# Patient Record
Sex: FEMALE | Race: WHITE | Employment: OTHER | URBAN - METROPOLITAN AREA
[De-identification: names, ages, dates, MRNs, and addresses within clinical notes are randomized per-mention and may not be internally consistent; named-entity substitution may affect disease eponyms.]

---

## 2017-04-05 ENCOUNTER — HOSPITAL ENCOUNTER (INPATIENT)
Age: 82
LOS: 1 days | Discharge: HOME HEALTH | DRG: 293 | End: 2017-04-06
Attending: EMERGENCY MEDICINE | Admitting: HOSPITALIST
Payer: MEDICARE

## 2017-04-05 ENCOUNTER — APPOINTMENT (OUTPATIENT)
Dept: CT IMAGING | Age: 82
DRG: 293 | End: 2017-04-05
Attending: EMERGENCY MEDICINE
Payer: MEDICARE

## 2017-04-05 ENCOUNTER — APPOINTMENT (OUTPATIENT)
Dept: GENERAL RADIOLOGY | Age: 82
DRG: 293 | End: 2017-04-05
Attending: EMERGENCY MEDICINE
Payer: MEDICARE

## 2017-04-05 DIAGNOSIS — R77.8 ELEVATED TROPONIN: ICD-10-CM

## 2017-04-05 DIAGNOSIS — I50.41 ACUTE COMBINED SYSTOLIC AND DIASTOLIC CONGESTIVE HEART FAILURE (HCC): Primary | ICD-10-CM

## 2017-04-05 DIAGNOSIS — I48.20 CHRONIC ATRIAL FIBRILLATION (HCC): ICD-10-CM

## 2017-04-05 PROBLEM — I50.9 CHF, ACUTE ON CHRONIC (HCC): Status: ACTIVE | Noted: 2017-04-05

## 2017-04-05 LAB
ALBUMIN SERPL BCP-MCNC: 3.7 G/DL (ref 3.4–5)
ALBUMIN/GLOB SERPL: 1.1 {RATIO} (ref 0.8–1.7)
ALP SERPL-CCNC: 128 U/L (ref 45–117)
ALT SERPL-CCNC: 14 U/L (ref 13–56)
ANION GAP BLD CALC-SCNC: 11 MMOL/L (ref 3–18)
APPEARANCE UR: CLEAR
AST SERPL W P-5'-P-CCNC: 24 U/L (ref 15–37)
BASOPHILS # BLD AUTO: 0 K/UL (ref 0–0.06)
BASOPHILS # BLD: 0 % (ref 0–2)
BILIRUB SERPL-MCNC: 0.9 MG/DL (ref 0.2–1)
BILIRUB UR QL: NEGATIVE
BNP SERPL-MCNC: 5645 PG/ML (ref 0–1800)
BUN SERPL-MCNC: 17 MG/DL (ref 7–18)
BUN/CREAT SERPL: 19 (ref 12–20)
CALCIUM SERPL-MCNC: 9.3 MG/DL (ref 8.5–10.1)
CHLORIDE SERPL-SCNC: 101 MMOL/L (ref 100–108)
CK MB CFR SERPL CALC: 1.9 % (ref 0–4)
CK MB SERPL-MCNC: 1.3 NG/ML (ref 5–25)
CK SERPL-CCNC: 70 U/L (ref 26–192)
CO2 SERPL-SCNC: 27 MMOL/L (ref 21–32)
COLOR UR: YELLOW
CREAT SERPL-MCNC: 0.91 MG/DL (ref 0.6–1.3)
DIFFERENTIAL METHOD BLD: ABNORMAL
EOSINOPHIL # BLD: 0 K/UL (ref 0–0.4)
EOSINOPHIL NFR BLD: 0 % (ref 0–5)
ERYTHROCYTE [DISTWIDTH] IN BLOOD BY AUTOMATED COUNT: 16.4 % (ref 11.6–14.5)
GLOBULIN SER CALC-MCNC: 3.5 G/DL (ref 2–4)
GLUCOSE SERPL-MCNC: 109 MG/DL (ref 74–99)
GLUCOSE UR STRIP.AUTO-MCNC: NEGATIVE MG/DL
HCT VFR BLD AUTO: 46.1 % (ref 35–45)
HGB BLD-MCNC: 15 G/DL (ref 12–16)
HGB UR QL STRIP: NEGATIVE
KETONES UR QL STRIP.AUTO: NEGATIVE MG/DL
LEUKOCYTE ESTERASE UR QL STRIP.AUTO: NEGATIVE
LYMPHOCYTES # BLD AUTO: 5 % (ref 21–52)
LYMPHOCYTES # BLD: 0.6 K/UL (ref 0.9–3.6)
MCH RBC QN AUTO: 26.5 PG (ref 24–34)
MCHC RBC AUTO-ENTMCNC: 32.5 G/DL (ref 31–37)
MCV RBC AUTO: 81.6 FL (ref 74–97)
MONOCYTES # BLD: 0.5 K/UL (ref 0.05–1.2)
MONOCYTES NFR BLD AUTO: 5 % (ref 3–10)
NEUTS SEG # BLD: 10 K/UL (ref 1.8–8)
NEUTS SEG NFR BLD AUTO: 90 % (ref 40–73)
NITRITE UR QL STRIP.AUTO: NEGATIVE
PH UR STRIP: 6.5 [PH] (ref 5–8)
PLATELET # BLD AUTO: 198 K/UL (ref 135–420)
PMV BLD AUTO: 9.5 FL (ref 9.2–11.8)
POTASSIUM SERPL-SCNC: 4.6 MMOL/L (ref 3.5–5.5)
PROT SERPL-MCNC: 7.2 G/DL (ref 6.4–8.2)
PROT UR STRIP-MCNC: NEGATIVE MG/DL
RBC # BLD AUTO: 5.65 M/UL (ref 4.2–5.3)
SODIUM SERPL-SCNC: 139 MMOL/L (ref 136–145)
SP GR UR REFRACTOMETRY: 1.01 (ref 1–1.03)
TROPONIN I SERPL-MCNC: 0.05 NG/ML (ref 0–0.04)
TSH SERPL DL<=0.05 MIU/L-ACNC: 1.69 UIU/ML (ref 0.36–3.74)
UROBILINOGEN UR QL STRIP.AUTO: 0.2 EU/DL (ref 0.2–1)
WBC # BLD AUTO: 11.1 K/UL (ref 4.6–13.2)

## 2017-04-05 PROCEDURE — 99285 EMERGENCY DEPT VISIT HI MDM: CPT

## 2017-04-05 PROCEDURE — 82550 ASSAY OF CK (CPK): CPT | Performed by: EMERGENCY MEDICINE

## 2017-04-05 PROCEDURE — 71010 XR CHEST SNGL V: CPT

## 2017-04-05 PROCEDURE — 87086 URINE CULTURE/COLONY COUNT: CPT | Performed by: EMERGENCY MEDICINE

## 2017-04-05 PROCEDURE — 51702 INSERT TEMP BLADDER CATH: CPT

## 2017-04-05 PROCEDURE — 85025 COMPLETE CBC W/AUTO DIFF WBC: CPT | Performed by: EMERGENCY MEDICINE

## 2017-04-05 PROCEDURE — 83880 ASSAY OF NATRIURETIC PEPTIDE: CPT | Performed by: EMERGENCY MEDICINE

## 2017-04-05 PROCEDURE — 84443 ASSAY THYROID STIM HORMONE: CPT | Performed by: EMERGENCY MEDICINE

## 2017-04-05 PROCEDURE — 70450 CT HEAD/BRAIN W/O DYE: CPT

## 2017-04-05 PROCEDURE — 77030005514 HC CATH URETH FOL14 BARD -A

## 2017-04-05 PROCEDURE — 65660000000 HC RM CCU STEPDOWN

## 2017-04-05 PROCEDURE — 93005 ELECTROCARDIOGRAM TRACING: CPT

## 2017-04-05 PROCEDURE — 96374 THER/PROPH/DIAG INJ IV PUSH: CPT

## 2017-04-05 PROCEDURE — 74011250636 HC RX REV CODE- 250/636: Performed by: EMERGENCY MEDICINE

## 2017-04-05 PROCEDURE — 80053 COMPREHEN METABOLIC PANEL: CPT | Performed by: EMERGENCY MEDICINE

## 2017-04-05 PROCEDURE — 74011250637 HC RX REV CODE- 250/637: Performed by: EMERGENCY MEDICINE

## 2017-04-05 PROCEDURE — 81003 URINALYSIS AUTO W/O SCOPE: CPT | Performed by: EMERGENCY MEDICINE

## 2017-04-05 RX ORDER — CHOLECALCIFEROL (VITAMIN D3) 125 MCG
2000 CAPSULE ORAL DAILY
COMMUNITY
End: 2017-12-24

## 2017-04-05 RX ORDER — LISINOPRIL 5 MG/1
2.5 TABLET ORAL DAILY
Status: DISCONTINUED | OUTPATIENT
Start: 2017-04-06 | End: 2017-04-06

## 2017-04-05 RX ORDER — METOPROLOL SUCCINATE 25 MG/1
25 TABLET, EXTENDED RELEASE ORAL DAILY
Status: DISCONTINUED | OUTPATIENT
Start: 2017-04-06 | End: 2017-04-06 | Stop reason: HOSPADM

## 2017-04-05 RX ORDER — ACETAMINOPHEN 325 MG/1
500 TABLET ORAL
COMMUNITY
End: 2017-12-24

## 2017-04-05 RX ORDER — CLOPIDOGREL BISULFATE 75 MG/1
75 TABLET ORAL DAILY
COMMUNITY
End: 2017-12-24

## 2017-04-05 RX ORDER — FUROSEMIDE 10 MG/ML
40 INJECTION INTRAMUSCULAR; INTRAVENOUS
Status: COMPLETED | OUTPATIENT
Start: 2017-04-05 | End: 2017-04-05

## 2017-04-05 RX ORDER — FUROSEMIDE 20 MG/1
40 TABLET ORAL DAILY
COMMUNITY

## 2017-04-05 RX ORDER — FUROSEMIDE 10 MG/ML
20 INJECTION INTRAMUSCULAR; INTRAVENOUS DAILY
Status: DISCONTINUED | OUTPATIENT
Start: 2017-04-06 | End: 2017-04-06

## 2017-04-05 RX ORDER — SIMVASTATIN 40 MG/1
40 TABLET, FILM COATED ORAL
COMMUNITY
End: 2017-12-24

## 2017-04-05 RX ORDER — FAMOTIDINE 40 MG/1
40 TABLET, FILM COATED ORAL DAILY
COMMUNITY
End: 2017-12-24

## 2017-04-05 RX ORDER — POTASSIUM CHLORIDE 750 MG/1
TABLET, FILM COATED, EXTENDED RELEASE ORAL EVERY OTHER DAY
COMMUNITY

## 2017-04-05 RX ORDER — GUAIFENESIN 100 MG/5ML
162 LIQUID (ML) ORAL
Status: COMPLETED | OUTPATIENT
Start: 2017-04-05 | End: 2017-04-05

## 2017-04-05 RX ORDER — ALENDRONATE SODIUM 40 MG/1
40 TABLET ORAL
COMMUNITY

## 2017-04-05 RX ORDER — LISINOPRIL 20 MG/1
20 TABLET ORAL DAILY
COMMUNITY
End: 2017-12-24

## 2017-04-05 RX ADMIN — NITROGLYCERIN 1 INCH: 20 OINTMENT TOPICAL at 20:59

## 2017-04-05 RX ADMIN — FUROSEMIDE 40 MG: 10 INJECTION, SOLUTION INTRAMUSCULAR; INTRAVENOUS at 20:59

## 2017-04-05 RX ADMIN — ASPIRIN 81 MG CHEWABLE TABLET 162 MG: 81 TABLET CHEWABLE at 20:59

## 2017-04-05 NOTE — ED PROVIDER NOTES
HPI Comments: Maik Palma is a 80 y.o. Female with h/o chf, normally active, ambulatory with c/o increased sob, diff walking, generalized weakness for last 2-3 days, sig worse today where pt could not walk. Denies cp, cough, nvd abd pain. Increased swelling in le. No known fever. Dec appetite. Sx exacerbated with walking, standing. Is supposed to be on diuretic but does not take all the time    The history is provided by the patient and a relative (daughter). Past Medical History:   Diagnosis Date    Breast cancer ([facility] [address]     Chronic obstructive pulmonary disease ([facility] [address]     Hypertension        Past Surgical History:   Procedure Laterality Date    HX MASTECTOMY           History reviewed. No pertinent family history. Social History     Social History    Marital status:      Spouse name: N/A    Number of children: N/A    Years of education: N/A     Occupational History    Not on file. Social History Main Topics    Smoking status: Never Smoker    Smokeless tobacco: Not on file    Alcohol use No    Drug use: No    Sexual activity: Not on file     Other Topics Concern    Not on file     Social History Narrative    No narrative on file         ALLERGIES: Review of patient's allergies indicates no known allergies. Review of Systems   Constitutional: Positive for activity change and appetite change. Negative for diaphoresis and fever. HENT: Negative for sore throat and trouble swallowing. Eyes: Negative for visual disturbance. Respiratory: Positive for shortness of breath. Negative for cough and wheezing. Cardiovascular: Positive for leg swelling. Negative for chest pain. Gastrointestinal: Negative for abdominal pain, diarrhea and nausea. Endocrine: Negative for polyuria. Genitourinary: Negative for difficulty urinating. Musculoskeletal: Positive for gait problem. Skin: Negative for rash. Allergic/Immunologic: Negative for food allergies.    Neurological: Positive for weakness. Negative for syncope. Hematological: Bruises/bleeds easily. Psychiatric/Behavioral: Positive for confusion and sleep disturbance. Vitals:    04/05/17 1913   BP: (!) 173/117   Pulse: 71   Resp: 13   Temp: 99.8 °F (37.7 °C)   SpO2: 97%   Weight: 49.9 kg (110 lb)   Height: 4' 11\" (1.499 m)            Physical Exam   Constitutional: She is oriented to person, place, and time. She appears well-developed and well-nourished. No distress. Elderly appearing younger than stated age, pleasant     HENT:   Head: Normocephalic and atraumatic. Right Ear: External ear normal.   Left Ear: External ear normal.   Nose: Nose normal.   Mouth/Throat: Uvula is midline, oropharynx is clear and moist and mucous membranes are normal.   Eyes: Conjunctivae are normal. No scleral icterus. Neck: Neck supple. JVD present. Cardiovascular: Normal rate, regular rhythm and intact distal pulses. Murmur heard. Pulmonary/Chest: Effort normal. She has rales (bases). Abdominal: Soft. There is no tenderness. Musculoskeletal: She exhibits edema (ankles,feet). Neurological: She is alert and oriented to person, place, and time. Gait normal.   Skin: Skin is warm and dry. She is not diaphoretic. Psychiatric: Her behavior is normal.   Nursing note and vitals reviewed.        [facility]  ED Course       Procedures      Vitals:  Patient Vitals for the past 12 hrs:   Temp Pulse Resp BP SpO2   04/05/17 1930 - 78 26 192/84 97 %   04/05/17 1915 - 80 24 (!) 196/108 98 %   04/05/17 1913 99.8 °F (37.7 °C) 71 13 (!) 173/117 97 %         Medications ordered:   Medications   furosemide (LASIX) injection 40 mg (40 mg IntraVENous Given 4/5/17 2059)   nitroglycerin (NITROBID) 2 % ointment 1 Inch (1 Inch Topical Given 4/5/17 2059)   aspirin chewable tablet 162 mg (162 mg Oral Given 4/5/17 2059)         Lab findings:  Recent Results (from the past 12 hour(s))   EKG, 12 LEAD, INITIAL    Collection Time: 04/05/17  7:18 PM   Result Value Ref Range    Ventricular Rate 83 BPM    Atrial Rate 375 BPM    QRS Duration 136 ms    Q-T Interval 398 ms    QTC Calculation (Bezet) 467 ms    Calculated R Axis -130 degrees    Calculated T Axis 27 degrees    Diagnosis       Atrial fibrillation  Right bundle branch block , plus right ventricular hypertrophy  Lateral infarct , age undetermined  Abnormal ECG  No previous ECGs available     CBC WITH AUTOMATED DIFF    Collection Time: 04/05/17  7:30 PM   Result Value Ref Range    WBC 11.1 4.6 - 13.2 K/uL    RBC 5.65 (H) 4.20 - 5.30 M/uL    HGB 15.0 12.0 - 16.0 g/dL    HCT 46.1 (H) 35.0 - 45.0 %    MCV 81.6 74.0 - 97.0 FL    MCH 26.5 24.0 - 34.0 PG    MCHC 32.5 31.0 - 37.0 g/dL    RDW 16.4 (H) 11.6 - 14.5 %    PLATELET 073 713 - 205 K/uL    MPV 9.5 9.2 - 11.8 FL    NEUTROPHILS 90 (H) 40 - 73 %    LYMPHOCYTES 5 (L) 21 - 52 %    MONOCYTES 5 3 - 10 %    EOSINOPHILS 0 0 - 5 %    BASOPHILS 0 0 - 2 %    ABS. NEUTROPHILS 10.0 (H) 1.8 - 8.0 K/UL    ABS. LYMPHOCYTES 0.6 (L) 0.9 - 3.6 K/UL    ABS. MONOCYTES 0.5 0.05 - 1.2 K/UL    ABS. EOSINOPHILS 0.0 0.0 - 0.4 K/UL    ABS. BASOPHILS 0.0 0.0 - 0.06 K/UL    DF AUTOMATED     METABOLIC PANEL, COMPREHENSIVE    Collection Time: 04/05/17  7:30 PM   Result Value Ref Range    Sodium 139 136 - 145 mmol/L    Potassium 4.6 3.5 - 5.5 mmol/L    Chloride 101 100 - 108 mmol/L    CO2 27 21 - 32 mmol/L    Anion gap 11 3.0 - 18 mmol/L    Glucose 109 (H) 74 - 99 mg/dL    BUN 17 7.0 - 18 MG/DL    Creatinine 0.91 0.6 - 1.3 MG/DL    BUN/Creatinine ratio 19 12 - 20      GFR est AA >60 >60 ml/min/1.73m2    GFR est non-AA 57 (L) >60 ml/min/1.73m2    Calcium 9.3 8.5 - 10.1 MG/DL    Bilirubin, total 0.9 0.2 - 1.0 MG/DL    ALT (SGPT) 14 13 - 56 U/L    AST (SGOT) 24 15 - 37 U/L    Alk.  phosphatase 128 (H) 45 - 117 U/L    Protein, total 7.2 6.4 - 8.2 g/dL    Albumin 3.7 3.4 - 5.0 g/dL    Globulin 3.5 2.0 - 4.0 g/dL    A-G Ratio 1.1 0.8 - 1.7     CARDIAC PANEL,(CK, CKMB & TROPONIN)    Collection Time: 04/05/17  7:30 PM   Result Value Ref Range    CK 70 26 - 192 U/L    CK - MB 1.3 <3.6 ng/ml    CK-MB Index 1.9 0.0 - 4.0 %    Troponin-I, Qt. 0.05 (H) 0.0 - 0.045 NG/ML   PRO-BNP    Collection Time: 04/05/17  7:30 PM   Result Value Ref Range    NT pro-BNP 5645 (H) 0 - 1800 PG/ML   TSH 3RD GENERATION    Collection Time: 04/05/17  7:30 PM   Result Value Ref Range    TSH 1.69 0.36 - 3.74 uIU/mL   URINALYSIS W/ RFLX MICROSCOPIC    Collection Time: 04/05/17  7:55 PM   Result Value Ref Range    Color YELLOW      Appearance CLEAR      Specific gravity 1.010 1.005 - 1.030      pH (UA) 6.5 5.0 - 8.0      Protein NEGATIVE  NEG mg/dL    Glucose NEGATIVE  NEG mg/dL    Ketone NEGATIVE  NEG mg/dL    Bilirubin NEGATIVE  NEG      Blood NEGATIVE  NEG      Urobilinogen 0.2 0.2 - 1.0 EU/dL    Nitrites NEGATIVE  NEG      Leukocyte Esterase NEGATIVE  NEG         EKG interpretation by ED Physician:  afib with rbbb, no acute st tw changes  Rate 83, qtc 467  No previous  But there is mention in [facility] of afib in 2014    X-Ray, CT or other radiology findings or impressions:  CT HEAD WO CONT    (Results Pending)   XR CHEST SNGL V    (Results Pending)   ct head with nap per prelim read  cxr cardiomegaly, with edema    Progress notes, Consult notes or additional Procedure notes:   Suspect sx related to worsening chf vs infection. Will give lasix, nitro paste  Dw/ pt, daughter, grandson results, need for admission  D/w dr Gari Dance on call for hospitalist who will admit    ED Critical Care Note    System at risk for life threatening failure: cardiac, pulm, renal  Associated problems: elevated trop, htn, afib, elevated bnp    Critical Care services provided: bedside management, consult, documentation  Excluded procedures (time not included in critical care): ecg interp    Total Critical Care Time (in minutes) 34          Reevaluation of patient:   Stable for admission    Disposition:  Diagnosis:   1.  Acute combined systolic and diastolic congestive heart failure (HCC)    2. Elevated troponin        Disposition: admit      Follow-up Information     None            Patient's Medications    No medications on file

## 2017-04-05 NOTE — ED TRIAGE NOTES
PAtient arrived by EMS with chief c/o generalized weakness. Per EMS patient can normally wlak around the house at home and today she couldn't get up. Patient states shes been weak for 3 days. Patient alert and oriented X4.

## 2017-04-05 NOTE — IP AVS SNAPSHOT
Current Discharge Medication List  
  
CONTINUE these medications which have NOT CHANGED Dose & Instructions Dispensing Information Comments Morning Noon Evening Bedtime  
 alendronate 40 mg tablet Commonly known as:  FOSAMAX Dose:  40 mg Take 40 mg by mouth every seven (7) days. Refills:  0  
     
   
   
   
  
 LASIX 20 mg tablet Generic drug:  furosemide Dose:  20 mg Take 20 mg by mouth every other day. Refills:  0  
     
   
   
   
  
 lisinopril 20 mg tablet Commonly known as:  Scar Erin Your last dose was:  4/6/2017 Your next dose is:  4/7/2017 Dose:  20 mg Take 20 mg by mouth daily. Refills:  0 PEPCID 40 mg tablet Generic drug:  famotidine Dose:  40 mg Take 40 mg by mouth daily. Refills:  0 PLAVIX 75 mg Tab Generic drug:  clopidogrel Your last dose was:  4/6/2017 Your next dose is:  4/7/2017 Dose:  75 mg Take 75 mg by mouth daily. Refills:  0  
     
   
   
   
  
 potassium chloride SR 10 mEq tablet Commonly known as:  KLOR-CON 10 Take  by mouth every other day. Refills:  0  
     
   
   
   
  
 TYLENOL 325 mg tablet Generic drug:  acetaminophen Dose:  325 mg Take 325 mg by mouth every six (6) hours as needed for Pain. Indications: Pain Refills:  0  
     
   
   
   
  
 VITAMIN D3 2,000 unit Tab Generic drug:  cholecalciferol (vitamin D3) Your last dose was:  4/6/2017 Your next dose is:  4/7/2017 Dose:  2000 Units Take 2,000 Units by mouth daily. Refills:  0 ZOCOR 40 mg tablet Generic drug:  simvastatin Your last dose was:  4/6/2017 Your next dose is:  4/7/2017 Dose:  40 mg Take 40 mg by mouth nightly. Refills:  0

## 2017-04-05 NOTE — IP AVS SNAPSHOT
Summary of Care Report The Summary of Care report has been created to help improve care coordination. Users with access to [employer] or [address] (Web-based application) may access additional patient information including the Discharge Summary. If you are not currently a [address] user and need more information, please call the number listed below in the [place] [address] section and ask to be connected with Medical Records. Facility Information Name Address Phone [address] [facility] [address] [facility] [address] [id number] [phone] Patient Information Patient Name Sex KRYSTEN Landaverde ([id number]) Female 10/21/1920 Discharge Information Admitting Provider Service Area Unit [facility], DO / [address] Cardiac [facility] / [phone] Discharge Provider Discharge Date/Time Discharge Disposition Destination (none) 2017 (Pending) [facility] (none) Patient Language Language ENGLISH [13] Hospital Problems as of 2017  Never Reviewed Class Noted - Resolved Last Modified POA Active Problems CHF, acute on chronic ([facility] [address]  2017 - Present 2017 by Ector Beckett MD Unknown Entered by Ector Beckett MD  
  
You are allergic to the following No active allergies Current Discharge Medication List  
  
CONTINUE these medications which have NOT CHANGED Dose & Instructions Dispensing Information Comments  
 alendronate 40 mg tablet Commonly known as:  FOSAMAX Dose:  40 mg Take 40 mg by mouth every seven (7) days. Refills:  0  
   
 LASIX 20 mg tablet Generic drug:  furosemide Dose:  20 mg Take 20 mg by mouth every other day. Refills:  0  
   
 lisinopril 20 mg tablet Commonly known as:  Salvadore Dine Dose:  20 mg Take 20 mg by mouth daily. Refills:  0 PEPCID 40 mg tablet Generic drug:  famotidine Dose:  40 mg Take 40 mg by mouth daily. Refills:  0 PLAVIX 75 mg Tab Generic drug:  clopidogrel Dose:  75 mg Take 75 mg by mouth daily. Refills:  0  
   
 potassium chloride SR 10 mEq tablet Commonly known as:  KLOR-CON 10 Take  by mouth every other day. Refills:  0  
   
 TYLENOL 325 mg tablet Generic drug:  acetaminophen Dose:  325 mg Take 325 mg by mouth every six (6) hours as needed for Pain. Indications: Pain Refills:  0  
   
 VITAMIN D3 2,000 unit Tab Generic drug:  cholecalciferol (vitamin D3) Dose:  2000 Units Take 2,000 Units by mouth daily. Refills:  0 ZOCOR 40 mg tablet Generic drug:  simvastatin Dose:  40 mg Take 40 mg by mouth nightly. Refills:  0 Follow-up Information Follow up With Details Comments Contact Info Not On File [facility] Schedule an appointment as soon as possible for a visit in 1 week  Not On File (62) Patient has a PCP but that physician is not listed in [facility]. Discharge Instructions Patient armband removed and shredded. DISCHARGE SUMMARY from Nurse The following personal items are in your possession at time of discharge: 
 
Dental Appliances: At home Visual Aid: Glasses, At bedside, With patient Home Medications: None Jewelry: Ring (sent home) Clothing: None (sent home) Other Valuables: None Personal Items Sent to Safe: none PATIENT INSTRUCTIONS: 
 
After general anesthesia or intravenous sedation, for 24 hours or while taking prescription Narcotics: · Limit your activities · Do not drive and operate hazardous machinery · Do not make important personal or business decisions · Do  not drink alcoholic beverages · If you have not urinated within 8 hours after discharge, please contact your surgeon on call. Report the following to your surgeon: · Excessive pain, swelling, redness or odor of or around the surgical area · Temperature over 100.5 · Nausea and vomiting lasting longer than 4 hours or if unable to take medications · Any signs of decreased circulation or nerve impairment to extremity: change in color, persistent  numbness, tingling, coldness or increase pain · Any questions What to do at Home: 
Recommended activity: Activity as tolerated. If you experience any of the following symptoms shortness of breath, difficulty breathing, fever greater than 100.5, nausea, vomiting, diarrhea, bleeding, weight gain of 5 pounds or more in one week, change in mental status or increasing leg pain please follow up with your primary care provider or call 911. *  Please give a list of your current medications to your Primary Care Provider. *  Please update this list whenever your medications are discontinued, doses are 
    changed, or new medications (including over-the-counter products) are added. *  Please carry medication information at all times in case of emergency situations. These are general instructions for a healthy lifestyle: No smoking/ No tobacco products/ Avoid exposure to second hand smoke Surgeon General's Warning:  Quitting smoking now greatly reduces serious risk to your health. Obesity, smoking, and sedentary lifestyle greatly increases your risk for illness A healthy diet, regular physical exercise & weight monitoring are important for maintaining a healthy lifestyle You may be retaining fluid if you have a history of heart failure or if you experience any of the following symptoms:  Weight gain of 3 pounds or more overnight or 5 pounds in a week, increased swelling in our hands or feet or shortness of breath while lying flat in bed. Please call your doctor as soon as you notice any of these symptoms; do not wait until your next office visit.  
 
Recognize signs and symptoms of STROKE: 
 
 F-face looks uneven A-arms unable to move or move unevenly S-speech slurred or non-existent T-time-call 911 as soon as signs and symptoms begin-DO NOT go Back to bed or wait to see if you get better-TIME IS BRAIN. Warning Signs of HEART ATTACK Call 911 if you have these symptoms: 
? Chest discomfort. Most heart attacks involve discomfort in the center of the chest that lasts more than a few minutes, or that goes away and comes back. It can feel like uncomfortable pressure, squeezing, fullness, or pain. ? Discomfort in other areas of the upper body. Symptoms can include pain or discomfort in one or both arms, the back, neck, jaw, or stomach. ? Shortness of breath with or without chest discomfort. ? Other signs may include breaking out in a cold sweat, nausea, or lightheadedness. Don't wait more than five minutes to call [address]! Fast action can save your life. Calling 911 is almost always the fastest way to get lifesaving treatment. Emergency Medical Services staff can begin treatment when they arrive  up to an hour sooner than if someone gets to the hospital by car. The discharge information has been reviewed with the patient and caregiver. The patient and caregiver verbalized understanding. Discharge medications reviewed with the patient and guardian and appropriate educational materials and side effects teaching were provided. Chart Review Routing History No Routing History on File

## 2017-04-05 NOTE — IP AVS SNAPSHOT
Adriel Flow 
 
 
 [address] [place] [address] 
[phone] Patient: Lita Sprague MRN: [id number] You are allergic to the following No active allergies Recent Documentation Height Weight Breastfeeding? BMI OB Status Smoking Status 1.372 m 49 kg No 26.04 kg/m2 Postmenopausal Never Smoker Unresulted Labs Order Current Status CULTURE, URINE Preliminary result Emergency Contacts Name Discharge Info Relation Home Work Mobile Sommer Travis DISCHARGE CAREGIVER [3] Daughter [21] [phone] About your hospitalization You were admitted on:  April 5, 2017 You last received care in the:  [address] You were discharged on:  April 6, 2017 Unit phone number:  [phone] Why you were hospitalized Your primary diagnosis was:  Not on File Your diagnoses also included:  Chf, Acute On Chronic (Hcc) Providers Seen During Your Hospitalizations Provider Role Specialty Primary office phone Xiomara Potter MD Attending Provider Emergency Medicine [phone] Cleve Cotton MD Attending Provider [facility] [phone] Aleksey Gentile DO Attending Provider Internal Medicine [phone] Your Primary Care Physician (PCP) Primary Care Physician Office Phone Office Fax NOT ON FILE ** None ** ** None ** Follow-up Information Follow up With Details Comments Contact Info Not On File Bsi Schedule an appointment as soon as possible for a visit in 1 week  Not On File (62) Patient has a PCP but that physician is not listed in [facility]. Current Discharge Medication List  
  
CONTINUE these medications which have NOT CHANGED Dose & Instructions Dispensing Information Comments Morning Noon Evening Bedtime  
 alendronate 40 mg tablet Commonly known as:  FOSAMAX Your last dose was: Your next dose is:    
   
   
 Dose:  40 mg Take 40 mg by mouth every seven (7) days. Refills:  0  
     
   
   
   
  
 LASIX 20 mg tablet Generic drug:  furosemide Your last dose was: Your next dose is:    
   
   
 Dose:  20 mg Take 20 mg by mouth every other day. Refills:  0  
     
   
   
   
  
 lisinopril 20 mg tablet Commonly known as:  Fred Hannahh Your last dose was: Your next dose is:    
   
   
 Dose:  20 mg Take 20 mg by mouth daily. Refills:  0 PEPCID 40 mg tablet Generic drug:  famotidine Your last dose was: Your next dose is:    
   
   
 Dose:  40 mg Take 40 mg by mouth daily. Refills:  0 PLAVIX 75 mg Tab Generic drug:  clopidogrel Your last dose was: Your next dose is:    
   
   
 Dose:  75 mg Take 75 mg by mouth daily. Refills:  0  
     
   
   
   
  
 potassium chloride SR 10 mEq tablet Commonly known as:  KLOR-CON 10 Your last dose was: Your next dose is: Take  by mouth every other day. Refills:  0  
     
   
   
   
  
 TYLENOL 325 mg tablet Generic drug:  acetaminophen Your last dose was: Your next dose is:    
   
   
 Dose:  325 mg Take 325 mg by mouth every six (6) hours as needed for Pain. Indications: Pain Refills:  0  
     
   
   
   
  
 VITAMIN D3 2,000 unit Tab Generic drug:  cholecalciferol (vitamin D3) Your last dose was: Your next dose is:    
   
   
 Dose:  2000 Units Take 2,000 Units by mouth daily. Refills:  0 ZOCOR 40 mg tablet Generic drug:  simvastatin Your last dose was: Your next dose is:    
   
   
 Dose:  40 mg Take 40 mg by mouth nightly. Refills:  0 Discharge Instructions Patient armband removed and shredded. DISCHARGE SUMMARY from Nurse The following personal items are in your possession at time of discharge: 
 
Dental Appliances: At home Visual Aid: Glasses, At bedside, With patient Home Medications: None Jewelry: Ring (sent home) Clothing: None (sent home) Other Valuables: None Personal Items Sent to Safe: none PATIENT INSTRUCTIONS: 
 
After general anesthesia or intravenous sedation, for 24 hours or while taking prescription Narcotics: · Limit your activities · Do not drive and operate hazardous machinery · Do not make important personal or business decisions · Do  not drink alcoholic beverages · If you have not urinated within 8 hours after discharge, please contact your surgeon on call. Report the following to your surgeon: 
· Excessive pain, swelling, redness or odor of or around the surgical area · Temperature over 100.5 · Nausea and vomiting lasting longer than 4 hours or if unable to take medications · Any signs of decreased circulation or nerve impairment to extremity: change in color, persistent  numbness, tingling, coldness or increase pain · Any questions What to do at Home: 
Recommended activity: Activity as tolerated. If you experience any of the following symptoms shortness of breath, difficulty breathing, fever greater than 100.5, nausea, vomiting, diarrhea, bleeding, weight gain of 5 pounds or more in one week, change in mental status or increasing leg pain please follow up with your primary care provider or call 911. *  Please give a list of your current medications to your Primary Care Provider. *  Please update this list whenever your medications are discontinued, doses are 
    changed, or new medications (including over-the-counter products) are added. *  Please carry medication information at all times in case of emergency situations. These are general instructions for a healthy lifestyle: No smoking/ No tobacco products/ Avoid exposure to second hand smoke Surgeon General's Warning:  Quitting smoking now greatly reduces serious risk to your health. Obesity, smoking, and sedentary lifestyle greatly increases your risk for illness A healthy diet, regular physical exercise & weight monitoring are important for maintaining a healthy lifestyle You may be retaining fluid if you have a history of heart failure or if you experience any of the following symptoms:  Weight gain of 3 pounds or more overnight or 5 pounds in a week, increased swelling in our hands or feet or shortness of breath while lying flat in bed. Please call your doctor as soon as you notice any of these symptoms; do not wait until your next office visit. Recognize signs and symptoms of STROKE: 
 
F-face looks uneven A-arms unable to move or move unevenly S-speech slurred or non-existent T-time-call 911 as soon as signs and symptoms begin-DO NOT go Back to bed or wait to see if you get better-TIME IS BRAIN. Warning Signs of HEART ATTACK Call 911 if you have these symptoms: 
? Chest discomfort. Most heart attacks involve discomfort in the center of the chest that lasts more than a few minutes, or that goes away and comes back. It can feel like uncomfortable pressure, squeezing, fullness, or pain. ? Discomfort in other areas of the upper body. Symptoms can include pain or discomfort in one or both arms, the back, neck, jaw, or stomach. ? Shortness of breath with or without chest discomfort. ? Other signs may include breaking out in a cold sweat, nausea, or lightheadedness. Don't wait more than five minutes to call [address]! Fast action can save your life. Calling 911 is almost always the fastest way to get lifesaving treatment. Emergency Medical Services staff can begin treatment when they arrive  up to an hour sooner than if someone gets to the hospital by car. The discharge information has been reviewed with the patient and caregiver. The patient and caregiver verbalized understanding. Discharge medications reviewed with the patient and guardian and appropriate educational materials and side effects teaching were provided. Discharge Instructions Attachments/References HEART FAILURE: AVOIDING TRIGGERS (ENGLISH) HEART FAILURE: GENERAL INFO (ENGLISH) Discharge Orders None [employer] Announcement We are excited to announce that we are making your provider's discharge notes available to you in [email or web address]. You will see these notes when they are completed and signed by the physician that discharged you from your recent hospital stay. If you have any questions or concerns about any information you see in [email or web address], please call the Health Information Department where you were seen or reach out to your Primary Care Provider for more information about your plan of care. Introducing [facility] SERVICES! Jersey Miramontes introduces [email or web address] patient portal. Now you can access parts of your medical record, email your doctor's office, and request medication refills online. 1. In your internet browser, go to [email or web address] 2. Click on the First Time User? Click Here link in the Sign In box. You will see the New Member Sign Up page. 3. Enter your [email or web address] Access Code exactly as it appears below. You will not need to use this code after youve completed the sign-up process. If you do not sign up before the expiration date, you must request a new code. · [email or web address] Access Code: [email or web address] Expires: 7/4/2017  7:19 PM 
 
4. Enter the last four digits of your Social Security Number (xxxx) and Date of Birth (mm/dd/yyyy) as indicated and click Submit. You will be taken to the next sign-up page. 5. Create a [employer]t ID. This will be your [email or web address] login ID and cannot be changed, so think of one that is secure and easy to remember. 6. Create a [employer] password. You can change your password at any time. 7. Enter your Password Reset Question and Answer. This can be used at a later time if you forget your password. 8. Enter your e-mail address. You will receive e-mail notification when new information is available in [address] 9. Click Sign Up. You can now view and download portions of your medical record. 10. Click the Download Summary menu link to download a portable copy of your medical information. If you have questions, please visit the Frequently Asked Questions section of the [employer] website. Remember, [employer] is NOT to be used for urgent needs. For medical emergencies, dial 911. Now available from your iPhone and Android! General Information Please provide this summary of care documentation to your next provider. Patient Signature:  ____________________________________________________________ Date:  ____________________________________________________________  
  
Guillermo Kaushik Provider Signature:  ____________________________________________________________ Date:  ____________________________________________________________ More Information Avoiding Triggers With Heart Failure: Care Instructions Your Care Instructions Triggers are anything that make your heart failure flare up. A flare-up is also called \"sudden heart failure\" or \"acute heart failure. \" When you have a flare-up, fluid builds up in your lungs, and you have problems breathing. You might need to go to the hospital. By watching for changes in your condition and avoiding triggers, you can prevent heart failure flare-ups. Follow-up care is a key part of your treatment and safety. Be sure to make and go to all appointments, and call your doctor if you are having problems. It's also a good idea to know your test results and keep a list of the medicines you take. How can you care for yourself at home? Watch for changes in your weight and condition · Weigh yourself without clothing at the same time each day. Record your weight. Call your doctor if you gain 3 pounds or more in 2 to 3 days. A sudden weight gain may mean that your heart failure is getting worse. · Keep a daily record of your symptoms. Write down any changes in how you feel, such as new shortness of breath, cough, or problems eating. Also record if your ankles are more swollen than usual and if you have to urinate in the night more often. Note anything that you ate or did that could have triggered these changes. Limit sodium Sodium causes your body to hold on to extra water. This may cause your heart failure symptoms to get worse. People get most of their sodium from processed foods. Fast food and restaurant meals also tend to be very high in sodium. · Your doctor may suggest that you limit sodium to 2,000 milligrams (mg) a day or less. That is less than 1 teaspoon of salt a day, including all the salt you eat in cooking or in packaged foods. · Read food labels on cans and food packages. They tell you how much sodium you get in one serving. Check the serving size. If you eat more than one serving, you are getting more sodium. · Be aware that sodium can come in forms other than salt, including monosodium glutamate (MSG), sodium citrate, and sodium bicarbonate (baking soda). MSG is often added to Asian food. You can sometimes ask for food without MSG or salt. · Slowly reducing salt will help you adjust to the taste. Take the salt shaker off the table. · Flavor your food with garlic, lemon juice, onion, vinegar, herbs, and spices instead of salt. Do not use soy sauce, steak sauce, onion salt, garlic salt, mustard, or ketchup on your food, unless it is labeled \"low-sodium\" or \"low-salt. \" 
· Make your own salad dressings, sauces, and ketchup without adding salt.  
· Use fresh or frozen ingredients, instead of canned ones, whenever you can. Choose low-sodium canned goods. · Eat less processed food and food from restaurants, including fast food. Exercise as directed Moderate, regular exercise is very good for your heart. It improves your blood flow and helps control your weight. But too much exercise can stress your heart and cause a heart failure flare-up. · Check with your doctor before you start an exercise program. 
· Walking is an easy way to get exercise. Start out slowly. Gradually increase the length and pace of your walk. Swimming, riding a bike, and using a treadmill are also good forms of exercise. · When you exercise, watch for signs that your heart is working too hard. You are pushing yourself too hard if you cannot talk while you are exercising. If you become short of breath or dizzy or have chest pain, stop, sit down, and rest. 
· Do not exercise when you do not feel well. Take medicines correctly · Take your medicines exactly as prescribed. Call your doctor if you think you are having a problem with your medicine. · Make a list of all the medicines you take. Include those prescribed to you by other doctors and any over-the-counter medicines, vitamins, or supplements you take. Take this list with you when you go to any doctor. · Take your medicines at the same time every day. It may help you to post a list of all the medicines you take every day and what time of day you take them. · Make taking your medicine as simple as you can. Plan times to take your medicines when you are doing other things, such as eating a meal or getting ready for bed. This will make it easier to remember to take your medicines. · Get organized. Use helpful tools, such as daily or weekly pill containers. When should you call for help? Call 911 if you have symptoms of sudden heart failure such as: 
· You have severe trouble breathing. · You cough up pink, foamy mucus. · You have a new irregular or rapid heartbeat. Call your doctor now or seek immediate medical care if: 
· You have new or increased shortness of breath. · You are dizzy or lightheaded, or you feel like you may faint. · You have sudden weight gain, such as 3 pounds or more in 2 to 3 days. · You have increased swelling in your legs, ankles, or feet. · You are suddenly so tired or weak that you cannot do your usual activities. Watch closely for changes in your health, and be sure to contact your doctor if you develop new symptoms. Where can you learn more? Go to http://denysEMBIflorencio.info/. Enter V956 in the search box to learn more about \"Avoiding Triggers With Heart Failure: Care Instructions. \" Current as of: November 15, 2016 Content Version: 11.2 © 7119-3030 [employer]. Care instructions adapted under license by [employer] (which disclaims liability or warranty for this information). If you have questions about a medical condition or this instruction, always ask your healthcare professional. [place] [address] any warranty or liability for your use of this information. Learning About Heart Failure What is heart failure? Heart failure means that your heart muscle does not pump as much blood as your body needs. Failure does not mean that your heart has stopped. It means that your heart is not pumping as well as it should. Your body has an amazing ability to make up for heart failure. It may do such a good job that you don't know you have a disease. But at some point, your heart and body will no longer be able to keep up. Then fluid starts to build up in your lungs and other parts of your body. What can you expect when you have heart failure? Heart failure is a lifelong (chronic) disease. Treatment may be able to slow the disease and help you feel better. But heart failure tends to get worse over time.  Despite this, there are many steps you can take to feel better and stay healthy longer. Early on, your symptoms may not be too bad. As heart failure gets worse, symptoms typically get worse, and you may need to limit your activities. Heart failure can also get worse suddenly. If this happens, you need emergency care. Then, after treatment, your symptoms may go back to being stable (which means they stay the same) for a long time. Heart failure can lead to other health problems, such as heart rhythm problems. Over time, your treatment options may change, especially as your symptoms get worse. As heart failure gets worse, palliative care can help improve the quality of your life. You can do advance care planning to decide what kind of care you want at the end of your life. What are the symptoms? Symptoms of heart failure start to happen when your heart can't pump enough blood to the rest of your body. In the early stages of heart failure, you may: · Feel tired easily. · Be short of breath when you exert yourself. · Feel like your heart is pounding or racing (palpitations). · Feel weak or dizzy. As heart failure gets worse, fluid starts to build up in your lungs and other parts of your body. This may cause you to: · Feel short of breath even at rest. 
· Have swelling (edema), especially in your legs, ankles, and feet. · Gain weight. This may happen over just a day or two, or more slowly. · Cough or wheeze, especially when you lie down. How is heart failure treated? · You'll probably take several medicines. · You might attend cardiac rehabilitation (rehab) to get education and support that help you make lifestyle changes and stay as healthy as possible. · You may get a heart device. A pacemaker helps your heart pump blood. An ICD can stop abnormal heart rhythms. How can you care for yourself? There are many steps you can take to feel better and stay healthy longer. These steps are an important part of treatment. They can help you stay active and enjoy life. · Take your medicine the right way. Avoid medicines that can make your symptoms worse. · Check your weight and symptoms every day. Know what to do if your symptoms get worse. · Limit sodium. This helps keep fluid from building up. It may help you feel better. · Be active. Exercise regularly, but don't exercise too hard. · Be heart-healthy. Eat healthy foods, stay at a healthy weight, limit alcohol, and don't smoke. · Stay as healthy as possible. Avoid colds and flu, get help for depression and anxiety, and manage stress. Follow-up care is a key part of your treatment and safety. Be sure to make and go to all appointments, and call your doctor if you are having problems. It's also a good idea to know your test results and keep a list of the medicines you take. Where can you learn more? Go to http://denys-florencio.info/. Enter H303 in the search box to learn more about \"Learning About Heart Failure. \" Current as of: November 15, 2016 Content Version: 11.2 © 4031-3403 [employer]. Care instructions adapted under license by [employer] (which disclaims liability or warranty for this information). If you have questions about a medical condition or this instruction, always ask your healthcare professional. [address] any warranty or liability for your use of this information.

## 2017-04-06 VITALS
WEIGHT: 108 LBS | TEMPERATURE: 97.5 F | BODY MASS INDEX: 24.99 KG/M2 | RESPIRATION RATE: 20 BRPM | HEART RATE: 61 BPM | DIASTOLIC BLOOD PRESSURE: 63 MMHG | SYSTOLIC BLOOD PRESSURE: 114 MMHG | OXYGEN SATURATION: 91 % | HEIGHT: 55 IN

## 2017-04-06 LAB
ATRIAL RATE: 375 BPM
CALCULATED R AXIS, ECG10: -130 DEGREES
CALCULATED T AXIS, ECG11: 27 DEGREES
DIAGNOSIS, 93000: NORMAL
Q-T INTERVAL, ECG07: 398 MS
QRS DURATION, ECG06: 136 MS
QTC CALCULATION (BEZET), ECG08: 467 MS
TROPONIN I SERPL-MCNC: 0.08 NG/ML (ref 0–0.04)
VENTRICULAR RATE, ECG03: 83 BPM

## 2017-04-06 PROCEDURE — 97162 PT EVAL MOD COMPLEX 30 MIN: CPT

## 2017-04-06 PROCEDURE — G8979 MOBILITY GOAL STATUS: HCPCS

## 2017-04-06 PROCEDURE — 99218 HC RM OBSERVATION: CPT

## 2017-04-06 PROCEDURE — G8978 MOBILITY CURRENT STATUS: HCPCS

## 2017-04-06 PROCEDURE — 97530 THERAPEUTIC ACTIVITIES: CPT

## 2017-04-06 PROCEDURE — 93306 TTE W/DOPPLER COMPLETE: CPT

## 2017-04-06 PROCEDURE — 74011250637 HC RX REV CODE- 250/637: Performed by: HOSPITALIST

## 2017-04-06 PROCEDURE — 84484 ASSAY OF TROPONIN QUANT: CPT | Performed by: HOSPITALIST

## 2017-04-06 PROCEDURE — 36415 COLL VENOUS BLD VENIPUNCTURE: CPT | Performed by: HOSPITALIST

## 2017-04-06 RX ORDER — LISINOPRIL 10 MG/1
20 TABLET ORAL DAILY
Status: DISCONTINUED | OUTPATIENT
Start: 2017-04-06 | End: 2017-04-06 | Stop reason: HOSPADM

## 2017-04-06 RX ORDER — FAMOTIDINE 20 MG/1
40 TABLET, FILM COATED ORAL DAILY
Status: DISCONTINUED | OUTPATIENT
Start: 2017-04-06 | End: 2017-04-06

## 2017-04-06 RX ORDER — ENOXAPARIN SODIUM 100 MG/ML
30 INJECTION SUBCUTANEOUS EVERY 24 HOURS
Status: DISCONTINUED | OUTPATIENT
Start: 2017-04-07 | End: 2017-04-06 | Stop reason: HOSPADM

## 2017-04-06 RX ORDER — POTASSIUM CHLORIDE 750 MG/1
10 TABLET, FILM COATED, EXTENDED RELEASE ORAL EVERY OTHER DAY
Status: DISCONTINUED | OUTPATIENT
Start: 2017-04-06 | End: 2017-04-06

## 2017-04-06 RX ORDER — SIMVASTATIN 40 MG/1
40 TABLET, FILM COATED ORAL
Status: DISCONTINUED | OUTPATIENT
Start: 2017-04-06 | End: 2017-04-06 | Stop reason: HOSPADM

## 2017-04-06 RX ORDER — CLOPIDOGREL BISULFATE 75 MG/1
75 TABLET ORAL DAILY
Status: DISCONTINUED | OUTPATIENT
Start: 2017-04-06 | End: 2017-04-06 | Stop reason: HOSPADM

## 2017-04-06 RX ORDER — MELATONIN
2000 DAILY
Status: DISCONTINUED | OUTPATIENT
Start: 2017-04-06 | End: 2017-04-06 | Stop reason: HOSPADM

## 2017-04-06 RX ORDER — FAMOTIDINE 20 MG/1
20 TABLET, FILM COATED ORAL DAILY
Status: DISCONTINUED | OUTPATIENT
Start: 2017-04-07 | End: 2017-04-06 | Stop reason: HOSPADM

## 2017-04-06 RX ORDER — ENOXAPARIN SODIUM 100 MG/ML
40 INJECTION SUBCUTANEOUS EVERY 24 HOURS
Status: DISCONTINUED | OUTPATIENT
Start: 2017-04-06 | End: 2017-04-06

## 2017-04-06 RX ADMIN — FAMOTIDINE 40 MG: 20 TABLET ORAL at 11:35

## 2017-04-06 RX ADMIN — VITAMIN D, TAB 1000IU (100/BT) 2000 UNITS: 25 TAB at 11:35

## 2017-04-06 RX ADMIN — LISINOPRIL 20 MG: 10 TABLET ORAL at 11:35

## 2017-04-06 RX ADMIN — CLOPIDOGREL BISULFATE 75 MG: 75 TABLET, FILM COATED ORAL at 11:35

## 2017-04-06 RX ADMIN — METOPROLOL SUCCINATE 25 MG: 25 TABLET, EXTENDED RELEASE ORAL at 11:36

## 2017-04-06 NOTE — DISCHARGE SUMMARY
Internal Medicine Discharge Summary        Patient: Ean Valles    YOB: 1920    Age:  80 y.o. Admit Date: 4/5/2017    Discharge Date: 4/6/2017    LOS:  LOS: 1 day     Discharge To: Home    Consults: None    Admission Diagnoses: CHF, acute on chronic ([facility]  CHF, acute on chronic [facility])    Discharge Diagnoses:    #Ambulatory Dysfunction 2/2 Generalized Muscle Weakness  #HTN  #HLD  #Vit D Def    Discharge Condition:  Improved    Procedures: None         HPI: Ean Valles is a 80 y.o. female who presents to the ED complaining of body pain and inability to walk. Patient's daughter is at bedside and reports that today it took 20 minutes to walk to the bathroom. The patient Complained of pain and became short winded with ambulation. She also reports that patient had purple hands that turned white and cold. Decreased appetite. She decided to bring the patient in for medial evaluation. While n the ED, patien found ot have elevated BNP. Hospital Course (Including Significant Findings): The patient was admitted to the hospital for further management of their presenting condition. Although she received IV lasix, she did not have too much urine output. She was feeling much better the following day. Echo did not show any signs of chronic heart failure. She was seen by PT and they recommended home health PT. The rest of the patient's chronic conditions were managed appropriately during their admission. They were medically stable at the time of discharge.     Visit Vitals    /63 (BP 1 Location: Right arm)    Pulse 61    Temp 97.5 °F (36.4 °C)    Resp 20    Ht 4' 6\" (1.372 m)    Wt 49 kg (108 lb)    SpO2 91%    Breastfeeding No    BMI 26.04 kg/m2       Physical Exam at Discharge:  General Appearance: NAD, conversant  HENT: normocephalic/atraumatic, moist mucus membranes  Lungs: CTA with normal respiratory effort  CV: RRR, no m/r/g  Abdomen: soft, non-tender, normal bowel sounds  Extremities: no cyanosis, no peripheral edema  Neuro: moves all extremities, no focal deficits  Psych: appropriate affect, alert and oriented to person, place and time    Labs Prior to Discharge:  Labs: Results:       Chemistry Recent Labs      04/05/17 1930   GLU  109*   NA  139   K  4.6   CL  101   CO2  27   BUN  17   CREA  0.91   CA  9.3   AGAP  11   BUCR  19   AP  128*   TP  7.2   ALB  3.7   GLOB  3.5   AGRAT  1.1      CBC w/Diff Recent Labs      04/05/17 1930   WBC  11.1   RBC  5.65*   HGB  15.0   HCT  46.1*   PLT  198   GRANS  90*   LYMPH  5*   EOS  0      Cardiac Enzymes Recent Labs      04/05/17 1930   CPK  70   CKND1  1.9      Coagulation No results for input(s): PTP, INR, APTT in the last 72 hours. No lab exists for component: INREXT    Lipid Panel No results found for: CHOL, CHOLPOCT, CHOLX, CHLST, CHOLV, [id number], HDL, LDL, NLDLCT, DLDL, LDLC, DLDLP, [id number], VLDLC, VLDL, TGL, TGLX, TRIGL, [id number], TRIGP, TGLPOCT, [id number], CHHD, CHHDX   BNP No results for input(s): BNPP in the last 72 hours. Liver Enzymes Recent Labs      04/05/17 1930   TP  7.2   ALB  3.7   AP  128*   SGOT  24      Thyroid Studies Lab Results   Component Value Date/Time    TSH 1.69 04/05/2017 07:30 PM            Significant Imaging:  Xr Chest Sngl V    Result Date: 4/6/2017  Chest, single view Indication: Chest pain, shortness of breath Comparison: July 25, 2007 Findings:  Portable upright AP view of the chest was obtained. The patient is slightly rotated to the left with kyphotic positioning. Evidence of prior median sternotomy and CABG. The cardiomediastinal silhouette is grossly unchanged in size given difference in technique/positioning however, the aorta is tortuous with atheromatous calcifications. Coarsened interstitial opacities are seen bilaterally with mild bibasilar volume loss. No focal consolidation. No effusion or pneumothorax. No acute osseous findings.  There does appear to be slight anterior subluxation of the right humeral head, incompletely assessed. Surgical clips recommended over the bilateral chest walls are noted. Impression: 1. No acute pulmonary process. 2. Mild cardiomegaly with chronic appearing interstitial opacities may reflect underlying pulmonary venous hypertension. 3. Apparent anterior subluxation of the right shoulder, incompletely assessed but may represent sequela of prior shoulder dislocation. Consider dedicated right shoulder radiographs as clinically indicated. Ct Head Wo Cont    Result Date: 4/6/2017  EXAM: CT head INDICATION: Weakness, gait difficulties. COMPARISON: None. TECHNIQUE: Axial CT imaging of the head was obtained from skull base to vertex without intravenous contrast. One or more dose reduction techniques were used on this CT: automated exposure control, adjustment of the mAs and/or kVp according to patient's size, and iterative reconstruction techniques. The specific techniques utilized on this CT exam have been documented in the patient's electronic medical record. _______________ FINDINGS: BRAIN AND POSTERIOR FOSSA: There is diffuse prominence of the ventricular system, associated with proportional widening of the cortical cerebral sulci, compatible with generalized volume loss. There is no intracranial hemorrhage, mass effect, or shift of midline structures. Scattered periventricular and subcortical hypoattenuation which is a nonspecific finding, most commonly encountered in the setting of chronic microvascular disease. Cerebral atherosclerosis noted. EXTRA-AXIAL SPACES AND MENINGES: There are no abnormal extra-axial fluid collections CALVARIUM: No acute osseous abnormality. SINUSES: The visualized portions of the paranasal sinuses and mastoid air cells are well aerated. OTHER: None. _______________     IMPRESSION: 1. No acute intracranial abnormalities are identified.    2.  Mild cerebral atrophy/volume loss and periventricular white matter changes, most commonly seen with chronic microvascular disease. Preliminary report was given by the on-call radiology resident. Discharge Medications:     Current Discharge Medication List      CONTINUE these medications which have NOT CHANGED    Details   clopidogrel (PLAVIX) 75 mg tab Take 75 mg by mouth daily. lisinopril (PRINIVIL, ZESTRIL) 20 mg tablet Take 20 mg by mouth daily. potassium chloride SR (KLOR-CON 10) 10 mEq tablet Take  by mouth every other day. famotidine (PEPCID) 40 mg tablet Take 40 mg by mouth daily. simvastatin (ZOCOR) 40 mg tablet Take 40 mg by mouth nightly. furosemide (LASIX) 20 mg tablet Take 20 mg by mouth every other day. cholecalciferol, vitamin D3, (VITAMIN D3) 2,000 unit tab Take 2,000 Units by mouth daily. alendronate (FOSAMAX) 40 mg tablet Take 40 mg by mouth every seven (7) days. acetaminophen (TYLENOL) 325 mg tablet Take 325 mg by mouth every six (6) hours as needed for Pain. Indications: Pain             Activity: PT/OT per Home Health    Diet: Resume previous diet    Wound Care: None needed    Follow-up:   Please follow up with your PCP within 7 days to discuss your recent hospitalization. Patient to arrange.          Total time spent including time spent on final examination and discharge discussion, discharge documentation and records reviewed and medication reconciliation: > 30 minutes    Reyes Clan, DO  Internal Medicine, Hospitalist  Pager: [phone]

## 2017-04-06 NOTE — PROGRESS NOTES
[employer]   Discharge Planning/ Assessment    Reasons for Intervention: Chart reviewed and verified with pt. Her PCP is Dr Maria Del Carmen Archibald in [place]. She lives with daughter Suze Clark [phone]. Dtr assists her with ADL. She had previous HH, agter havin an episode of swollen legs, but does not remember company. She has a walker, wc and cane at home her dtr will drive her home and participate in dc process. Plan is home, with [facility] pt/ot as needed.     High Risk Criteria  [] Yes  [x]No   Physician Referral  [] Yes  [x]No        Date    Nursing Referral  [] Yes  [x]No        Date    Patient/Family Request  [] Yes  [x]No        Date       Resources:    Medicare  [x] Yes  []No   Medicaid  [] Yes  [x]No   No Resources  [] Yes  [x]No   Private Insurance  [] Yes  [x]No    Name/Phone Number    Other  [] Yes  [x]No        (i.e. Workman's Comp)         Prior Services:    Prior Services  [x] Yes  []No   Home Health  [x] Yes  []No   [address]  [] Yes  [x]No        Number of [address]  [] Yes  [x]No       Meals on Wheels  [] Yes  [x]No   Office on Aging  [] Yes  [x]No   Transportation Services  [] Yes  [x]No   Nursing Home  [] Yes  [x]No        Nursing Home Name    [address]  [] Yes  [x]No        [address] Name    Other       Information Source:      Information obtained from  [x] Patient  [] Parent   [] [facility]  [] Child  [] Spouse   [] Significant Other/Partner   [] Friend      [] EMS    [] Nursing Home Chart          [] Other:   Chart Review  [x] Yes  []No     Family/Support System:    Patient lives with  [] Alone    [] Spouse   [] Significant Other  [x] Children  [] Caretaker   [] Parent  [] Sibling     [] Other       Other Support System:    Is the patient responsible for care of others  [] Yes  [x]No   Information of person caring for patient on  discharge    Managers financial affairs independently  [] Yes  [x]No   If no, explain: Status Prior to Admission:    Mental Status  [x] Awake  [] Alert  [] Oriented  [] Quiet/Calm [] Lethargic/Sedated   [] Disoriented  [] Restless/Anxious  [] Combative   Personal Care  [x] Dependent  [] Independent Personal Care  [] Requires Assistance   Meal Preparation Ability  [] Independent   [] Standby Assistance   [] Minimal Assistance   [] Moderate Assistance  [] Maximum Assistance     [x] Total Assistance   Chores  [] Independent with Chores   [] N/A Nursing Home Resident   [x] Requires Assistance   Bowel/Bladder  [x] Continent  [] Catheter  [] Incontinent  [] Ostomy Self-Care    [] Urine Diversion Self-Care  [] Maximum Assistance     [] Total Assistance   Number of Persons needed for assistance    DME at home  [] Eugena Anjali Russo  [x] Eugena Pool, Straight   [] Commode    [] Bathroom/Grab Bars  [] Hospital Bed  [] Nebulizer  [] Oxygen           [] Raised Toilet Seat  [] Shower Chair  [] Side Rails for Bed   [] Tub Transfer Bench   [] Tasia Matamoros  [x] Walker, Standard      [] Other:   Vendor      Treatment Presently Receiving:    Current Treatments  [] Chemotherapy  [] Dialysis  [] Insulin  [] IVAB [x] IVF   [] O2  [] PCA   [] PT   [] RT   [] Tube Feedings   [] Wound Care     Psychosocial Evaluation:    Verbalized Knowledge of Disease Process  [] Patient  []Family   Coping with Disease Process  [] Patient  []Family   Requires Further Counseling Coping with Disease Process  [] Patient  []Family     Identified Projected Needs:    Home Health Aid  [] Yes  [x]No   Transportation  [] Yes  [x]No   Education  [] Yes  [x]No        Specific Education     Financial Counseling  [] Yes  [x]No   Inability to Care for Self/Will Require 24 hour care  [] Yes  [x]No   Pain Management  [] Yes  [x]No   Home Infusion Therapy  [] Yes  [x]No   Oxygen Therapy  [] Yes  [x]No   DME  [] Yes  [x]No   Long Term Care Placement  [] Yes  [x]No   Rehab  [] Yes  [x]No   Physical Therapy  [] Yes  [x]No   Needs Anticipated At This Time  [] Yes [x]No     Intra-Hospital Referral:    [address] [facility]  [] Yes  [x]No     [] Yes  [x]No   Patient Representative  [] Yes  [x]No   Staff for Teaching Needs  [] Yes  [x]No   Specialty Teaching Needs     Diabetic Educator  [] Yes  [x]No   Referral for Diabetic Educator Needed  [] Yes  [x]No  If Yes, place order for Nutritionist or Diabetic Consult     Tentative Discharge Plan:    Home with No Services  [] Yes  [x]No   Home with Home Health Follow-up  [x] Yes  []No        If Yes, specify type    Home Care Program  [] Yes  [x]No        If Yes, specify type    Meals on Wheels  [] Yes  [x]No   Office of Aging  [] Yes  [x]No   NHP  [] Yes  [x]No   Return to the Nursing Home  [] Yes  [x]No   Rehab Therapy  [] Yes  [x]No   Acute Rehab  [] Yes  [x]No   Subacute Rehab  [] Yes  [x]No   Private Care  [] Yes  [x]No   Substance Abuse Referral  [] Yes  [x]No   Transportation  [] Yes  [x]No   Chore Service  [] Yes  [x]No   Inpatient Hospice  [] Yes  [x]No   OP RT  [] Yes  [x] No   OP Hemo  [] Yes  [x] No   OP PT  [] Yes  [x]No   Support Group  [] Yes  [x]No   Reach to Recovery  [] Yes  [x]No   OP Oncology Clinic  [] Yes  [x]No   Clinic Appointment  [] Yes  [x]No   DME  [] Yes  [x]No   Comments    Name of D/C Planner or  Given to Patient or Family Sarwat Ramon. Luisa Godinez RN   Phone Number         [phone]   Date 04/06/17   Time    If you are discharged home, whom do you designate to participate in your discharge plan and receive any information needed?      Enter name of designee dtr        Phone # of designee         Address of designee         Updated         Patient refused to designate any           individual

## 2017-04-06 NOTE — PROGRESS NOTES
mel completed formal not to follow  Patient to benefit from physical therapy home health and a rolling walker for home.

## 2017-04-06 NOTE — PROGRESS NOTES
Nutrition initial assessment/Plan of care      RECOMMENDATIONS:   1. Low Na ( 2g) diet  2. Ensure Plus daily  3. Monitor weight and PO intake  4. RD to follow     GOALS:   1. PO intake meets >75% of protein/calorie needs by 4/11  2. Weight Maintenance (+/- 1-2 lb) by 4/13       ASSESSMENT:   Per BMI of 26.0, weight is in the overweight classification. However, weight is appropriate for patient. PO intake is fair. Labs noted. Nutrition recommendations listed. RD to follow. Nutrition Diagnoses:   None at this time     Nutrition Risk:  [] High  [x] Moderate []  Low    SUBJECTIVE/OBJECTIVE:   Patient admitted for CHF. She has h/o COPD. Per daughter, patient was 113 lb 1 week ago at the doctor. They were surprised that she was 108 lb here. Patient ate about 50% of her lunch. Patient doesn't eat a lot at meal time but she like to snack throughout the day. Daughter cooks her dinner and she would eat her leftover for lunch. She likes sweets and fruits. Information Obtained from:    [x] Chart Review   [x] Patient   [x] Family/Caregiver   [] Nurse/Physician   [] Interdisciplinary Meeting/Rounds    Diet: Cardiac  Medications: [x] Reviewed (Vitamin D3, KCl)   Allergies: [x] Reviewed   Encounter Diagnoses     ICD-10-CM ICD-9-CM   1. Acute combined systolic and diastolic congestive heart failure (HCC) I50.41 428.41     428.0   2. Elevated troponin R74.8 790.6   3.  Chronic atrial fibrillation (HCC) I48.2 427.31     Past Medical History:   Diagnosis Date    [facility] ([facility]     Chronic obstructive pulmonary disease ([facility]     Hypertension       Labs:  Lab Results   Component Value Date/Time    Sodium 139 04/05/2017 07:30 PM    Potassium 4.6 04/05/2017 07:30 PM    Chloride 101 04/05/2017 07:30 PM    CO2 27 04/05/2017 07:30 PM    Anion gap 11 04/05/2017 07:30 PM    Glucose 109 04/05/2017 07:30 PM    BUN 17 04/05/2017 07:30 PM    Creatinine 0.91 04/05/2017 07:30 PM    Calcium 9.3 04/05/2017 07:30 PM    Albumin 3.7 04/05/2017 07:30 PM     Anthropometrics: BMI (calculated): 26  Last 3 Recorded Weights in this Encounter    04/05/17 1913 04/05/17 2226 04/06/17 0619   Weight: 49.9 kg (110 lb) 49.2 kg (108 lb 7.5 oz) 49 kg (108 lb)    Ht Readings from Last 1 Encounters:   04/05/17 4' 6\" (1.372 m)     Patient Vitals for the past 100 hrs:   % Diet Eaten   04/06/17 1316 50 %   04/06/17 1012 100 %   04/06/17 0232 50 %       IBW: 70 lb %IBW: 154%  Estimated Nutrition Needs: [x] MSJ  [] Other:  Calories: 9897-9795 kcal Based on:   [x] Actual BW    Protein:   55-65 g Based on:   [x] Actual BW    Fluid:       8399-9903 ml Based on:   [x] Actual BW      [x] No Cultural, Muslim or ethnic dietary need identified.     [] Cultural, Muslim and ethnic food preferences identified and addressed     Wt Status:  [] Normal (18.6 - 24.9) [] Underweight (<18.5) [x] Overweight (25 - 29.9) [] Mild Obesity (30 - 34.9)  [] Moderate Obesity (35 - 39.9) [] Morbid Obesity (40+)   [] Moderate Malnutrition [] Severe Malnutrition in the context of :     Nutrition Problems Identified:   [x] Suboptimal PO intake   [] Food Allergies  [] Difficulty chewing/swallowing/poor dentition  [] Constipation/Diarrhea   [] Nausea/Vomiting   [] None  [] Other:     Plan:   [x] Therapeutic Diet  [x]  Obtained/adjusted food preferences/tolerances and/or snacks options   [x]  Supplements added   [] Occupational therapy following for feeding techniques  []  HS snack added   []  Modify diet texture   []  Modify diet for food allergies   []  Educate patient   []  Assist with menu selection   [x]  Monitor PO intake on meal rounds   [x]  Continue inpatient monitoring and intervention   []  Participated in discharge planning/Interdisciplinary rounds/Team meetings   []  Other:     Education Needs:   [] Not appropriate for teaching at this time due to:   [x] Identified and addressed    Nutrition Monitoring and Evaluation:  [x] Continue ongoing monitoring and intervention  [] Other    Ayesha Vega, [address] [place] [address]  Pager: [phone]

## 2017-04-06 NOTE — ROUTINE PROCESS
Bedside and Verbal shift change report given to [facility] (oncoming nurse) by Laura Bardales (offgoing nurse). Report included the following information SBAR, Kardex, Procedure Summary, Intake/Output, MAR and Recent Results. 0900 Spoke to pt's grandson and two daughters by phone to update them on pt's condition. 1150 Pt resting in bed with no change to condition. Will continue to monitor.  Pt [facility] to [facility] for 2D Echo. Pt transported on stretcher via transport. 1402 Pt returned to room Daughter at bedside. 1410 Removed pt's de guzman catheter without difficulty or event. Will continue to monitor. 7003 Per Archie Barker she will contact pt's daughter on 4/7/2017 concerning pt's home care. 1658 Pt voided without difficulty post de guzman catheter removal. Bedside commode in place. 4057 Per Archie Barker, , pt's daughter will be contacted in the am about home health and walker delivery. 1800 Discharge instructions reviewed with pt and daughter. Questions answered and clarifications provided. 1805 Pt discharged via wheelchair with daughter.

## 2017-04-06 NOTE — PROGRESS NOTES
conducted an initial consultation and Spiritual Assessment for Tasia Fritz, who is a 80 y.o.,female. Patients Primary Language is: [place]. According to the patients EMR Restoration Affiliation is: No preference. The reason the Patient came to the hospital is:   Patient Active Problem List    Diagnosis Date Noted    CHF, acute on chronic ([facility] 04/05/2017        The  provided the following Interventions:  Initiated a relationship of care and support with patient in room 3005  Listened empathically as she talked about being here and her pleasure over out visit. Provided information about Spiritual Care Services. Offered prayer and assurance of continued prayers on patients behalf. The following outcomes were achieved:  Patient shared limited information about her medical narrative and spiritual journey/beliefs. Patient processed feeling about current hospitalization. Patient expressed gratitude for pastoral care visit. Assessment:  Patient does not have any Confucianism/cultural needs that will affect patients preferences in health care. There are no further spiritual or Confucianism issues which require Spiritual Care Services interventions at this time. Plan:  Chaplains will continue to follow and will provide pastoral care on an as needed/requested basis    . Martin Sahni   Spiritual Care   [phone]

## 2017-04-06 NOTE — H&P
History and Physical    Patient: Bob Stern               Sex: female          DOA: 4/5/2017       YOB: 1920      Age:  80 y.o.        LOS:  LOS: 0 days        HPI:     Bob Stern is a 80 y.o. female who presents to the ED complaining of body pain and inability to walk. Patient's daughter is at bedside and reports that today it took 20 minutes to walk to the bathroom. The patient  Complained of pain and became short winded with ambulation. She also reports that patient had purple hands that turned white and cold. Decreased appetite. She decided to bring the patient in for medial evaluation. While n the ED, patien found ot have elevated BNP. Patient admitted for acute on chronic CHF. Past Medical History:   Diagnosis Date    Breast cancer ([facility]     Chronic obstructive pulmonary disease ([facility]     Hypertension        Past Surgical History:   Procedure Laterality Date    HX MASTECTOMY         Social History     Social History    Marital status:      Spouse name: N/A    Number of children: N/A    Years of education: N/A     Occupational History    Not on file. Social History Main Topics    Smoking status: Never Smoker    Smokeless tobacco: Not on file    Alcohol use No    Drug use: No    Sexual activity: Not on file     Other Topics Concern    Not on file     Social History Narrative    No narrative on file       History reviewed. No pertinent family history. No Known Allergies    Review of Systems:  Positive in bold. CONST: Fever, weight loss, fatigue or chills  GI: Nausea, vomiting, abdominal pain, change in bowel habits, hematochezia, melena, and GERD   INTEG: Dermatitis, abnormal moles  HEENT: Recent changes in vision, vertigo, epistaxis, dysphagia, hoarseness  CV: Chest pain, palpitations, HTN, edema and varicosities  RESP: Cough, shortness of breath, wheezing, hemoptysis, snoring.    : Hematuria, dysuria, frequency, urgency, retention, incontinence   MS: Weakness, joint pain and arthritis  ENDO: Diabetes, thyroid disease, polyuria, polydipsia, polyphagia, poor wound healing, heat intolerance, cold intolerance  LYMPH/HEME: Anemia, bruising and history of blood transfusions  NEURO: Dizziness, headache, fainting, seizures and stroke  PSYCH: Anxiety , depression    Physical Exam:      Visit Vitals    /84    Pulse 78    Temp 99.8 °F (37.7 °C)    Resp 26    Ht 4' 11\" (1.499 m)    Wt 49.9 kg (110 lb)    SpO2 97%    BMI 22.22 kg/m2       Physical Exam:  Gen:  No distress, alert, awake and oriented x 4  HEENT:  Normal cephalic atraumatic, extra-occular movements are intact. Neck:  Supple, No JVD  Lungs:  Clear bilaterally, no wheeze, no rales, normal effort  Cardiovascular: irregularly irregular Rhythm, normal S1 and S2, no audible murmur. Capillary refill: < 3 seconds. Abdomen:  Soft, non tender, non distended, normal bowel sounds, no guarding. Extremities:  Well perfused, no cyanosis,1+ edema  Neurological:  Awake and alert, CN's are intact, normal strength throughout extremities  Skin:  No rashes or moles. Turgor and color normal  Mental Status:  Normal thought process, does not appear anxious      Laboratory Studies: All lab results for the last 24 hours reviewed. Assessment/Plan     Active Problems:    CHF  A-Fib      PLAN:    Respiratory: shortness of breath. Patient breathing well on room air. Oxygen via nasal canula as needed    CV: A-Fib, CHF, acute on chronic   Admit to tele   Echocardiogram in am   Lasix 20mg IV daily   Fluid restriction-cardiac diet. Heme:  DVT prophylaxis   Bilateral lower extremity compression devices    Misc:  FULL CODE   Physical therapy to evaluate and treat   Fall precautions   Ambulate with assistance. Monitor intake and output   Monitor vitals as per unit   Replace electrolytes as needed. Follow up am labs.           Sen Cameron MD

## 2017-04-06 NOTE — PROGRESS NOTES
Internal Medicine Progress Note    Patient's Name: Joan Bass  Admit Date: 4/5/2017  Length of Stay: 1      Assessment/Plan     #Acute CHF, Diastolic vs Systolic  #HTN  #HLD  #Vit D Def  #Ambulatory Dysfunction 2/2 Generalized Muscle Weakness  #DVT PPx    - D/c further diuretics as pt no longer in heart failure. Echo pending. Trend BMP. Daily wts  - BP stable. Cont home regimen  - Cont statin  - Cont Vit D  - PT eval/tx  - Lovenox    I have personally reviewed all pertinent labs and films that have officially resulted over the last 24 hours. I have personally checked for all pending labs that are awaiting final results.     Subjective     Pt s/e @ bedside  No major events overnight  Pt offers no complaints this AM  Confused at baseline  Denies CP or SOB    Objective     Visit Vitals    /75 (BP 1 Location: Right arm)    Pulse 65    Temp 97.5 °F (36.4 °C)    Resp 20    Ht 4' 6\" (1.372 m)    Wt 49 kg (108 lb)    SpO2 93%    Breastfeeding No    BMI 26.04 kg/m2       Physical Exam:  General Appearance: NAD, conversant  Lungs: CTA with normal respiratory effort  CV: RRR, no m/r/g  Abdomen: soft, non-tender, normal bowel sounds  Extremities: no cyanosis, no peripheral edema  Psych: appropriate affect, AA&Ox1    Lab/Data Reviewed:  BMP:   Lab Results   Component Value Date/Time     04/05/2017 07:30 PM    K 4.6 04/05/2017 07:30 PM     04/05/2017 07:30 PM    CO2 27 04/05/2017 07:30 PM    AGAP 11 04/05/2017 07:30 PM     (H) 04/05/2017 07:30 PM    BUN 17 04/05/2017 07:30 PM    CREA 0.91 04/05/2017 07:30 PM    GFRAA >60 04/05/2017 07:30 PM    GFRNA 57 (L) 04/05/2017 07:30 PM     CBC:   Lab Results   Component Value Date/Time    WBC 11.1 04/05/2017 07:30 PM    HGB 15.0 04/05/2017 07:30 PM    HCT 46.1 (H) 04/05/2017 07:30 PM     04/05/2017 07:30 PM       Imaging Reviewed:  Xr Chest Sngl V    Result Date: 4/6/2017  Chest, single view Indication: Chest pain, shortness of breath Comparison: July 25, 2007 Findings:  Portable upright AP view of the chest was obtained. The patient is slightly rotated to the left with kyphotic positioning. Evidence of prior median sternotomy and CABG. The cardiomediastinal silhouette is grossly unchanged in size given difference in technique/positioning however, the aorta is tortuous with atheromatous calcifications. Coarsened interstitial opacities are seen bilaterally with mild bibasilar volume loss. No focal consolidation. No effusion or pneumothorax. No acute osseous findings. There does appear to be slight anterior subluxation of the right humeral head, incompletely assessed. Surgical clips recommended over the bilateral chest walls are noted. Impression: 1. No acute pulmonary process. 2. Mild cardiomegaly with chronic appearing interstitial opacities may reflect underlying pulmonary venous hypertension. 3. Apparent anterior subluxation of the right shoulder, incompletely assessed but may represent sequela of prior shoulder dislocation. Consider dedicated right shoulder radiographs as clinically indicated. Ct Head Wo Cont    Result Date: 4/6/2017  EXAM: CT head INDICATION: Weakness, gait difficulties. COMPARISON: None. TECHNIQUE: Axial CT imaging of the head was obtained from skull base to vertex without intravenous contrast. One or more dose reduction techniques were used on this CT: automated exposure control, adjustment of the mAs and/or kVp according to patient's size, and iterative reconstruction techniques. The specific techniques utilized on this CT exam have been documented in the patient's electronic medical record. _______________ FINDINGS: BRAIN AND POSTERIOR FOSSA: There is diffuse prominence of the ventricular system, associated with proportional widening of the cortical cerebral sulci, compatible with generalized volume loss. There is no intracranial hemorrhage, mass effect, or shift of midline structures.   Scattered periventricular and subcortical hypoattenuation which is a nonspecific finding, most commonly encountered in the setting of chronic microvascular disease. Cerebral atherosclerosis noted. EXTRA-AXIAL SPACES AND MENINGES: There are no abnormal extra-axial fluid collections CALVARIUM: No acute osseous abnormality. SINUSES: The visualized portions of the paranasal sinuses and mastoid air cells are well aerated. OTHER: None. _______________     IMPRESSION: 1. No acute intracranial abnormalities are identified. 2.  Mild cerebral atrophy/volume loss and periventricular white matter changes, most commonly seen with chronic microvascular disease. Preliminary report was given by the on-call radiology resident.        Medications Reviewed:  Current Facility-Administered Medications   Medication Dose Route Frequency    clopidogrel (PLAVIX) tablet 75 mg  75 mg Oral DAILY    famotidine (PEPCID) tablet 40 mg  40 mg Oral DAILY    lisinopril (PRINIVIL, ZESTRIL) tablet 20 mg  20 mg Oral DAILY    potassium chloride SR (KLOR-CON 10) tablet 10 mEq  10 mEq Oral EVERY OTHER DAY    simvastatin (ZOCOR) tablet 40 mg  40 mg Oral QHS    cholecalciferol (VITAMIN D3) tablet 2,000 Units  2,000 Units Oral DAILY    metoprolol succinate (TOPROL-XL) XL tablet 25 mg  25 mg Oral DAILY           Heide Marley DO  Internal Medicine, Hospitalist  Pager: [phone] [facility]

## 2017-04-06 NOTE — PROGRESS NOTES
Attempted to see for evaluation patient off the floor for testing will attempt later today as time permits.

## 2017-04-06 NOTE — ACP (ADVANCE CARE PLANNING)
Patient has designated ________Dtr________________ to participate in his/her discharge plan and to receive any needed information.      Name: Adelle Scheuermann  Address:  Phone number:[phone]

## 2017-04-06 NOTE — PROGRESS NOTES
Reason for Renal Dosing:  Per Renal Dosing Policy    Patient clinical status and labs ordered/reviewed. Pt Weight Weight: 49 kg (108 lb)   Serum Creatinine Lab Results   Component Value Date/Time    Creatinine 0.91 04/05/2017 07:30 PM       Creatinine Clearance Estimated Creatinine Clearance: 28 mL/min (based on Cr of 0.91). BUN Lab Results   Component Value Date/Time    BUN 17 04/05/2017 07:30 PM       WBC Lab Results   Component Value Date/Time    WBC 11.1 04/05/2017 07:30 PM      Temperature Temp: 97.5 °F (36.4 °C)   HR Pulse (Heart Rate): 61     BP BP: 114/63           Drug type: low molecular weight heparin      Drug/dose: Renally dosed enoxaparin to 30 mg subcutaneously every 24 hours. Continue to monitor.     Signed Darlin Apple PHARMD  Date 4/6/2017  Time 3:35 PM

## 2017-04-06 NOTE — PROGRESS NOTES
Problem: Mobility Impaired (Adult and Pediatric)  Goal: *Acute Goals and Plan of Care (Insert Text)  Physical Therapy Goals  Initiated 4/6/2017 and to be accomplished within 7 day(s)  1. Patient will move from supine to sit and sit to supine , scoot up and down and roll side to side in bed with modified independence. 2. Patient will transfer from bed to chair and chair to bed with modified independence using the least restrictive device. 3. Patient will perform sit to stand with modified independence. 4. Patient will ambulate with modified independence for 150 feet with the least restrictive device. 5. Patient will ascend/descend 4 stairs with handrail(s) with minimal assistance/contact guard assist.  Outcome: Progressing Towards Goal  PHYSICAL THERAPY EVALUATION     Patient: Ean Valles (10 y.o. female)  Date: 4/6/2017  Primary Diagnosis: CHF, acute on chronic (HCC)  CHF, acute on chronic [facility])        Precautions:   Fall      PROBLEM LIST:  Patient presents with the following problems:   Bed Mobility, Transfers, Gait, Strength, Balance and Stairs  ASSESSMENT :   Patient requires between supervision/set-up and minimal assistance/contact guard assist for bed mobility, transfers and ambulation. Patient reports no pain. Mobility with rolling walker and cga to control long gown and  Safety . Patient  And daughter educated on plan of care and safety and need for rolling walker for home need reinforcement. Called Dr Hellen Farmer and obtained orders for home health PT and rolling walker for home. Nursing notified. Left in bed with call light in reach. Patient will benefit from skilled intervention to address the above impairments.   Patients rehabilitation potential is considered to be Fair  Factors which may influence rehabilitation potential include:   [ ]         None noted  [ ]         Mental ability/status  [X]         Medical condition  [ ]         Home/family situation and support systems  [ ] Safety awareness  [ ]         Pain tolerance/management  [ ]         Other:        PLAN :  Recommendations and Planned Interventions:  [X]           Bed Mobility Training             [X]    Neuromuscular Re-Education  [X]           Transfer Training                   [ ]    Orthotic/Prosthetic Training  [X]           Gait Training                          [ ]    Modalities  [X]           Therapeutic Exercises          [ ]    Edema Management/Control  [X]           Therapeutic Activities            [X]    Patient and Family Training/Education  [ ]           Other (comment):     Frequency/Duration: Patient will be followed by physical therapy 3-5 times a week to address goals. Discharge Recommendations: Home Health  Further Equipment Recommendations for Discharge: rolling walker       SUBJECTIVE:   Patient stated .      OBJECTIVE DATA SUMMARY:       Past Medical History:   Diagnosis Date    [facility] cancer ([facility]      Chronic obstructive pulmonary disease ([facility]      Hypertension       Past Surgical History:   Procedure Laterality Date    HX MASTECTOMY         Barriers to Learning/Limitations: yes;  sensory deficits-vision/hearing/speech and physical  Compensate with: visual, verbal, tactile, kinesthetic cues/model     G CODES:Mobility  Current  CK= 40-59%   Goal  CJ= 20-39%. The severity rating is based on the Other [employer] Balance Scale3/5   [employer] Balance Scale3/5  0: Pt performs 25% or less of standing activity (Max assist) CN, 100% impaired. 1: Pt supports self with upper extremities but requires therapist assistance. Pt performs 25-50% of effort (Mod assist) CM, 80% to <100% impaired. 1+: Pt supports self with upper extremities but requires therapist assistance. Pt performs >50% effort. (Min assist). CL, 60% to <80% impaired. 2: Pt supports self independently with both upper extremities (walker, crutches, parallel bars). CL, 60% to <80% impaired.   2+: Pt support self independently with 1 upper extremity (cane, crutch, 1 parallel bar). CK, 40% to <60% impaired. 3: Pt stands without upper extremity support for up to 30 seconds. CK, 40% to <60% impaired. 3+: Pt stands without upper extremity support for 30 seconds or greater. CJ, 20% to <40% impaired. 4: Pt independently moves and returns center of gravity 1-2 inches in one plane. CJ, 20% to <40% impaired. 4+: Pt independently moves and returns center of gravity 1-2 inches in multiple planes. CI, 1% to <20% impaired. 5: Pt independently moves and returns center of gravity in all planes greater than 2 inches. CH, 0% impaired. Eval Complexity: History: MEDIUM  Complexity : 1-2 comorbidities / personal factors will impact the outcome/ POC Exam:MEDIUM Complexity : 3 Standardized tests and measures addressing body structure, function, activity limitation and / or participation in recreation  Presentation: MEDIUM Complexity : Evolving with changing characteristics  Clinical Decision Making:Medium Complexity [facility] Standing Balance Scale3/5 Overall Complexity:MEDIUM     Prior Level of Function/Home Situation: I with adl's and ambulation with quad cane. Home Situation  Home Environment: Private residence  # Steps to Enter: 4  Rails to Enter: Yes  Hand Rails : Bilateral  One/Two Story Residence: Two story, live on 1st floor  # of Interior Steps: 13  Height of Each Step (in): 8 inches  Interior Rails: Right  Lift Chair Available: No  Living Alone: No  Support Systems: Family member(s)  Patient Expects to be Discharged to[de-identified] Private residence  Current DME Used/Available at Home: Cane, quad, Grab bars  Tub or Shower Type: Tub/Shower combination  Critical Behavior:  Neurologic State: Alert  Orientation Level: Oriented to person;Oriented to place; Disoriented to time  Cognition: Follows commands  Safety/Judgement: Awareness of environment; Fall prevention  Psychosocial  Patient Behaviors: Calm;Cooperative  Family  Behaviors: Calm; Cooperative  Purposeful Interaction: Yes  Pt Identified Daily Priority: Clinical issues (comment)  Caritas Process: Nurture loving kindness;Enable dolores/hope;Establish trust;Teaching/learning; Attend basic human needs;Create healing environment  Caring Interventions: Reassure; Therapeutic modalities  Reassure: Therapeutic listening; Informing; Acceptance; Instilling dolores and hope;Caring rounds  Family  Behaviors: Calm; Cooperative  Strength:    Strength: Generally decreased, functional (3+/5 left <right )  Tone & Sensation:   Tone: Normal  Sensation: Intact     Range Of Motion:  AROM: Generally decreased, functional ( right shoulder decreased flex, end range of both knee)  PROM: Generally decreased, functional  Functional Mobility:  Bed Mobility:  Rolling: Stand-by asssistance;Contact guard assistance; Additional time  Supine to Sit: Stand-by asssistance;Contact guard assistance; Additional time  Sit to Supine: Stand-by asssistance;Contact guard assistance; Additional time;Assist x1  Transfers:  Sit to Stand: Stand-by asssistance;Contact guard assistance; Additional time;Assist x1  Stand to Sit: Contact guard assistance;Stand-by asssistance; Additional time  Balance:   Sitting: Impaired  Sitting - Static: Good (unsupported); Fair (occasional)  Sitting - Dynamic: Fair (occasional)  Standing: Impaired  Standing - Static: Good;Fair  Standing - Dynamic : Fair  Ambulation/Gait Training:  Distance (ft): 65 Feet (ft) (x3)  Assistive Device: Walker, rolling  Ambulation - Level of Assistance: Stand-by asssistance;Contact guard assistance  Gait Description (WDL): Exceptions to WDL  Gait Abnormalities: Step to gait; Path deviations  Base of Support: Center of gravity altered  Speed/My: Slow  Step Length: Left shortened;Right shortened  Interventions: Safety awareness training;Verbal cues; Visual/Demos  Therapeutic Exercises: ankle pumps glut sets quad  Set shoulder  Flex   Pain:  Pre treatment pain level:0  Post treatment pain level:0  Pain Scale 1: Numeric (0 - 10)  Pain Intensity 1: 0   Activity Tolerance:   Fair   Please refer to the flowsheet for vital signs taken during this treatment. After treatment:   [ ]         Patient left in no apparent distress sitting up in chair  [X]         Patient left in no apparent distress in bed  [X]         Call bell left within reach  [X]         Nursing notified  [X]         Caregiver present  [ ]         Bed alarm activated      COMMUNICATION/EDUCATION:   [X]         Fall prevention education was provided and the patient/caregiver indicated understanding. [X]         Patient/family have participated as able in goal setting and plan of care. [X]         Patient/family agree to work toward stated goals and plan of care. [ ]         Patient understands intent and goals of therapy, but is neutral about his/her participation. [ ]         Patient is unable to participate in goal setting and plan of care. Patient educated on the role of physical therapy during the acute stay  and the importance of mobility. VU.needs reinforcement.         Thank you for this referral.  Viviann Bumpers, PT   Time Calculation: 24 mins

## 2017-04-06 NOTE — PROGRESS NOTES
Reason for Renal Dosing:  Per Renal Dosing Policy    Patient clinical status and labs ordered/reviewed. Pt Weight Weight: 49 kg (108 lb)   Serum Creatinine Lab Results   Component Value Date/Time    Creatinine 0.91 04/05/2017 07:30 PM       Creatinine Clearance Estimated Creatinine Clearance: 28 mL/min (based on Cr of 0.91). BUN Lab Results   Component Value Date/Time    BUN 17 04/05/2017 07:30 PM       WBC Lab Results   Component Value Date/Time    WBC 11.1 04/05/2017 07:30 PM      Temperature Temp: 97.5 °F (36.4 °C)   HR Pulse (Heart Rate): 61     BP BP: 114/63           Drug type: H-2 Receptor Blocker      Drug/dose: Famotidine has been renally dosed to 20 mg PO daily. Continue to monitor.     Signed Salomón Vegas, PHARMD  Date 4/6/2017  Time 3:40 PM

## 2017-04-07 ENCOUNTER — HOME HEALTH ADMISSION (OUTPATIENT)
Dept: HOME HEALTH SERVICES | Facility: HOME HEALTH | Age: 82
End: 2017-04-07
Payer: MEDICARE

## 2017-04-07 LAB
BACTERIA SPEC CULT: NORMAL
SERVICE CMNT-IMP: NORMAL

## 2017-04-07 NOTE — ADT AUTH CERT NOTES
INITIAL PHYSICIAN ORDER: OBSERVATION/OUTPATIENT IN A BED Telemetry [[id number]] (Order [id number])   ADT Transfer    Date and Time: 4/6/2017  3:37 PM   Department: [facility] 3S CARDIAC TELE   Released By/Authorizing: Anabella Mejía DO (auto-released)         Order Providers      Authorizing     Anabella Mejía       Order Information      Order Date/Time Release Date/Time Start Date/Time End Date/Time     04/06/17 03:37 PM 04/06/17 03:37 PM 04/06/17 03:45 PM 04/06/17 03:45 PM         CSN:     [id number]       Order Details      Frequency Duration Priority Order Class     ONE TIME 1  occurrence Routine ADT Pend Transfer       Reprint OrderRequisition - Outpatient Only      INITIAL PHYSICIAN ORDER: OBSERVATION/OUTPATIENT IN A BED Telemetry (Order #[id number]) on 4/6/17       Original Order      Ordered On Ordered By        4/6/2017 3:37 PM Anabella Mejía DO               Transfer Level of Care      Department     [facility] ADMINISTRATION       Order Questions      Question Answer Comment     Patient Class: Observation      Type of Bed Telemetry      Reason for Observation Dyspnea, possible chf      Admitting Physician YOLANDE [address] [place] [address] Elena      Attending Physician WEI LANIER      Diagnosis CHF, acute on chronic [facility])        Collection Information        Acknowledgement Info      For At Acknowledged By Acknowledged On     Placing Order 04/06/17 1538 Lalita Castillo 04/06/17 1546       Additional Information      Associated Reports     View Encounter     Priority and Order Details       Process Instructions      Plan of Care: See Progress Notes, H&P and Physician Orders            Medication Detail         Disp Refills Start End        INITIAL PHYSICIAN ORDER: OBSERVATION/OUTPATIENT IN A BED Telemetry   4/6/2017 4/6/2017      Sig: One time.      Class: ADT Pend Transfer            NPI Information            Electronically signed by Authorizing Provider: Lindsay Rodney [id number]  Order start date/time: 4/6/2017 3:45 PM  Electronically signed by Co-signing Provider (if required):

## 2017-04-07 NOTE — ROUTINE PROCESS
2220 - Patient arrived in the unit. She is calm. Daughter and son-in-law with her. They both seem very worried about pt.    2300 - Shift assessment. Bedside and Verbal shift change report given to Fred Cabrera RN (oncoming nurse) by Norma Woody RN (offgoing nurse). Report included the following information SBAR, Recent Results and Med Rec Status.

## 2017-04-10 ENCOUNTER — HOME CARE VISIT (OUTPATIENT)
Dept: HOME HEALTH SERVICES | Facility: HOME HEALTH | Age: 82
End: 2017-04-10

## 2017-04-10 NOTE — ANCILLARY DISCHARGE INSTRUCTIONS
[facility]  Discharge Phone Call       After-Care Discharge Phone Call Questions:    Were you able to get your prescriptions filled? Comment:      [x] Yes  []No    Comment if answer is \"No\"   Are you taking your medication(s) as your doctor ordered? Do you understand the purpose of your medications? Comment:    [x] Yes  []No    Comment if answer is \"No\"   Are you taking any other medications that are not on the list?  Comment:      [] Yes  [x]No    Comment if answer is \"Yes\"   Do you have any questions about your medications? Are you aware of potential side effects? Comment:    [] Yes  [x]No    Comment if answer is \"Yes\"   Did you make your follow-up appointments (if the hospital did not do this before  discharge)? Comment:    [x] Yes  []No    Comment if answer is \"No\"   Is there any reason you might not be able to keep your follow-up appointments? Comment:     [] Yes  [x]No    Comment if answer is \"Yes\"   Do you have any questions about your care plan? Are you aware of what health problems to be alert for? Comment:    [] Yes  [x]No    Comment if answer is \"Yes\"   Do you have a good understanding of how you should manage your health? Comment:    [x] Yes  []No    Comment if answer is \"Yes\"   Do you know which symptoms to watch for that would mean you would need to call your doctor right away? Comment:      [x] Yes  []No    Comment if answer is \"No\"   Do you have any questions about the follow up process or any instructions that we have provided? Comment:    [] Yes  [x]No    Comment if answer is \"Yes\"   Did staff take your preferences into account?  Daughter has been trying to call the  for information on home health that she set up, unable to get through to the .         [] Yes  [x]No    Comment if answer is \"Yes\"

## 2017-04-10 NOTE — ANCILLARY DISCHARGE INSTRUCTIONS
Spoke with daughter during discharge follow up call, daughter states that she has been calling the hospital trying to contact the , daughter would like 's information and supervisor's. Daughter given Ben Ortega ext and Suzie Mattson's ext.

## 2017-04-11 ENCOUNTER — HOME CARE VISIT (OUTPATIENT)
Dept: HOME HEALTH SERVICES | Facility: HOME HEALTH | Age: 82
End: 2017-04-11

## 2017-04-11 ENCOUNTER — HOME CARE VISIT (OUTPATIENT)
Dept: SCHEDULING | Facility: HOME HEALTH | Age: 82
End: 2017-04-11
Payer: MEDICARE

## 2017-04-11 VITALS — DIASTOLIC BLOOD PRESSURE: 60 MMHG | SYSTOLIC BLOOD PRESSURE: 110 MMHG | HEART RATE: 70 BPM | RESPIRATION RATE: 17 BRPM

## 2017-04-11 PROCEDURE — 3331090001 HH PPS REVENUE CREDIT

## 2017-04-11 PROCEDURE — G0151 HHCP-SERV OF PT,EA 15 MIN: HCPCS

## 2017-04-11 PROCEDURE — 3331090002 HH PPS REVENUE DEBIT

## 2017-04-11 PROCEDURE — 400013 HH SOC

## 2017-04-12 ENCOUNTER — HOME CARE VISIT (OUTPATIENT)
Dept: HOME HEALTH SERVICES | Facility: HOME HEALTH | Age: 82
End: 2017-04-12
Payer: MEDICARE

## 2017-04-12 PROCEDURE — 3331090002 HH PPS REVENUE DEBIT

## 2017-04-12 PROCEDURE — 3331090001 HH PPS REVENUE CREDIT

## 2017-04-13 ENCOUNTER — HOME CARE VISIT (OUTPATIENT)
Dept: SCHEDULING | Facility: HOME HEALTH | Age: 82
End: 2017-04-13
Payer: MEDICARE

## 2017-04-13 PROCEDURE — 3331090002 HH PPS REVENUE DEBIT

## 2017-04-13 PROCEDURE — G0157 HHC PT ASSISTANT EA 15: HCPCS

## 2017-04-13 PROCEDURE — 3331090001 HH PPS REVENUE CREDIT

## 2017-04-14 ENCOUNTER — HOME CARE VISIT (OUTPATIENT)
Dept: SCHEDULING | Facility: HOME HEALTH | Age: 82
End: 2017-04-14
Payer: MEDICARE

## 2017-04-14 VITALS — OXYGEN SATURATION: 91 % | SYSTOLIC BLOOD PRESSURE: 116 MMHG | HEART RATE: 58 BPM | DIASTOLIC BLOOD PRESSURE: 64 MMHG

## 2017-04-14 VITALS — HEART RATE: 75 BPM | SYSTOLIC BLOOD PRESSURE: 118 MMHG | OXYGEN SATURATION: 90 % | DIASTOLIC BLOOD PRESSURE: 60 MMHG

## 2017-04-14 PROCEDURE — 3331090002 HH PPS REVENUE DEBIT

## 2017-04-14 PROCEDURE — G0157 HHC PT ASSISTANT EA 15: HCPCS

## 2017-04-14 PROCEDURE — 3331090001 HH PPS REVENUE CREDIT

## 2017-04-15 PROCEDURE — 3331090001 HH PPS REVENUE CREDIT

## 2017-04-15 PROCEDURE — 3331090002 HH PPS REVENUE DEBIT

## 2017-04-16 ENCOUNTER — HOME CARE VISIT (OUTPATIENT)
Dept: SCHEDULING | Facility: HOME HEALTH | Age: 82
End: 2017-04-16
Payer: MEDICARE

## 2017-04-16 PROCEDURE — 3331090002 HH PPS REVENUE DEBIT

## 2017-04-16 PROCEDURE — 3331090001 HH PPS REVENUE CREDIT

## 2017-04-16 PROCEDURE — G0299 HHS/HOSPICE OF RN EA 15 MIN: HCPCS

## 2017-04-17 ENCOUNTER — HOME CARE VISIT (OUTPATIENT)
Dept: SCHEDULING | Facility: HOME HEALTH | Age: 82
End: 2017-04-17
Payer: MEDICARE

## 2017-04-17 VITALS — DIASTOLIC BLOOD PRESSURE: 58 MMHG | OXYGEN SATURATION: 98 % | HEART RATE: 55 BPM | SYSTOLIC BLOOD PRESSURE: 116 MMHG

## 2017-04-17 VITALS
SYSTOLIC BLOOD PRESSURE: 112 MMHG | DIASTOLIC BLOOD PRESSURE: 60 MMHG | TEMPERATURE: 97 F | RESPIRATION RATE: 16 BRPM | HEART RATE: 58 BPM | OXYGEN SATURATION: 100 %

## 2017-04-17 PROCEDURE — 3331090001 HH PPS REVENUE CREDIT

## 2017-04-17 PROCEDURE — G0157 HHC PT ASSISTANT EA 15: HCPCS

## 2017-04-17 PROCEDURE — 3331090002 HH PPS REVENUE DEBIT

## 2017-04-17 PROCEDURE — G0156 HHCP-SVS OF AIDE,EA 15 MIN: HCPCS

## 2017-04-18 ENCOUNTER — HOME CARE VISIT (OUTPATIENT)
Dept: SCHEDULING | Facility: HOME HEALTH | Age: 82
End: 2017-04-18
Payer: MEDICARE

## 2017-04-18 PROCEDURE — 3331090001 HH PPS REVENUE CREDIT

## 2017-04-18 PROCEDURE — 3331090002 HH PPS REVENUE DEBIT

## 2017-04-18 PROCEDURE — G0299 HHS/HOSPICE OF RN EA 15 MIN: HCPCS

## 2017-04-19 ENCOUNTER — HOME CARE VISIT (OUTPATIENT)
Dept: SCHEDULING | Facility: HOME HEALTH | Age: 82
End: 2017-04-19
Payer: MEDICARE

## 2017-04-19 VITALS — SYSTOLIC BLOOD PRESSURE: 121 MMHG | DIASTOLIC BLOOD PRESSURE: 72 MMHG

## 2017-04-19 PROCEDURE — 3331090001 HH PPS REVENUE CREDIT

## 2017-04-19 PROCEDURE — 3331090002 HH PPS REVENUE DEBIT

## 2017-04-19 PROCEDURE — G0157 HHC PT ASSISTANT EA 15: HCPCS

## 2017-04-20 PROCEDURE — 3331090001 HH PPS REVENUE CREDIT

## 2017-04-20 PROCEDURE — 3331090002 HH PPS REVENUE DEBIT

## 2017-04-21 ENCOUNTER — HOME CARE VISIT (OUTPATIENT)
Dept: SCHEDULING | Facility: HOME HEALTH | Age: 82
End: 2017-04-21
Payer: MEDICARE

## 2017-04-21 VITALS — OXYGEN SATURATION: 94 % | SYSTOLIC BLOOD PRESSURE: 109 MMHG | HEART RATE: 74 BPM | DIASTOLIC BLOOD PRESSURE: 64 MMHG

## 2017-04-21 PROCEDURE — G0157 HHC PT ASSISTANT EA 15: HCPCS

## 2017-04-21 PROCEDURE — 3331090002 HH PPS REVENUE DEBIT

## 2017-04-21 PROCEDURE — 3331090001 HH PPS REVENUE CREDIT

## 2017-04-22 PROCEDURE — 3331090001 HH PPS REVENUE CREDIT

## 2017-04-22 PROCEDURE — 3331090002 HH PPS REVENUE DEBIT

## 2017-04-23 PROCEDURE — 3331090001 HH PPS REVENUE CREDIT

## 2017-04-23 PROCEDURE — 3331090002 HH PPS REVENUE DEBIT

## 2017-04-24 ENCOUNTER — HOME CARE VISIT (OUTPATIENT)
Dept: SCHEDULING | Facility: HOME HEALTH | Age: 82
End: 2017-04-24
Payer: MEDICARE

## 2017-04-24 ENCOUNTER — HOME CARE VISIT (OUTPATIENT)
Dept: HOME HEALTH SERVICES | Facility: HOME HEALTH | Age: 82
End: 2017-04-24
Payer: MEDICARE

## 2017-04-24 VITALS
WEIGHT: 106 LBS | DIASTOLIC BLOOD PRESSURE: 68 MMHG | BODY MASS INDEX: 25.56 KG/M2 | OXYGEN SATURATION: 98 % | HEART RATE: 61 BPM | SYSTOLIC BLOOD PRESSURE: 137 MMHG

## 2017-04-24 VITALS — HEART RATE: 77 BPM | DIASTOLIC BLOOD PRESSURE: 65 MMHG | OXYGEN SATURATION: 93 % | SYSTOLIC BLOOD PRESSURE: 126 MMHG

## 2017-04-24 PROCEDURE — 3331090002 HH PPS REVENUE DEBIT

## 2017-04-24 PROCEDURE — G0495 RN CARE TRAIN/EDU IN HH: HCPCS

## 2017-04-24 PROCEDURE — G0152 HHCP-SERV OF OT,EA 15 MIN: HCPCS

## 2017-04-24 PROCEDURE — G0157 HHC PT ASSISTANT EA 15: HCPCS

## 2017-04-24 PROCEDURE — 3331090001 HH PPS REVENUE CREDIT

## 2017-04-25 ENCOUNTER — HOME CARE VISIT (OUTPATIENT)
Dept: SCHEDULING | Facility: HOME HEALTH | Age: 82
End: 2017-04-25
Payer: MEDICARE

## 2017-04-25 VITALS — HEART RATE: 50 BPM | OXYGEN SATURATION: 90 % | DIASTOLIC BLOOD PRESSURE: 81 MMHG | SYSTOLIC BLOOD PRESSURE: 115 MMHG

## 2017-04-25 VITALS
RESPIRATION RATE: 18 BRPM | HEART RATE: 60 BPM | OXYGEN SATURATION: 96 % | TEMPERATURE: 97.2 F | DIASTOLIC BLOOD PRESSURE: 70 MMHG | SYSTOLIC BLOOD PRESSURE: 118 MMHG

## 2017-04-25 PROCEDURE — 3331090002 HH PPS REVENUE DEBIT

## 2017-04-25 PROCEDURE — 3331090001 HH PPS REVENUE CREDIT

## 2017-04-25 PROCEDURE — G0299 HHS/HOSPICE OF RN EA 15 MIN: HCPCS

## 2017-04-26 ENCOUNTER — HOME CARE VISIT (OUTPATIENT)
Dept: SCHEDULING | Facility: HOME HEALTH | Age: 82
End: 2017-04-26
Payer: MEDICARE

## 2017-04-26 ENCOUNTER — HOME CARE VISIT (OUTPATIENT)
Dept: HOME HEALTH SERVICES | Facility: HOME HEALTH | Age: 82
End: 2017-04-26
Payer: MEDICARE

## 2017-04-26 PROCEDURE — 3331090002 HH PPS REVENUE DEBIT

## 2017-04-26 PROCEDURE — G0151 HHCP-SERV OF PT,EA 15 MIN: HCPCS

## 2017-04-26 PROCEDURE — 3331090001 HH PPS REVENUE CREDIT

## 2017-04-27 ENCOUNTER — HOME CARE VISIT (OUTPATIENT)
Dept: SCHEDULING | Facility: HOME HEALTH | Age: 82
End: 2017-04-27
Payer: MEDICARE

## 2017-04-27 PROCEDURE — 3331090002 HH PPS REVENUE DEBIT

## 2017-04-27 PROCEDURE — 3331090001 HH PPS REVENUE CREDIT

## 2017-04-27 PROCEDURE — G0152 HHCP-SERV OF OT,EA 15 MIN: HCPCS

## 2017-04-27 PROCEDURE — G0299 HHS/HOSPICE OF RN EA 15 MIN: HCPCS

## 2017-04-28 ENCOUNTER — HOME CARE VISIT (OUTPATIENT)
Dept: SCHEDULING | Facility: HOME HEALTH | Age: 82
End: 2017-04-28
Payer: MEDICARE

## 2017-04-28 VITALS — DIASTOLIC BLOOD PRESSURE: 81 MMHG | SYSTOLIC BLOOD PRESSURE: 132 MMHG

## 2017-04-28 VITALS
TEMPERATURE: 98.2 F | SYSTOLIC BLOOD PRESSURE: 138 MMHG | HEART RATE: 65 BPM | DIASTOLIC BLOOD PRESSURE: 84 MMHG | OXYGEN SATURATION: 98 % | RESPIRATION RATE: 16 BRPM

## 2017-04-28 VITALS — HEART RATE: 72 BPM | SYSTOLIC BLOOD PRESSURE: 134 MMHG | DIASTOLIC BLOOD PRESSURE: 66 MMHG | OXYGEN SATURATION: 98 %

## 2017-04-28 VITALS — HEART RATE: 66 BPM | SYSTOLIC BLOOD PRESSURE: 136 MMHG | OXYGEN SATURATION: 93 % | DIASTOLIC BLOOD PRESSURE: 80 MMHG

## 2017-04-28 PROCEDURE — 3331090001 HH PPS REVENUE CREDIT

## 2017-04-28 PROCEDURE — 3331090002 HH PPS REVENUE DEBIT

## 2017-04-28 PROCEDURE — G0157 HHC PT ASSISTANT EA 15: HCPCS

## 2017-04-29 PROCEDURE — 3331090001 HH PPS REVENUE CREDIT

## 2017-04-29 PROCEDURE — 3331090002 HH PPS REVENUE DEBIT

## 2017-04-30 VITALS
DIASTOLIC BLOOD PRESSURE: 74 MMHG | OXYGEN SATURATION: 96 % | HEART RATE: 55 BPM | RESPIRATION RATE: 17 BRPM | SYSTOLIC BLOOD PRESSURE: 136 MMHG | TEMPERATURE: 98.1 F

## 2017-04-30 PROCEDURE — 3331090001 HH PPS REVENUE CREDIT

## 2017-04-30 PROCEDURE — 3331090002 HH PPS REVENUE DEBIT

## 2017-05-01 ENCOUNTER — HOME CARE VISIT (OUTPATIENT)
Dept: SCHEDULING | Facility: HOME HEALTH | Age: 82
End: 2017-05-01
Payer: MEDICARE

## 2017-05-01 VITALS — DIASTOLIC BLOOD PRESSURE: 67 MMHG | HEART RATE: 68 BPM | OXYGEN SATURATION: 98 % | SYSTOLIC BLOOD PRESSURE: 136 MMHG

## 2017-05-01 PROCEDURE — 3331090001 HH PPS REVENUE CREDIT

## 2017-05-01 PROCEDURE — 3331090002 HH PPS REVENUE DEBIT

## 2017-05-01 PROCEDURE — G0157 HHC PT ASSISTANT EA 15: HCPCS

## 2017-05-02 ENCOUNTER — HOME CARE VISIT (OUTPATIENT)
Dept: SCHEDULING | Facility: HOME HEALTH | Age: 82
End: 2017-05-02
Payer: MEDICARE

## 2017-05-02 VITALS — OXYGEN SATURATION: 93 % | SYSTOLIC BLOOD PRESSURE: 121 MMHG | DIASTOLIC BLOOD PRESSURE: 68 MMHG | HEART RATE: 56 BPM

## 2017-05-02 PROCEDURE — 3331090002 HH PPS REVENUE DEBIT

## 2017-05-02 PROCEDURE — G0157 HHC PT ASSISTANT EA 15: HCPCS

## 2017-05-02 PROCEDURE — 3331090001 HH PPS REVENUE CREDIT

## 2017-05-03 PROCEDURE — 3331090001 HH PPS REVENUE CREDIT

## 2017-05-03 PROCEDURE — 3331090002 HH PPS REVENUE DEBIT

## 2017-05-04 ENCOUNTER — HOME CARE VISIT (OUTPATIENT)
Dept: SCHEDULING | Facility: HOME HEALTH | Age: 82
End: 2017-05-04
Payer: MEDICARE

## 2017-05-04 VITALS — HEART RATE: 62 BPM | SYSTOLIC BLOOD PRESSURE: 122 MMHG | OXYGEN SATURATION: 98 % | DIASTOLIC BLOOD PRESSURE: 64 MMHG

## 2017-05-04 PROCEDURE — G0157 HHC PT ASSISTANT EA 15: HCPCS

## 2017-05-04 PROCEDURE — 3331090002 HH PPS REVENUE DEBIT

## 2017-05-04 PROCEDURE — 3331090001 HH PPS REVENUE CREDIT

## 2017-05-04 PROCEDURE — G0299 HHS/HOSPICE OF RN EA 15 MIN: HCPCS

## 2017-05-05 PROCEDURE — 3331090002 HH PPS REVENUE DEBIT

## 2017-05-05 PROCEDURE — 3331090001 HH PPS REVENUE CREDIT

## 2017-05-06 PROCEDURE — 3331090001 HH PPS REVENUE CREDIT

## 2017-05-06 PROCEDURE — 3331090002 HH PPS REVENUE DEBIT

## 2017-05-07 PROCEDURE — 3331090002 HH PPS REVENUE DEBIT

## 2017-05-07 PROCEDURE — 3331090001 HH PPS REVENUE CREDIT

## 2017-05-08 ENCOUNTER — HOME CARE VISIT (OUTPATIENT)
Dept: SCHEDULING | Facility: HOME HEALTH | Age: 82
End: 2017-05-08
Payer: MEDICARE

## 2017-05-08 VITALS
WEIGHT: 105 LBS | RESPIRATION RATE: 18 BRPM | TEMPERATURE: 98.4 F | BODY MASS INDEX: 25.32 KG/M2 | HEART RATE: 52 BPM | DIASTOLIC BLOOD PRESSURE: 76 MMHG | OXYGEN SATURATION: 98 % | SYSTOLIC BLOOD PRESSURE: 137 MMHG

## 2017-05-08 PROCEDURE — G0151 HHCP-SERV OF PT,EA 15 MIN: HCPCS

## 2017-05-08 PROCEDURE — 3331090002 HH PPS REVENUE DEBIT

## 2017-05-08 PROCEDURE — 3331090003 HH PPS REVENUE ADJ

## 2017-05-08 PROCEDURE — 3331090001 HH PPS REVENUE CREDIT

## 2017-05-09 PROCEDURE — 3331090002 HH PPS REVENUE DEBIT

## 2017-05-09 PROCEDURE — 3331090001 HH PPS REVENUE CREDIT

## 2017-05-10 PROCEDURE — 3331090002 HH PPS REVENUE DEBIT

## 2017-05-10 PROCEDURE — 3331090001 HH PPS REVENUE CREDIT

## 2017-05-11 PROCEDURE — 3331090001 HH PPS REVENUE CREDIT

## 2017-05-11 PROCEDURE — 3331090002 HH PPS REVENUE DEBIT

## 2017-05-12 PROCEDURE — 3331090001 HH PPS REVENUE CREDIT

## 2017-05-12 PROCEDURE — 3331090002 HH PPS REVENUE DEBIT

## 2017-05-13 PROCEDURE — 3331090002 HH PPS REVENUE DEBIT

## 2017-05-13 PROCEDURE — 3331090001 HH PPS REVENUE CREDIT

## 2017-05-14 PROCEDURE — 3331090001 HH PPS REVENUE CREDIT

## 2017-05-14 PROCEDURE — 3331090002 HH PPS REVENUE DEBIT

## 2017-05-15 PROCEDURE — 3331090001 HH PPS REVENUE CREDIT

## 2017-05-15 PROCEDURE — 3331090002 HH PPS REVENUE DEBIT

## 2017-05-16 PROCEDURE — 3331090002 HH PPS REVENUE DEBIT

## 2017-05-16 PROCEDURE — 3331090001 HH PPS REVENUE CREDIT

## 2017-05-17 PROCEDURE — 3331090002 HH PPS REVENUE DEBIT

## 2017-05-17 PROCEDURE — 3331090001 HH PPS REVENUE CREDIT

## 2017-05-18 PROCEDURE — 3331090001 HH PPS REVENUE CREDIT

## 2017-05-18 PROCEDURE — 3331090002 HH PPS REVENUE DEBIT

## 2017-11-25 ENCOUNTER — HOSPITAL ENCOUNTER (INPATIENT)
Age: 82
LOS: 5 days | Discharge: NURSING, REHAB OR LONG-TERM CARE | DRG: 565 | End: 2017-11-30
Attending: EMERGENCY MEDICINE | Admitting: FAMILY MEDICINE
Payer: MEDICARE

## 2017-11-25 ENCOUNTER — APPOINTMENT (OUTPATIENT)
Dept: CT IMAGING | Age: 82
DRG: 565 | End: 2017-11-25
Attending: PHYSICIAN ASSISTANT
Payer: MEDICARE

## 2017-11-25 ENCOUNTER — APPOINTMENT (OUTPATIENT)
Dept: GENERAL RADIOLOGY | Age: 82
DRG: 565 | End: 2017-11-25
Attending: PHYSICIAN ASSISTANT
Payer: MEDICARE

## 2017-11-25 DIAGNOSIS — T79.6XXA TRAUMATIC RHABDOMYOLYSIS, INITIAL ENCOUNTER (HCC): Primary | ICD-10-CM

## 2017-11-25 DIAGNOSIS — R53.81 DEBILITY: ICD-10-CM

## 2017-11-25 DIAGNOSIS — F02.80 LATE ONSET ALZHEIMER'S DISEASE WITHOUT BEHAVIORAL DISTURBANCE (HCC): ICD-10-CM

## 2017-11-25 DIAGNOSIS — R77.8 ELEVATED TROPONIN: ICD-10-CM
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
## 2017-11-25 DIAGNOSIS — I48.20 CHRONIC ATRIAL FIBRILLATION (HCC): ICD-10-CM

## 2017-11-25 DIAGNOSIS — G30.1 LATE ONSET ALZHEIMER'S DISEASE WITHOUT BEHAVIORAL DISTURBANCE (HCC): ICD-10-CM

## 2017-11-25 PROBLEM — I48.91 A-FIB (HCC): Status: ACTIVE | Noted: 2017-11-25

## 2017-11-25 PROBLEM — N39.0 UTI (URINARY TRACT INFECTION): Status: ACTIVE | Noted: 2017-11-25

## 2017-11-25 LAB
ALBUMIN SERPL-MCNC: 3.6 G/DL (ref 3.4–5)
ALBUMIN/GLOB SERPL: 1.1 {RATIO} (ref 0.8–1.7)
ALP SERPL-CCNC: 153 U/L (ref 45–117)
ALT SERPL-CCNC: 46 U/L (ref 13–56)
ANION GAP SERPL CALC-SCNC: 10 MMOL/L (ref 3–18)
APPEARANCE UR: ABNORMAL
APTT PPP: 27.3 SEC (ref 23–36.4)
AST SERPL-CCNC: 118 U/L (ref 15–37)
BACTERIA URNS QL MICRO: ABNORMAL /HPF
BASOPHILS # BLD: 0 K/UL (ref 0–0.06)
BASOPHILS NFR BLD: 0 % (ref 0–2)
BILIRUB SERPL-MCNC: 0.8 MG/DL (ref 0.2–1)
BILIRUB UR QL: NEGATIVE
BUN SERPL-MCNC: 19 MG/DL (ref 7–18)
BUN/CREAT SERPL: 15 (ref 12–20)
CALCIUM SERPL-MCNC: 9.7 MG/DL (ref 8.5–10.1)
CHLORIDE SERPL-SCNC: 107 MMOL/L (ref 100–108)
CK MB CFR SERPL CALC: 1.2 % (ref 0–4)
CK MB CFR SERPL CALC: 1.4 % (ref 0–4)
CK MB SERPL-MCNC: 30 NG/ML (ref 5–25)
CK MB SERPL-MCNC: 38.8 NG/ML (ref 5–25)
CK SERPL-CCNC: 2470 U/L (ref 26–192)
CK SERPL-CCNC: 2825 U/L (ref 26–192)
CO2 SERPL-SCNC: 25 MMOL/L (ref 21–32)
COLOR UR: ABNORMAL
CREAT SERPL-MCNC: 1.26 MG/DL (ref 0.6–1.3)
DIFFERENTIAL METHOD BLD: ABNORMAL
EOSINOPHIL # BLD: 0 K/UL (ref 0–0.4)
EOSINOPHIL NFR BLD: 0 % (ref 0–5)
EPITH CASTS URNS QL MICRO: ABNORMAL /LPF (ref 0–5)
ERYTHROCYTE [DISTWIDTH] IN BLOOD BY AUTOMATED COUNT: 16.1 % (ref 11.6–14.5)
GLOBULIN SER CALC-MCNC: 3.4 G/DL (ref 2–4)
GLUCOSE SERPL-MCNC: 133 MG/DL (ref 74–99)
GLUCOSE UR STRIP.AUTO-MCNC: NEGATIVE MG/DL
HCT VFR BLD AUTO: 46.4 % (ref 35–45)
HGB BLD-MCNC: 15.2 G/DL (ref 12–16)
HGB UR QL STRIP: ABNORMAL
INR PPP: 1 (ref 0.8–1.2)
KETONES UR QL STRIP.AUTO: NEGATIVE MG/DL
LEUKOCYTE ESTERASE UR QL STRIP.AUTO: ABNORMAL
LYMPHOCYTES # BLD: 1 K/UL (ref 0.9–3.6)
LYMPHOCYTES NFR BLD: 8 % (ref 21–52)
MCH RBC QN AUTO: 27.6 PG (ref 24–34)
MCHC RBC AUTO-ENTMCNC: 32.8 G/DL (ref 31–37)
MCV RBC AUTO: 84.2 FL (ref 74–97)
MONOCYTES # BLD: 0.8 K/UL (ref 0.05–1.2)
MONOCYTES NFR BLD: 6 % (ref 3–10)
NEUTS SEG # BLD: 10.5 K/UL (ref 1.8–8)
NEUTS SEG NFR BLD: 86 % (ref 40–73)
NITRITE UR QL STRIP.AUTO: NEGATIVE
PH UR STRIP: 5 [PH] (ref 5–8)
PLATELET # BLD AUTO: 252 K/UL (ref 135–420)
PMV BLD AUTO: 10.6 FL (ref 9.2–11.8)
POTASSIUM SERPL-SCNC: 5 MMOL/L (ref 3.5–5.5)
PROT SERPL-MCNC: 7 G/DL (ref 6.4–8.2)
PROT UR STRIP-MCNC: 300 MG/DL
PROTHROMBIN TIME: 12.7 SEC (ref 11.5–15.2)
RBC # BLD AUTO: 5.51 M/UL (ref 4.2–5.3)
RBC #/AREA URNS HPF: ABNORMAL /HPF (ref 0–5)
SODIUM SERPL-SCNC: 142 MMOL/L (ref 136–145)
SP GR UR REFRACTOMETRY: 1.02 (ref 1–1.03)
TROPONIN I SERPL-MCNC: 2.19 NG/ML (ref 0–0.04)
TROPONIN I SERPL-MCNC: 2.25 NG/ML (ref 0–0.04)
UROBILINOGEN UR QL STRIP.AUTO: 1 EU/DL (ref 0.2–1)
WBC # BLD AUTO: 12.2 K/UL (ref 4.6–13.2)
WBC URNS QL MICRO: ABNORMAL /HPF (ref 0–4)

## 2017-11-25 PROCEDURE — 77030011943

## 2017-11-25 PROCEDURE — 85730 THROMBOPLASTIN TIME PARTIAL: CPT

## 2017-11-25 PROCEDURE — 83874 ASSAY OF MYOGLOBIN: CPT

## 2017-11-25 PROCEDURE — 80053 COMPREHEN METABOLIC PANEL: CPT

## 2017-11-25 PROCEDURE — 74011250636 HC RX REV CODE- 250/636: Performed by: PHYSICIAN ASSISTANT

## 2017-11-25 PROCEDURE — 65660000000 HC RM CCU STEPDOWN

## 2017-11-25 PROCEDURE — 93005 ELECTROCARDIOGRAM TRACING: CPT

## 2017-11-25 PROCEDURE — 70450 CT HEAD/BRAIN W/O DYE: CPT

## 2017-11-25 PROCEDURE — 77030036549 HC CLLR CERV EMT SEMI-RID PREFB S2SG -B

## 2017-11-25 PROCEDURE — 99285 EMERGENCY DEPT VISIT HI MDM: CPT

## 2017-11-25 PROCEDURE — 73560 X-RAY EXAM OF KNEE 1 OR 2: CPT

## 2017-11-25 PROCEDURE — 81002 URINALYSIS NONAUTO W/O SCOPE: CPT

## 2017-11-25 PROCEDURE — 81001 URINALYSIS AUTO W/SCOPE: CPT

## 2017-11-25 PROCEDURE — 94761 N-INVAS EAR/PLS OXIMETRY MLT: CPT

## 2017-11-25 PROCEDURE — 96360 HYDRATION IV INFUSION INIT: CPT

## 2017-11-25 PROCEDURE — 82550 ASSAY OF CK (CPK): CPT

## 2017-11-25 PROCEDURE — 72125 CT NECK SPINE W/O DYE: CPT

## 2017-11-25 PROCEDURE — 85025 COMPLETE CBC W/AUTO DIFF WBC: CPT

## 2017-11-25 PROCEDURE — 70486 CT MAXILLOFACIAL W/O DYE: CPT

## 2017-11-25 PROCEDURE — 85610 PROTHROMBIN TIME: CPT

## 2017-11-25 PROCEDURE — 73502 X-RAY EXAM HIP UNI 2-3 VIEWS: CPT

## 2017-11-25 RX ORDER — POLYETHYLENE GLYCOL 3350 17 G/17G
17 POWDER, FOR SOLUTION ORAL DAILY
Status: DISCONTINUED | OUTPATIENT
Start: 2017-11-26 | End: 2017-11-30 | Stop reason: HOSPADM

## 2017-11-25 RX ORDER — SODIUM CHLORIDE 9 MG/ML
125 INJECTION, SOLUTION INTRAVENOUS CONTINUOUS
Status: DISCONTINUED | OUTPATIENT
Start: 2017-11-25 | End: 2017-11-27

## 2017-11-25 RX ORDER — FAMOTIDINE 20 MG/1
40 TABLET, FILM COATED ORAL DAILY
Status: DISCONTINUED | OUTPATIENT
Start: 2017-11-26 | End: 2017-11-27 | Stop reason: SDUPTHER

## 2017-11-25 RX ORDER — SIMVASTATIN 40 MG/1
40 TABLET, FILM COATED ORAL
Status: DISCONTINUED | OUTPATIENT
Start: 2017-11-25 | End: 2017-11-30 | Stop reason: HOSPADM

## 2017-11-25 RX ORDER — FLUTICASONE PROPIONATE 50 MCG
2 SPRAY, SUSPENSION (ML) NASAL DAILY
Status: DISCONTINUED | OUTPATIENT
Start: 2017-11-26 | End: 2017-11-30 | Stop reason: HOSPADM

## 2017-11-25 RX ORDER — LISINOPRIL 10 MG/1
20 TABLET ORAL DAILY
Status: DISCONTINUED | OUTPATIENT
Start: 2017-11-26 | End: 2017-11-30 | Stop reason: HOSPADM

## 2017-11-25 RX ORDER — ENOXAPARIN SODIUM 100 MG/ML
40 INJECTION SUBCUTANEOUS EVERY 24 HOURS
Status: DISCONTINUED | OUTPATIENT
Start: 2017-11-25 | End: 2017-11-26

## 2017-11-25 RX ORDER — CLOPIDOGREL BISULFATE 75 MG/1
75 TABLET ORAL DAILY
Status: DISCONTINUED | OUTPATIENT
Start: 2017-11-26 | End: 2017-11-30 | Stop reason: HOSPADM

## 2017-11-25 RX ORDER — DONEPEZIL HYDROCHLORIDE 5 MG/1
5 TABLET, FILM COATED ORAL DAILY
Status: DISCONTINUED | OUTPATIENT
Start: 2017-11-26 | End: 2017-11-30 | Stop reason: HOSPADM

## 2017-11-25 RX ORDER — METOPROLOL SUCCINATE 25 MG/1
25 TABLET, EXTENDED RELEASE ORAL DAILY
Status: DISCONTINUED | OUTPATIENT
Start: 2017-11-26 | End: 2017-11-30 | Stop reason: HOSPADM

## 2017-11-25 RX ORDER — MELATONIN
2000 DAILY
Status: DISCONTINUED | OUTPATIENT
Start: 2017-11-26 | End: 2017-11-30 | Stop reason: HOSPADM

## 2017-11-25 RX ORDER — LEVOFLOXACIN 5 MG/ML
750 INJECTION, SOLUTION INTRAVENOUS EVERY 24 HOURS
Status: DISCONTINUED | OUTPATIENT
Start: 2017-11-25 | End: 2017-11-26 | Stop reason: DRUGHIGH

## 2017-11-25 RX ORDER — SODIUM CHLORIDE 9 MG/ML
75 INJECTION, SOLUTION INTRAVENOUS CONTINUOUS
Status: DISCONTINUED | OUTPATIENT
Start: 2017-11-25 | End: 2017-11-28

## 2017-11-25 RX ORDER — NITROGLYCERIN 0.4 MG/1
0.4 TABLET SUBLINGUAL
Status: DISCONTINUED | OUTPATIENT
Start: 2017-11-25 | End: 2017-11-30 | Stop reason: HOSPADM

## 2017-11-25 RX ADMIN — SODIUM CHLORIDE 1000 ML: 900 INJECTION, SOLUTION INTRAVENOUS at 20:46

## 2017-11-25 RX ADMIN — SODIUM CHLORIDE 100 ML/HR: 900 INJECTION, SOLUTION INTRAVENOUS at 22:06

## 2017-11-25 NOTE — ED PROVIDER NOTES
HPI Comments: Pt is a 79 yo female with PMH of A. Fib, Dementia, HTN, COPD presents to the ED via EMS for fall. Pt lives at home with daughter, per EMS she fell at an unknown time, daughter last saw her at 36 AM today. Family then found her laying on the ground at home face down around 1730. Pt has dementia, and is a poor historian, but she is complaining of left eye pain, denies any other complaints. Pt is on plavix. Patient is a 80 y.o. female presenting with fall. The history is provided by the patient and the EMS personnel. The history is limited by the condition of the patient (Dementia). Fall   Pertinent negatives include no laceration. Past Medical History:   Diagnosis Date    Breast [facility] ([facility]     Chronic obstructive pulmonary disease ([facility]     Dementia     Hypertension        Past Surgical History:   Procedure Laterality Date    HX MASTECTOMY           History reviewed. No pertinent family history. Social History     Social History    Marital status:      Spouse name: N/A    Number of children: N/A    Years of education: N/A     Occupational History    Not on file. Social History Main Topics    Smoking status: Never Smoker    Smokeless tobacco: Never Used    Alcohol use No    Drug use: No    Sexual activity: Not on file     Other Topics Concern    Not on file     Social History Narrative         ALLERGIES: Azithromycin and Penicillin    Review of Systems   Unable to perform ROS: Dementia   Eyes: Positive for pain. Vitals:    11/25/17 1815 11/25/17 1905 11/25/17 1914 11/25/17 2030   BP:  (!) 155/91  (!) 163/94   Pulse: 64 62  61   Resp: 20 21  18   Temp:   97.7 °F (36.5 °C)    Weight:   45.4 kg (100 lb)    Height:   4' 6\" (1.372 m)             Physical Exam   Constitutional: She appears well-developed and well-nourished. She is cooperative. Non-toxic appearance. She does not have a sickly appearance. She does not appear ill. No distress.    HENT:   Head: Normocephalic and atraumatic. Head is without raccoon's eyes, without Flores's sign, without abrasion, without contusion, without right periorbital erythema and without left periorbital erythema. Right Ear: Tympanic membrane, external ear and ear canal normal. No hemotympanum. Left Ear: Tympanic membrane, external ear and ear canal normal. No hemotympanum. Nose: Nose normal. No sinus tenderness or nasal deformity. Mouth/Throat: Uvula is midline, oropharynx is clear and moist and mucous membranes are normal.   Eyes: Conjunctivae, EOM and lids are normal. Pupils are equal, round, and reactive to light. No periorbital ecchymosis, swelling, crepitus, or TTP. Neck: Normal range of motion. Neck supple. No spinous process tenderness and no muscular tenderness present. In C-Collar   Cardiovascular: Normal rate, normal heart sounds and intact distal pulses. An irregularly irregular rhythm present. Pulmonary/Chest: Effort normal and breath sounds normal.   Abdominal: Soft. Normal appearance and bowel sounds are normal. She exhibits no distension. There is no tenderness. There is no rigidity, no rebound, no guarding, no CVA tenderness, no tenderness at McBurney's point and negative Alvarado's sign. Musculoskeletal: Normal range of motion. Right knee: She exhibits ecchymosis. She exhibits normal range of motion, no swelling, no effusion, no deformity, no laceration, no erythema, normal alignment, no LCL laxity, normal patellar mobility, no bony tenderness, normal meniscus and no MCL laxity. No tenderness found. Left knee: She exhibits ecchymosis. She exhibits normal range of motion, no swelling, no effusion, no deformity, no laceration, no erythema, normal alignment, no LCL laxity, normal patellar mobility, no bony tenderness, normal meniscus and no MCL laxity. No tenderness found.         Right upper leg: Normal.        Left upper leg: Normal.        Right lower leg: Normal.        Left lower leg: Normal.   Neurological: She is alert. She is not disoriented. She displays no tremor. No cranial nerve deficit or sensory deficit. She displays no seizure activity. Coordination normal. GCS eye subscore is 4. GCS verbal subscore is 5. GCS motor subscore is 6. Patient moves all four extremeties, normal bilateral finger to nose exam. Oriented to self. Skin: Skin is warm and dry. No laceration and no rash noted. She is not diaphoretic. Psychiatric: She has a normal mood and affect. Her behavior is normal. Judgment and thought content normal.   Nursing note and vitals reviewed. MDM  Number of Diagnoses or Management Options  Chronic atrial fibrillation [facility]):   Elevated troponin:   Fall, initial encounter:   Traumatic rhabdomyolysis, initial encounter [facility]):   Diagnosis management comments: Pt with baseline dementia, no family here. Fall with unknown downtime. Will CT head, maxillofacial, and neck, check EKG, cardiac panel, cbc, bmp, ua. UA obtained by nursing while I was present, urine coke colored. Kenda Paget, PA-C 7:00 PM     Elev CPK, with hemoglobinuria, traumatic fall, c/w with rhabdomyolysis. Pt on IVF. EKG with a. Fib, t wave inversion, unchanged on 3 EKG in ED. Elev trop, 2.25, trending down. D/w ED attending Dr. Bella Mccauley recommends cardiology consult after CT results for recommendations on management. CT head without acute intracranial findings per radiology impression. CT maxillofacial impression: No acute facial fx.    CT C-spine impression: No acute fracture or dislocation. Left and right knee and r  Consult:    10:19 PM   Discussed care with OBIE Moran with cardiology. Standard discussion; including history of patients chief complaint, available diagnostic results, and treatment course. PLAN: Feels less likely an ACS picture, more c/w rhabdo, but will consult concerning elevated troponin.  Given pt age and fall hx, does not feel acute anticoagulation or therapy indicated at this time. Will consult, add to tx team, inc Dr. Frederick Mcgraw. Consult:    10:20 PM   Discussed care with Dr. Junito Simms, hospitalist.  Standard discussion; including history of patients chief complaint, available diagnostic results, and treatment course. PLAN: Admit, continue IVF. Jenna Crowe PA-C     Reviewed workup results, any admission plan with patient and any family present. Answered all questions. Jenna Crowe PA-C 10:23 PM          Amount and/or Complexity of Data Reviewed  Clinical lab tests: ordered and reviewed  Tests in the radiology section of CPT®: ordered and reviewed  Tests in the medicine section of CPT®: ordered and reviewed  Obtain history from someone other than the patient: yes  Review and summarize past medical records: yes  Discuss the patient with other providers: yes  Independent visualization of images, tracings, or specimens: yes    Risk of Complications, Morbidity, and/or Mortality  Presenting problems: moderate  Diagnostic procedures: moderate  Management options: moderate    Patient Progress  Patient progress: stable    ED Course       Procedures    Labs Reviewed   CBC WITH AUTOMATED DIFF - Abnormal; Notable for the following:        Result Value    RBC 5.51 (*)     HCT 46.4 (*)     RDW 16.1 (*)     NEUTROPHILS 86 (*)     LYMPHOCYTES 8 (*)     ABS. NEUTROPHILS 10.5 (*)     All other components within normal limits   METABOLIC PANEL, COMPREHENSIVE - Abnormal; Notable for the following:     Glucose 133 (*)     BUN 19 (*)     GFR est AA 48 (*)     GFR est non-AA 39 (*)     AST (SGOT) 118 (*)     Alk.  phosphatase 153 (*)     All other components within normal limits   CARDIAC PANEL,(CK, CKMB & TROPONIN) - Abnormal; Notable for the following:     CK 2825 (*)     CK - MB 38.8 (*)     Troponin-I, Qt. 2.25 (*)     All other components within normal limits   URINALYSIS W/ RFLX MICROSCOPIC - Abnormal; Notable for the following:     Protein 300 (*)     Blood LARGE (*) Leukocyte Esterase SMALL (*)     All other components within normal limits   URINE MICROSCOPIC ONLY - Abnormal; Notable for the following:     Bacteria 2+ (*)     All other components within normal limits   MYOGLOBIN, UR, SCREEN - Abnormal; Notable for the following:     Myoglobin,urine screen POSITIVE (*)     All other components within normal limits   CARDIAC PANEL,(CK, CKMB & TROPONIN) - Abnormal; Notable for the following:     CK 2470 (*)     CK - MB 30.0 (*)     Troponin-I, Qt. 2.19 (*)     All other components within normal limits   CULTURE, URINE   PROTHROMBIN TIME + INR   PTT   MYOGLOBIN, UR, QT     Diagnosis:   1. Traumatic rhabdomyolysis, initial encounter ([facility]    2. Elevated troponin    3. Chronic atrial fibrillation ([facility]    4. Fall, initial encounter          Disposition: Admit. Follow-up Information     None          Patient's Medications   Start Taking    No medications on file   Continue Taking    ACETAMINOPHEN (TYLENOL) 325 MG TABLET    Take 500 mg by mouth every four (4) hours as needed. prn pain  Indications: Pain    ALENDRONATE (FOSAMAX) 40 MG TABLET    Take 40 mg by mouth every seven (7) days. 70 mg    CETIRIZINE (ZYRTEC) 10 MG TABLET    Take 10 mg by mouth daily as needed. for allergy    CHOLECALCIFEROL, VITAMIN D3, (VITAMIN D3) 2,000 UNIT TAB    Take 2,000 Units by mouth daily. CLOPIDOGREL (PLAVIX) 75 MG TAB    Take 75 mg by mouth daily. DONEPEZIL (ARICEPT) 5 MG TABLET    Take 5 mg by mouth daily. FAMOTIDINE (PEPCID) 40 MG TABLET    Take 40 mg by mouth daily. FLUTICASONE (FLONASE) 50 MCG/ACTUATION NASAL SPRAY    2 Sprays by Both Nostrils route daily. FUROSEMIDE (LASIX) 20 MG TABLET    Take 40 mg by mouth daily. LISINOPRIL (PRINIVIL, ZESTRIL) 20 MG TABLET    Take 20 mg by mouth daily. METOPROLOL SUCCINATE (TOPROL-XL) 25 MG XL TABLET    Take 25 mg by mouth daily.     NITROGLYCERIN (NITROSTAT) 0.4 MG SL TABLET    0.4 mg by SubLINGual route every five (5) minutes as needed. POLYETHYLENE GLYCOL (MIRALAX) 17 GRAM PACKET    Take 17 g by mouth daily. POTASSIUM CHLORIDE SR (KLOR-CON 10) 10 MEQ TABLET    Take  by mouth every other day. SIMVASTATIN (ZOCOR) 40 MG TABLET    Take 40 mg by mouth nightly. VIT C-VIT E-COPPER-ZNOX-LUTEIN (PRESERVISION LUTEIN) 226 MG-200 UNIT -5 MG-0.8 MG CAP    Take 1 Cap by mouth daily.    These Medications have changed    No medications on file   Stop Taking    No medications on file

## 2017-11-25 NOTE — ED TRIAGE NOTES
Pt arrived by EMS with chief c/o a fall today at unknown time. Patient was last seen at 1130 and between then and [phone] patient had a fall and was found by family laying on her face.  Patient c/o left eye pain and is on blood thinners

## 2017-11-25 NOTE — ED NOTES
c-collar applied due to patient having stiff neck at this time and being found on her face on the floor

## 2017-11-25 NOTE — Clinical Note
Status[de-identified] Inpatient [101] Type of Bed: Telemetry [19] Inpatient Hospitalization Certified Necessary for the Following Reasons: 3. Patient receiving treatment that can only be provided in an inpatient setting (further clarification in H&P documentation) Admitting Diagnosis: Traumatic rhabdomyolysis ([facility] [address] [[id number]] Admitting Physician: Adarsh Dewitt Attending Physician: Adarsh Dewitt Estimated Length of Stay: 2 Midnights Discharge Plan[de-identified] Home with Office Follow-up

## 2017-11-26 PROBLEM — I10 ESSENTIAL HYPERTENSION: Status: ACTIVE | Noted: 2017-11-26

## 2017-11-26 PROBLEM — G30.1 LATE ONSET ALZHEIMER'S DISEASE WITHOUT BEHAVIORAL DISTURBANCE (HCC): Status: ACTIVE | Noted: 2017-11-26

## 2017-11-26 PROBLEM — F02.80 LATE ONSET ALZHEIMER'S DISEASE WITHOUT BEHAVIORAL DISTURBANCE (HCC): Status: ACTIVE | Noted: 2017-11-26

## 2017-11-26 LAB
ANION GAP SERPL CALC-SCNC: 9 MMOL/L (ref 3–18)
ATRIAL RATE: 64 BPM
ATRIAL RATE: 79 BPM
ATRIAL RATE: 96 BPM
BASOPHILS # BLD: 0 K/UL (ref 0–0.06)
BASOPHILS NFR BLD: 0 % (ref 0–2)
BUN SERPL-MCNC: 20 MG/DL (ref 7–18)
BUN/CREAT SERPL: 20 (ref 12–20)
CALCIUM SERPL-MCNC: 8.9 MG/DL (ref 8.5–10.1)
CALCULATED R AXIS, ECG10: -148 DEGREES
CALCULATED R AXIS, ECG10: 165 DEGREES
CALCULATED R AXIS, ECG10: 180 DEGREES
CALCULATED T AXIS, ECG11: 103 DEGREES
CALCULATED T AXIS, ECG11: 126 DEGREES
CALCULATED T AXIS, ECG11: 134 DEGREES
CHLORIDE SERPL-SCNC: 110 MMOL/L (ref 100–108)
CK MB CFR SERPL CALC: 1 % (ref 0–4)
CK MB SERPL-MCNC: 19.5 NG/ML (ref 5–25)
CK SERPL-CCNC: 1914 U/L (ref 26–192)
CO2 SERPL-SCNC: 25 MMOL/L (ref 21–32)
CREAT SERPL-MCNC: 1.01 MG/DL (ref 0.6–1.3)
DIAGNOSIS, 93000: NORMAL
DIFFERENTIAL METHOD BLD: ABNORMAL
EOSINOPHIL # BLD: 0 K/UL (ref 0–0.4)
EOSINOPHIL NFR BLD: 0 % (ref 0–5)
ERYTHROCYTE [DISTWIDTH] IN BLOOD BY AUTOMATED COUNT: 15.9 % (ref 11.6–14.5)
GLUCOSE BLD STRIP.AUTO-MCNC: 96 MG/DL (ref 70–110)
GLUCOSE SERPL-MCNC: 107 MG/DL (ref 74–99)
HCT VFR BLD AUTO: 41 % (ref 35–45)
HGB BLD-MCNC: 13.6 G/DL (ref 12–16)
INR PPP: 1.1 (ref 0.8–1.2)
LACTATE SERPL-SCNC: 2.5 MMOL/L (ref 0.4–2)
LYMPHOCYTES # BLD: 1.3 K/UL (ref 0.9–3.6)
LYMPHOCYTES NFR BLD: 14 % (ref 21–52)
MCH RBC QN AUTO: 27.6 PG (ref 24–34)
MCHC RBC AUTO-ENTMCNC: 33.2 G/DL (ref 31–37)
MCV RBC AUTO: 83.3 FL (ref 74–97)
MONOCYTES # BLD: 0.7 K/UL (ref 0.05–1.2)
MONOCYTES NFR BLD: 8 % (ref 3–10)
NEUTS SEG # BLD: 7.6 K/UL (ref 1.8–8)
NEUTS SEG NFR BLD: 78 % (ref 40–73)
PLATELET # BLD AUTO: 178 K/UL (ref 135–420)
PMV BLD AUTO: 9.5 FL (ref 9.2–11.8)
POTASSIUM SERPL-SCNC: 4 MMOL/L (ref 3.5–5.5)
PROTHROMBIN TIME: 13.6 SEC (ref 11.5–15.2)
Q-T INTERVAL, ECG07: 506 MS
Q-T INTERVAL, ECG07: 512 MS
Q-T INTERVAL, ECG07: 548 MS
QRS DURATION, ECG06: 136 MS
QTC CALCULATION (BEZET), ECG08: 519 MS
QTC CALCULATION (BEZET), ECG08: 526 MS
QTC CALCULATION (BEZET), ECG08: 556 MS
RBC # BLD AUTO: 4.92 M/UL (ref 4.2–5.3)
SODIUM SERPL-SCNC: 144 MMOL/L (ref 136–145)
TROPONIN I SERPL-MCNC: 1.81 NG/ML (ref 0–0.04)
TROPONIN I SERPL-MCNC: 2.34 NG/ML (ref 0–0.04)
VENTRICULAR RATE, ECG03: 62 BPM
VENTRICULAR RATE, ECG03: 62 BPM
VENTRICULAR RATE, ECG03: 65 BPM
WBC # BLD AUTO: 9.6 K/UL (ref 4.6–13.2)

## 2017-11-26 PROCEDURE — 74011250637 HC RX REV CODE- 250/637: Performed by: FAMILY MEDICINE

## 2017-11-26 PROCEDURE — 36415 COLL VENOUS BLD VENIPUNCTURE: CPT | Performed by: INTERNAL MEDICINE

## 2017-11-26 PROCEDURE — 65660000000 HC RM CCU STEPDOWN

## 2017-11-26 PROCEDURE — 74011250636 HC RX REV CODE- 250/636: Performed by: FAMILY MEDICINE

## 2017-11-26 PROCEDURE — 82962 GLUCOSE BLOOD TEST: CPT

## 2017-11-26 PROCEDURE — 85025 COMPLETE CBC W/AUTO DIFF WBC: CPT | Performed by: FAMILY MEDICINE

## 2017-11-26 PROCEDURE — 97530 THERAPEUTIC ACTIVITIES: CPT

## 2017-11-26 PROCEDURE — 82550 ASSAY OF CK (CPK): CPT | Performed by: FAMILY MEDICINE

## 2017-11-26 PROCEDURE — 83605 ASSAY OF LACTIC ACID: CPT | Performed by: INTERNAL MEDICINE

## 2017-11-26 PROCEDURE — 85610 PROTHROMBIN TIME: CPT | Performed by: FAMILY MEDICINE

## 2017-11-26 PROCEDURE — 97162 PT EVAL MOD COMPLEX 30 MIN: CPT

## 2017-11-26 PROCEDURE — 80048 BASIC METABOLIC PNL TOTAL CA: CPT | Performed by: FAMILY MEDICINE

## 2017-11-26 PROCEDURE — 77030034849

## 2017-11-26 RX ORDER — LEVOFLOXACIN 5 MG/ML
750 INJECTION, SOLUTION INTRAVENOUS
Status: DISCONTINUED | OUTPATIENT
Start: 2017-11-28 | End: 2017-11-30 | Stop reason: HOSPADM

## 2017-11-26 RX ORDER — ENOXAPARIN SODIUM 100 MG/ML
30 INJECTION SUBCUTANEOUS EVERY 24 HOURS
Status: DISCONTINUED | OUTPATIENT
Start: 2017-11-27 | End: 2017-11-30 | Stop reason: HOSPADM

## 2017-11-26 RX ADMIN — METOPROLOL SUCCINATE 25 MG: 25 TABLET, EXTENDED RELEASE ORAL at 10:04

## 2017-11-26 RX ADMIN — FAMOTIDINE 40 MG: 20 TABLET, FILM COATED ORAL at 10:04

## 2017-11-26 RX ADMIN — DONEPEZIL HYDROCHLORIDE 5 MG: 5 TABLET, FILM COATED ORAL at 10:04

## 2017-11-26 RX ADMIN — VITAMIN D, TAB 1000IU (100/BT) 2000 UNITS: 25 TAB at 10:04

## 2017-11-26 RX ADMIN — POLYETHYLENE GLYCOL 3350 17 G: 17 POWDER, FOR SOLUTION ORAL at 10:05

## 2017-11-26 RX ADMIN — CLOPIDOGREL BISULFATE 75 MG: 75 TABLET ORAL at 10:04

## 2017-11-26 RX ADMIN — ENOXAPARIN SODIUM 40 MG: 40 INJECTION SUBCUTANEOUS at 03:26

## 2017-11-26 RX ADMIN — LEVOFLOXACIN 750 MG: 5 INJECTION, SOLUTION INTRAVENOUS at 00:24

## 2017-11-26 RX ADMIN — SIMVASTATIN 40 MG: 40 TABLET, FILM COATED ORAL at 22:19

## 2017-11-26 RX ADMIN — LISINOPRIL 20 MG: 20 TABLET ORAL at 10:04

## 2017-11-26 RX ADMIN — SODIUM CHLORIDE 125 ML/HR: 900 INJECTION, SOLUTION INTRAVENOUS at 10:02

## 2017-11-26 RX ADMIN — SODIUM CHLORIDE 125 ML/HR: 900 INJECTION, SOLUTION INTRAVENOUS at 00:24

## 2017-11-26 NOTE — PROGRESS NOTES
Problem: Falls - Risk of  Goal: *Absence of Falls  Document Malu Fall Risk and appropriate interventions in the flowsheet.    Outcome: Progressing Towards Goal  Fall Risk Interventions:  Mobility Interventions: Patient to call before getting OOB    Mentation Interventions: Bed/chair exit alarm, Door open when patient unattended    Medication Interventions: Patient to call before getting OOB    Elimination Interventions: Call light in reach    History of Falls Interventions: Door open when patient unattended

## 2017-11-26 NOTE — ED NOTES
Patients daughter questioning how long it will be until the patient gets an admission bed. Call placed to the Nursing Supervisor. Informed patients daughter that the patient will be given a bed in about 1 1/2 hours. Patients daughter upset with the length of time. Explained why the delay occurred and that they Supervisor is doing everything in her power to expedite a bed quicker. Patients daughter upset.

## 2017-11-26 NOTE — PROGRESS NOTES
CARDIOLOGY CONSULT    Patient: Glen Corrigan  MR #: [id number]      Reason for consult: elevated troponin    Assessment/Plan:     Hospital Problems  Never Reviewed          Codes Class Noted POA    Late onset Alzheimer's disease without behavioral disturbance, presumptive diagnosis ICD-10-CM: G30.1, F02.80  ICD-9-CM: 331.0, 294.10  11/26/2017 Unknown        Essential hypertension, very hypertensive at presentation with BP of 130/110 mmHG ICD-10-CM: I10  ICD-9-CM: 401.9  11/26/2017 Unknown        * (Principal)Traumatic rhabdomyolysis ([facility] ICD-10-CM: T79. 6XXA  ICD-9-CM: 958.6  11/25/2017 Yes        Elevated troponin, 5 serum troponins obtained thus far with peak of 2.34. ECG does show possible anterior ischemic changes. Would start empiric therapy for ischemic heart disease. Daughter presently unavailable but I believe that the patient would not be a candidate for aggressive invasive evaluation. ICD-10-CM: R74.8  ICD-9-CM: 790.6  11/25/2017 Yes        UTI (urinary tract infection) ICD-10-CM: N39.0  ICD-9-CM: 599.0  11/25/2017 Yes        A-fib ([facility] chronic [id number] score 6, not suitable candidate for anticoagulation ICD-10-CM: I48.91  ICD-9-CM: 427.31  11/25/2017 Yes              History of Present Illness  Glen Corrigan is a 80 y.o. women that had a fall at home prior to admission. She had an elevated CPK  And is presumed to have rhabdomyolysis. In the course of her evaluation , 5 troponins have been obtained. They demonstrate a mild but steady rise to a value of 2.34. The ECG demonstrates Atrial fibrillation with a possible posterolateral MI of indeterminate age, RBBB, and abnormal anterior T wave inversion possibly consistent with ischemia. She has a history of cognitive dysfunction and cannot detail her history. . She is currently on Plavix, a BB, and an ACEi. The patient denies chest discomfort.     Past Medical History  Past Medical History:   Diagnosis Date    Breast cancer ([facility]     Chronic obstructive pulmonary disease ([facility]     Dementia     Hypertension        Past Surgical History  Social History     Social History    Marital status:      Spouse name: N/A    Number of children: N/A    Years of education: N/A     Occupational History    Not on file. Social History Main Topics    Smoking status: Never Smoker    Smokeless tobacco: Never Used    Alcohol use No    Drug use: No    Sexual activity: Not on file     Other Topics Concern    Not on file     Social History Narrative         Meds  Current Facility-Administered Medications   Medication Dose Route Frequency    [START ON 11/28/2017] levoFLOXacin (LEVAQUIN) 750 mg in D5W IVPB  750 mg IntraVENous Q48H    0.9% sodium chloride infusion  75 mL/hr IntraVENous CONTINUOUS    0.9% sodium chloride infusion  125 mL/hr IntraVENous CONTINUOUS    enoxaparin (LOVENOX) injection 40 mg  40 mg SubCUTAneous Q24H    alendronate (FOSAMAX) tablet 40 mg  40 mg Oral Q7D    cholecalciferol (VITAMIN D3) tablet 2,000 Units  2,000 Units Oral DAILY    clopidogrel (PLAVIX) tablet 75 mg  75 mg Oral DAILY    donepezil (ARICEPT) tablet 5 mg  5 mg Oral DAILY    famotidine (PEPCID) tablet 40 mg  40 mg Oral DAILY    fluticasone (FLONASE) 50 mcg/actuation nasal spray 2 Spray  2 Spray Both Nostrils DAILY    lisinopril (PRINIVIL, ZESTRIL) tablet 20 mg  20 mg Oral DAILY    metoprolol succinate (TOPROL-XL) XL tablet 25 mg  25 mg Oral DAILY    nitroglycerin (NITROSTAT) tablet 0.4 mg  0.4 mg SubLINGual Q5MIN PRN    polyethylene glycol (MIRALAX) packet 17 g  17 g Oral DAILY    simvastatin (ZOCOR) tablet 40 mg  40 mg Oral QHS         Allergies  Allergies   Allergen Reactions    Azithromycin Anaphylaxis    Penicillin Anaphylaxis       Social History  Social History     Social History    Marital status:      Spouse name: N/A    Number of children: N/A    Years of education: N/A     Occupational History    Not on file.      Social History Main Topics    Smoking status: Never Smoker    Smokeless tobacco: Never Used    Alcohol use No    Drug use: No    Sexual activity: Not on file     Other Topics Concern    Not on file     Social History Narrative       Family History     History reviewed. No pertinent family history. Review of systems    General: No fever, chills. HEENT: Denies frequent headaches, cataracts, sinus issues. Pulmonary: Denies cough, wheezing. Cardiac: See HPI  GI: No nausea, vomiting, frequent diarrhea or constipation. : Denies dysuria, hematuria, nocturia. Neuro: No seizures, tremor or notable weakness. Musculoskeletal: No arthralgias or myalgias. Heme: Denies easy bruising or bleeding. Psych: Denies history of depression or anxiety issues. Physical Exam  Visit Vitals    /87 (BP 1 Location: Right arm)    Pulse 64    Temp 98 °F (36.7 °C)    Resp 20    Ht 4' 6\" (1.372 m)    Wt 100 lb (45.4 kg)    SpO2 95%    BMI 24.11 kg/m2       Chanel Singh is who is alertand in no acute distress. Head is normocephalic and atraumatic. Trachea appears midline with no noted thyromegaly. Carotids are full without definite bruits. There is no JVD. Nighat Barrio Chest is clear to auscultation bilaterally. Cardiac auscultation reveals an irregular rate and rhythm without significant murmur. Abdomen is soft and nontender. Extremities are without significant edema. Dorsalis pedis and posterior tibial pulses are  palpable. . Skin is warm and dry.        Labs:   Recent Labs      11/26/17   0335  11/25/17   1817   WBC  9.6  12.2   HGB  13.6  15.2   HCT  41.0  46.4*   PLT  178  252     Recent Labs      11/26/17   0335  11/25/17   2111  11/25/17   1817   NA  144   --   142   K  4.0   --   5.0   CL  110*   --   107   CO2  25   --   25   GLU  107*   --   133*   BUN  20*   --   19*   CREA  1.01   --   1.26   CA  8.9   --   9.7   ALB   --    --   3.6   SGOT   --    --   118*   ALT   --    --   46   INR  1.1  1.0   --        Recent Labs      11/26/17 0335  11/25/17   2111  11/25/17   1817   TROIQ  2.34*  2.19*  2.25*   CPK  1914*  2470*  2825*   CKMB  19.5*  30.0*  38.8*     Last Lipid:  No results found for: CHOL, CHOLX, CHLST, CHOLV, HDL, LDL, LDLC, DLDLP, TGLX, TRIGL, TRIGP, CHHD, CHHDX        We appreciate the opportunity to see Lawerence Batter with you. We will follow along.     Tereza Boone MD  11/26/2017

## 2017-11-26 NOTE — MANAGEMENT PLAN
[facility]   Discharge Planning/ Assessment    Reasons for Intervention:   Fatimah Sharpe patient's daughter. Pt with dementia. Verified demographics listed on face sheet; all information correct. Pt has [employer] for insurance. Patient stated their PCP is Dr Keli Sánchez and last appt 6 weeks ago. Patient lives with daughter and grandson. Grandson does not assist. Patient's NOK is. Patient moderately independent with ADLs prior to admission. No use of DME prior to admission. Occasionally uses cane if feeling weak. Thye do have walker at house. Discharge plan is Home with Home Health.  Home PT/OT    Ankur Carter RN BSN  Outcomes Manager  Pager # [phone]    High Risk Criteria  [x] Yes  []No   Physician Referral  [] Yes  []No        Date    Nursing Referral  [] Yes  []No        Date    Patient/Family Request  [] Yes  []No        Date       Resources:    Medicare  [x] Yes  []No   Medicaid  [] Yes  []No   No Resources  [] Yes  []No   Private Insurance  [] Yes  []No    Name/Phone Number    Other  [] Yes  []No        (i.e. Workman's Comp)         Prior Services:    Prior Services  [] Yes  [x]No   Home Health  [] Yes  []No   [address] [place]  [] Yes  []No        Number of [address]  [] Yes  []No       Meals on Wheels  [] Yes  []No   Office on Aging  [] Yes  []No   Transportation Services  [] Yes  []No   Nursing Home  [] Yes  []No        Nursing Home Name    [address]  [] Yes  []No        [address] Name    Other       Information Source:      Information obtained from  [] Patient  [] Parent   [] Guardian  [x] Child  [] Spouse   [] Significant Other/Partner   [] Friend      [] EMS    [] Nursing Home Chart          [] Other:   Chart Review  [x] Yes  []No     Family/Support System:    Patient lives with  [] Alone    [] Spouse   [] Significant Other  [x] Children  [] Caretaker   [] Parent  [] Sibling     [] Other       Other Support System:    Is the patient responsible for care of others  [] Yes  [x]No   Information of person caring for patient on  discharge    Managers financial affairs independently  [] Yes  [x]No   If no, explain:      Status Prior to Admission:    Mental Status  [x] Awake  [x] Alert  [] Oriented  [] Quiet/Calm [] Lethargic/Sedated   [x] Disoriented  [] Restless/Anxious  [] Combative   Personal Care  [] Dependent  [x] [address]  [] Requires Assistance   Meal Preparation Ability  [] Independent   [] Standby Assistance   [] Minimal Assistance   [] Moderate Assistance  [] Maximum Assistance     [x] Total Assistance   Chores  [] Independent with Chores   [] [address] Resident   [x] Requires Assistance   Bowel/Bladder  [x] Continent  [] Catheter  [] Incontinent  [] Ostomy Self-Care    [] Urine Diversion Self-Care  [] Maximum Assistance     [] Total Assistance   Number of Persons needed for assistance    DME at home  [] [employer], Deb Bhandari  [] [employer], Dexter   [] Commode    [] Bathroom/Grab Bars  [] Hospital Bed  [] Nebulizer  [] Oxygen           [] Raised Toilet Seat  [] Shower Chair  [] Side Rails for Bed   [] Tub Transfer Bench   [] Lupis Beekaty  [] Ailyn James      [] Other:   Vendor      Treatment Presently Receiving:    Current Treatments  [] Chemotherapy  [] Dialysis  [] Insulin  [] IVAB [] IVF   [] O2  [] PCA   [] PT   [] RT   [] Tube Feedings   [] Wound Care     Psychosocial Evaluation:    Verbalized Knowledge of Disease Process  [] Patient  []Family   Coping with Disease Process  [] Patient  []Family   Requires Further Counseling Coping with Disease Process  [] Patient  []Family     Identified Projected Needs:    Home Health Aid  [] Yes  []No   Transportation  [] Yes  []No   Education  [] Yes  []No        Specific Education     Financial Counseling  [] Yes  []No   Inability to Care for Self/Will Require 24 hour care  [] Yes  []No   Pain Management  [] Yes  []No   Home Infusion Therapy  [] Yes  []No Oxygen Therapy  [] Yes  []No   DME  [] Yes  []No   Long Term Care Placement  [] Yes  []No   Rehab  [] Yes  []No   Physical Therapy  [x] Yes  []No   Needs Anticipated At This Time  [x] Yes  []No     Intra-Hospital Referral:    [facility]  [] Yes  []No     [] Yes  []No   Patient Representative  [] Yes  []No   Staff for Teaching Needs  [] Yes  []No   Specialty Teaching Needs     Diabetic Educator  [] Yes  []No   Referral for Diabetic Educator Needed  [] Yes  []No  If Yes, place order for Nutritionist or Diabetic Consult     Tentative Discharge Plan:    Home with No Services  [] Yes  []No   Home with [address]  [] Yes  []No        If Yes, specify type    [address]  [x] Yes  []No        If Yes, specify type Skilled nurse, PT/OT   Meals on Wheels  [] Yes  []No   Office of Aging  [] Yes  []No   NHP  [] Yes  []No   Return to the [address]  [] Yes  []No   Rehab Therapy  [] Yes  []No   Acute Rehab  [] Yes  []No   Subacute Rehab  [] Yes  []No   Private Care  [] Yes  []No   Substance Abuse Referral  [] Yes  []No   Transportation  [] Yes  []No   Chore Service  [] Yes  []No   Inpatient Hospice  [] Yes  []No   OP RT  [] Yes  [] No   OP Hemo  [] Yes  [] No   OP PT  [] Yes  []No   Support Group  [] Yes  []No   Reach to Recovery  [] Yes  []No   OP Oncology Clinic  [] Yes  []No   Clinic Appointment  [] Yes  []No   DME  [] Yes  []No   Comments    Name of D/C Planner or  Given to Patient or Family Shantelle Roman RN BSN  Outcomes Manager  Pager # [phone]   Phone Number         Extension    Date 11/26/2017   Time 1300   If you are discharged home, whom do you designate to participate in your discharge plan and receive any information needed?      Enter name of designee daughter        Phone # of designee         Address of designee         Updated         Patient refused to designate any           individual

## 2017-11-26 NOTE — PROGRESS NOTES
Problem: Falls - Risk of  Goal: *Absence of Falls  Document Malu Fall Risk and appropriate interventions in the flowsheet.    Outcome: Progressing Towards Goal  Fall Risk Interventions:  Mobility Interventions: OT consult for ADLs, Patient to call before getting OOB, PT Consult for mobility concerns, PT Consult for assist device competence    Mentation Interventions: Adequate sleep, hydration, pain control         Elimination Interventions: Call light in reach, Toileting schedule/hourly rounds    History of Falls Interventions: Consult care management for discharge planning, Door open when patient unattended, Room close to nurse's station

## 2017-11-26 NOTE — PROGRESS NOTES
Problem: Mobility Impaired (Adult and Pediatric)  Goal: *Acute Goals and Plan of Care (Insert Text)  Physical Therapy Goals  Initiated 11/26/2017 and to be accomplished within 7 day(s)  1. Patient will move from supine to sit and sit to supine , scoot up and down and roll side to side in bed with supervision/set-up. 2.  Patient will transfer from bed to chair and chair to bed with supervision/set-up using the least restrictive device. 3.  Patient will perform sit to stand with supervision/set-up. 4.  Patient will ambulate with supervision/set-up for >/= 75 feet with the least restrictive device. 5.  Patient will ascend/descend 2 stairs with bilateral handrail(s) with supervision/set-up. physical Therapy EVALUATION    Patient: Criselda Darden (59 y.o. female)  Date: 11/26/2017  Primary Diagnosis: Traumatic rhabdomyolysis ([address]  Traumatic rhabdomyolysis ([address]  Elevated troponin  UTI (urinary tract infection)        Precautions:        ASSESSMENT :   Patient requires between minimal assistance/contact guard assist and moderate assistance for bed mobility, transfers and ambulation. Will benefit from skilled PT intervention to increase overall functional mobility independence and safety. Patient presents with deficits in:   Bed Mobility, Transfers, Gait and Balance    Patient will benefit from skilled intervention to address the above impairments.   Patients rehabilitation potential is considered to be Good  Factors which may influence rehabilitation potential include:  []         None noted  []         Mental ability/status  [x]         Medical condition  []         Home/family situation and support systems  []         Safety awareness  []         Pain tolerance/management  []         Other:     Recommendations for nursing:   Written on communication board: OOB with assist of 1  Verbally communicated to: nurse Angelina Arthur     PLAN :  Recommendations and Planned Interventions:  [x]           Bed Mobility Training []    Neuromuscular Re-Education  [x]           Transfer Training                   []    Orthotic/Prosthetic Training  [x]           Gait Training                          []    Modalities  []           Therapeutic Exercises          []    Edema Management/Control  []           Therapeutic Activities            [x]    Patient and Family Training/Education*  [x]           Other (comment): Plan of care, bed mobility, transfers, gait with rolling walker    Frequency/Duration: Patient will be followed by physical therapy 1 time per day/3-5 days per week to address goals. Discharge Recommendations: Didier Narvaez  Further Equipment Recommendations for Discharge: wheelchair, [facility]      SUBJECTIVE:   Patient stated feels weak some.     OBJECTIVE DATA SUMMARY:     Past Medical History:   Diagnosis Date    [facility]     Chronic obstructive pulmonary disease [facility]     Dementia     Hypertension      Past Surgical History:   Procedure Laterality Date    HX MASTECTOMY       Barriers to Learning/Limitations: none  Compensate with: visual, verbal, tactile, kinesthetic cues/model    G CODE:Mobility  Current  CL= 60-79%   Goal  CJ= 20-39%. The severity rating is based on the Other [facility] Balance Scale    Eval Complexity: History: MEDIUM  Complexity : 1-2 comorbidities / personal factors will impact the outcome/ POC Exam:MEDIUM Complexity : 3 Standardized tests and measures addressing body structure, function, activity limitation and / or participation in recreation  Presentation: MEDIUM Complexity : Evolving with changing characteristics  Clinical Decision Making:Medium Complexity [facility] Standing Balance Scale Overall Complexity:MEDIUM    [facility] Balance Scale  0: Pt performs 25% or less of standing activity (Max assist) CN, 100% impaired. 1: Pt supports self with upper extremities but requires therapist assistance.  Pt performs 25-50% of effort (Mod assist) CM, 80% to <100% impaired. 1+: Pt supports self with upper extremities but requires therapist assistance. Pt performs >50% effort. (Min assist). CL, 60% to <80% impaired. 2: Pt supports self independently with both upper extremities (walker, crutches, parallel bars). CL, 60% to <80% impaired. 2+: Pt support self independently with 1 upper extremity (cane, crutch, 1 parallel bar). CK, 40% to <60% impaired. 3: Pt stands without upper extremity support for up to 30 seconds. CK, 40% to <60% impaired. 3+: Pt stands without upper extremity support for 30 seconds or greater. CJ, 20% to <40% impaired. 4: Pt independently moves and returns center of gravity 1-2 inches in one plane. CJ, 20% to <40% impaired. 4+: Pt independently moves and returns center of gravity 1-2 inches in multiple planes. CI, 1% to <20% impaired. 5: Pt independently moves and returns center of gravity in all planes greater than 2 inches. CH, 0% impaired. Prior Level of Function/Home Situation: Ambulatory with rollator  Home Situation  Home Environment: Private residence  # Steps to Enter: 2  Rails to Enter: Yes  Hand Rails : Bilateral  One/Two Story Residence: Two story  Living Alone: No  Support Systems: Child(madonna)  Patient Expects to be Discharged to[de-identified] Private residence  Current DME Used/Available at Home: [address], rollator  Critical Behavior:  Neurologic State: Alert  Orientation Level: Oriented X4  Cognition: Decreased attention/concentration  Safety/Judgement: Fall prevention  Psychosocial  Patient Behaviors: Calm; Cooperative  Family  Behaviors: Calm; Cooperative  Needs Expressed: Emotional;Nutritional  Purposeful Interaction: Yes  Pt Identified Daily Priority: Clinical issues (comment)  Caritas Process: Teaching/learning  Caring Interventions: Therapeutic modalities  Therapeutic Modalities: Intentional therapeutic touch  Skin Condition/Temp: Warm  Family  Behaviors: Calm; Cooperative  Skin Integrity: Abrasion (knees)  Skin Integumentary  Skin Color: Appropriate for ethnicity  Skin Condition/Temp: Warm  Skin Integrity: Abrasion (knees)  Turgor: Epidermis thin w/ loss of subcut tissue  Hair Growth: Sparce  Varicosities: Present     Strength:    Strength: Generally decreased, functional (both LE)      Tone & Sensation:   Tone: Normal (both LE)        Range Of Motion:  AROM: Generally decreased, functional (both LE)      Functional Mobility:  Bed Mobility:  Supine to Sit: Moderate assistance; Additional time  Sit to Supine: Moderate assistance  Scooting: Maximum assistance  Transfers:  Sit to Stand: Minimum assistance; Moderate assistance  Stand to Sit: Minimum assistance  Balance:   Sitting: Impaired  Sitting - Static: Fair (occasional) (Minus)  Sitting - Dynamic: Poor (constant support) (Plus/Fair Minus)  Standing: With support  Standing - Static: Poor (Plus)  Standing - Dynamic : Poor (Plus)  Ambulation/Gait Training:  Distance (ft): 4 Feet (ft) (side stepping to left/right)  Assistive Device: Walker, rolling  Ambulation - Level of Assistance: Minimal assistance  Base of Support: Center of gravity altered;Narrowed  Speed/My: Slow  Step Length: Left shortened;Right shortened    Pain:  Pre treatment pain level: 0  Post treatment pain level:  0  Pain Scale 1: Numeric (0 - 10)  Pain Intensity 1: 0      Activity Tolerance:   Fair Minus    Please refer to the flowsheet for vital signs taken during this treatment. After treatment:  []         Patient left in no apparent distress sitting up in chair  [x]         Patient left in no apparent distress in bed  [x]         Call bell left within reach  [x]         Nursing notified  [x]         Daughter present  []         Bed alarm activated    COMMUNICATION/EDUCATION:   [x]         Fall prevention education was provided and the patient/caregiver indicated understanding. [x]         Patient/family have participated as able in goal setting and plan of care.   [x] Patient/family agree to work toward stated goals and plan of care. []         Patient understands intent and goals of therapy, but is neutral about his/her participation. []         Patient is unable to participate in goal setting and plan of care.     Thank you for this referral.  Aram Babcock, PT   Time Calculation: 23 mins

## 2017-11-26 NOTE — ROUTINE PROCESS
0400  Received pt from ED. Pt alert and oriented to self only. Tele applied, currently A-fib 60s. Physical assessment complete, pt has no complaint of pain. RA at 97%. Bed locked and in low position, bed alarm on, call bell within reach. 0715  Bedside and Verbal shift change report given to Sang Valentine (oncoming nurse) by Bell Lai (offgoing nurse). Report included the following information SBAR, Kardex, Intake/Output and MAR.

## 2017-11-26 NOTE — ACP (ADVANCE CARE PLANNING)
Patient has designated ___daughter_____________________ to participate in his/her discharge plan and to receive any needed information.      Name: Annie Fanta as pt  Phone [phone]

## 2017-11-26 NOTE — ED NOTES
Offered patient food. Patient declined food at this time. Patient states that she isn't hungry at this time. Informed patient that the food is at the bedside if she wants it. Patient verbalized understanding.

## 2017-11-26 NOTE — H&P
History and Physical    Patient: Deveron Boeck               Sex: female          DOA: 11/25/2017       YOB: 1920      Age:  80 y.o.        LOS:  LOS: 0 days        Chief Complaint   Patient presents with    Fall         HPI:     Deveron Boeck is a 80 y.o. female who presents with falls. Patient was found at home this evening by her daughter on the floor face down. Patient was last seen normal last night. Patient denies LOC . In the ED serial CT head, cervical spine were negative. Patient denies pain anywhere . She had labs which show she had CK 2825, Elevated troponin 2.25. She was also noted to have UTI. She will be admitted for further treatment. Past Medical History:   Diagnosis Date    [facility] ([facility]     Chronic obstructive pulmonary disease ([facility]     Dementia     Hypertension    . Past Surgical History:   Procedure Laterality Date    HX MASTECTOMY         No current facility-administered medications on file prior to encounter. Current Outpatient Prescriptions on File Prior to Encounter   Medication Sig Dispense Refill    polyethylene glycol (MIRALAX) 17 gram packet Take 17 g by mouth daily.  fluticasone (FLONASE) 50 mcg/actuation nasal spray 2 Sprays by Both Nostrils route daily.  nitroglycerin (NITROSTAT) 0.4 mg SL tablet 0.4 mg by SubLINGual route every five (5) minutes as needed.  Vit C-Vit E-Copper-ZnOx-Lutein (PRESERVISION LUTEIN) 226 mg-200 unit -5 mg-0.8 mg cap Take 1 Cap by mouth daily.  cetirizine (ZYRTEC) 10 mg tablet Take 10 mg by mouth daily as needed. for allergy      metoprolol succinate (TOPROL-XL) 25 mg XL tablet Take 25 mg by mouth daily.  donepezil (ARICEPT) 5 mg tablet Take 5 mg by mouth daily.  clopidogrel (PLAVIX) 75 mg tab Take 75 mg by mouth daily.  lisinopril (PRINIVIL, ZESTRIL) 20 mg tablet Take 20 mg by mouth daily.  potassium chloride SR (KLOR-CON 10) 10 mEq tablet Take  by mouth every other day.  famotidine (PEPCID) 40 mg tablet Take 40 mg by mouth daily.  simvastatin (ZOCOR) 40 mg tablet Take 40 mg by mouth nightly.  furosemide (LASIX) 20 mg tablet Take 40 mg by mouth daily.  cholecalciferol, vitamin D3, (VITAMIN D3) 2,000 unit tab Take 2,000 Units by mouth daily.  alendronate (FOSAMAX) 40 mg tablet Take 40 mg by mouth every seven (7) days. 70 mg      acetaminophen (TYLENOL) 325 mg tablet Take 500 mg by mouth every four (4) hours as needed. prn pain  Indications: Pain         Social History     Social History    Marital status:      Spouse name: N/A    Number of children: N/A    Years of education: N/A     Occupational History    Not on file. Social History Main Topics    Smoking status: Never Smoker    Smokeless tobacco: Never Used    Alcohol use No    Drug use: No    Sexual activity: Not on file     Other Topics Concern    Not on file     Social History Narrative       Prior to Admission Medications   Prescriptions Last Dose Informant Patient Reported? Taking? Vit C-Vit E-Copper-ZnOx-Lutein (PRESERVISION LUTEIN) 226 mg-200 unit -5 mg-0.8 mg cap   Yes No   Sig: Take 1 Cap by mouth daily. acetaminophen (TYLENOL) 325 mg tablet   Yes No   Sig: Take 500 mg by mouth every four (4) hours as needed. prn pain  Indications: Pain   alendronate (FOSAMAX) 40 mg tablet   Yes No   Sig: Take 40 mg by mouth every seven (7) days. 70 mg   cetirizine (ZYRTEC) 10 mg tablet   Yes No   Sig: Take 10 mg by mouth daily as needed. for allergy   cholecalciferol, vitamin D3, (VITAMIN D3) 2,000 unit tab   Yes No   Sig: Take 2,000 Units by mouth daily. clopidogrel (PLAVIX) 75 mg tab   Yes No   Sig: Take 75 mg by mouth daily. donepezil (ARICEPT) 5 mg tablet   Yes No   Sig: Take 5 mg by mouth daily. famotidine (PEPCID) 40 mg tablet   Yes No   Sig: Take 40 mg by mouth daily. fluticasone (FLONASE) 50 mcg/actuation nasal spray   Yes No   Si Sprays by Both Nostrils route daily. furosemide (LASIX) 20 mg tablet   Yes No   Sig: Take 40 mg by mouth daily. lisinopril (PRINIVIL, ZESTRIL) 20 mg tablet   Yes No   Sig: Take 20 mg by mouth daily. metoprolol succinate (TOPROL-XL) 25 mg XL tablet   Yes No   Sig: Take 25 mg by mouth daily. nitroglycerin (NITROSTAT) 0.4 mg SL tablet   Yes No   Si.4 mg by SubLINGual route every five (5) minutes as needed. polyethylene glycol (MIRALAX) 17 gram packet   Yes No   Sig: Take 17 g by mouth daily. potassium chloride SR (KLOR-CON 10) 10 mEq tablet   Yes No   Sig: Take  by mouth every other day. simvastatin (ZOCOR) 40 mg tablet   Yes No   Sig: Take 40 mg by mouth nightly. Facility-Administered Medications: None       History reviewed. No pertinent family history. Allergies   Allergen Reactions    Azithromycin Anaphylaxis    Penicillin Anaphylaxis       Review of Systems: Unable to obtain due to dementia      Physical Exam:       Visit Vitals    BP (!) 163/94    Pulse 61    Temp 97.7 °F (36.5 °C)    Resp 18    Ht 4' 6\" (1.372 m)    Wt 45.4 kg (100 lb)    BMI 24.11 kg/m2       Physical Exam   Constitutional: She appears well-developed and well-nourished. No distress. HENT:   Head: Normocephalic and atraumatic. Mouth/Throat: No oropharyngeal exudate. Eyes: Conjunctivae are normal. No scleral icterus. Neck: Neck supple. Cardiovascular: Normal rate. An irregularly irregular rhythm present. No murmur heard. Pulmonary/Chest: Effort normal. No respiratory distress. She has no wheezes. She has no rales. Abdominal: Soft. Bowel sounds are normal. She exhibits no distension. There is no tenderness. There is no guarding. Musculoskeletal: She exhibits no edema. Neurological: She is alert. Skin: Skin is warm and dry. No rash noted. She is not diaphoretic. No erythema. No pallor. Ancillary Studies: All lab and imaging reviewed for the past 24 hours.     Recent Results (from the past 24 hour(s))   URINALYSIS W/ RFLX MICROSCOPIC    Collection Time: 11/25/17  5:20 PM   Result Value Ref Range    Color BROWN      Appearance CLOUDY      Specific gravity 1.020 1.005 - 1.030      pH (UA) 5.0 5.0 - 8.0      Protein 300 (A) NEG mg/dL    Glucose NEGATIVE  NEG mg/dL    Ketone NEGATIVE  NEG mg/dL    Bilirubin NEGATIVE  NEG      Blood LARGE (A) NEG      Urobilinogen 1.0 0.2 - 1.0 EU/dL    Nitrites NEGATIVE  NEG      Leukocyte Esterase SMALL (A) NEG     URINE MICROSCOPIC ONLY    Collection Time: 11/25/17  5:20 PM   Result Value Ref Range    WBC 4 to 10 0 - 4 /hpf    RBC TOO NUMEROUS TO COUNT 0 - 5 /hpf    Epithelial cells FEW 0 - 5 /lpf    Bacteria 2+ (A) NEG /hpf   MYOGLOBIN, UR, SCREEN    Collection Time: 11/25/17  5:20 PM   Result Value Ref Range    Myoglobin,urine screen POSITIVE (A) NEG     CBC WITH AUTOMATED DIFF    Collection Time: 11/25/17  6:17 PM   Result Value Ref Range    WBC 12.2 4.6 - 13.2 K/uL    RBC 5.51 (H) 4.20 - 5.30 M/uL    HGB 15.2 12.0 - 16.0 g/dL    HCT 46.4 (H) 35.0 - 45.0 %    MCV 84.2 74.0 - 97.0 FL    MCH 27.6 24.0 - 34.0 PG    MCHC 32.8 31.0 - 37.0 g/dL    RDW 16.1 (H) 11.6 - 14.5 %    PLATELET 158 150 - 266 K/uL    MPV 10.6 9.2 - 11.8 FL    NEUTROPHILS 86 (H) 40 - 73 %    LYMPHOCYTES 8 (L) 21 - 52 %    MONOCYTES 6 3 - 10 %    EOSINOPHILS 0 0 - 5 %    BASOPHILS 0 0 - 2 %    ABS. NEUTROPHILS 10.5 (H) 1.8 - 8.0 K/UL    ABS. LYMPHOCYTES 1.0 0.9 - 3.6 K/UL    ABS. MONOCYTES 0.8 0.05 - 1.2 K/UL    ABS. EOSINOPHILS 0.0 0.0 - 0.4 K/UL    ABS.  BASOPHILS 0.0 0.0 - 0.06 K/UL    DF AUTOMATED     METABOLIC PANEL, COMPREHENSIVE    Collection Time: 11/25/17  6:17 PM   Result Value Ref Range    Sodium 142 136 - 145 mmol/L    Potassium 5.0 3.5 - 5.5 mmol/L    Chloride 107 100 - 108 mmol/L    CO2 25 21 - 32 mmol/L    Anion gap 10 3.0 - 18 mmol/L    Glucose 133 (H) 74 - 99 mg/dL    BUN 19 (H) 7.0 - 18 MG/DL    Creatinine 1.26 0.6 - 1.3 MG/DL    BUN/Creatinine ratio 15 12 - 20      GFR est AA 48 (L) >60 ml/min/1.73m2    GFR est non-AA 39 (L) >60 ml/min/1.73m2    Calcium 9.7 8.5 - 10.1 MG/DL    Bilirubin, total 0.8 0.2 - 1.0 MG/DL    ALT (SGPT) 46 13 - 56 U/L    AST (SGOT) 118 (H) 15 - 37 U/L    Alk.  phosphatase 153 (H) 45 - 117 U/L    Protein, total 7.0 6.4 - 8.2 g/dL    Albumin 3.6 3.4 - 5.0 g/dL    Globulin 3.4 2.0 - 4.0 g/dL    A-G Ratio 1.1 0.8 - 1.7     CARDIAC PANEL,(CK, CKMB & TROPONIN)    Collection Time: 11/25/17  6:17 PM   Result Value Ref Range    CK 2825 (H) 26 - 192 U/L    CK - MB 38.8 (H) <3.6 ng/ml    CK-MB Index 1.4 0.0 - 4.0 %    Troponin-I, Qt. 2.25 (HH) 0.0 - 0.045 NG/ML   EKG, 12 LEAD, INITIAL    Collection Time: 11/25/17  6:18 PM   Result Value Ref Range    Ventricular Rate 65 BPM    Atrial Rate 96 BPM    QRS Duration 136 ms    Q-T Interval 506 ms    QTC Calculation (Bezet) 526 ms    Calculated R Axis -148 degrees    Calculated T Axis 103 degrees    Diagnosis       Suspect arm lead reversal, interpretation assumes no reversal  Undetermined rhythm  Right bundle branch block  Lateral infarct (cited on or before 05-APR-2017)  Abnormal ECG  When compared with ECG of 05-APR-2017 19:18,  Current undetermined rhythm precludes rhythm comparison, needs review     EKG, 12 LEAD, INITIAL    Collection Time: 11/25/17  6:49 PM   Result Value Ref Range    Ventricular Rate 62 BPM    Atrial Rate 79 BPM    QRS Duration 136 ms    Q-T Interval 512 ms    QTC Calculation (Bezet) 519 ms    Calculated R Axis 180 degrees    Calculated T Axis 126 degrees    Diagnosis       Suspect arm lead reversal, interpretation assumes no reversal  Atrial fibrillation with a competing junctional pacemaker  Right bundle branch block  Lateral infarct (cited on or before 05-APR-2017)  Abnormal ECG  When compared with ECG of 25-NOV-2017 18:18,  Previous ECG has undetermined rhythm, needs review  Questionable change in QRS axis     CARDIAC PANEL,(CK, CKMB & TROPONIN)    Collection Time: 11/25/17  9:11 PM   Result Value Ref Range    CK 2470 (H) 26 - 192 U/L    CK - MB 30.0 (H) <3.6 ng/ml    CK-MB Index 1.2 0.0 - 4.0 %    Troponin-I, Qt. 2.19 (HH) 0.0 - 0.045 NG/ML   PROTHROMBIN TIME + INR    Collection Time: 11/25/17  9:11 PM   Result Value Ref Range    Prothrombin time 12.7 11.5 - 15.2 sec    INR 1.0 0.8 - 1.2     PTT    Collection Time: 11/25/17  9:11 PM   Result Value Ref Range    aPTT 27.3 23.0 - 36.4 SEC   EKG, 12 LEAD, SUBSEQUENT    Collection Time: 11/25/17  9:16 PM   Result Value Ref Range    Ventricular Rate 62 BPM    Atrial Rate 64 BPM    QRS Duration 136 ms    Q-T Interval 548 ms    QTC Calculation (Bezet) 556 ms    Calculated R Axis 165 degrees    Calculated T Axis 134 degrees    Diagnosis       Suspect arm lead reversal, interpretation assumes no reversal  Atrial fibrillation  Right bundle branch block  Lateral infarct (cited on or before 05-APR-2017)  Abnormal ECG  When compared with ECG of 25-NOV-2017 18:49,  No significant change was found         Assessment/Plan     Principal Problem:    Traumatic rhabdomyolysis (HCC) (11/25/2017)    Active Problems:    Elevated troponin (11/25/2017)      UTI (urinary tract infection) (11/25/2017)      A-fib ([address] (11/25/2017)        PLAN:    Traumatic Rhabdomyolysis - 2/2 fall. Continue aggressive IVF hydration. Monitor CK daily. Monitor BMP    Elevated Troponin - trending down . Cardiology consulted. Did not recommend [facility] therapy . Trend Cardiac enz    UTI - Rocephin , urine culture ordered by me    Afib/SVR - rate controlled , definitely not a candidate for [facility] therapy due to falls      Hypertension - Toprol     Dementia - Aricept     CAD - Toprol , Zocor , Plavix     DVT Prophylaxis     Full code - by default , note her daughter who is POA is unable to make a decision for her code status.  She wants to discuss with the extended family first      Juan F Jones MD  11/25/2017  10:22 PM

## 2017-11-26 NOTE — PROGRESS NOTES
Hospitalist Progress Note  Lorenzo Fregoso MD  Internal medicine/ Hospitalist    Daily Progress Note: 2017 9:44 AM      Interval history / Subjective: Padmaja Nagel is a 80 y.o. female who presented with falls. Patient was found at home last evening by her daughter on the floor face down. Patient says that she fell and could not reach the phone. Patient denies LOC . In the ED serial CT head, cervical spine were negative. Patient denies pain anywhere . She had labs which show she had CK 2825, Elevated troponin 2.25. She was also noted to have UTI. She is on iv fluid and levaquin iv. No complaint today    Current Facility-Administered Medications   Medication Dose Route Frequency    0.9% sodium chloride infusion  125 mL/hr IntraVENous CONTINUOUS    levoFLOXacin (LEVAQUIN) 750 mg in D5W IVPB  750 mg IntraVENous Q24H    0.9% sodium chloride infusion  125 mL/hr IntraVENous CONTINUOUS    enoxaparin (LOVENOX) injection 40 mg  40 mg SubCUTAneous Q24H    alendronate (FOSAMAX) tablet 40 mg  40 mg Oral Q7D    cholecalciferol (VITAMIN D3) tablet 2,000 Units  2,000 Units Oral DAILY    clopidogrel (PLAVIX) tablet 75 mg  75 mg Oral DAILY    donepezil (ARICEPT) tablet 5 mg  5 mg Oral DAILY    famotidine (PEPCID) tablet 40 mg  40 mg Oral DAILY    fluticasone (FLONASE) 50 mcg/actuation nasal spray 2 Spray  2 Spray Both Nostrils DAILY    lisinopril (PRINIVIL, ZESTRIL) tablet 20 mg  20 mg Oral DAILY    metoprolol succinate (TOPROL-XL) XL tablet 25 mg  25 mg Oral DAILY    nitroglycerin (NITROSTAT) tablet 0.4 mg  0.4 mg SubLINGual Q5MIN PRN    polyethylene glycol (MIRALAX) packet 17 g  17 g Oral DAILY    simvastatin (ZOCOR) tablet 40 mg  40 mg Oral QHS      Objective:     Visit Vitals    /87 (BP 1 Location: Right arm)    Pulse 64    Temp 98 °F (36.7 °C)    Resp 20    Ht 4' 6\" (1.372 m)    Wt 45.4 kg (100 lb)    SpO2 95%    BMI 24.11 kg/m2           Temp (24hrs), Av.1 °F (36.7 °C), Min:97.7 °F (36.5 °C), Max:98.7 °F (37.1 °C)         11/24 1901 - 11/26 0700  In: 1206.3 [I.V.:1206.3]  Out: 25 [Urine:25]  Physical Exam   Constitutional:NAD,interacting well. HEENT:perrla,at/nc,mouth moist  Neck: Neck supple. Cardiovascular: Normal rate;irregularly irregular rhythm present,no murmur  Pulmonary/Chest: Effort normal. No respiratory distress. She has no wheezes. She has no rales. Abdominal: Soft. Bowel sounds are normal.  Musculoskeletal: She exhibits no edema. Neurological:Awake,aler,ox3  Skin: Skin is warm and dry. No rash noted. Data Review    Recent Results (from the past 12 hour(s))   GLUCOSE, POC    Collection Time: 11/26/17 12:39 AM   Result Value Ref Range    Glucose (POC) 96 70 - 623 mg/dL   METABOLIC PANEL, BASIC    Collection Time: 11/26/17  3:35 AM   Result Value Ref Range    Sodium 144 136 - 145 mmol/L    Potassium 4.0 3.5 - 5.5 mmol/L    Chloride 110 (H) 100 - 108 mmol/L    CO2 25 21 - 32 mmol/L    Anion gap 9 3.0 - 18 mmol/L    Glucose 107 (H) 74 - 99 mg/dL    BUN 20 (H) 7.0 - 18 MG/DL    Creatinine 1.01 0.6 - 1.3 MG/DL    BUN/Creatinine ratio 20 12 - 20      GFR est AA >60 >60 ml/min/1.73m2    GFR est non-AA 51 (L) >60 ml/min/1.73m2    Calcium 8.9 8.5 - 10.1 MG/DL   CBC WITH AUTOMATED DIFF    Collection Time: 11/26/17  3:35 AM   Result Value Ref Range    WBC 9.6 4.6 - 13.2 K/uL    RBC 4.92 4.20 - 5.30 M/uL    HGB 13.6 12.0 - 16.0 g/dL    HCT 41.0 35.0 - 45.0 %    MCV 83.3 74.0 - 97.0 FL    MCH 27.6 24.0 - 34.0 PG    MCHC 33.2 31.0 - 37.0 g/dL    RDW 15.9 (H) 11.6 - 14.5 %    PLATELET 814 084 - 851 K/uL    MPV 9.5 9.2 - 11.8 FL    NEUTROPHILS 78 (H) 40 - 73 %    LYMPHOCYTES 14 (L) 21 - 52 %    MONOCYTES 8 3 - 10 %    EOSINOPHILS 0 0 - 5 %    BASOPHILS 0 0 - 2 %    ABS. NEUTROPHILS 7.6 1.8 - 8.0 K/UL    ABS. LYMPHOCYTES 1.3 0.9 - 3.6 K/UL    ABS. MONOCYTES 0.7 0.05 - 1.2 K/UL    ABS. EOSINOPHILS 0.0 0.0 - 0.4 K/UL    ABS.  BASOPHILS 0.0 0.0 - 0.06 K/UL    DF AUTOMATED     PROTHROMBIN TIME + INR    Collection Time: 11/26/17  3:35 AM   Result Value Ref Range    Prothrombin time 13.6 11.5 - 15.2 sec    INR 1.1 0.8 - 1.2     CARDIAC PANEL,(CK, CKMB & TROPONIN)    Collection Time: 11/26/17  3:35 AM   Result Value Ref Range    CK 1914 (H) 26 - 192 U/L    CK - MB 19.5 (H) <3.6 ng/ml    CK-MB Index 1.0 0.0 - 4.0 %    Troponin-I, Qt. 2.34 (HH) 0.0 - 0.045 NG/ML         Assessment/Plan:     Principal Problem:    Traumatic rhabdomyolysis ([facility] (11/25/2017)    Active Problems:    Elevated troponin (11/25/2017)      UTI (urinary tract infection) (11/25/2017)      A-fib ([facility] (11/25/2017)      [address]   1. Traumatic Rhabdomyolysis - 2/2 fall. Continue aggressive IVF hydration. Monitor CK daily. Today 1914. Monitor BMP     2. Elevated Troponin - Troponin 2.25 ->2.19 ->2.34 . Cardiology consulted. Did not recommend [facility] therapy . Pt has h/o CAD and she is on plavix,metoprolol. Will follow cardiology recommendations     3. UTI - Rocephin , will follow cx results     4. Afib/SVR - rate controlled , definitely not a candidate for [facility] therapy due to falls    5. H/o CHF,diastolic:not in failure,monitor closely since pt receiving iv fluid for rhabdo;consider resuming lasix in sign of chf.exac     6. Hypertension - Toprol      7. Dementia - Aricept      8. CAD - Toprol , Zocor , Plavix     9. Falls - fall precaution;PT/OT     DVT Prophylaxis:on heparin sc  Full code - by default , note her daughter who is POA is unable to make a decision for her code status.  She wants to discuss with the extended family first  Disposition:tbd

## 2017-11-27 LAB
ANION GAP SERPL CALC-SCNC: 9 MMOL/L (ref 3–18)
BUN SERPL-MCNC: 25 MG/DL (ref 7–18)
BUN/CREAT SERPL: 23 (ref 12–20)
CALCIUM SERPL-MCNC: 9.1 MG/DL (ref 8.5–10.1)
CHLORIDE SERPL-SCNC: 111 MMOL/L (ref 100–108)
CK SERPL-CCNC: 1197 U/L (ref 26–192)
CO2 SERPL-SCNC: 21 MMOL/L (ref 21–32)
CREAT SERPL-MCNC: 1.11 MG/DL (ref 0.6–1.3)
GLUCOSE SERPL-MCNC: 98 MG/DL (ref 74–99)
POTASSIUM SERPL-SCNC: 4.1 MMOL/L (ref 3.5–5.5)
SODIUM SERPL-SCNC: 141 MMOL/L (ref 136–145)

## 2017-11-27 PROCEDURE — 65660000000 HC RM CCU STEPDOWN

## 2017-11-27 PROCEDURE — 36415 COLL VENOUS BLD VENIPUNCTURE: CPT | Performed by: INTERNAL MEDICINE

## 2017-11-27 PROCEDURE — 82550 ASSAY OF CK (CPK): CPT | Performed by: INTERNAL MEDICINE

## 2017-11-27 PROCEDURE — 80048 BASIC METABOLIC PNL TOTAL CA: CPT | Performed by: INTERNAL MEDICINE

## 2017-11-27 PROCEDURE — 77030020263 HC SOL INJ SOD CL0.9% LFCR 1000ML

## 2017-11-27 PROCEDURE — 97530 THERAPEUTIC ACTIVITIES: CPT

## 2017-11-27 PROCEDURE — 74011250637 HC RX REV CODE- 250/637: Performed by: FAMILY MEDICINE

## 2017-11-27 PROCEDURE — 74011250636 HC RX REV CODE- 250/636: Performed by: INTERNAL MEDICINE

## 2017-11-27 PROCEDURE — 97166 OT EVAL MOD COMPLEX 45 MIN: CPT

## 2017-11-27 RX ORDER — FAMOTIDINE 20 MG/1
20 TABLET, FILM COATED ORAL DAILY
Status: DISCONTINUED | OUTPATIENT
Start: 2017-11-28 | End: 2017-11-30 | Stop reason: HOSPADM

## 2017-11-27 RX ADMIN — CLOPIDOGREL BISULFATE 75 MG: 75 TABLET ORAL at 08:37

## 2017-11-27 RX ADMIN — FAMOTIDINE 40 MG: 20 TABLET, FILM COATED ORAL at 08:38

## 2017-11-27 RX ADMIN — VITAMIN D, TAB 1000IU (100/BT) 2000 UNITS: 25 TAB at 08:37

## 2017-11-27 RX ADMIN — DONEPEZIL HYDROCHLORIDE 5 MG: 5 TABLET, FILM COATED ORAL at 08:37

## 2017-11-27 RX ADMIN — POLYETHYLENE GLYCOL 3350 17 G: 17 POWDER, FOR SOLUTION ORAL at 08:38

## 2017-11-27 RX ADMIN — ENOXAPARIN SODIUM 30 MG: 30 INJECTION SUBCUTANEOUS at 06:41

## 2017-11-27 RX ADMIN — LISINOPRIL 20 MG: 20 TABLET ORAL at 08:38

## 2017-11-27 RX ADMIN — METOPROLOL SUCCINATE 25 MG: 25 TABLET, EXTENDED RELEASE ORAL at 08:37

## 2017-11-27 RX ADMIN — SIMVASTATIN 40 MG: 40 TABLET, FILM COATED ORAL at 21:36

## 2017-11-27 NOTE — ANCILLARY DISCHARGE INSTRUCTIONS
Patient and/or next of kin has been given the [facility] Important Message From Medicare About Your Rights\" letter and all questions were answered. POA given copy.

## 2017-11-27 NOTE — PROGRESS NOTES
Problem: Self Care Deficits Care Plan (Adult)  Goal: *Acute Goals and Plan of Care (Insert Text)  Occupational Therapy Goals  Initiated 11/27/2017 within 7 day(s). 1.  Patient will perform grooming tasks at EOB with minimal assistance for balance. 2.  Patient will perform lower body dressing with minimal assistance. 3.  Patient will perform functional task for 8 minutes with supervision for balance at EOB and minimal verbal cues for pacing to increase activity tolerance for ADLs. 4.  Patient will perform toilet transfers with minimal assistance. 5.  Patient will perform all aspects of toileting with minimal assistance/contact guard assist.  6.  Patient will participate in upper extremity therapeutic exercise/activities with minimal assistance/contact guard assist for 8 minutes to increase BUE AROM/strength for increased participation in ADLs. 7.  Patient will utilize energy conservation techniques during functional activities with minimal verbal cues. Outcome: Progressing Towards Goal  Occupational Therapy EVALUATION    Patient: Megan Gallagher (89 y.o. female)  Date: 11/27/2017  Primary Diagnosis: Traumatic rhabdomyolysis ([facility]  Traumatic rhabdomyolysis ([facility]  Elevated troponin  UTI (urinary tract infection)        Precautions:  Fall    ASSESSMENT :  Based on the objective data described below, the patient presents with impairments with regard to bed mobility, activity tolerance, BUE strength, and independence in ADLs. Pt sleeping on arrival, wakes to voice & light touch. Pt A&O x2, reports it's 1937, reoriented to time & situation. Pt following all commands. Decreased AROM/strength of BUEs. When asked if pt was able to dress herself PTA, pt stated, \"I think so. \" No family present to provide PLOF info. Mod A/additional time for rolling. Pt refused EOB/OOB activity despite max encouragement. Pt stating, \"maybe in a half hour. \" Recommend continued therapy during acute care stay.  Pt left supine with needs within reach, re-oriented to call shaun Amato RN aware of session. Patient will benefit from skilled intervention to address the above impairments. Patients rehabilitation potential is considered to be Fair  Factors which may influence rehabilitation potential include:   []             None noted  [x]             Mental ability/status  [x]             Medical condition  []             Home/family situation and support systems  []             Safety awareness  []             Pain tolerance/management  []             Other:        PLAN :  Recommendations and Planned Interventions:  [x]               Self Care Training                  [x]        Therapeutic Activities  [x]               Functional Mobility Training    []        Cognitive Retraining  [x]               Therapeutic Exercises           [x]        Endurance Activities  [x]               Balance Training                   []        Neuromuscular Re-Education  []               Visual/Perceptual Training     [x]   Home Safety Training  [x]               Patient Education                 [x]        Family Training/Education  []               Other (comment):    Frequency/Duration: Patient will be followed by occupational therapy 3-5 times a week to address goals. Discharge Recommendations: Didier Narvaez  Further Equipment Recommendations for Discharge: bedside commode, shower chair     SUBJECTIVE:   Patient stated It's 1937.     OBJECTIVE DATA SUMMARY:     Past Medical History:   Diagnosis Date    [facility] cancer ([facility]     Chronic obstructive pulmonary disease ([facility]     Dementia     Hypertension      Past Surgical History:   Procedure Laterality Date    HX MASTECTOMY       Barriers to Learning/Limitations: yes;  cognitive  Compensate with: visual, verbal, tactile, kinesthetic cues/model    GCODES:  Self Care  Current  CK= 40-59%   Goal  CJ= 20-39%.   The severity rating is based on the Other Functional Assessment, MMT, ROM    Eval Complexity: History: MEDIUM Complexity : Expanded review of history including physical, cognitive and psychosocial  history ; Examination: MEDIUM Complexity : 3-5 performance deficits relating to physical, cognitive , or psychosocial skils that result in activity limitations and / or participation restrictions; Decision Making:MEDIUM Complexity : Patient may present with comorbidities that affect occupational performnce. Miniml to moderate modification of tasks or assistance (eg, physical or verbal ) with assesment(s) is necessary to enable patient to complete evaluation     Prior Level of Function/Home Situation: Pt reports being somewhat independent in ADLs PTA; lives with family. Home Situation  Home Environment: Private residence  # Steps to Enter: 2  Rails to Enter: Yes  Hand Rails : Bilateral  One/Two Story Residence: Two story  Living Alone: No  Support Systems: Child(madonna)  Patient Expects to be Discharged to[de-identified] Private residence  Current DME Used/Available at Home: [employer], rollator  Tub or Shower Type: Tub/Shower combination  []  Right hand dominant   []  Left hand dominant  Cognitive/Behavioral Status:  Neurologic State: Drowsy; Eyes open to voice; Eyes open spontaneously  Orientation Level: Oriented to person;Oriented to place  Cognition: Decreased command following;Decreased attention/concentration  Safety/Judgement: Fall prevention     Skin: Intact (BUEs)  Edema: None noted (BUEs)    Vision/Perceptual:    Acuity: Impaired far vision      Coordination:  Coordination: Generally decreased, functional (BUEs)  Fine Motor Skills-Upper: Right Intact; Left Intact (generally decreased)    Gross Motor Skills-Upper: Right Intact; Left Intact     Balance:  Sitting:  (not assessed; pt refused despite max encouargement)  Standing:  (not assessed)     Strength:  Strength: Generally decreased, functional (BUEs: approx 2+/5)    Tone & Sensation:  Tone: Normal (BUEs)    Range of Motion:  AROM: Generally decreased, functional (BUEs: approx 1/2 shoulder flex)    Functional Mobility and Transfers for ADLs:  Bed Mobility:  Rolling: Moderate assistance; Additional time  Supine to Sit:  (not assessed; pt refused)  Scooting: Maximum assistance; Total assistance (towards [facility])     Transfers:  Sit to Stand:  (not assessed; pt refused)    ADL Assessment:  Feeding: Minimum assistance  Oral Facial Hygiene/Grooming: Minimum assistance  Bathing: Moderate assistance  Upper Body Dressing: Minimum assistance  Lower Body Dressing: Moderate assistance  Toileting: Maximum assistance    Cognitive Retraining  Safety/Judgement: Fall prevention    Pain:  Pre treatment pain level: 0/10  Post treatment pain level: 0/10  Pain Scale 1: Numeric (0 - 10)  Pain Intensity 1: 0    Activity Tolerance:  Fair  Please refer to the flowsheet for vital signs taken during this treatment. After treatment:   [] Patient left in no apparent distress sitting up in chair  [x] Patient left in no apparent distress in bed  [x] Call bell left within reach  [x] Nursing notified  [] Caregiver present  [] Bed alarm activated    COMMUNICATION/EDUCATION: Pt educated on role of  OT and POC; needs reinforcement. [] Home safety education was provided and the patient/caregiver indicated understanding. [] Patient/family have participated as able in goal setting and plan of care. [x] Patient/family agree to work toward stated goals and plan of care. [x] Patient understands intent and goals of therapy, but is neutral about his/her participation. [] Patient is unable to participate in goal setting and plan of care.     Thank you for this referral.    Lisa Medrano MS OTR/L  Time Calculation: 9 mins

## 2017-11-27 NOTE — CDMP QUERY
Please clarify if this patient is being treated/managed for:    =>Demand ischemia related to ischemic heart disease    or   =>Other Explanation of clinical findings  =>Unable to Determine (no explanation of clinical findings)    The medical record reflects the following:    Risk: 81 yo female with PMH Atrial fib, recent falls, traumatic rhabdomyolysis    Clinical Indicators:   Troponin levels 2.25, 2.19  2.34    Per EKG 11/25  Atrial fibrillation  Right bundle branch block  posterolateral infarct  (cited on or before 05-APR-2017)  Marked T wave abnormality, consider anterior ischemia     Treatment: Serial EKG's, serial troponins, Cardiology consult    Please clarify and document your clinical opinion in the progress notes and discharge summary including the definitive and/or presumptive diagnosis, (suspected or probable), related to the above clinical findings. Please include clinical findings supporting your diagnosis. If you DECLINE this query or would like to communicate with [facility], please utilize the \"[employer] message box\" at the TOP of the Progress Note on the right.       Thank you,  Prisca Rubio RN [facility]  [phone]

## 2017-11-27 NOTE — PROGRESS NOTES
Discharge plan noted to be home with home care. Therapy is recommending SNF. Will discuss discharge options with patient.   Yury Lira RN

## 2017-11-27 NOTE — PROGRESS NOTES
Hospitalist Progress Note  Jerald Leo MD  Internal medicine/ Hospitalist    Daily Progress Note: 11/27/2017 9:44 AM      Interval history / Subjective: Clarke Ayala is a 80 y.o. female who presented with falls. Patient was found at home last evening by her daughter on the floor face down. Patient says that she fell and could not reach the phone. Patient denies LOC . In the ED serial CT head, cervical spine were negative. Patient denies pain anywhere . She had labs which show she had CK 2825, Elevated troponin 2.25. She was also noted to have UTI. She is on iv fluid and levaquin iv. No complaint today    Current Facility-Administered Medications   Medication Dose Route Frequency    [START ON 11/28/2017] famotidine (PEPCID) tablet 20 mg  20 mg Oral DAILY    [START ON 11/28/2017] levoFLOXacin (LEVAQUIN) 750 mg in D5W IVPB  750 mg IntraVENous Q48H    [START ON 12/1/2017] alendronate (FOSAMAX) tablet 40 mg  40 mg Oral every Friday    enoxaparin (LOVENOX) injection 30 mg  30 mg SubCUTAneous Q24H    0.9% sodium chloride infusion  75 mL/hr IntraVENous CONTINUOUS    cholecalciferol (VITAMIN D3) tablet 2,000 Units  2,000 Units Oral DAILY    clopidogrel (PLAVIX) tablet 75 mg  75 mg Oral DAILY    donepezil (ARICEPT) tablet 5 mg  5 mg Oral DAILY    fluticasone (FLONASE) 50 mcg/actuation nasal spray 2 Spray  2 Spray Both Nostrils DAILY    lisinopril (PRINIVIL, ZESTRIL) tablet 20 mg  20 mg Oral DAILY    metoprolol succinate (TOPROL-XL) XL tablet 25 mg  25 mg Oral DAILY    nitroglycerin (NITROSTAT) tablet 0.4 mg  0.4 mg SubLINGual Q5MIN PRN    polyethylene glycol (MIRALAX) packet 17 g  17 g Oral DAILY    simvastatin (ZOCOR) tablet 40 mg  40 mg Oral QHS      Objective:     Visit Vitals    /89 (BP 1 Location: Left arm, BP Patient Position: At rest)    Pulse 78    Temp 97.5 °F (36.4 °C)    Resp 18    Ht 4' 6\" (1.372 m)    Wt 45.4 kg (100 lb)    SpO2 96%    BMI 24.11 kg/m2           Temp (24hrs), Av.4 °F (36.3 °C), Min:97.2 °F (36.2 °C), Max:97.6 °F (36.4 °C)      701 - 1900  In: 1221.3 [I.V.:1221.3]  Out: -   1901 - 700  In: 3858.3 [P.O.:480; I.V.:3378.3]  Out: 275 [Urine:275]  Physical Exam   Constitutional:NAD,interacting well. HEENT:perrla,at/nc,mouth moist  Neck: Neck supple. Cardiovascular: Normal rate;irregularly irregular rhythm present,no murmur  Pulmonary/Chest: Effort normal. No respiratory distress. She has no wheezes. She has no rales. Abdominal: Soft. Bowel sounds are normal.  Musculoskeletal: She exhibits no edema. Neurological:Awake,aler,ox3  Skin: Skin is warm and dry. No rash noted. Data Review    Recent Results (from the past 12 hour(s))   CK    Collection Time: 17  6:35 AM   Result Value Ref Range    CK 1197 (H) 26 - 161 U/L   METABOLIC PANEL, BASIC    Collection Time: 17  6:35 AM   Result Value Ref Range    Sodium 141 136 - 145 mmol/L    Potassium 4.1 3.5 - 5.5 mmol/L    Chloride 111 (H) 100 - 108 mmol/L    CO2 21 21 - 32 mmol/L    Anion gap 9 3.0 - 18 mmol/L    Glucose 98 74 - 99 mg/dL    BUN 25 (H) 7.0 - 18 MG/DL    Creatinine 1.11 0.6 - 1.3 MG/DL    BUN/Creatinine ratio 23 (H) 12 - 20      GFR est AA 55 (L) >60 ml/min/1.73m2    GFR est non-AA 45 (L) >60 ml/min/1.73m2    Calcium 9.1 8.5 - 10.1 MG/DL         Assessment/Plan:     Principal Problem:    [facility] (2017)    Active Problems:    Elevated troponin (2017)      UTI (urinary tract infection) (2017)      [facility]fib [facility] (2017)      Late onset Alzheimer's disease without behavioral disturbance (2017)      Essential hypertension (2017)      [address]   1. Traumatic Rhabdomyolysis - 2/2 fall. Continue aggressive IVF hydration. Monitor CK daily. Today . Monitor BMP     2. Elevated Troponin - Troponin 2.25 ->2.19 ->2.34 . Cardiology consulted. Did not recommend [facility] therapy . Pt has h/o CAD and she is on plavix,metoprolol. Will follow cardiology recommendations     3. UTI - Rocephin , will follow cx results     4. Afib/SVR - rate controlled , definitely not a candidate for [facility] therapy due to falls    5. H/o CHF,diastolic:not in failure,monitor closely since pt receiving iv fluid for rhabdo;consider resuming lasix in sign of chf.exac     6. Hypertension - Toprol      7. Dementia - Aricept      8. CAD - Toprol , Zocor , Plavix     9. Falls - fall precaution;PT/OT     DVT Prophylaxis:on heparin sc  Full code - by default , note her daughter who is POA is unable to make a decision for her code status.  She wants to discuss with the extended family first  Disposition:11/28

## 2017-11-27 NOTE — PROGRESS NOTES
CARDIOLOGY CONSULT    Patient: Emmanuel Duran  MR #: [id number]      Reason for consult: elevated troponin    Assessment/Plan:     Hospital Problems  Never Reviewed          Codes Class Noted POA    Late onset Alzheimer's disease without behavioral disturbance, presumptive diagnosis, Defer to Primary team ICD-10-CM: G30.1, F02.80  ICD-9-CM: 331.0, 294.10  11/26/2017 Unknown        Essential hypertension, very hypertensive at presentation. BP better controlled now ICD-10-CM: I10  ICD-9-CM: 401.9  11/26/2017 Unknown        * (Principal)Traumatic rhabdomyolysis ([facility] CK improving ICD-10-CM: T79. 6XXA  ICD-9-CM: 958.6  11/25/2017 Yes        Elevated troponin, 5 serum troponins obtained thus far with peak of 2.34. ECG does show possible anterior ischemic changes. Continue empiric therapy for ischemic heart disease. Patient would be suboptimal candidate for aggressive invasive management. Continue Plavix, BB, statin, lisinopril. ICD-10-CM: R74.8  ICD-9-CM: 790.6  11/25/2017 Yes        UTI (urinary tract infection)  Defer to PCP ICD-10-CM: N39.0  ICD-9-CM: 599.0  11/25/2017 Yes        A-fib ([facility] chronic [id number] score 6, not suitable candidate for anticoagulation. Suboptimal candidate for aggressive anticoagulation  ICD-10-CM: I48.91  ICD-9-CM: 427.31  11/25/2017 Yes            No further cardiac work up planned at this time unless clinical status changes. Call us back if needed. Will be available as needed. Will need to follow up in cardiology clinic in 3 weeks after discharge. Thank you. Past Medical History  Past Medical History:   Diagnosis Date    Breast cancer ([facility]     Chronic obstructive pulmonary disease ([facility]     Dementia     Hypertension        Past Surgical History  Social History     Social History    Marital status:      Spouse name: N/A    Number of children: N/A    Years of education: N/A     Occupational History    Not on file.      Social History Main Topics    Smoking status: Never Smoker    Smokeless tobacco: Never Used    Alcohol use No    Drug use: No    Sexual activity: Not on file     Other Topics Concern    Not on file     Social History Narrative         Meds  Current Facility-Administered Medications   Medication Dose Route Frequency    [START ON 11/28/2017] levoFLOXacin (LEVAQUIN) 750 mg in D5W IVPB  750 mg IntraVENous Q48H    [START ON 12/1/2017] alendronate (FOSAMAX) tablet 40 mg  40 mg Oral every Friday    enoxaparin (LOVENOX) injection 30 mg  30 mg SubCUTAneous Q24H    0.9% sodium chloride infusion  75 mL/hr IntraVENous CONTINUOUS    cholecalciferol (VITAMIN D3) tablet 2,000 Units  2,000 Units Oral DAILY    clopidogrel (PLAVIX) tablet 75 mg  75 mg Oral DAILY    donepezil (ARICEPT) tablet 5 mg  5 mg Oral DAILY    famotidine (PEPCID) tablet 40 mg  40 mg Oral DAILY    fluticasone (FLONASE) 50 mcg/actuation nasal spray 2 Spray  2 Spray Both Nostrils DAILY    lisinopril (PRINIVIL, ZESTRIL) tablet 20 mg  20 mg Oral DAILY    metoprolol succinate (TOPROL-XL) XL tablet 25 mg  25 mg Oral DAILY    nitroglycerin (NITROSTAT) tablet 0.4 mg  0.4 mg SubLINGual Q5MIN PRN    polyethylene glycol (MIRALAX) packet 17 g  17 g Oral DAILY    simvastatin (ZOCOR) tablet 40 mg  40 mg Oral QHS         Allergies  Allergies   Allergen Reactions    Azithromycin Anaphylaxis    Penicillin Anaphylaxis       Social History  Social History     Social History    Marital status:      Spouse name: N/A    Number of children: N/A    Years of education: N/A     Occupational History    Not on file. Social History Main Topics    Smoking status: Never Smoker    Smokeless tobacco: Never Used    Alcohol use No    Drug use: No    Sexual activity: Not on file     Other Topics Concern    Not on file     Social History Narrative       Family History     History reviewed. No pertinent family history. Review of systems    General: No fever, chills.    HEENT: Denies frequent headaches, cataracts, sinus issues. Pulmonary: Denies cough, wheezing. Cardiac: See HPI  GI: No nausea, vomiting, frequent diarrhea or constipation. : Denies dysuria, hematuria, nocturia. Neuro: No seizures, tremor or notable weakness. Musculoskeletal: No arthralgias or myalgias. Heme: Denies easy bruising or bleeding. Psych: Denies history of depression or anxiety issues. Physical Exam  Visit Vitals    /89 (BP 1 Location: Left arm, BP Patient Position: At rest)    Pulse 78    Temp 97.5 °F (36.4 °C)    Resp 18    Ht 4' 6\" (1.372 m)    Wt 100 lb (45.4 kg)    SpO2 96%    BMI 24.11 kg/m2       Urbano Patterson is who is alertand in no acute distress. Head is normocephalic and atraumatic. Trachea appears midline with no noted thyromegaly. There is no JVD. Patricia Gearing Chest is clear to auscultation bilaterally anteriorly. Cardiac auscultation reveals an irregular rate and rhythm without significant murmur. Abdomen is soft and nontender. Extremities are without significant edema. Skin is warm and dry.        Labs:   Recent Labs      11/26/17 0335 11/25/17 1817   WBC  9.6  12.2   HGB  13.6  15.2   HCT  41.0  46.4*   PLT  178  252     Recent Labs      11/27/17 0635 11/26/17 0335 11/25/17 2111 11/25/17 1817   NA  141  144   --   142   K  4.1  4.0   --   5.0   CL  111*  110*   --   107   CO2  21  25   --   25   GLU  98  107*   --   133*   BUN  25*  20*   --   19*   CREA  1.11  1.01   --   1.26   CA  9.1  8.9   --   9.7   ALB   --    --    --   3.6   SGOT   --    --    --   118*   ALT   --    --    --   46   INR   --   1.1  1.0   --        Recent Labs      11/27/17 0635 11/26/17 2050 11/26/17 0335 11/25/17 2111 11/25/17 1817   TROIQ   --   1.81*  2.34*  2.19*  2.25*   CPK  1197*   --   1914*  2470*  2825*   CKMB   --    --   19.5*  30.0*  38.8*     Last Lipid:  No results found for: CHOL, CHOLX, CHLST, CHOLV, HDL, LDL, LDLC, DLDLP, TGLX, TRIGL, TRIGP, CHHD, CHHDX        We appreciate the opportunity to see Glen Corrigan with you. We will follow along.     Chantale Riddle MD  11/27/2017

## 2017-11-27 NOTE — PROGRESS NOTES
Problem: Falls - Risk of  Goal: *Absence of Falls  Document Malu Fall Risk and appropriate interventions in the flowsheet.    Outcome: Progressing Towards Goal  Fall Risk Interventions:  Mobility Interventions: Patient to call before getting OOB, OT consult for ADLs, Bed/chair exit alarm, PT Consult for mobility concerns, PT Consult for assist device competence    Mentation Interventions: Bed/chair exit alarm, Increase mobility, Reorient patient    Medication Interventions: Evaluate medications/consider consulting pharmacy, Patient to call before getting OOB, Teach patient to arise slowly    Elimination Interventions: Call light in reach, Patient to call for help with toileting needs, Toilet paper/wipes in reach, Toileting schedule/hourly rounds    History of Falls Interventions: Door open when patient unattended, Investigate reason for fall, Room close to nurse's station, Consult care management for discharge planning, Bed/chair exit alarm

## 2017-11-27 NOTE — PROGRESS NOTES
I brought the snf list to pt/family. They said they made their mind up to go to [facility], I let them know that we want them to pick more than one facility in case that facility is full. They said that is ok, they will look over the list. I posted [facility] for the pt.

## 2017-11-27 NOTE — PROGRESS NOTES
Problem: Mobility Impaired (Adult and Pediatric)  Goal: *Acute Goals and Plan of Care (Insert Text)  Physical Therapy Goals  Initiated 11/26/2017 and to be accomplished within 7 day(s)  1. Patient will move from supine to sit and sit to supine , scoot up and down and roll side to side in bed with supervision/set-up. 2.  Patient will transfer from bed to chair and chair to bed with supervision/set-up using the least restrictive device. 3.  Patient will perform sit to stand with supervision/set-up. 4.  Patient will ambulate with supervision/set-up for >/= 75 feet with the least restrictive device. 5.  Patient will ascend/descend 2 stairs with bilateral handrail(s) with supervision/set-up. Outcome: Not Progressing Towards Goal  physical Therapy TREATMENT    Patient: Shamika Rouse (58 y.o. female)  Date: 11/27/2017  Diagnosis: Traumatic rhabdomyolysis [facility])  Traumatic rhabdomyolysis ([address]  Elevated troponin  UTI (urinary tract infection) Traumatic rhabdomyolysis [facility])       Precautions: Fall  Chart, physical therapy assessment, plan of care and goals were reviewed. ASSESSMENT:  Pt present sin bed. Dependent mobility. Cries out in pain with  Sit <> SUP transitions and indicating L side pain above hip . RN reports (-) findings radiology for  hip and spine. Anxious dtr at bedside and concerned that pt spine is broken. Dtr reports and nsg confirms minimal po intake. Suggested ensure and dtr said mixed w ice cream. RN to put in order for ensure and RN will f/u w pt dtr regarding radiology findings. Pt positioned sitting eob for lunch and dtr present. Pt tolerating less than 15 minutes and per dtr request returned pt to bed. EDUCATION:  daughter for PT [facility] and post acute rehab  Progression toward goals:        Minimal  progress to goals this visit. PLAN:  Patient continues to benefit from skilled intervention to address the above impairments. Continue treatment per established plan of care.   Discharge Recommendations:  snf  Further Equipment Recommendations for Discharge: has walker per dtr     SUBJECTIVE:   Patient stated .    OBJECTIVE DATA SUMMARY:   Critical Behavior:  Neurologic State: Eyes open to voice, Eyes open spontaneously  Orientation Level: Oriented to person, Not Oriented to place \"at home\"; time 1957  Cognition: Decreased command following, Decreased attention/concentration  Safety/Judgement: Fall prevention    G CODE:na  Functional Mobility Training:  Bed Mobility:  Rolling: Moderate assistance; Additional time  Supine to Sit: Maximum assistance; Total assistance; Additional time;Assist x1  Sit to Supine: Maximum assistance; Total assistance; Additional time;Assist x1  Scooting: Maximum assistance; Total assistance (towards [facility])  Transfers:  Sit to Stand: Maximum assistance; Additional time;Assist x2 (dtr assisting w sit>stand from eob)  Stand to Sit: Moderate assistance  Interventions: Safety awareness training; Tactile cues; Verbal cues     Balance:  Sitting: Impaired  Sitting - Static: Fair (occasional); Good (unsupported) (however poor to no activityt o[place])  Sitting - Dynamic: Poor (constant support) (2/2 pain; kyophosis)  Standing: Impaired; With support  Standing - Static: Poor  Standing - Dynamic : Poor  Ambulation/Gait Training: unable at this time; demos one step forward on the R LE from eob; shuffles to side 3-4 steps.   Therapeutic Exercises:   B LE hip and knee F/E, abd/ add; B LAQ x 10 all AA w VC  Vital Signs  Temp: 97.5 °F (36.4 °C)     Pulse (Heart Rate): 78     BP: 109/89     Resp Rate: 18     O2 Sat (%): 96 %  Pain:   FACES  Pre treatment pain level:   0   Post treatment pain level:  0  Pain Scale 1: Numeric (0 - 10)  Pain Intensity 1: 0  Activity Tolerance:   Poor; tolerating < 15 mins sitting eob  After treatment:   Patient left in no apparent distress in bed  Call bell left within reach  [facility], notified  Daughter present      Shelbi Ramirez PTA   Time Calculation: 31 mins

## 2017-11-27 NOTE — PROGRESS NOTES
Reason for Renal Dosing:  Per Renal Dosing Policy    Patient clinical status and labs ordered/reviewed. Pt Weight Weight: 45.4 kg (100 lb)   Serum Creatinine Lab Results   Component Value Date/Time    Creatinine 1.01 11/26/2017 03:35 AM       Creatinine Clearance Estimated Creatinine Clearance: 20.6 mL/min (based on Cr of 1.01). BUN Lab Results   Component Value Date/Time    BUN 20 11/26/2017 03:35 AM       WBC Lab Results   Component Value Date/Time    WBC 9.6 11/26/2017 03:35 AM      Temperature Temp: 97.2 °F (36.2 °C)   HR Pulse (Heart Rate): 72     BP BP: (!) 105/95             Drug type: Non-Antibiotic      Drug/dose: Enoxaparin 40 mg every 24 hours was adjusted to : Enoxaparin 30 mg every 24 hours     Continue to monitor.     Signed Alejandro Ren PHARMD  Date 11/26/2017  Time 10:43 PM

## 2017-11-27 NOTE — ANCILLARY DISCHARGE INSTRUCTIONS
Daughter in room asking questions about rehab facilities, made her aware that the  can answer those questions. Made  Cintia Warren aware, she will talk with daughter.
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
## 2017-11-28 PROBLEM — R53.81 DEBILITY: Status: ACTIVE | Noted: 2017-11-28

## 2017-11-28 LAB
ANION GAP SERPL CALC-SCNC: 7 MMOL/L (ref 3–18)
BUN SERPL-MCNC: 26 MG/DL (ref 7–18)
BUN/CREAT SERPL: 27 (ref 12–20)
CALCIUM SERPL-MCNC: 8.8 MG/DL (ref 8.5–10.1)
CHLORIDE SERPL-SCNC: 112 MMOL/L (ref 100–108)
CK SERPL-CCNC: 640 U/L (ref 26–192)
CO2 SERPL-SCNC: 26 MMOL/L (ref 21–32)
CREAT SERPL-MCNC: 0.95 MG/DL (ref 0.6–1.3)
GLUCOSE SERPL-MCNC: 84 MG/DL (ref 74–99)
MYOGLOBIN UR QL: POSITIVE
MYOGLOBIN UR-MCNC: 2019 NG/ML (ref 0–13)
POTASSIUM SERPL-SCNC: 3.5 MMOL/L (ref 3.5–5.5)
SODIUM SERPL-SCNC: 145 MMOL/L (ref 136–145)

## 2017-11-28 PROCEDURE — 74011250636 HC RX REV CODE- 250/636: Performed by: FAMILY MEDICINE

## 2017-11-28 PROCEDURE — 36415 COLL VENOUS BLD VENIPUNCTURE: CPT | Performed by: INTERNAL MEDICINE

## 2017-11-28 PROCEDURE — 97535 SELF CARE MNGMENT TRAINING: CPT

## 2017-11-28 PROCEDURE — 97530 THERAPEUTIC ACTIVITIES: CPT

## 2017-11-28 PROCEDURE — 76450000000

## 2017-11-28 PROCEDURE — 97110 THERAPEUTIC EXERCISES: CPT

## 2017-11-28 PROCEDURE — 82550 ASSAY OF CK (CPK): CPT | Performed by: INTERNAL MEDICINE

## 2017-11-28 PROCEDURE — 74011250636 HC RX REV CODE- 250/636: Performed by: INTERNAL MEDICINE

## 2017-11-28 PROCEDURE — 65660000000 HC RM CCU STEPDOWN

## 2017-11-28 PROCEDURE — 80048 BASIC METABOLIC PNL TOTAL CA: CPT | Performed by: INTERNAL MEDICINE

## 2017-11-28 PROCEDURE — 77030020263 HC SOL INJ SOD CL0.9% LFCR 1000ML

## 2017-11-28 PROCEDURE — 74011250637 HC RX REV CODE- 250/637: Performed by: FAMILY MEDICINE

## 2017-11-28 PROCEDURE — 74011250637 HC RX REV CODE- 250/637: Performed by: INTERNAL MEDICINE

## 2017-11-28 RX ADMIN — CLOPIDOGREL BISULFATE 75 MG: 75 TABLET ORAL at 09:20

## 2017-11-28 RX ADMIN — SODIUM CHLORIDE 75 ML/HR: 900 INJECTION, SOLUTION INTRAVENOUS at 00:39

## 2017-11-28 RX ADMIN — METOPROLOL SUCCINATE 25 MG: 25 TABLET, EXTENDED RELEASE ORAL at 09:20

## 2017-11-28 RX ADMIN — VITAMIN D, TAB 1000IU (100/BT) 2000 UNITS: 25 TAB at 09:20

## 2017-11-28 RX ADMIN — POLYETHYLENE GLYCOL 3350 17 G: 17 POWDER, FOR SOLUTION ORAL at 09:20

## 2017-11-28 RX ADMIN — FLUTICASONE PROPIONATE 2 SPRAY: 50 SPRAY, METERED NASAL at 11:48

## 2017-11-28 RX ADMIN — SODIUM CHLORIDE 75 ML/HR: 900 INJECTION, SOLUTION INTRAVENOUS at 11:45

## 2017-11-28 RX ADMIN — LISINOPRIL 20 MG: 20 TABLET ORAL at 09:20

## 2017-11-28 RX ADMIN — DONEPEZIL HYDROCHLORIDE 5 MG: 5 TABLET, FILM COATED ORAL at 09:20

## 2017-11-28 RX ADMIN — FAMOTIDINE 20 MG: 20 TABLET, FILM COATED ORAL at 09:20

## 2017-11-28 RX ADMIN — SIMVASTATIN 40 MG: 40 TABLET, FILM COATED ORAL at 21:15

## 2017-11-28 RX ADMIN — ENOXAPARIN SODIUM 30 MG: 30 INJECTION SUBCUTANEOUS at 06:12

## 2017-11-28 RX ADMIN — LEVOFLOXACIN 750 MG: 5 INJECTION, SOLUTION INTRAVENOUS at 00:39

## 2017-11-28 NOTE — ROUTINE PROCESS
0800 - assumed care of patient - alert to self only - pleasant, denies any pain. 0915 - AM meds given crushed with breakfast.  Up to BR with assist.    1000 - daughter meeting with palliative care team - per MD, patient now a DNR    [phone] - up to [facility] and to chair with assist.    1215 - daughter at bedside. Admission database completed with daughter's assistance. 1330 - patient back to bed with assist.    1600 - up to [facility] and to chair.     1730 - continues in chair for dinner - no complaints

## 2017-11-28 NOTE — PROGRESS NOTES
1930 --  Bedside, Verbal and Written shift change report given to [address] (oncoming nurse) by Kevin Elizabeth (offgoing nurse). Report included the following information SBAR, Kardex, Intake/Output, MAR and Recent Results. Required documentation needs to be completed, will pass on to Oncoming RN.     0000 -- Shift reassessment, pt condition unchanged, will continue to monitor. 0430 -- Shift reassessment, pt condition unchanged, will continue to monitor. 0730 -- Bedside, Verbal and Written shift change report given to (oncoming nurse) by MILTON (offgoing nurse).   Report included the following information SBAR, Kardex, Intake/Output, MAR and Recent Results

## 2017-11-28 NOTE — PROGRESS NOTES
Problem: Falls - Risk of  Goal: *Absence of Falls  Document Malu Fall Risk and appropriate interventions in the flowsheet.    Outcome: Progressing Towards Goal  Fall Risk Interventions:  Mobility Interventions: Bed/chair exit alarm    Mentation Interventions: Bed/chair exit alarm, Reorient patient, Room close to nurse's station, Update white board    Medication Interventions: Bed/chair exit alarm    Elimination Interventions: Bed/chair exit alarm, Call light in reach    History of Falls Interventions: Bed/chair exit alarm

## 2017-11-28 NOTE — PROGRESS NOTES
conducted a Follow up consultation and Spiritual Assessment for Eren Vargas, who is a 80 y.o.,female. The  provided the following Interventions:  Continued the relationship of care and support. Listened empathically. Offered assurance of continued prayer on patients behalf. Chart reviewed. The following outcomes were achieved:  Patient expressed gratitude for pastoral care visit. Assessment:  There are no further spiritual or Episcopalian issues which require Spiritual Care Services interventions at this time. Plan:  Chaplains will continue to follow and will provide pastoral care on an as needed/requested basis.  recommends bedside caregivers page  on duty if patient shows signs of acute spiritual or emotional distress.      [address], [facility]

## 2017-11-28 NOTE — PROGRESS NOTES
[facility]: [phone])  [facility]: [address] [place] [address] Way: [phone]    Patient Name: Karel Jackson  YOB: 1920    Date of Initial Consult: 17  Reason for Consult: Care Decisions  Requesting Provider: Colin Vu MD   Primary Care Physician: PROVIDER UNKNOWN      SUMMARY:   Karel Jackson is a 80y.o. year old with a past history of Dementia, Afib, HTN, COPD, Breast Ca who was admitted on 2017 from ER/Home with dtr with a diagnosis of found on Floor/fall. Current medical issues leading to Palliative Medicine involvement include: patient with dementia, advanced age asked to support patient/family to establish goals of care. PALLIATIVE DIAGNOSES:   1. Dementia  2. Debility  3. Fall  4. A fib       PLAN:   1. Anne Marie Murray MSW and I met with patient, she is alert, knows her name, her  but not her age, says she is 80, that she is in hospital b/c she fell but unsure what hospital or date or POTUS. She denies pain, had eaten 40% of tray and drank all of the ensure. We met dtr at bedside who shared that patient  about 18 yrs ago, had 3 dtrs, 2 in [place], lives with her dtr Padma cSott for over 35 yrs. Has 8 grandchildren, many great and great-great grands. She worked in a [employer], had 13 siblings, many have passed, one lives in  with dementia. 2. Dementia-per dtr she has been on Aricept many months, that prior to her taking it her mood was very different, was more paranoid and argumentative. She does leave her home alone when she goes to work and patient had been able to get her prepared lunch out of fridge. She will fold laundry and watch TV but dtr said she has to keep her from napping or she stays awake at night.   3. Debility-patient needs quite a bit of supervision per dtr-dtr manages all of her IADLS, and has for years, she does have some urinary incontinence and wears pads, no bowel incontinence. Poor oral intake for over a year, has lost about 30lb, no choking or pocketing of food. Has to be encouraged to drink more fluids. When she does eat dtr says can feed self, use utensils but eats small amounts due to prior gastrectomy and if overeats will vomit. Needs to be reminded to shower-will need oversight in shower, can dress self, walks with Rollator if going any distance. 4. Fall-patient could not give info related to the fall and since it was unwitnessed no one can give info. Dtr is aware that patient should not be left alone as it is too unsafe and she is high risk for falls and injuries. 5. A fib-dtr shared that patient suffered an MI years ago, she may have had an ischemic event related to the fall but she is not a candidate for more aggressive evaluation/treatment. She also is not candidate for [facility] given her dementia/fall risk. Presently rate is controlled, remains on Toprol. 6. Goals of care and Code Status-dtr Antolin Elder is named as mPOA in patient's AMD and living will. We reviewed it with Meng Estevez, she agreed patient spelled out that she did not want heroics at EOL and would not want INTUBATION ever. Meng Estevez signed the [facility], copy to chart, copy to dtr and DNR/DNI entered into EMR. We shared the POST form with her. We explained that it was likely a good form to consider completing as patient would not want a feeding tube either. She is less certain about whether patient would want to remain home on [address] or come in to hospital for limited interventions. Suggested she think it over, to call us if she wants to complete it. Shared that Dementia is a progressive illness, that leads to worsened function, wt loss and eventually death. That presently patient does not qualify for [facility] care but she could if her decline continues and since patient really wants to remain home with dtr and pass at home, this would provide support required to do this optimally.  Dtr is hoping that patient will be able to have SNF stay for rehab in the [facility], during whish time she plans to look for more help at home, as she agrees patient needs 24/7 oversight at home. 7. Initial consult note routed to primary continuity provider  8. Communicated plan of care with: Palliative IDT, RN and attending       GOALS OF CARE:     [====Goals of Care====]  GOALS OF CARE:  Patient / health care proxy stated goals: to come home and have family look after her      TREATMENT PREFERENCES:   Code Status: DNR    Advance Care Planning:  Advance Care Planning 11/28/2017   Patient's Healthcare Decision Maker is: Named in scanned ACP document   Primary Decision Maker Name Pao King   Primary Decision Maker Phone Number [phone] (c) or [phone] (h)   Primary Decision Maker Relationship to Patient Adult child   Confirm Advance Directive Yes, on file   Patient Would Like to Complete Advance Directive -   Does the patient have other document types Do Not Resuscitate       Other:    The palliative care team has discussed with patient / health care proxy about goals of care / treatment preferences for patient.  [====Goals of Care====]    Advance Care Planning 11/28/2017   Patient's Healthcare Decision Maker is: Named in scanned ACP document   Primary Decision Maker Name Pao King   Primary Decision Maker Phone Number [phone] (c) or [phone] (h)   Primary Decision Maker Relationship to Patient Adult child   Confirm Advance Directive Yes, on file   Patient Would Like to Complete Advance Directive -   Does the patient have other document types Do Not Resuscitate           HISTORY:     History obtained from: Chart    CHIEF COMPLAINT: Fall    HPI/SUBJECTIVE:  98.1, 65, 159/88, 18 on RA at 95%, Wt 100lb, Voiding, Stool x2 11-28,   MAR-Vit D, Plavix, Aricept, Lovenox, Pepcid, Levaquin, Zestril, Toprol, Zocor, Miralax.   LABS-WBC 9.6, H&H 13.6 & 41, [address] 178 BUN/Creat 26/0.95, Albumin 3.6,  INR 1.1,   Head CT-No evidence of acute intracranial process  CT Neck-1. No evidence of acute cervical spine fracture. Severe degenerative disc and  facet disease.     2. Small right apical pulmonary nodule, unlikely to be clinically significant in  a patient of this age. ECHO 4-2017-Left ventricle: Systolic function was normal by visual assessment. Ejection fraction was estimated to be 55 %. There were no regional wall  motion abnormalities. Wall thickness was mildly to moderately increased. There was asymmetric hypertrophy of the septum. Right ventricle: Systolic function was normal.    Left atrium: The atrium was severely dilated. Right atrium: The atrium was dilated. Mitral valve: There was moderate calcification, with moderate chordal  involvement. There was moderately reduced leaflet separation. The findings  were consistent with mild to moderate mitral stenosis. There was moderate  regurgitation. Mean transmitral gradient was 6 mmHg. Mitral valve area by  pressure half-time was 1.4 cm-sq. Aortic valve: There was no stenosis. Tricuspid valve: There was moderate regurgitation. Cardio-elevated CPK, elevated Troponins peak of 2.34. ECG does show possible anterior ischemic changes. Would start empiric therapy for ischemic heart disease.  Daughter presently unavailable but I believe that the patient would not be a candidate for aggressive invasive evaluation  The patient is:   [] Verbal and participatory  [x] Non-participatory due to: dementia    Clinical Pain Assessment (nonverbal scale for nonverbal patients): Pain: 0  Denies pain       FUNCTIONAL ASSESSMENT:     Palliative Performance Scale (PPS):50%          PSYCHOSOCIAL/SPIRITUAL SCREENING:     Advance Care Planning:  Advance Care Planning 11/28/2017   Patient's Healthcare Decision Maker is: Named in scanned ACP document   Primary Decision Maker Name Bronson Ayala   Primary Decision Maker Phone Number [phone] (c) or [phone] (h)   Primary Decision Maker Relationship to Patient Adult child   Confirm Advance Directive Yes, on file   Patient Would Like to Complete Advance Directive -   Does the patient have other document types Do Not Resuscitate        Any spiritual / Christian concerns:  [] Yes /  [x] No    Caregiver Burnout:  [] Yes /  [x] No /  [] No Caregiver Present      Anticipatory grief assessment:   [x] Normal  / [] Maladaptive       ESAS Anxiety: Anxiety: 0    ESAS Depression: Depression: 0        REVIEW OF SYSTEMS:     Positive and pertinent negative findings in ROS are noted above in HPI. Most reviewed with dtr-had decreasing appetite over last year, eats small amounts, if overeats will vomit since having gastrectomy. Did feed self, even took out lunch from fridge. Walked mostly independently, had rollator for distances. Occasional urine incontinence, could dress self, but needing reminding oversight in shower. Had no chronic pain. The following systems were [x] reviewed / [] unable to be reviewed as noted in HPI  Other findings are noted below. Systems: constitutional, ears/nose/mouth/throat, respiratory, gastrointestinal, genitourinary, musculoskeletal, integumentary, neurologic, psychiatric, endocrine. Positive findings noted below. Modified ESAS Completed by: provider   Fatigue: 2 Drowsiness: 0   Depression: 0 Pain: 0   Anxiety: 0 Nausea: 0   Anorexia: 2 Dyspnea: 0     Constipation: No     Stool Occurrence(s): 1        PHYSICAL EXAM:     Wt Readings from Last 3 Encounters:   17 45.4 kg (100 lb)   17 47.6 kg (105 lb)   17 48.1 kg (106 lb)     Blood pressure 154/71, pulse 67, temperature 98.3 °F (36.8 °C), resp. rate 20, height 4' 6\" (1.372 m), weight 45.4 kg (100 lb), SpO2 97 %.   Pain:  Pain Scale 1: Numeric (0 - 10)  Pain Intensity 1: 0                 Last bowel movement:     Constitutional: alert and very pleasant, looks younger than her known age, she is oriented to self, her  and where she is but not the date, year, POTUS   Eyes: pupils equal, anicteric  ENMT: no nasal discharge, moist mucous membranes  Respiratory: breathing not labored, symmetric  Gastrointestinal: soft non-tender, +bowel sounds  Musculoskeletal: no deformity, no tenderness to palpation  Skin: warm, dry  Neurologic: following commands, moving all extremities  Psychiatric: full affect, no hallucinations         HISTORY:     Principal Problem:    Traumatic rhabdomyolysis ([facility] (11/25/2017)    Active Problems:    Elevated troponin (11/25/2017)      UTI (urinary tract infection) (11/25/2017)      A-fib ([facility] (11/25/2017)      Late onset Alzheimer's disease without behavioral disturbance (11/26/2017)      Essential hypertension (11/26/2017)      Past Medical History:   Diagnosis Date    Breast cancer ([facility]     Chronic obstructive pulmonary disease ([facility]     Dementia     Hypertension       Past Surgical History:   Procedure Laterality Date    HX MASTECTOMY        History reviewed. No pertinent family history. History reviewed, no pertinent family history.   Social History   Substance Use Topics    Smoking status: Never Smoker    Smokeless tobacco: Never Used    Alcohol use No     Allergies   Allergen Reactions    Azithromycin Anaphylaxis    Penicillin Anaphylaxis      Current Facility-Administered Medications   Medication Dose Route Frequency    famotidine (PEPCID) tablet 20 mg  20 mg Oral DAILY    levoFLOXacin (LEVAQUIN) 750 mg in D5W IVPB  750 mg IntraVENous Q48H    [START ON 12/1/2017] alendronate (FOSAMAX) tablet 40 mg  40 mg Oral every Friday    enoxaparin (LOVENOX) injection 30 mg  30 mg SubCUTAneous Q24H    cholecalciferol (VITAMIN D3) tablet 2,000 Units  2,000 Units Oral DAILY    clopidogrel (PLAVIX) tablet 75 mg  75 mg Oral DAILY    donepezil (ARICEPT) tablet 5 mg  5 mg Oral DAILY    fluticasone (FLONASE) 50 mcg/actuation nasal spray 2 Spray  2 Spray Both Nostrils DAILY    lisinopril (PRINIVIL, ZESTRIL) tablet 20 mg  20 mg Oral DAILY    metoprolol succinate (TOPROL-XL) XL tablet 25 mg  25 mg Oral DAILY    nitroglycerin (NITROSTAT) tablet 0.4 mg  0.4 mg SubLINGual Q5MIN PRN    polyethylene glycol (MIRALAX) packet 17 g  17 g Oral DAILY    simvastatin (ZOCOR) tablet 40 mg  40 mg Oral QHS        LAB AND IMAGING FINDINGS:     Lab Results   Component Value Date/Time    WBC 9.6 11/26/2017 03:35 AM    HGB 13.6 11/26/2017 03:35 AM    PLATELET 910 07/54/8203 03:35 AM     Lab Results   Component Value Date/Time    Sodium 145 11/28/2017 03:50 AM    Potassium 3.5 11/28/2017 03:50 AM    Chloride 112 11/28/2017 03:50 AM    CO2 26 11/28/2017 03:50 AM    BUN 26 11/28/2017 03:50 AM    Creatinine 0.95 11/28/2017 03:50 AM    Calcium 8.8 11/28/2017 03:50 AM      Lab Results   Component Value Date/Time    AST (SGOT) 118 11/25/2017 06:17 PM    Alk. phosphatase 153 11/25/2017 06:17 PM    Protein, total 7.0 11/25/2017 06:17 PM    Albumin 3.6 11/25/2017 06:17 PM    Globulin 3.4 11/25/2017 06:17 PM     Lab Results   Component Value Date/Time    INR 1.1 11/26/2017 03:35 AM    Prothrombin time 13.6 11/26/2017 03:35 AM    aPTT 27.3 11/25/2017 09:11 PM      No results found for: IRON, FE, TIBC, IBCT, PSAT, FERR   No results found for: PH, PCO2, PO2  No components found for: [place]   Lab Results   Component Value Date/Time     11/28/2017 03:50 AM    CK - MB 19.5 11/26/2017 03:35 AM              Total time: 100  Counseling / coordination time, spent as noted above: 70  > 50% counseling / coordination?: yes with dtr    Prolonged service was provided for  [x]30 min   []75 min in face to face time in the presence of the patient, spent as noted above. Time Start: 11:20am  Time End: 12:30pm  Note: this can only be billed with 06569 (initial) or 21 [phone] (follow up). If multiple start / stop times, list each separately.

## 2017-11-28 NOTE — ACP (ADVANCE CARE PLANNING)
Daughter provided copy of patient's medical POA paperwork. It names daughter Chris Campo primary medical agent. Placed document on chart. Daughter completed Durable DNR on patient's behalf. Placed on chart for scanning. Code status was changed to reflect patient's wish for DNR/DNI.      Medical POA: Chris Campo (daughter)  [phone] (c)  [phone] (h)

## 2017-11-28 NOTE — PROGRESS NOTES
I was not able to assess the patient at this time and will perform a follow up visit in a few days. Anila Mirza M.Div.   [facility]  [phone]

## 2017-11-28 NOTE — PROGRESS NOTES
Problem: Mobility Impaired (Adult and Pediatric)  Goal: *Acute Goals and Plan of Care (Insert Text)  Physical Therapy Goals  Initiated 11/26/2017 and to be accomplished within 7 day(s)  1. Patient will move from supine to sit and sit to supine , scoot up and down and roll side to side in bed with supervision/set-up. 2.  Patient will transfer from bed to chair and chair to bed with supervision/set-up using the least restrictive device. 3.  Patient will perform sit to stand with supervision/set-up. 4.  Patient will ambulate with supervision/set-up for >/= 75 feet with the least restrictive device. 5.  Patient will ascend/descend 2 stairs with bilateral handrail(s) with supervision/set-up. Outcome: Progressing Towards Goal  physical Therapy TREATMENT    Patient: Laxmi Pugh (84 y.o. female)  Date: 11/28/2017  Diagnosis: Traumatic rhabdomyolysis [facility])  Traumatic rhabdomyolysis ([address]  Elevated troponin  UTI (urinary tract infection) Traumatic rhabdomyolysis [facility])       Precautions: Fall  Chart, physical therapy assessment, plan of care and goals were reviewed. ASSESSMENT:  Pt. Presents up in chair w supportive daughter present. Has been up x 2 hrs per RN. More alert today and demos increased mobility ambulating 30 feet on level w CGA & improved walker mgt. Cont all B LE ex., seated and bed level. No pain indicated. EDUCATION:  LE ex., walker mgt. Progression toward goals:        Improving appropriately and progressing toward goals     PLAN:  Patient continues to benefit from skilled intervention to address the above impairments. Continue treatment per established plan of care. Discharge Recommendations:  snf  Further Equipment Recommendations for Discharge:  has needed equipment per daughter     SUBJECTIVE:   Patient stated . She was SOB after ambulation.     OBJECTIVE DATA SUMMARY:   Critical Behavior:  Neurologic State: Alert  Orientation Level: Oriented to person  Safety/Judgement: Fall prevention    G CODE:  na  Functional Mobility Training:  Bed Mobility:  Sit to Supine: Moderate assistance; Additional time;Assist x1;Other (comment) (A for ANTWON Gonzalez's)  Transfers:  Sit to Stand: Minimum assistance; Moderate assistance; Additional time;Assist x1  Stand to Sit: Minimum assistance; Additional time  Bed to Chair: Minimum assistance  Interventions: Safety awareness training; Tactile cues; Verbal cues     Balance:  Sitting - Static: Good (unsupported)  Sitting - Dynamic: Fair (occasional)  Standing: With support  Standing - Static: Fair  Standing - Dynamic : Fair  Ambulation/Gait Training:  Distance (ft): 30 Feet (ft)  Assistive Device: Gait belt;Walker, rolling  Ambulation - Level of Assistance: Minimal assistance  Base of Support: Center of gravity altered;Narrowed  Speed/My: Slow  Step Length: Left shortened;Right shortened  Therapeutic Exercises:   Seated b LAQ x 10; seated hip F x 10; bed level AP, quad and glut isometric x 10 w 3 sec hold, heel slide and hip abd/add x 10; SLR x 5  Vital Signs  Temp: 98.3 °F (36.8 °C)     Pulse (Heart Rate): 67     BP: 154/71     Resp Rate: 20     O2 Sat (%): 97 %  Pain:  Pre treatment pain level:  0  Post treatment pain level:  0  Pain Scale 1: Numeric (0 - 10)  Pain Intensity 1: 0  Activity Tolerance:   poor  After treatment:  Patient left in no apparent distress in bed  Call bell left within reach  Nursing notified  Daughter present    Linda Soni PTA   Time Calculation: 27 mins

## 2017-11-28 NOTE — PROGRESS NOTES
Problem: Self Care Deficits Care Plan (Adult)  Goal: *Acute Goals and Plan of Care (Insert Text)  Occupational Therapy Goals  Initiated 11/27/2017 within 7 day(s). 1.  Patient will perform grooming tasks at EOB with minimal assistance for balance. 2.  Patient will perform lower body dressing with minimal assistance. 3.  Patient will perform functional task for 8 minutes with supervision for balance at EOB and minimal verbal cues for pacing to increase activity tolerance for ADLs. 4.  Patient will perform toilet transfers with minimal assistance. 5.  Patient will perform all aspects of toileting with minimal assistance/contact guard assist.  6.  Patient will participate in upper extremity therapeutic exercise/activities with minimal assistance/contact guard assist for 8 minutes to increase BUE AROM/strength for increased participation in ADLs. 7.  Patient will utilize energy conservation techniques during functional activities with minimal verbal cues. Outcome: Progressing Towards Goal  Occupational Therapy TREATMENT    Patient: Sara Barrios (81 y.o. female)  Date: 11/28/2017  Diagnosis: Traumatic rhabdomyolysis [facility])  Traumatic rhabdomyolysis ([facility]  Elevated troponin  UTI (urinary tract infection) Traumatic rhabdomyolysis [facility])       Precautions: Fall  Chart, occupational therapy assessment, plan of care, and goals were reviewed. ASSESSMENT:  Pt OOB seated in chair upon entry. Pleasantly confused, seen for ADL grooming task at chair level w/set-up. Pt decrease FM strength/coordination requires increase time w/toothpaste mgt w/oral care. No c/o pain. Pt left in chair for dinner, reinforced importance of calling for assistance.    EDUCATION Pt educated on energy conservation techniques w/ADLs  Progression toward goals:  []          Improving appropriately and progressing toward goals  [x]          Improving slowly and progressing toward goals  []          Not making progress toward goals and plan of care will be adjusted     PLAN:  Patient continues to benefit from skilled intervention to address the above impairments. Continue treatment per established plan of care. Discharge Recommendations:  Didier Narvaez  Further Equipment Recommendations for Discharge:  shower chair and rolling walker     SUBJECTIVE:   Patient stated Zachariah Friday you sure you don't want to stay for some dinner? Charles Ramirez    OBJECTIVE DATA SUMMARY:       Cognitive/Behavioral Status:  Neurologic State: Alert, Confused  Orientation Level: Oriented to person  Cognition: Follows commands  Safety/Judgement: Fall prevention  Functional Mobility and Transfers for ADLs:  Balance:  Sitting: Impaired  Sitting - Static: Good (unsupported)  Sitting - Dynamic: Fair (occasional)    ADL Intervention:  Grooming  Washing Hands: Supervision/set-up  Brushing Teeth: Stand-by assistance    Pain:  Pre Treatment:0  Post Treatment:0  Pain Scale 1: Numeric (0 - 10)  Pain Intensity 1: 0    Activity Tolerance:    Fair    Please refer to the flowsheet for vital signs taken during this treatment.   After treatment:   [x]  Patient left in no apparent distress sitting up in chair  []  Patient left in no apparent distress in bed  [x]  Call bell left within reach  [x]  Nursing notified  []  Caregiver present  []  Bed alarm activated    LISA Carbajal  Time Calculation: 15 mins

## 2017-11-28 NOTE — PROGRESS NOTES
Shift summary: Patient slept most of shift, confused but pleasant when awake. Patient's daughter and grandson present for part of shift. A significant amount of time is spend discussing the patient's status, where she will go from the hospital, and that the daughter feels that she needs help at home to take care of her mom. Case management notified and a palliative care consult is also placed to help navigate code status and long term plans.  Bedside report given to Kenya Agrawal

## 2017-11-28 NOTE — PROGRESS NOTES
Palliative called and left vm with daughter Padma Scott to schedule family meeting. RN will notify Palliative if daughter come to visit. 11:10 Palliative MD, Dr. Thai Dao and myself met with daughter Padma Scott outside patients room. Padma Scott has been primary caregiver to patient for 35 years. 16 of those years she also cared for her father, who passed 18 years ago. Patient has had a decline over the last year with memory, weight loss, decreased appetite, and some ADLs. Daughter hasn't been able to leave her own at home, which has affected daughters work. Padma Scott mentioned needing to work for financial reasons. Sommer's son (patient's grandson) if asked will try to assist with watching patient. Daughter questioned about qualifications for Medicaid. Referred her to speak with [employer] worker in hospital about the process and qualifications. Daughter is patient's medical and financial POA. Documents were placed on chart. Patient outlined in medical POA clear wishes for no life prolonging measures at EOL. Daughter wishes to honor mothers instructions for EOL. Daughter wishes for DNR/DNI and signed Durable DNR. Discussed importance of completing POST form. It was reviewed with her and provided copy for her to discuss with sister. Thank you for the opportunity to assist in the care of Mrs. Cheri Mendez.    JORDAN Mercedes  Palliative Medicine

## 2017-11-28 NOTE — PROGRESS NOTES
Hospitalist Progress Note  Radha Morfin MD  Internal medicine/ Hospitalist    Daily Progress Note: 2017 9:44 AM      Interval history / Subjective: Nate Tomas is a 80 y.o. female who presented with falls. Patient was found at home last evening by her daughter on the floor face down. Patient says that she fell and could not reach the phone. Patient denies LOC . In the ED serial CT head, cervical spine were negative. Patient denies pain anywhere . She had labs which show she had CK 2825, Elevated troponin 2.25. She was also noted to have UTI. She is on iv fluid and levaquin iv. No complaint today    Current Facility-Administered Medications   Medication Dose Route Frequency    famotidine (PEPCID) tablet 20 mg  20 mg Oral DAILY    levoFLOXacin (LEVAQUIN) 750 mg in D5W IVPB  750 mg IntraVENous Q48H    [START ON 2017] alendronate (FOSAMAX) tablet 40 mg  40 mg Oral every Friday    enoxaparin (LOVENOX) injection 30 mg  30 mg SubCUTAneous Q24H    0.9% sodium chloride infusion  75 mL/hr IntraVENous CONTINUOUS    cholecalciferol (VITAMIN D3) tablet 2,000 Units  2,000 Units Oral DAILY    clopidogrel (PLAVIX) tablet 75 mg  75 mg Oral DAILY    donepezil (ARICEPT) tablet 5 mg  5 mg Oral DAILY    fluticasone (FLONASE) 50 mcg/actuation nasal spray 2 Spray  2 Spray Both Nostrils DAILY    lisinopril (PRINIVIL, ZESTRIL) tablet 20 mg  20 mg Oral DAILY    metoprolol succinate (TOPROL-XL) XL tablet 25 mg  25 mg Oral DAILY    nitroglycerin (NITROSTAT) tablet 0.4 mg  0.4 mg SubLINGual Q5MIN PRN    polyethylene glycol (MIRALAX) packet 17 g  17 g Oral DAILY    simvastatin (ZOCOR) tablet 40 mg  40 mg Oral QHS      Objective:     Visit Vitals    /71    Pulse 67    Temp 98.3 °F (36.8 °C)    Resp 20    Ht 4' 6\" (1.372 m)    Wt 45.4 kg (100 lb)    SpO2 97%    BMI 24.11 kg/m2           Temp (24hrs), Av.2 °F (36.8 °C), Min:97.8 °F (36.6 °C), Max:98.5 °F (36.9 °C)         1901 -  0700  In: 0922 [I.V.:2790]  Out: 850 [Urine:750]  Physical Exam   Constitutional:NAD,interacting well. HEENT:perrla,at/nc,mouth moist  Neck: Neck supple. Cardiovascular: Normal rate;irregularly irregular rhythm present,no murmur  Pulmonary/Chest: Effort normal. No respiratory distress. She has no wheezes. She has no rales. Abdominal: Soft. Bowel sounds are normal.  Musculoskeletal: She exhibits no edema. Neurological:Awake,aler,ox3  Skin: Skin is warm and dry. No rash noted. Data Review    Recent Results (from the past 12 hour(s))   CK    Collection Time: 11/28/17  3:50 AM   Result Value Ref Range     (H) 26 - 343 U/L   METABOLIC PANEL, BASIC    Collection Time: 11/28/17  3:50 AM   Result Value Ref Range    Sodium 145 136 - 145 mmol/L    Potassium 3.5 3.5 - 5.5 mmol/L    Chloride 112 (H) 100 - 108 mmol/L    CO2 26 21 - 32 mmol/L    Anion gap 7 3.0 - 18 mmol/L    Glucose 84 74 - 99 mg/dL    BUN 26 (H) 7.0 - 18 MG/DL    Creatinine 0.95 0.6 - 1.3 MG/DL    BUN/Creatinine ratio 27 (H) 12 - 20      GFR est AA >60 >60 ml/min/1.73m2    GFR est non-AA 54 (L) >60 ml/min/1.73m2    Calcium 8.8 8.5 - 10.1 MG/DL         Assessment/Plan:     Principal Problem:    Traumatic rhabdomyolysis ([facility] (11/25/2017)    Active Problems:    Elevated troponin (11/25/2017)      UTI (urinary tract infection) (11/25/2017)      A-fib ([facility] [address] (11/25/2017)      Late onset Alzheimer's disease without behavioral disturbance (11/26/2017)      Essential hypertension (11/26/2017)      [address]   1. Traumatic Rhabdomyolysis - 2/2 fall.  today compared to 2825 on admission.     2.Elevated Troponin - Troponin 2.25 ->2.19 ->2.34 . Cardiology consulted. Did not recommend [facility] therapy . Pt has h/o CAD and she is on plavix,metoprolol. Will follow cardiology recommendations     3. UTI - Rocephin ,Ucx ordered but no results in chart.     4.Afib/SVR - rate controlled , definitely not a candidate for [facility] therapy due to falls    5. H/o CHF,diastolic:not in failure,monitor closely since pt receiving iv fluid for rhabdo;consider resuming lasix in sign of chf.exac     6. Hypertension - Toprol      7. Dementia - Aricept      8. CAD - Toprol , Zocor , Plavix     9. Falls - fall precaution;PT/OT     DVT Prophylaxis:on heparin sc  DNR  Disposition:rehab

## 2017-11-29 PROCEDURE — 74011000250 HC RX REV CODE- 250: Performed by: HOSPITALIST

## 2017-11-29 PROCEDURE — 74011250636 HC RX REV CODE- 250/636: Performed by: INTERNAL MEDICINE

## 2017-11-29 PROCEDURE — 97530 THERAPEUTIC ACTIVITIES: CPT

## 2017-11-29 PROCEDURE — 74011250637 HC RX REV CODE- 250/637: Performed by: FAMILY MEDICINE

## 2017-11-29 PROCEDURE — 74011250637 HC RX REV CODE- 250/637: Performed by: INTERNAL MEDICINE

## 2017-11-29 PROCEDURE — 65660000000 HC RM CCU STEPDOWN

## 2017-11-29 PROCEDURE — 97535 SELF CARE MNGMENT TRAINING: CPT

## 2017-11-29 RX ORDER — METOPROLOL TARTRATE 5 MG/5ML
5 INJECTION INTRAVENOUS ONCE
Status: COMPLETED | OUTPATIENT
Start: 2017-11-29 | End: 2017-11-29

## 2017-11-29 RX ADMIN — METOPROLOL TARTRATE 5 MG: 5 INJECTION INTRAVENOUS at 04:42

## 2017-11-29 RX ADMIN — METOPROLOL SUCCINATE 25 MG: 25 TABLET, EXTENDED RELEASE ORAL at 10:42

## 2017-11-29 RX ADMIN — FAMOTIDINE 20 MG: 20 TABLET, FILM COATED ORAL at 10:42

## 2017-11-29 RX ADMIN — LISINOPRIL 20 MG: 20 TABLET ORAL at 10:42

## 2017-11-29 RX ADMIN — CLOPIDOGREL BISULFATE 75 MG: 75 TABLET ORAL at 10:42

## 2017-11-29 RX ADMIN — DONEPEZIL HYDROCHLORIDE 5 MG: 5 TABLET, FILM COATED ORAL at 10:42

## 2017-11-29 RX ADMIN — ENOXAPARIN SODIUM 30 MG: 30 INJECTION SUBCUTANEOUS at 05:32

## 2017-11-29 RX ADMIN — FLUTICASONE PROPIONATE 2 SPRAY: 50 SPRAY, METERED NASAL at 10:42

## 2017-11-29 RX ADMIN — VITAMIN D, TAB 1000IU (100/BT) 2000 UNITS: 25 TAB at 10:42

## 2017-11-29 NOTE — PROGRESS NOTES
conducted a Follow up consultation and Spiritual Assessment for Andrew Botello, who is a 80 y.o.,female. The  provided the following Interventions:  Continued the relationship of care and support. Listened empathically. Offered assurance of continued prayer on patients behalf. Chart reviewed. The following outcomes were achieved:  Patient expressed gratitude for pastoral care visit. Assessment:  There are no further spiritual or Yazdanism issues which require Spiritual Care Services interventions at this time. Plan:  Chaplains will continue to follow and will provide pastoral care on an as needed/requested basis.  recommends bedside caregivers page  on duty if patient shows signs of acute spiritual or emotional distress.      [address] [place], [address]

## 2017-11-29 NOTE — PROGRESS NOTES
1900 -- Bedside, Verbal and Written shift change report given to [facility] (oncoming nurse) by [address]  (offgoing nurse). Report included the following information SBAR, Kardex, Intake/Output, MAR and Recent Results. Code status changed, DNR bracelet placed on pt's wrist.    0030 -- Shift reassessment, pt condition unchanged, will continue to monitor. 0330 -- Shift reassessment, pt condition unchanged, will continue to monitor. 0400 -- MD Ferrer on unit, pt's /95  approved PRN medication, will administered and reassess. 0516 -- BP reassessed, /88.     0730 -- Bedside, Verbal and Written shift change report given to [facility] (oncoming nurse) by MILTON (offgoing nurse).   Report included the following information SBAR, Kardex, Intake/Output, MAR and Recent Results

## 2017-11-29 NOTE — PROGRESS NOTES
Hospitalist Progress Note  Talya Price MD  Internal medicine/ Hospitalist    Daily Progress Note: 2017 9:44 AM      Interval history / Subjective: Maria Fernanda Donaldson is a 80 y.o. female who presented with falls. Patient was found at home last evening by her daughter on the floor face down. Patient says that she fell and could not reach the phone. Patient denies LOC . In the ED serial CT head, cervical spine were negative. Patient denies pain anywhere . She had labs which show she had CK 2825, Elevated troponin 2.25. She was also noted to have UTI. She is on iv fluid and levaquin iv. No complaint today    Current Facility-Administered Medications   Medication Dose Route Frequency    famotidine (PEPCID) tablet 20 mg  20 mg Oral DAILY    levoFLOXacin (LEVAQUIN) 750 mg in D5W IVPB  750 mg IntraVENous Q48H    [START ON 2017] alendronate (FOSAMAX) tablet 40 mg  40 mg Oral every Friday    enoxaparin (LOVENOX) injection 30 mg  30 mg SubCUTAneous Q24H    cholecalciferol (VITAMIN D3) tablet 2,000 Units  2,000 Units Oral DAILY    clopidogrel (PLAVIX) tablet 75 mg  75 mg Oral DAILY    donepezil (ARICEPT) tablet 5 mg  5 mg Oral DAILY    fluticasone (FLONASE) 50 mcg/actuation nasal spray 2 Spray  2 Spray Both Nostrils DAILY    lisinopril (PRINIVIL, ZESTRIL) tablet 20 mg  20 mg Oral DAILY    metoprolol succinate (TOPROL-XL) XL tablet 25 mg  25 mg Oral DAILY    nitroglycerin (NITROSTAT) tablet 0.4 mg  0.4 mg SubLINGual Q5MIN PRN    polyethylene glycol (MIRALAX) packet 17 g  17 g Oral DAILY    simvastatin (ZOCOR) tablet 40 mg  40 mg Oral QHS      Objective:     Visit Vitals    /90    Pulse 66    Temp 98.4 °F (36.9 °C)    Resp 20    Ht 4' 6\" (1.372 m)    Wt 51.7 kg (113 lb 15.7 oz)    SpO2 93%    Breastfeeding No    BMI 27.48 kg/m2           Temp (24hrs), Av.4 °F (36.9 °C), Min:97.9 °F (36.6 °C), Max:98.9 °F (37.2 °C)         1901 -  0700  In: 2253.8 [P.O.:1040; I.V.:1213.8]  Out: 1400 [Urine:1300]  Physical Exam   Constitutional:NAD,interacting well. HEENT:perrla,at/nc,mouth moist  Neck: Neck supple. Cardiovascular: Normal rate;irregularly irregular rhythm present,no murmur  Pulmonary/Chest: Effort normal. No respiratory distress. She has no wheezes. She has no rales. Abdominal: Soft. Bowel sounds are normal.  Musculoskeletal: She exhibits no edema. Neurological:Awake,aler,ox3  Skin: Skin is warm and dry. No rash noted. Data Review    No results found for this or any previous visit (from the past 12 hour(s)). Assessment/Plan:     Principal Problem:    Traumatic rhabdomyolysis ([facility] [address] (11/25/2017)    Active Problems:    Elevated troponin (11/25/2017)      UTI (urinary tract infection) (11/25/2017)      A-fib ([facility] [address] (11/25/2017)      Late onset Alzheimer's disease without behavioral disturbance (11/26/2017)      Essential hypertension (11/26/2017)      Debility (11/28/2017)      Fall (11/28/2017)      [address]   1. Traumatic Rhabdomyolysis - 2/2 fall.  on 11/28 compared to 2825 on admission.     2.Elevated Troponin - Troponin 2.25 ->2.19 ->2.34 . Cardiology consulted. Did not recommend [facility] therapy . Pt has h/o CAD and she is on plavix,metoprolol. Will follow cardiology recommendations     3. UTI - Rocephin ,Ucx ordered but no results in chart.     4.Afib/SVR - rate controlled , definitely not a candidate for [facility] therapy due to falls    5. H/o CHF,diastolic:not in failure,monitor closely since pt receiving iv fluid for rhabdo;consider resuming lasix in sign of chf.exac     6. Hypertension - Toprol      7. Dementia - Aricept      8. CAD - Toprol , Zocor , Plavix     9. Falls - fall precaution;PT/OT     DVT Prophylaxis:on heparin sc  DNR  Disposition:rehab on 11/30

## 2017-11-29 NOTE — MANAGEMENT PLAN
Discharge Planning    [facility] is starting auth with [facility] Medicare for SNF rehab      Norma Alex RN BSN  Outcomes Manager  Pager # [phone]

## 2017-11-29 NOTE — ROUTINE PROCESS
Bedside and Verbal shift change report given to Precious Mercado RN (oncoming nurse) by Marley Agarwal RN (offgoing nurse). Report included the following information SBAR, Kardex and MAR.

## 2017-11-29 NOTE — PROGRESS NOTES
Problem: Mobility Impaired (Adult and Pediatric)  Goal: *Acute Goals and Plan of Care (Insert Text)  Physical Therapy Goals  Initiated 11/26/2017 and to be accomplished within 7 day(s)  1. Patient will move from supine to sit and sit to supine , scoot up and down and roll side to side in bed with supervision/set-up. 2.  Patient will transfer from bed to chair and chair to bed with supervision/set-up using the least restrictive device. 3.  Patient will perform sit to stand with supervision/set-up. 4.  Patient will ambulate with supervision/set-up for >/= 75 feet with the least restrictive device. 5.  Patient will ascend/descend 2 stairs with bilateral handrail(s) with supervision/set-up. Outcome: Progressing Towards Goal  physical Therapy TREATMENT    Patient: Nate Tomas (72 y.o. female)  Date: 11/29/2017  Diagnosis: Traumatic rhabdomyolysis [facility])  Traumatic rhabdomyolysis ([address]  Elevated troponin  UTI (urinary tract infection) Traumatic rhabdomyolysis [facility])       Precautions: Fall  Chart, physical therapy assessment, plan of care and goals were reviewed. ASSESSMENT:  Pt presents in bed. Alert. Agrees to PT. INcreased ambulation today,decreased assist w Bed mobility. EDUCATION:  Safety>call for assist. Theo. Pt: hand placement during sit to stand; wants to pull to stand on walker  Progression toward goals:        Improving appropriately and progressing toward goals     PLAN:  Patient continues to benefit from skilled intervention to address the above impairments. Continue treatment per established plan of care. Discharge Recommendations:  snf v home w   Further Equipment Recommendations for Discharge:  none     SUBJECTIVE:   Patient stated I'll clean it up.  (Pt accidentally spilled Ensure)    OBJECTIVE DATA SUMMARY:   Critical Behavior:  Neurologic State: Alert, Confused  Orientation Level: Oriented to person  Cognition: Follows commands  Safety/Judgement: Fall prevention    G CODE:na  Functional Mobility Training:  Bed Mobility:  Supine to Sit: Minimum assistance  Transfers:  Sit to Stand: Minimum assistance; Additional time  Stand to Sit: Contact guard assistance;Minimum assistance     Stand Pivot Transfers: Minimal assistance  Bed to Chair: Minimum assistance  Interventions: Safety awareness training; Tactile cues; Verbal cues     Balance:  Sitting - Static: Good (unsupported)  Sitting - Dynamic: Fair (occasional)  Standing: With support  Standing - Static: Fair  Standing - Dynamic : Fair  Ambulation/Gait Training:  Distance (ft): 60 Feet (ft) (30 ft x 2 w rest break in sitting bw  bouts)  Assistive Device: Walker, rolling  Ambulation - Level of Assistance: Minimal assistance  Gait Abnormalities: Decreased step clearance; Other (inc. thoracis kyphosis)  Base of Support: Center of gravity altered;Narrowed  Speed/My: Slow  Step Length: Left shortened;Right shortened  Interventions: Manual cues; Safety awareness training; Tactile cues; Verbal cues; Visual/Demos  Therapeutic Exercises:   B LAQ x 20  Vital Signs  Temp: 98.4 °F (36.9 °C)     Pulse (Heart Rate): 66     BP: 149/90     Resp Rate: 20     O2 Sat (%): 93 %  Pain:  Pre treatment pain level: 0  Post treatment pain level:  0  Pain Scale 1: Numeric (0 - 10)  Pain Intensity 1: 0    Activity Tolerance:   poor  After treatment:   Patient left in no apparent distress sitting up in chair  Call bell left within reach  Nursing Shelli  Notified; Carla Lucero educated grandson to call Rosio/nursing for assist back to bed when he leaves. He verbalized understanding.   Grandson present  Alli Ramirez PTA   Time Calculation: 29 mins

## 2017-11-29 NOTE — PROGRESS NOTES
1929 shift change report given to Jeff Reynolds Rn by Vinnie Ayon Rn,patient on tele monitoring nsr ,no signs of distress noted    2020 shift assessment been done,patient agitated and confused,saying she wants to get out of bed ,and got to her bedroom. patient reoriented and told she is is in the hospital    2125 due meds given,patient still agitated,a high fall risk,patient transferrred from room to another room close to the nurses station,and put on a bed alarm      2214 patient still agitated,CNAs on floor taking turns to sit with patient     2314 patient sleeping at this time,no signs of distress noted    0420 patrient resting in bed no signs of distress noted    0735 shift assessment report given to [address] [place] [address]

## 2017-11-29 NOTE — PROGRESS NOTES
[facility]: [phone])  [facility]: [address] [place] [address] Way: [phone]    Patient Name: Ben Curtis  YOB: 1920    Date of Initial Consult: 11-28-17  Reason for Consult: Care Decisions  Requesting Provider: Declan Kelly MD   Primary Care Physician: PROVIDER UNKNOWN      SUMMARY:   Ben Curtis is a 80y.o. year old with a past history of Dementia, Afib, HTN, COPD, Breast Ca who was admitted on 11/25/2017 from ER/Home with dtr with a diagnosis of found on Floor/fall. Current medical issues leading to Palliative Medicine involvement include: patient with dementia, advanced age asked to support patient/family to establish goals of care. PALLIATIVE DIAGNOSES:   1. Dementia  2. Debility  3. Fall  4. A fib       PLAN:   1. Jeanine Vargas MSW and I met with patient, she is alert, knows her name, she was seen while sitting in valles, as she had spilled the ensure in her room. She was also seen to be walking using a walker with PT, appears to be calm, in no distress. 2. Dementia-dtr did not present while we visited, patient does not have any behaviors that are posing risks during her hospital stay. 3. Debility-she appears to be cooperating with PT/OT and may benefit from some ongoing rehab in [facility]  4. Fall-her dementia causes her to have limited insight into her deficits, dtr shared she would attempt to climb a step stool in the house. 5. A fib-dtr shared that patient suffered an MI years ago, she may have had an ischemic event related to the fall but she is not a candidate for more aggressive evaluation/treatment. She also is not candidate for [facility] given her dementia/fall risk. Presently rate is controlled, remains on Toprol. 6. Goals of care and Code Status-yesterday, dtr Debbie Luna signed the [facility], copy to chart, copy to dtr and DNR/DNI entered into EMR.   Had hoped to meet with dtr, to see if she had any thoughts regarding completion of a POST, will call prior to her d/c to [facility]/Rehab to see if completing this would be helpful. Likely she will not qualify for [facility] and thus limited interventions the best option. 7. Initial consult note routed to primary continuity provider  8. Communicated plan of care with: Palliative IDT, RN and attending       GOALS OF CARE:     [====Goals of Care====]  GOALS OF CARE:  Patient / health care proxy stated goals: to come home and have family look after her      TREATMENT PREFERENCES:   Code Status: DNR    Advance Care Planning:  Advance Care Planning 11/28/2017   Patient's Healthcare Decision Maker is: Named in scanned ACP document   Primary Decision Maker Name [facility] [place]   Primary Decision Maker Phone Number [phone] (c) or [phone] (h)   Primary Decision Maker Relationship to Patient Adult child   Confirm Advance Directive Yes, on file   Patient Would Like to Complete Advance Directive -   Does the patient have other document types Do Not Resuscitate       Other:    The palliative care team has discussed with patient / health care proxy about goals of care / treatment preferences for patient.  [====Goals of Care====]    Advance Care Planning 11/28/2017   Patient's Healthcare Decision Maker is: Named in scanned ACP document   Primary Decision Maker Name [facility] [place]   Primary Decision Maker Phone Number [phone] (c) or [phone] (h)   Primary Decision Maker Relationship to Patient Adult child   Confirm Advance Directive Yes, on file   Patient Would Like to Complete Advance Directive -   Does the patient have other document types Do Not Resuscitate           HISTORY:     History obtained from: Chart    CHIEF COMPLAINT: Fall    HPI/SUBJECTIVE:  98.4, 66, 149/90, 18 on RA at 93%, Wt 100lb, Voiding, Stool x2 11-28,   MAR-Vit D, Plavix, Aricept, Lovenox, Pepcid, Levaquin, Zestril, Toprol, Zocor, Miralax.   LABS-WBC 9.6, H&H 13.6 & 41, Plat 178 BUN/Creat 26/0.95, Albumin 3.6,  INR 1.1,   Head CT-No evidence of acute intracranial process  CT Neck-1. No evidence of acute cervical spine fracture. Severe degenerative disc and  facet disease.     2. Small right apical pulmonary nodule, unlikely to be clinically significant in  a patient of this age. ECHO 4-2017-Left ventricle: Systolic function was normal by visual assessment. Ejection fraction was estimated to be 55 %. There were no regional wall  motion abnormalities. Wall thickness was mildly to moderately increased. There was asymmetric hypertrophy of the septum. Right ventricle: Systolic function was normal.    Left atrium: The atrium was severely dilated. Right atrium: The atrium was dilated. Mitral valve: There was moderate calcification, with moderate chordal  involvement. There was moderately reduced leaflet separation. The findings  were consistent with mild to moderate mitral stenosis. There was moderate  regurgitation. Mean transmitral gradient was 6 mmHg. Mitral valve area by  pressure half-time was 1.4 cm-sq. Aortic valve: There was no stenosis. Tricuspid valve: There was moderate regurgitation. Cardio-elevated CPK, elevated Troponins peak of 2.34. ECG does show possible anterior ischemic changes. Would start empiric therapy for ischemic heart disease.  Daughter presently unavailable but I believe that the patient would not be a candidate for aggressive invasive evaluation  The patient is:   [] Verbal and participatory  [x] Non-participatory due to: dementia    Clinical Pain Assessment (nonverbal scale for nonverbal patients): Pain: 0  Denies pain       FUNCTIONAL ASSESSMENT:     Palliative Performance Scale (PPS):50%          PSYCHOSOCIAL/SPIRITUAL SCREENING:     Advance Care Planning:  Advance Care Planning 11/28/2017   Patient's Healthcare Decision Maker is: Named in scanned ACP document   Primary Decision Maker Name Bebo Kline   Primary Decision Maker Phone Number [phone] (c) or [phone] (h) Primary Decision Maker Relationship to Patient Adult child   Confirm Advance Directive Yes, on file   Patient Would Like to Complete Advance Directive -   Does the patient have other document types Do Not Resuscitate        Any spiritual / [employer]ybeterian concerns:  [] Yes /  [x] No    Caregiver Burnout:  [] Yes /  [x] No /  [] No Caregiver Present      Anticipatory grief assessment:   [x] Normal  / [] Maladaptive       ESAS Anxiety: Anxiety: 0    ESAS Depression: Depression: 0        REVIEW OF SYSTEMS:     Positive and pertinent negative findings in ROS are noted above in HPI.patient denies pain, staff share she is eating, about 50%. The following systems were [x] reviewed / [] unable to be reviewed as noted in HPI  Other findings are noted below. Systems: constitutional, ears/nose/mouth/throat, respiratory, gastrointestinal, genitourinary, musculoskeletal, integumentary, neurologic, psychiatric, endocrine. Positive findings noted below. Modified ESAS Completed by: provider   Fatigue: 2 Drowsiness: 0   Depression: 0 Pain: 0   Anxiety: 0 Nausea: 0   Anorexia: 2 Dyspnea: 0     Constipation: No     Stool Occurrence(s): 1        PHYSICAL EXAM:     Wt Readings from Last 3 Encounters:   11/29/17 51.7 kg (113 lb 15.7 oz)   05/04/17 47.6 kg (105 lb)   04/24/17 48.1 kg (106 lb)     Blood pressure 149/90, pulse 66, temperature 98.4 °F (36.9 °C), resp. rate 20, height 4' 6\" (1.372 m), weight 51.7 kg (113 lb 15.7 oz), SpO2 93 %, not currently breastfeeding.   Pain:  Pain Scale 1: Numeric (0 - 10)  Pain Intensity 1: 0                 Last bowel movement: 11-28    Constitutional: alert and very pleasant, looks younger than her known age, she is oriented to self  Eyes: pupils equal, anicteric  ENMT: no nasal discharge, moist mucous membranes  Respiratory: breathing not labored, symmetric  Gastrointestinal: soft non-tender, +bowel sounds  Musculoskeletal: no deformity, no tenderness to palpation  Skin: warm, dry  Neurologic: following commands, moving all extremities-walking in valles  Psychiatric: full affect, no hallucinations         HISTORY:     Principal Problem:    [facility] (11/25/2017)    Active Problems:    Elevated troponin (11/25/2017)      UTI (urinary tract infection) (11/25/2017)      A-fib ([facility] (11/25/2017)      Late onset Alzheimer's disease without behavioral disturbance (11/26/2017)      Essential hypertension (11/26/2017)      Debility (11/28/2017)      Fall (11/28/2017)      Past Medical History:   Diagnosis Date    [facility]     Chronic obstructive pulmonary disease ([facility]     Dementia     Hypertension       Past Surgical History:   Procedure Laterality Date    HX MASTECTOMY        History reviewed. No pertinent family history. History reviewed, no pertinent family history.   Social History   Substance Use Topics    Smoking status: Never Smoker    Smokeless tobacco: Never Used    Alcohol use No     Allergies   Allergen Reactions    Azithromycin Anaphylaxis    Penicillin Anaphylaxis      Current Facility-Administered Medications   Medication Dose Route Frequency    famotidine (PEPCID) tablet 20 mg  20 mg Oral DAILY    levoFLOXacin (LEVAQUIN) 750 mg in D5W IVPB  750 mg IntraVENous Q48H    [START ON 12/1/2017] alendronate (FOSAMAX) tablet 40 mg  40 mg Oral every Friday    enoxaparin (LOVENOX) injection 30 mg  30 mg SubCUTAneous Q24H    cholecalciferol (VITAMIN D3) tablet 2,000 Units  2,000 Units Oral DAILY    clopidogrel (PLAVIX) tablet 75 mg  75 mg Oral DAILY    donepezil (ARICEPT) tablet 5 mg  5 mg Oral DAILY    fluticasone (FLONASE) 50 mcg/actuation nasal spray 2 Spray  2 Spray Both Nostrils DAILY    lisinopril (PRINIVIL, ZESTRIL) tablet 20 mg  20 mg Oral DAILY    metoprolol succinate (TOPROL-XL) XL tablet 25 mg  25 mg Oral DAILY    nitroglycerin (NITROSTAT) tablet 0.4 mg  0.4 mg SubLINGual Q5MIN PRN    polyethylene glycol (MIRALAX) packet 17 g  17 g Oral DAILY    simvastatin (ZOCOR) tablet 40 mg  40 mg Oral QHS        LAB AND IMAGING FINDINGS:     Lab Results   Component Value Date/Time    WBC 9.6 11/26/2017 03:35 AM    HGB 13.6 11/26/2017 03:35 AM    PLATELET 981 57/72/1136 03:35 AM     Lab Results   Component Value Date/Time    Sodium 145 11/28/2017 03:50 AM    Potassium 3.5 11/28/2017 03:50 AM    Chloride 112 11/28/2017 03:50 AM    CO2 26 11/28/2017 03:50 AM    BUN 26 11/28/2017 03:50 AM    Creatinine 0.95 11/28/2017 03:50 AM    Calcium 8.8 11/28/2017 03:50 AM      Lab Results   Component Value Date/Time    AST (SGOT) 118 11/25/2017 06:17 PM    Alk. phosphatase 153 11/25/2017 06:17 PM    Protein, total 7.0 11/25/2017 06:17 PM    Albumin 3.6 11/25/2017 06:17 PM    Globulin 3.4 11/25/2017 06:17 PM     Lab Results   Component Value Date/Time    INR 1.1 11/26/2017 03:35 AM    Prothrombin time 13.6 11/26/2017 03:35 AM    aPTT 27.3 11/25/2017 09:11 PM      No results found for: IRON, FE, TIBC, IBCT, PSAT, FERR   No results found for: PH, PCO2, PO2  No components found for: [place]   Lab Results   Component Value Date/Time     11/28/2017 03:50 AM    CK - MB 19.5 11/26/2017 03:35 AM              Total time: 20  Counseling / coordination time, spent as noted above: 15  > 50% counseling / coordination?: yes with patient    Prolonged service was provided for  []30 min   []75 min in face to face time in the presence of the patient, spent as noted above. Time Start:   Time End:   Note: this can only be billed with 24279 (initial) or 25381 (follow up). If multiple start / stop times, list each separately.

## 2017-11-29 NOTE — PROGRESS NOTES
Problem: Self Care Deficits Care Plan (Adult)  Goal: *Acute Goals and Plan of Care (Insert Text)  Occupational Therapy Goals  Initiated 11/27/2017 within 7 day(s). 1.  Patient will perform grooming tasks at EOB with minimal assistance for balance. 2.  Patient will perform lower body dressing with minimal assistance. 3.  Patient will perform functional task for 8 minutes with supervision for balance at EOB and minimal verbal cues for pacing to increase activity tolerance for ADLs. 4.  Patient will perform toilet transfers with minimal assistance. 5.  Patient will perform all aspects of toileting with minimal assistance/contact guard assist.  6.  Patient will participate in upper extremity therapeutic exercise/activities with minimal assistance/contact guard assist for 8 minutes to increase BUE AROM/strength for increased participation in ADLs. 7.  Patient will utilize energy conservation techniques during functional activities with minimal verbal cues. Outcome: Progressing Towards Goal  Occupational Therapy TREATMENT    Patient: Donny Chand (64 y.o. female)  Date: 11/29/2017  Diagnosis: Traumatic rhabdomyolysis [facility])  Traumatic rhabdomyolysis ([facility]  Elevated troponin  UTI (urinary tract infection) Traumatic rhabdomyolysis [facility])       Precautions: Fall  Chart, occupational therapy assessment, plan of care, and goals were reviewed. ASSESSMENT:  Pt seated at EOB upon entry, unsupported, maintaining good sitting balance. Pt performs UB dressing and grooming ADLs w/increase time (see functional levels below). Pt generalized weakness requires Min Assist w/functional transfer to standing and vc's for BUE retraction 2/2 kyphotic posture. Pt requires assist w/BLE for return to supine. No c/o pain. Pt oriented to self only, following commands.   EDUCATION Pt educated on energy conservation techniques w/ADLs  Progression toward goals:  []          Improving appropriately and progressing toward goals  [x] Improving slowly and progressing toward goals  []          Not making progress toward goals and plan of care will be adjusted     PLAN:  Patient continues to benefit from skilled intervention to address the above impairments. Continue treatment per established plan of care. Discharge Recommendations:  Skilled Nursing Facility  Further Equipment Recommendations for Discharge:  shower chair and rolling walker     SUBJECTIVE:   Patient stated I usually take a nap in the afternoon.     OBJECTIVE DATA SUMMARY:       Cognitive/Behavioral Status:  Neurologic State: Alert, Confused  Orientation Level: Oriented to person  Cognition: Follows commands  Safety/Judgement: Fall prevention  Functional Mobility and Transfers for ADLs:   Bed Mobility:  Sit to Supine: Moderate assistance (requires assist w/BLE)  Scooting: Total assistance   Transfers:  Sit to Stand: Minimum assistance (w/RW)   Pt side steps to [facility] w/RW   Balance:  Sitting: Impaired  Sitting - Static: Good (unsupported)  Sitting - Dynamic: Fair (occasional)  Standing: Impaired; With support  Standing - Static: Fair  Standing - Dynamic : Fair  ADL Intervention:  Grooming  Washing Face: Supervision/set-up  Washing Hands: Supervision/set-up  Applying Makeup: Supervision/set-up (applying lotionto face and BUE)  Brushing/Combing Hair: Supervision/set-up    Upper Body [address]: Minimum  assistance    Pain:  Pre Treatment:0  Post Treatment:0    Activity Tolerance:    Fair    Please refer to the flowsheet for vital signs taken during this treatment.   After treatment:   []  Patient left in no apparent distress sitting up in chair  [x]  Patient left in no apparent distress in bed  [x]  Call bell left within reach  [x]  Nursing notified  []  Caregiver present  [x]  Bed alarm activated    LISA Carbajal  Time Calculation: 25 mins

## 2017-11-30 ENCOUNTER — HOSPITAL ENCOUNTER (INPATIENT)
Age: 82
LOS: 24 days | Discharge: HOME HEALTH | End: 2017-12-24
Attending: INTERNAL MEDICINE | Admitting: INTERNAL MEDICINE

## 2017-11-30 VITALS
WEIGHT: 117.6 LBS | OXYGEN SATURATION: 95 % | TEMPERATURE: 97.9 F | DIASTOLIC BLOOD PRESSURE: 82 MMHG | HEART RATE: 62 BPM | BODY MASS INDEX: 27.21 KG/M2 | HEIGHT: 55 IN | RESPIRATION RATE: 20 BRPM | SYSTOLIC BLOOD PRESSURE: 165 MMHG

## 2017-11-30 DIAGNOSIS — G30.1 LATE ONSET ALZHEIMER'S DISEASE WITHOUT BEHAVIORAL DISTURBANCE (HCC): Primary | ICD-10-CM

## 2017-11-30 DIAGNOSIS — F02.80 LATE ONSET ALZHEIMER'S DISEASE WITHOUT BEHAVIORAL DISTURBANCE (HCC): Primary | ICD-10-CM

## 2017-11-30 PROCEDURE — 74011250636 HC RX REV CODE- 250/636: Performed by: FAMILY MEDICINE

## 2017-11-30 PROCEDURE — 74011250637 HC RX REV CODE- 250/637: Performed by: INTERNAL MEDICINE

## 2017-11-30 PROCEDURE — 74011250636 HC RX REV CODE- 250/636: Performed by: INTERNAL MEDICINE

## 2017-11-30 PROCEDURE — 74011250637 HC RX REV CODE- 250/637: Performed by: FAMILY MEDICINE

## 2017-11-30 RX ORDER — LISINOPRIL 20 MG/1
20 TABLET ORAL DAILY
Status: DISCONTINUED | OUTPATIENT
Start: 2017-12-01 | End: 2017-12-20

## 2017-11-30 RX ORDER — POLYETHYLENE GLYCOL 3350 17 G/17G
17 POWDER, FOR SOLUTION ORAL DAILY
Status: DISCONTINUED | OUTPATIENT
Start: 2017-12-01 | End: 2017-12-24 | Stop reason: HOSPADM

## 2017-11-30 RX ORDER — NITROGLYCERIN 0.4 MG/1
0.4 TABLET SUBLINGUAL
Status: DISCONTINUED | OUTPATIENT
Start: 2017-11-30 | End: 2017-12-24 | Stop reason: HOSPADM

## 2017-11-30 RX ORDER — DONEPEZIL HYDROCHLORIDE 5 MG/1
5 TABLET, FILM COATED ORAL DAILY
Status: DISCONTINUED | OUTPATIENT
Start: 2017-12-01 | End: 2017-12-24 | Stop reason: HOSPADM

## 2017-11-30 RX ORDER — FAMOTIDINE 20 MG/1
20 TABLET, FILM COATED ORAL DAILY
Status: DISCONTINUED | OUTPATIENT
Start: 2017-12-01 | End: 2017-12-24 | Stop reason: HOSPADM

## 2017-11-30 RX ORDER — ACETAMINOPHEN 500 MG
500 TABLET ORAL
Status: DISCONTINUED | OUTPATIENT
Start: 2017-11-30 | End: 2017-12-24 | Stop reason: HOSPADM

## 2017-11-30 RX ORDER — SIMVASTATIN 40 MG/1
40 TABLET, FILM COATED ORAL
Status: DISCONTINUED | OUTPATIENT
Start: 2017-11-30 | End: 2017-12-24 | Stop reason: HOSPADM

## 2017-11-30 RX ORDER — MELATONIN
2000 DAILY
Status: DISCONTINUED | OUTPATIENT
Start: 2017-12-01 | End: 2017-12-24 | Stop reason: HOSPADM

## 2017-11-30 RX ORDER — FLUTICASONE PROPIONATE 50 MCG
2 SPRAY, SUSPENSION (ML) NASAL DAILY
Status: DISCONTINUED | OUTPATIENT
Start: 2017-12-01 | End: 2017-12-24 | Stop reason: HOSPADM

## 2017-11-30 RX ORDER — METOPROLOL SUCCINATE 25 MG/1
25 TABLET, EXTENDED RELEASE ORAL DAILY
Status: DISCONTINUED | OUTPATIENT
Start: 2017-12-01 | End: 2017-12-24 | Stop reason: HOSPADM

## 2017-11-30 RX ORDER — CLOPIDOGREL BISULFATE 75 MG/1
75 TABLET ORAL DAILY
Status: DISCONTINUED | OUTPATIENT
Start: 2017-12-01 | End: 2017-12-24 | Stop reason: HOSPADM

## 2017-11-30 RX ADMIN — DONEPEZIL HYDROCHLORIDE 5 MG: 5 TABLET, FILM COATED ORAL at 08:47

## 2017-11-30 RX ADMIN — CLOPIDOGREL BISULFATE 75 MG: 75 TABLET ORAL at 08:47

## 2017-11-30 RX ADMIN — LISINOPRIL 20 MG: 20 TABLET ORAL at 08:47

## 2017-11-30 RX ADMIN — METOPROLOL SUCCINATE 25 MG: 25 TABLET, EXTENDED RELEASE ORAL at 08:46

## 2017-11-30 RX ADMIN — SIMVASTATIN 40 MG: 40 TABLET, FILM COATED ORAL at 21:38

## 2017-11-30 RX ADMIN — FAMOTIDINE 20 MG: 20 TABLET, FILM COATED ORAL at 08:47

## 2017-11-30 RX ADMIN — VITAMIN D, TAB 1000IU (100/BT) 2000 UNITS: 25 TAB at 08:46

## 2017-11-30 RX ADMIN — POLYETHYLENE GLYCOL 3350 17 G: 17 POWDER, FOR SOLUTION ORAL at 08:47

## 2017-11-30 RX ADMIN — ENOXAPARIN SODIUM 30 MG: 30 INJECTION SUBCUTANEOUS at 06:32

## 2017-11-30 RX ADMIN — LEVOFLOXACIN 750 MG: 5 INJECTION, SOLUTION INTRAVENOUS at 01:30

## 2017-11-30 NOTE — IP AVS SNAPSHOT
[address] Patient: Laxmi Pugh MRN: [id number] About your hospitalization You were admitted on:  November 30, 2017 You last received care in the:  [address] You were discharged on:  December 24, 2017 Why you were hospitalized Your primary diagnosis was:  Not on File Things You Need To Do (next 8 weeks) Follow up with PROVIDER UNKNOWN Where:  Patient not available to ask Discharge Orders None A check tash indicates which time of day the medication should be taken. My Medications TAKE these medications as instructed Instructions Each Dose to Equal  
 Morning Noon Evening Bedtime  
 acetaminophen 500 mg tablet Commonly known as:  TYLENOL Your last dose was: Your next dose is: Take 1 Tab by mouth every four (4) hours as needed. Indications: Pain 500 mg  
    
   
   
   
  
 cholecalciferol 1,000 unit tablet Commonly known as:  VITAMIN D3 Your last dose was: Your next dose is: Take 2 Tabs by mouth daily. 2000 Units  
    
   
   
   
  
 clopidogrel 75 mg Tab Commonly known as:  PLAVIX Your last dose was: Your next dose is: Take 1 Tab by mouth daily. 75 mg  
    
   
   
   
  
 donepezil 5 mg tablet Commonly known as:  ARICEPT Your last dose was: Your next dose is: Take 1 Tab by mouth daily. 5 mg  
    
   
   
   
  
 famotidine 20 mg tablet Commonly known as:  PEPCID Your last dose was: Your next dose is: Take 1 Tab by mouth daily. 20 mg  
    
   
   
   
  
 fluticasone 50 mcg/actuation nasal spray Commonly known as:  Susan Chard Your last dose was: Your next dose is:    
   
   
 2 sprays both nostriles daily lisinopril 10 mg tablet Commonly known as:  Silvestre Villavicencio Your last dose was: Your next dose is: Take 1 Tab by mouth daily. 10 mg  
    
   
   
   
  
 methylphenidate HCl 10 mg tablet Commonly known as:  RITALIN Your last dose was: Your next dose is: One pill twice a day as needed  Indications: Narcolepsy Syndrome  
     
   
   
   
  
 metoprolol succinate 25 mg XL tablet Commonly known as:  TOPROL-XL Your last dose was: Your next dose is: Take 1 Tab by mouth daily. 25 mg  
    
   
   
   
  
 mirtazapine 7.5 mg tablet Commonly known as:  Omid Bee Your last dose was: Your next dose is: Take 1 Tab by mouth nightly. 7.5 mg  
    
   
   
   
  
 nitroglycerin 0.4 mg SL tablet Commonly known as:  NITROSTAT Your last dose was: Your next dose is:    
   
   
 1 Tab by SubLINGual route every five (5) minutes as needed for Chest Pain. 0.4 mg  
    
   
   
   
  
 polyethylene glycol 17 gram packet Commonly known as:  Isaac Armor Your last dose was: Your next dose is: Take 1 Packet by mouth daily. 17 g  
    
   
   
   
  
 simvastatin 40 mg tablet Commonly known as:  ZOCOR Your last dose was: Your next dose is: Take 1 Tab by mouth nightly. 40 mg ASK your physician about these medications Instructions Each Dose to Equal  
 Morning Noon Evening Bedtime  
 alendronate 40 mg tablet Commonly known as:  FOSAMAX Your last dose was: Your next dose is: Take 40 mg by mouth every seven (7) days. 70 mg  
 40 mg  
    
   
   
   
  
 cetirizine 10 mg tablet Commonly known as:  ZYRTEC Your last dose was: Your next dose is: Take 10 mg by mouth daily as needed. for allergy 10 mg  
    
   
   
   
  
 LASIX 20 mg tablet Generic drug:  furosemide Your last dose was: Your next dose is: Take 40 mg by mouth daily. 40 mg  
    
   
   
   
  
 potassium chloride SR 10 mEq tablet Commonly known as:  KLOR-CON 10 Your last dose was: Your next dose is: Take  by mouth every other day. PRESERVISION LUTEIN 226 mg-200 unit -5 mg-0.8 mg Cap Generic drug:  Vit C-Vit E-Copper-ZnOx-Lutein Your last dose was: Your next dose is: Take 1 Cap by mouth daily. 1 Cap Where to Get Your Medications Information on where to get these meds will be given to you by the nurse or doctor. ! Ask your nurse or doctor about these medications  
  acetaminophen 500 mg tablet  
 cholecalciferol 1,000 unit tablet  
 clopidogrel 75 mg Tab  
 donepezil 5 mg tablet  
 famotidine 20 mg tablet  
 fluticasone 50 mcg/actuation nasal spray  
 lisinopril 10 mg tablet  
 methylphenidate HCl 10 mg tablet  
 metoprolol succinate 25 mg XL tablet  
 mirtazapine 7.5 mg tablet  
 nitroglycerin 0.4 mg SL tablet  
 polyethylene glycol 17 gram packet  
 simvastatin 40 mg tablet Discharge Instructions None [employer] Announcement We are excited to announce that we are making your provider's discharge notes available to you in [facility] [facility]. You will see these notes when they are completed and signed by the physician that discharged you from your recent hospital stay. If you have any questions or concerns about any information you see in [employer], please call the Health Information Department where you were seen or reach out to your Primary Care Provider for more information about your plan of care. Introducing [facility] SERVICES! Godwin Potter introduces [employer] patient portal. Now you can access parts of your medical record, email your doctor's office, and request medication refills online. 1. In your internet browser, go to [email or web address] 2. Click on the First Time User? Click Here link in the Sign In box. You will see the New Member Sign Up page. 3. Enter your [employer] Access Code exactly as it appears below. You will not need to use this code after youve completed the sign-up process. If you do not sign up before the expiration date, you must request a new code. · [employer] Access Code: [email or web address] Expires: 2/28/2018 12:58 PM 
 
4. Enter the last four digits of your Social Security Number (xxxx) and Date of Birth (mm/dd/yyyy) as indicated and click Submit. You will be taken to the next sign-up page. 5. Create a [employer] ID. This will be your [employer] login ID and cannot be changed, so think of one that is secure and easy to remember. 6. Create a [employer] password. You can change your password at any time. 7. Enter your Password Reset Question and Answer. This can be used at a later time if you forget your password. 8. Enter your e-mail address. You will receive e-mail notification when new information is available in [address] 9. Click Sign Up. You can now view and download portions of your medical record. 10. Click the Download Summary menu link to download a portable copy of your medical information. If you have questions, please visit the Frequently Asked Questions section of the [employer] website. Remember, [employer] is NOT to be used for urgent needs. For medical emergencies, dial 911. Now available from your iPhone and Android! Providers Seen During Your Hospitalization Provider Specialty Primary office phone Levar Purdy [address] [phone] Immunizations Administered for This Admission Name Date Pneumococcal Polysaccharide (PPSV-23)  Deferred (),  Deferred () Your Primary Care Physician (PCP) Primary Care Physician Office Phone Office Fax  UNKNOWN, PROVIDER ** None ** ** None **  
  
 You are allergic to the following Allergen Reactions Azithromycin Anaphylaxis Penicillin Anaphylaxis Recent Documentation Height Weight Breastfeeding? BMI OB Status Smoking Status 1.372 m 53 kg No 28.16 kg/m2 Postmenopausal Never Smoker Emergency Contacts Name Discharge Info Relation Home Work Mobile Sommer Travis DISCHARGE CAREGIVER [3] Daughter [21] [phone] Patient Belongings The following personal items are in your possession at time of discharge: 
     Visual Aid: Glasses      Home Medications: None   Jewelry: None  Clothing: Footwear, Pajamas, Pants    Other Valuables: None Please provide this summary of care documentation to your next provider. Signatures-by signing, you are acknowledging that this After Visit Summary has been reviewed with you and you have received a copy. Patient Signature:  ____________________________________________________________ Date:  ____________________________________________________________  
  
Carina Artist Provider Signature:  ____________________________________________________________ Date:  ____________________________________________________________

## 2017-11-30 NOTE — ROUTINE PROCESS
Bedside, Verbal and Written shift change report given to Carley Mccormick RN (oncoming nurse) by Suzy Valverde RN (offgoing nurse). Report included the following information SBAR, Kardex, Intake/Output, MAR, Recent Results, Med Rec Status, Procedure Summary and Cardiac Rhythm A-Flutter.      6405 - Shift assessment completed. Pt alert and oriented x1 to self. No respiratory distress noted. No c/o pain reported. Call bell within reach, bed in low position. Will continue to monitor.      1210 - Shift re-assessment completed, no change in pt condition.      1610 - Shift re-assessment completed, no change in pt condition.      Bedside, Verbal and Written shift change report given to Lin Blackwell RN (oncoming nurse) by Carley Mccormick RN (offgoing nurse). Report included the following information SBAR, Kardex, Intake/Output, MAR, Recent Results, Med Rec Status, Procedure Summary and Cardiac Rhythm A-Flutter.

## 2017-11-30 NOTE — DISCHARGE SUMMARY
Discharge Summary    Patient: Keturah Bryant               Sex: female          DOA: 11/25/2017         YOB: 1920      Age:  80 y.o.        LOS:  LOS: 5 days                Admit Date: 11/25/2017    Discharge Date: 11/30/2017    Admission Diagnoses: Traumatic rhabdomyolysis [facility])  Traumatic rhabdomyolysis ([facility]  Elevated troponin  UTI (urinary tract infection)    Discharge Diagnoses:    Problem List as of 11/30/2017  Never Reviewed          Codes Class Noted - Resolved    Debility ICD-10-CM: R53.81  ICD-9-CM: 799.3  11/28/2017 - Present        Fall ICD-10-CM: W19. Jud Fluke  ICD-9-CM: B128.7  11/28/2017 - Present        Late onset Alzheimer's disease without behavioral disturbance ICD-10-CM: G30.1, F02.80  ICD-9-CM: 331.0, 294.10  11/26/2017 - Present        Essential hypertension ICD-10-CM: I10  ICD-9-CM: 401.9  11/26/2017 - Present        * (Principal)Traumatic rhabdomyolysis ([facility] ICD-10-CM: T79. 6XXA  ICD-9-CM: 958.6  11/25/2017 - Present        Elevated troponin ICD-10-CM: R74.8  ICD-9-CM: 790.6  11/25/2017 - Present        UTI (urinary tract infection) ICD-10-CM: N39.0  ICD-9-CM: 599.0  11/25/2017 - Present        A-fib ([facility] ICD-10-CM: I48.91  ICD-9-CM: 427.31  11/25/2017 - Present        CHF, acute on chronic (HCC) ICD-10-CM: I50.9  ICD-9-CM: 428.0  4/5/2017 - Present              Discharge Medications:     Current Discharge Medication List      CONTINUE these medications which have NOT CHANGED    Details   polyethylene glycol (MIRALAX) 17 gram packet Take 17 g by mouth daily. fluticasone (FLONASE) 50 mcg/actuation nasal spray 2 Sprays by Both Nostrils route daily. Vit C-Vit E-Copper-ZnOx-Lutein (PRESERVISION LUTEIN) 226 mg-200 unit -5 mg-0.8 mg cap Take 1 Cap by mouth daily. cetirizine (ZYRTEC) 10 mg tablet Take 10 mg by mouth daily as needed. for allergy      metoprolol succinate (TOPROL-XL) 25 mg XL tablet Take 25 mg by mouth daily.       donepezil (ARICEPT) 5 mg tablet Take 5 mg by mouth daily. clopidogrel (PLAVIX) 75 mg tab Take 75 mg by mouth daily. lisinopril (PRINIVIL, ZESTRIL) 20 mg tablet Take 20 mg by mouth daily. potassium chloride SR (KLOR-CON 10) 10 mEq tablet Take  by mouth every other day. famotidine (PEPCID) 40 mg tablet Take 40 mg by mouth daily. furosemide (LASIX) 20 mg tablet Take 40 mg by mouth daily. cholecalciferol, vitamin D3, (VITAMIN D3) 2,000 unit tab Take 2,000 Units by mouth daily. alendronate (FOSAMAX) 40 mg tablet Take 40 mg by mouth every seven (7) days. 70 mg      nitroglycerin (NITROSTAT) 0.4 mg SL tablet 0.4 mg by SubLINGual route every five (5) minutes as needed. simvastatin (ZOCOR) 40 mg tablet Take 40 mg by mouth nightly. acetaminophen (TYLENOL) 325 mg tablet Take 500 mg by mouth every four (4) hours as needed. prn pain  Indications: Pain             Follow-up:pcp    Discharge Condition:stable    Activity:as per PT    Diet:Heart healthy    Labs:  Labs: Results:       Chemistry Recent Labs      11/28/17   0350   GLU  84   NA  145   K  3.5   CL  112*   CO2  26   BUN  26*   CREA  0.95   CA  8.8   AGAP  7   BUCR  27*      CBC w/Diff No results for input(s): WBC, RBC, HGB, HCT, PLT, GRANS, LYMPH, EOS, HGBEXT, HCTEXT, PLTEXT in the last 72 hours. Cardiac Enzymes Recent Labs      11/28/17   0350   CPK  640*      Coagulation No results for input(s): PTP, INR, APTT in the last 72 hours. No lab exists for component: INREXT    Lipid Panel No results found for: CHOL, CHOLPOCT, CHOLX, CHLST, CHOLV, 510985, HDL, LDL, LDLC, DLDLP, 474307, VLDLC, VLDL, TGLX, TRIGL, TRIGP, TGLPOCT, CHHD, CHHDX   BNP No results for input(s): BNPP in the last 72 hours. Liver Enzymes No results for input(s): TP, ALB, TBIL, AP, SGOT, GPT in the last 72 hours.     No lab exists for component: DBIL   Thyroid Studies Lab Results   Component Value Date/Time    TSH 1.69 04/05/2017 07:30 PM          Imaging:  CT head  No evidence of acute intracranial process.       Consults:   cardiology    Treatment Team: Treatment Team: Attending Provider: Ivana Sarabia MD; Physician: Sury Walker MD; Utilization Review: Nola Killian RN; Care Manager: Naif Garcia RN; Occupational Therapy Assistant: Chong Jeans, COTA; Physical Therapy Assistant: Lita Grider PTA    Significant Diagnostic Studies:see recent lab results    Hospital Course: Alex Nova a 80 y. o. female who presented with falls. Patient was found at home by her daughter on the floor face down. Patient said that she fell and could not reach the phone. Patient denies LOC . In the ED serial CT head, cervical spine were negative. Patient denied pain anywhere . She had labs which show she had CK 2825, Elevated troponin 2.25. She was also noted to have UTI. She is on iv fluid and levaquin iv. Cardiology consulted due to elevated troponin and advised to continue empiric therapy for ischemic heart disease,patient being suboptimal candidate for aggressive invasive management. Pt on plavix,bb,statin,lisinopril. Patient continued iv fluid for rhabdo and CK down to 640 on 11/28. PT has worked with patient for h/o fall and has recommended rehab. Patient is medically cleared for transfer to [facility] today. Time for discharge:35 minutes.     Ivana Sarabia MD  November 30, 2017

## 2017-11-30 NOTE — ROUTINE PROCESS
Care Management Interventions  PCP Verified by CM: Yes (Dr Fortunato Guerra )  Last Visit to PCP: 10/16/17  Mode of Transport at Discharge:  Other (see comment) (inpatient medical transport)  Transition of Care Consult (CM Consult): SNF  Partner SNF: Yes  Physical Therapy Consult: Yes  Occupational Therapy Consult: Yes  Current Support Network: Relative's Home (with daughter)  Confirm Follow Up Transport: Family  Plan discussed with Pt/Family/Caregiver: Yes  Freedom of Choice Offered: Yes  Discharge Location  Discharge Placement: Skilled nursing facility ([facility])

## 2017-11-30 NOTE — ROUTINE PROCESS
Verbal and bedside Shift changed report given to Madhavi Godinez RN (oncoming RN) on Pt. Condition. Report consisted of patients Situation, History, Activities, intake/output,  Background, Assessment and Recommendations(SBAR). Information from the following report(s) Kardex, order Summary, Lab results and MAR was reviewed with the receiving nurse. Opportunity for questions and clarification was provided.

## 2017-11-30 NOTE — MANAGEMENT PLAN
Discharge Planning    0800:Patient to go to [facility] today for SNF rehab pending auth. 0900: [facility] has auth from [facility]. Notified Dr Kaya Sarmiento.  Pt to discharge today        Eusebia Can RN BSN  Outcomes Manager  Pager # [phone]

## 2017-11-30 NOTE — ROUTINE PROCESS
0730-report received, patient resting quietly in bed. Call bell within reach,  0850-Assessment completed, call bell within reach, no distress noted. Am medications given. 0930-getting a spa bath  1230-no change in condition, no distress noted. 1330-report called to T[facility] and patient transferred to [facility] via bed.

## 2017-11-30 NOTE — ROUTINE PROCESS
Patient to [facility] unit via Bed Alert and confused, oriented x 1, Received report from [facility] on [address].

## 2017-11-30 NOTE — IP AVS SNAPSHOT
[address] Patient: Ben Curtis MRN: [id number] RGY:[id number]6 My Medications TAKE these medications as instructed Instructions Each Dose to Equal  
 Morning Noon Evening Bedtime  
 acetaminophen 500 mg tablet Commonly known as:  TYLENOL Your last dose was: Your next dose is: Take 1 Tab by mouth every four (4) hours as needed. Indications: Pain 500 mg  
    
   
   
   
  
 cholecalciferol 1,000 unit tablet Commonly known as:  VITAMIN D3 Your last dose was: Your next dose is: Take 2 Tabs by mouth daily. 2000 Units  
    
   
   
   
  
 clopidogrel 75 mg Tab Commonly known as:  PLAVIX Your last dose was: Your next dose is: Take 1 Tab by mouth daily. 75 mg  
    
   
   
   
  
 donepezil 5 mg tablet Commonly known as:  ARICEPT Your last dose was: Your next dose is: Take 1 Tab by mouth daily. 5 mg  
    
   
   
   
  
 famotidine 20 mg tablet Commonly known as:  PEPCID Your last dose was: Your next dose is: Take 1 Tab by mouth daily. 20 mg  
    
   
   
   
  
 fluticasone 50 mcg/actuation nasal spray Commonly known as:  Oscar Caller Your last dose was: Your next dose is:    
   
   
 2 sprays both nostriles daily  
     
   
   
   
  
 lisinopril 10 mg tablet Commonly known as:  Boss Nikki Your last dose was: Your next dose is: Take 1 Tab by mouth daily. 10 mg  
    
   
   
   
  
 methylphenidate HCl 10 mg tablet Commonly known as:  RITALIN Your last dose was: Your next dose is: One pill twice a day as needed  Indications: Narcolepsy Syndrome  
     
   
   
   
  
 metoprolol succinate 25 mg XL tablet Commonly known as:  TOPROL-XL Your last dose was: Your next dose is: Take 1 Tab by mouth daily. 25 mg  
    
   
   
   
  
 mirtazapine 7.5 mg tablet Commonly known as:  Mook Halt Your last dose was: Your next dose is: Take 1 Tab by mouth nightly. 7.5 mg  
    
   
   
   
  
 nitroglycerin 0.4 mg SL tablet Commonly known as:  NITROSTAT Your last dose was: Your next dose is:    
   
   
 1 Tab by SubLINGual route every five (5) minutes as needed for Chest Pain. 0.4 mg  
    
   
   
   
  
 polyethylene glycol 17 gram packet Commonly known as:  Lisset Fail Your last dose was: Your next dose is: Take 1 Packet by mouth daily. 17 g  
    
   
   
   
  
 simvastatin 40 mg tablet Commonly known as:  ZOCOR Your last dose was: Your next dose is: Take 1 Tab by mouth nightly. 40 mg ASK your physician about these medications Instructions Each Dose to Equal  
 Morning Noon Evening Bedtime  
 alendronate 40 mg tablet Commonly known as:  FOSAMAX Your last dose was: Your next dose is: Take 40 mg by mouth every seven (7) days. 70 mg  
 40 mg  
    
   
   
   
  
 cetirizine 10 mg tablet Commonly known as:  ZYRTEC Your last dose was: Your next dose is: Take 10 mg by mouth daily as needed. for allergy 10 mg  
    
   
   
   
  
 LASIX 20 mg tablet Generic drug:  furosemide Your last dose was: Your next dose is: Take 40 mg by mouth daily. 40 mg  
    
   
   
   
  
 potassium chloride SR 10 mEq tablet Commonly known as:  KLOR-CON 10 Your last dose was: Your next dose is: Take  by mouth every other day. PRESERVISION LUTEIN 226 mg-200 unit -5 mg-0.8 mg Cap Generic drug:  Vit C-Vit E-Copper-ZnOx-Lutein Your last dose was: Your next dose is: Take 1 Cap by mouth daily. 1 Cap Where to Get Your Medications Information on where to get these meds will be given to you by the nurse or doctor. ! Ask your nurse or doctor about these medications  
  acetaminophen 500 mg tablet  
 cholecalciferol 1,000 unit tablet  
 clopidogrel 75 mg Tab  
 donepezil 5 mg tablet  
 famotidine 20 mg tablet  
 fluticasone 50 mcg/actuation nasal spray  
 lisinopril 10 mg tablet  
 methylphenidate HCl 10 mg tablet  
 metoprolol succinate 25 mg XL tablet  
 mirtazapine 7.5 mg tablet  
 nitroglycerin 0.4 mg SL tablet  
 polyethylene glycol 17 gram packet  
 simvastatin 40 mg tablet

## 2017-11-30 NOTE — IP AVS SNAPSHOT
Summary of Care Report The Summary of Care report has been created to help improve care coordination. Users with access to [employer] or [address] (Web-based application) may access additional patient information including the Discharge Summary. If you are not currently a [employer] [facility] user and need more information, please call the number listed below in the [place] [address] section and ask to be connected with Medical Records. Facility Information Name Address Phone [facility] [address] [facility] [phone] [phone] Patient Information Patient Name Sex  Ty Mccoy ([id number]) Female 10/21/1920 Discharge Information Admitting Provider Service Area Unit Debbra Litten, MD / [facility] Zoya Parker [address] / [phone] Discharge Provider Discharge Date/Time Discharge Disposition Destination (none) 2017 Midday (Pending) [facility] (none) Patient Language Language ENGLISH [13] Non-Hospital Problems as of 2017  Never Reviewed Class Noted - Resolved Last Modified Active Problems   CHF, acute on chronic ([facility]  2017 - Present 2017 by Allan Bustos MD  
  Entered by Allan Bustos MD  
  Traumatic rhabdomyolysis ([facility]  2017 - Present 2017 by Dale Sawyer MD  
  Entered by Dale Sawyer MD  
  Elevated troponin  2017 - Present 2017 by Dale Sawyer MD  
  Entered by Dale Sawyer MD  
  UTI (urinary tract infection)  2017 - Present 2017 by Dale Sawyer MD  
  Entered by Dale Sawyer MD  
  A-fib ([facility]  2017 - Present 2017 by Dale Sawyer MD  
  Entered by Dale Sawyer MD  
  Late onset Alzheimer's disease without behavioral disturbance 11/26/2017 - Present 11/26/2017 by Duane Serna MD  
  Entered by Duane Serna MD  
  Essential hypertension  11/26/2017 - Present 11/26/2017 by Duane Serna MD  
  Entered by Duane Serna MD  
  Debility  11/28/2017 - Present 11/28/2017 by Siva Menard MD  
  Entered by Siva Menard MD  
  Fall  11/28/2017 - Present 11/28/2017 by Siva Menard MD  
  Entered by Siva Menard MD  
  
You are allergic to the following Allergen Reactions Azithromycin Anaphylaxis Penicillin Anaphylaxis Current Discharge Medication List  
  
START taking these medications Dose & Instructions Dispensing Information Comments  
 methylphenidate HCl 10 mg tablet Commonly known as:  RITALIN One pill twice a day as needed  Indications: Narcolepsy Syndrome Quantity:  30 Tab Refills:  0  
   
 mirtazapine 7.5 mg tablet Commonly known as:  Buddy [place] Dose:  7.5 mg Take 1 Tab by mouth nightly. Quantity:  60 Tab Refills:  0 CONTINUE these medications which have CHANGED Dose & Instructions Dispensing Information Comments  
 acetaminophen 500 mg tablet Commonly known as:  TYLENOL What changed:   
- medication strength 
- additional instructions Dose:  500 mg Take 1 Tab by mouth every four (4) hours as needed. Indications: Pain Quantity:  60 Tab Refills:  0  
   
 cholecalciferol 1,000 unit tablet Commonly known as:  VITAMIN D3 What changed:  medication strength Dose:  2000 Units Take 2 Tabs by mouth daily. Quantity:  60 Tab Refills:  0  
   
 famotidine 20 mg tablet Commonly known as:  PEPCID What changed:   
- medication strength 
- how much to take Dose:  20 mg Take 1 Tab by mouth daily. Quantity:  60 Tab Refills:  0  
   
 fluticasone 50 mcg/actuation nasal spray Commonly known as:  Michel Rodriguez What changed:   
- how much to take 
- how to take this - when to take this 
- additional instructions 2 sprays both nostriles daily Quantity:  1 Bottle Refills:  0  
   
 lisinopril 10 mg tablet Commonly known as:  Gunnar Doherty What changed:   
- medication strength 
- how much to take Dose:  10 mg Take 1 Tab by mouth daily. Quantity:  30 Tab Refills:  0  
   
 nitroglycerin 0.4 mg SL tablet Commonly known as:  NITROSTAT What changed:  reasons to take this Dose:  0.4 mg  
1 Tab by SubLINGual route every five (5) minutes as needed for Chest Pain. Quantity:  1 Bottle Refills:  0 CONTINUE these medications which have NOT CHANGED Dose & Instructions Dispensing Information Comments  
 clopidogrel 75 mg Tab Commonly known as:  PLAVIX Dose:  75 mg Take 1 Tab by mouth daily. Quantity:  60 Tab Refills:  0  
   
 donepezil 5 mg tablet Commonly known as:  ARICEPT Dose:  5 mg Take 1 Tab by mouth daily. Quantity:  60 Tab Refills:  0  
   
 metoprolol succinate 25 mg XL tablet Commonly known as:  TOPROL-XL Dose:  25 mg Take 1 Tab by mouth daily. Quantity:  60 Tab Refills:  0  
   
 polyethylene glycol 17 gram packet Commonly known as:  Lelan Situ Dose:  17 g Take 1 Packet by mouth daily. Quantity:  30 Packet Refills:  0  
   
 simvastatin 40 mg tablet Commonly known as:  ZOCOR Dose:  40 mg Take 1 Tab by mouth nightly. Quantity:  60 Tab Refills:  0 ASK your doctor about these medications Dose & Instructions Dispensing Information Comments  
 alendronate 40 mg tablet Commonly known as:  FOSAMAX Dose:  40 mg Take 40 mg by mouth every seven (7) days. 70 mg Refills:  0  
   
 cetirizine 10 mg tablet Commonly known as:  ZYRTEC Dose:  10 mg Take 10 mg by mouth daily as needed. for allergy Refills:  0  
   
 LASIX 20 mg tablet Generic drug:  furosemide Dose:  40 mg Take 40 mg by mouth daily. Refills:  0  
   
 potassium chloride SR 10 mEq tablet Commonly known as:  KLOR-CON 10  
 Take  by mouth every other day. Refills:  0 PRESERVISION LUTEIN 226 mg-200 unit -5 mg-0.8 mg Cap Generic drug:  Vit C-Vit E-Copper-ZnOx-Lutein Dose:  1 Cap Take 1 Cap by mouth daily. Refills:  0 Current Immunizations Name Date Influenza Vaccine 10/27/2017 Pneumococcal Polysaccharide (PPSV-23)  Deferred (),  Deferred () Follow-up Information Follow up With Details Comments Contact Info Provider Unknown   Patient not available to ask Discharge Instructions None Chart Review Routing History No Routing History on File

## 2017-12-01 PROCEDURE — 74011250636 HC RX REV CODE- 250/636: Performed by: INTERNAL MEDICINE

## 2017-12-01 PROCEDURE — 74011250637 HC RX REV CODE- 250/637: Performed by: INTERNAL MEDICINE

## 2017-12-01 RX ORDER — CYANOCOBALAMIN 1000 UG/ML
1000 INJECTION, SOLUTION INTRAMUSCULAR; SUBCUTANEOUS
Status: DISCONTINUED | OUTPATIENT
Start: 2017-12-01 | End: 2017-12-24 | Stop reason: HOSPADM

## 2017-12-01 RX ADMIN — LISINOPRIL 20 MG: 20 TABLET ORAL at 09:00

## 2017-12-01 RX ADMIN — POLYETHYLENE GLYCOL 3350 17 G: 17 POWDER, FOR SOLUTION ORAL at 08:54

## 2017-12-01 RX ADMIN — VITAMIN D, TAB 1000IU (100/BT) 2000 UNITS: 25 TAB at 08:56

## 2017-12-01 RX ADMIN — CLOPIDOGREL BISULFATE 75 MG: 75 TABLET ORAL at 08:56

## 2017-12-01 RX ADMIN — SIMVASTATIN 40 MG: 40 TABLET, FILM COATED ORAL at 23:16

## 2017-12-01 RX ADMIN — DONEPEZIL HYDROCHLORIDE 5 MG: 5 TABLET, FILM COATED ORAL at 08:56

## 2017-12-01 RX ADMIN — FLUTICASONE PROPIONATE 2 SPRAY: 50 SPRAY, METERED NASAL at 09:12

## 2017-12-01 RX ADMIN — CYANOCOBALAMIN 1000 MCG: 1000 INJECTION, SOLUTION INTRAMUSCULAR at 14:00

## 2017-12-01 RX ADMIN — FAMOTIDINE 20 MG: 20 TABLET ORAL at 08:56

## 2017-12-01 RX ADMIN — METOPROLOL SUCCINATE 25 MG: 25 TABLET, EXTENDED RELEASE ORAL at 08:56

## 2017-12-01 NOTE — PROGRESS NOTES
Problem: Mobility Impaired (Adult and Pediatric)  Goal: *Acute Goals and Plan of Care (Insert Text)  PHYSICAL THERAPY STG GOALS :  Initiated 12/1/2017 and to be accomplished within 1-2 Weeks    1. Patient will move from supine to sit and sit to supine  and scoot up and down in bed with modified independence. 2.  Patient will transfer from bed to chair and chair to bed with stand by assistance using RW. 3.  Patient will perform sit to stand with stand by assistance with Good balance and safety awareness. 4.  Patient will ambulate with stand by assistance/contact guard assist for 50 feet with RW on level surfaces with 2 turns. 5.  Patient will ascend/descend 5 stairs with bilateral handrail(s) with minimal assistance/contact guard assist to allow for safe home access/exit. 6.  Patient will improve standardized test score for [facility] Standing Balance Scale 3      PHYSICAL THERAPY LTG GOALS :  Initiated 12/1/2017 and to be accomplished within 3-4 Weeks    1. Patient will transfer from bed to chair and chair to bed with supervision/set-up using RW. 2.  Patient will perform sit to stand with supervision/set-up with Good balance and safety awareness. 3.  Patient will ambulate with supervision/set-up for 120 feet with RW on level surfaces and be able to maneuver through narrow spaces and obstacles without loss of balance. 4.  Patient will ascend/descend 5 stairs with bilateral handrail(s) with supervision/stand by assistance/set-up to allow for safe home access/exit. 5.  Patient will improve standardized test score for [facility] Balance Scale 4. Physical Therapist:   Elda Howard  on 12/1/2017            [facility]   physical Therapy EVALUATION    Patient: Rashida Gautam (20 y.o. female)                Start of Care Date: 12/1/2017   Referred by :  Shankar Allred MD                 Precautions: Fall     History:  Patient was admitted to [facility] on 11/25 with a Dx of Traumatic rhabdomyolysis  after a fall. No reports of fractures, per chart. Pt rendered to TCC for continuation of skilled PT for improvements in strength, endurance, balance and coordination. History of present problem reveals HIGH Complexity :3+ comorbidities / personal factors will impact the outcome/ POC . Past Medical history includes:   Past Medical History:   Diagnosis Date    [facility] [address]     Chronic obstructive pulmonary disease ([facility] [address] [facility]     Dementia     Hypertension      Past Surgical History:   Procedure Laterality Date    HX MASTECTOMY          Cognitive Status:  Mental Status  Neurologic State: Alert; Appropriate for age  Orientation Level: Oriented to place (knows in hospital but not name; thinks she fell but not sure)  Cognition: Follows commands  Perception: Appears intact  Perseveration: No perseveration noted  Safety/Judgement: Fall prevention  Barriers to Learning/Limitations: yes;  altered mental status (i.e.Sedation, Confusion)  Compensate with: visual, verbal, tactile, kinesthetic cues/model  Prior Level of Function/Home Situation and Assitance recieved:  Home Situation  Home Environment: Private residence  # Steps to Enter: 3 (to the porch)  Rails to Enter: Yes  Hand Rails : Bilateral  One/Two Story Residence: Two story, live on 1st floor  # of Interior Steps: 15  Interior Rails: Both  Lift Chair Available: No  Living Alone: No  Support Systems: Family member(s) (lives with Jess Llanos)  Patient Expects to be Discharged to[de-identified] Private residence  Current DME Used/Available at Home: Girtha Maddie, straight, Walker, rolling  Tub or Shower Type: Tub/Shower combination  Level of Assistance received at Home:   Prior to this current hospitalization, the patient required no asistance for ambulation nor household or personal care, per patient.    Justification for Evaluation complexity:  Patient is referred to Skilled Physical Therapy services due a functional decline in strength, endurance and balance and required an overall MEDIUM complexity evaluation examination. Exam:HIGH Complexity : 4+ Standardized tests and measures addressing body structure, function, activity limitation and / or participation in recreation    Clinical Presentation: MEDIUM Complexity : Evolving with changing characteristics   Eval Complexity: History: HIGH Complexity :3+ comorbidities / personal factors will impact the outcome/ POC Exam:HIGH Complexity : 4+ Standardized tests and measures addressing body structure, function, activity limitation and / or participation in recreation  Presentation: MEDIUM Complexity : Evolving with changing characteristics    OBJECTIVE DATA SUMMARY:     Vital Signs:  Resting BP:  BP: (!) 182/100  HR: Pulse (Heart Rate): 64    Pain:     Gross Assessment:  Gross Assessment  AROM: Generally decreased, functional  PROM: Generally decreased, functional  Strength: Generally decreased, functional (4-/5 through LLE, 3 through out RLE ; limited due to pain)  Coordination: Generally decreased, functional  Tone: Normal  Sensation: Intact   Bed Mobility:  Bed Mobility  Supine to Sit: Contact guard assistance;Minimum assistance (Merissa for initiating sitting up )  Sit to Supine: Maximum assistance;Assist x2  Balance:  Balance  Sitting: With support  Sitting - Static: Fair (occasional)  Sitting - Dynamic: Poor (constant support)  Standing: With support  Standing - Static: Constant support (forward flexion presented)  Standing - Dynamic :  (not tested)  Transfers:  Sit to Stand: Contact guard assistance;Minimum assistance  Gait:              With 0 turns. Wheelchair Management:     not assessed  Assessment:   Clinical Decision Making:Medium Complexity , using standardized patient assessment instrument and /or measurable assessement of functional outcome of [facility] Standing Balance Scale 2  0: Pt performs 25% or less of standing activity (Max assist) CN, 100% impaired.   1: Pt supports self with upper extremities but requires therapist assistance. Pt performs 25-50% of effort (Mod assist) CM, 80% to <100% impaired. 1+: Pt supports self with upper extremities but requires therapist assistance. Pt performs >50% effort. (Min assist). CL, 60% to <80% impaired. 2: Pt supports self independently with both upper extremities (walker, crutches, parallel bars). CL, 60% to <80% impaired. 2+: Pt support self independently with 1 upper extremity (cane, crutch, 1 parallel bar). CK, 40% to <60% impaired. 3: Pt stands without upper extremity support for up to 30 seconds. CK, 40% to <60% impaired. 3+: Pt stands without upper extremity support for 30 seconds or greater. CJ, 20% to <40% impaired. 4: Pt independently moves and returns center of gravity 1-2 inches in one plane. CJ, 20% to <40% impaired. 4+: Pt independently moves and returns center of gravity 1-2 inches in multiple planes. CI, 1% to <20% impaired. 5: Pt independently moves and returns center of gravity in all planes greater than 2 inches. CH, 0% impaired. Jessica Hernandez Positioning needs/Additional Comments :  Pt received sitting up in bed sleep with lunch tray in front; agreeable to PT. Bed mobility and transfers as mentioned above; pt stated \"I prefer to sit up in the chair. I don't like sleeping in the bed\". All history gathered by pt; may need to be confirmed by daughter. Pt presents with intermittent drowsiness once in chair but aroused by tactile and verbal cues. Therapeutic exercise rendered to address strength and circulation: LAQs x10, heel and toe raises x20. Pt presents with swelling of the hands and lower extremities. Pt educated on safety awareness, role of PT, need of sleeping in bed. Pt remaining up in chair with feet supported and call bell within reach and chair alarm activated. Pt reccommended for skilled PT at [facility] to improve deficits and functional mobility for safe return home.      TREATMENT PLAN: Rehab Potential : Good  Skilled Physical Therapy Services may include:    [x]           Bed Mobility Training             [x]    Neuromuscular Re-Education  [x]           Transfer Training                   [x]    Orthotic/Prosthetic Training  [x]           Gait Training                          [x]    Modalities  [x]           Therapeutic Procedures        [x]    Manual Therapy  [x]           Therapeutic Activities            [x]    Patient and Family Training/Education  []           Other (comment):     Frequency/Duration: Patient will be followed by physical therapy for 1-2 times a day for a minimum of 3 days per week for 4 weeks to address goals. Discharge Recommendations: Home Health  Patient's expected Discharge Location:  [x]Private Residence  [] care home/ILF  []LTC  []Other:     COMMUNICATION/EDUCATION:  Patient/Family Agreement with Treatment Plan :     [x]         The PT evaluation/POC has been reviewed with PT assistant. [x]         Fall prevention education was provided and the patient/caregiver indicated understanding. [x]         Patient/family have participated as able in goal setting and plan of care and Patient/family agree to work toward stated goals and plan of care. []         Patient is unable to participate in goal setting and plan of care. Therapist/SW will contact family/POA. Treatment session:  Overall Complexity: MEDIUM Evaluation:  20 mins./ Treatment:  44 mins.   Physical Therapist:   Hayden Padgett       12/1/2017   Thank you for this referral.

## 2017-12-01 NOTE — ROUTINE PROCESS
Patient new admit  Alert 2-3. In bed daughter present, takes medication whole, lungs clear, bowel sound present all quads, patient has multi bruising, to extremities. Hep lock removed from left upper arm, Edema noted to site. Patient has double mastectomy.

## 2017-12-01 NOTE — PROGRESS NOTES
Problem: Self Care Deficits Care Plan (Adult)  Goal: *Acute Goals and Plan of Care (Insert Text)  OCCUPATIONAL THERAPY SHORT TERM GOALS    Initiated 12/1/2017 and to be accomplished within 2 Week(s)    1. Patient will perform Upper body ADL's without adaptive equipment with moderate assistance . 2.  Patient will perform Lower body ADL's with adaptive equipment as needed with maximal assistance . 3. Patient will perform toileting task with maximal assistance with Good safety to reduce falls risk. 4.  Patient will perform toilet transfers with Rolling Walker and moderate assistance . 5. Patient will perform standing static/dynamic balance activities for improved ADL/IADL function with moderate assistance and Good balance and safety awareness. 6.  Patient will improve Barthel index scores to atleast 35/100 to improve functional mobility. 7. Patient will perform self feeding and set up with adaptive equipment with Mod I.      OCCUPATIONAL THERAPY LONG TERM GOALS   Initiated 12/1/2017 and to be accomplished within 4 Week(s)    1. Patient will perform Upper body ADL's without adaptive equipment with modified independence. 2.  Patient will perform Lower body ADL's with adaptive equipment as needed with modified independence . 3. Patient will perform toileting task with modified independence with Good safety to reduce falls risk. 4.  Patient will perform all functional transfers utilizing least restrictive device and durable medical equipment as needed with modified independence and Good balance and safety awareness. 5.  Patient will perform standing static/dynamic activity for improved ADL/IADL function with modified independence an and Good balance and safety awareness. 6.  Patient will improve Barthel index score to 95/100 to improve independence with mobility.       Therapist: Preet Davis    12/1/2017         [facility]   Occupational Therapy EVALUATION      Patient: Padmaja Nagel (41 y.o. female)            Start of Care Date: 12/1/2017                         Onset Date:  11/25/17  Referred by : Shankar Allred MD         Primary Diagnosis: Traumatic rhabdomyolysis        Treatment Diagnosis  Treatment Diagnosis: muscle weakness  Treatment Diagnosis 2: increased need for assist w/ self care     Patient is a 80 y.o. Female admitted [facility] 11/25/17 s/p fall at home, in which family found her lying on her face-patient c/o left eye pain and on blood thinners, CT head: cervical spine negative, diagnosed w/ UTI, traumatic rhabdomyolosis, EKG: irregular rhythm. Patient unsafe to discharge home and transferred to [facility] and referred to skilled Occupational therapy in order to reach maximal functional potential for safe discharge home at [facility]. Precautions: Fall  Eval Complexity: History: HIGH Complexity : Extensive review of history including physical, cognitive and psychosocial history . History of present problem reveals HIGH Complexity :3+ comorbidities / personal factors will impact the outcome/ POC . Past Medical history includes:   Past Medical History:   Diagnosis Date    [facility] ([facility] [address]     Chronic obstructive pulmonary disease ([facility] [address] [facility]     Dementia     Hypertension      Past Surgical History:   Procedure Laterality Date    HX MASTECTOMY        Cognitive Status:  Mental Status  Neurologic State: Alert;Confused  Orientation Level: Oriented to place  Cognition: Follows commands  Perception: Appears intact  Perseveration: No perseveration noted  Safety/Judgement: Fall prevention  Barriers to Learning/Limitations: yes;  cognitive, physical and altered mental status (i.e.Sedation, Confusion)  Compensate with: visual, verbal, tactile, kinesthetic cues/model  Justification for Evaluation complexity:   HIGH Complexity : Patient presents with comorbidities that affect occupational performance.  Signifigant modification of tasks or assistance (eg, physical or verbal) with assessment (s) is necessary to enable patient to complete evaluation . Patient is referred to [address],[facility] due to a functional decline in strength, balance, coordination, safety awareness and functional activity tolerance, which is inhibiting independence w/ self care performance skills and functional mobility. Prior Level of Function/Home Situation:   Home Situation  Home Environment: Private residence  # Steps to Enter: 2  Rails to Enter: Yes  Hand Rails : Bilateral  One/Two Story Residence: Two story  Lift Chair Available: No  Living Alone: No  Support Systems: Family member(s)  Patient Expects to be Discharged to[de-identified] Private residence  Current DME Used/Available at Home: Yolanda Phoenix, straight, Walker, rolling  Tub or Shower Type: Shower  Level of Assistance received at Home: Prior to this current hospitalization, the patient required assist w/ ADLs, IADLs, cooking and cleaning, lives with daughter. Patient reports supv for shower transfers and tasks and that she assists w/ cooking and cleaning, although patient is not a reliable historian 2/2 A& O x 1 (self only). OBJECTIVE DATA SUMMARY:     Vital Signs:  Resting BP:  BP: (!) 182/100  HR: Pulse (Heart Rate): 64     Pain:     Auditory:  Auditory  Auditory Impairment: None  Examination:   HIGH Complexity : 5 or more performance deficits relating to physical, cognitive , or psychosocial skils that result in activity limitations and / or participation restrictions; Tone and Sensation:  Tone: Normal              Sensation: Intact               Visual Perceptual:       Coordination:  Coordination  Fine Motor Skills-Upper: Left Intact; Right Intact  Gross Motor Skills-Upper: Left Intact; Right Intact  Coordination: Generally decreased, functional  Fine Motor Skills-Upper: Left Intact; Right Intact    Gross Motor Skills-Upper: Left Intact; Right Intact  Bed Mobility:  Bed Mobility  Sit to Supine: Maximum assistance;Assist x2     Transfers:  Functional Transfers  Bed to Chair: Maximum assistance;Assist x2 (w/c to bed)  Toilet Transfer : Other (comment) (patient refused)  Balance:  Balance  Sitting: With support  Sitting - Static: Fair (occasional) ((-))  Sitting - Dynamic: Poor (constant support)  Standing: With support  Standing - Static: Poor ((-))  Standing - Dynamic :  (not tested)  Gross Assesment:  Gross Assessment  AROM: Generally decreased, functional  PROM: Generally decreased, functional  Strength: Generally decreased, functional (RUE 3-/5, LUE 3/5)  Coordination: Generally decreased, functional  Tone: Normal  Sensation: Intact  ADL self care:     ADL Intervention:  Upper Body Bathing  Bathing Assistance: Maximum assistance  Upper Body Dressing Assistance  Dressing Assistance: Maximum assistance  Lower Body Bathing  Bathing Assistance: Total assistance(dependent)  Toileting  Toileting Assistance: Total assistance(dependent)  Bladder Hygiene: Total assistance (dependent)  Bowel Hygiene: Total assistance (dependent)  Clothing Management: Total assistance (dependent)  Grooming  Grooming Assistance: Minimum assistance  Feeding  Feeding Assistance: Stand-by assistance;Supervision/set-up  Cutting Food: Total assistance (dependent)  Utensil Management: Stand-by assistance;Supervision/set-up  Food to Mouth: Supervision/set-up; Stand-by assistance  Drink to Mouth: Supervision/set-up; Stand-by assistance           ASSESSMENT:   A clinical decision making of HIGH  complexity was conducted, which includes an analysis of the Occupational profile, standardized assessments, data and treatment options.                                             THE BARTHEL INDEX    ACTIVITY   SCORE   FEEDING  0=unable  5=needs help cutting,spreading butter,etc., or modified diet  10= independent   5   BATHING  0=dependent  5=independent (or in shower   0   GROOMING  0=needs help  5=independent face/hair/teeth/shaving (implements provided)   0   DRESSING  0=dependent  5=needs help but can do about half unaided  10=independent(including buttons, zips,laces etc.)   0   BOWELS  0=incontinent  5=occasional accident  10=continent   0   BLADDER  0=incontinent, or catheterized and unable to manage alone  5=occasional accident  10=continent   0   TOILET USE  0=dependent  5=needs some help, but can do something alone  10=independent (on and off, dressing, wiping)   0   TRANSFER (BED TO CHAIR AND BACK)  0=unable, no sitting balance  5=major help(one or two people,physical), can sit  10=minor help(verbal or physical)  15=independent   5   MOBILITY (ON LEVEL SURFACES)  0=immobile or <50 yards  5=wheelchair independent,including corners,>50 yards  10=walkes with help of one person (verbal or physical) >50 yards  15=independent(but may use any aid; for example, stick) >50 yards   0   STAIRS  0=unable  5=needs help (verbal, physical, carrying aid)  10=independent   0             TOTAL:             10/100     Additional comments:  Patient presents sitting up in w/c, lunch untouched in front of her, dep w/ set up, verbal cues to initiate utilizing backward chain technique-nursing/Carmen & Claudia notified of patient level of self feeding assist. Patient c/o pain RUE w/ impaired AROM/PROM-nursing/Carmen notified. Patient declined toilet transfer & task, Max A x 2 sit to stand & SPT w/c to edge of bed following max encouragement to rest supine and assist for toileting e.g. Change of adult brief 2/2 incontinence, patient states she prefers to sit up in w/c, nursing reports 11 pm - 7 am shift stated patient continuously asking to get out of bed and into w/c and assisted from 11-7 to w/c and unsure how long patient has been sitting up in w/c ~ 8+ hours, Nursing/Carmen present when patient c/o back pain.     TREATMENT PLAN : Rehab Potential : Good  Skilled Occupational Therapy Services may include:   [x] Self Care/Home Mgmt                    [x]Cognitive Perceptual Re-training                              [x] Adaptive Equip Training [x]Therapeutic Exercise                  []Sensory Integration                           []Community Work Integration  [x]Therapeutic Activities                     [x]Other/modalities  [x]Neuromuscular Re-education         [x] Wheelchair Mgmt/propulsion    [x]Patient/Family/Staff Instruction      :  []Orthotics/Prosthetic Fitting & training     Frequency/Duration: Patient will be followed by Occupational therapy for 1-2 times a day for a minimum of 3 days per week for 4 weeks to address goals. .  Discharge Recommendations: Home Health  Patient expected Discharge Location: [x]Private Residence  [] longterm  []LTC  [] Senior Apt     COMMUNICATION/EDUCATION:  Patient/Family Agreement with Treatment Plan :     [x]   OT Evaluation/POC has been reviewed with the GONZALEZ. [x]        Fall prevention education was provided and the patient/caregiver indicated understanding  [x]        Patient/family have participated as able in goal setting and plan of care. Patient/family agree to work toward stated goals and plan of care. []        Patient is unable to participate in goal setting and plan of care. Therapist will contact SW/POA. Treatment session:   Overall Complexity:HIGH  Evaluation:  34 mins. Treatment :   34  mins.     Occupational Therapist:   Tesfaye Bruno          12/1/2017   Thank You for this referral.

## 2017-12-01 NOTE — PROGRESS NOTES
MDS SECTION GG NURSING REPORT      Patient: Shamika Rouse (7 y.o. female)                         Date: 12/1/2017    Primary Diagnosis: Traumatic rhabdomyolysis       Attending Physician: Harsha Davila MD        Use the KEY on right side to code Functional Ability           MDS Section GG Data Collection Tool  Key:      01     Dependent      02     Substantial/Maximal               Assistance             ([place] does > 50%)      03     Partial/Moderate             Assistance             ([place] does < 50%)      04     Supervision or             Touching Assistance      05     Set-up or [address]      06     Independent      07     Resident Refused      09      not applicable      88     Not Attempted d/t             Medical or Safety      7-3 3-11 11-7      Performance Code Performance Code Performance Code    Self Care Eating 1       Oral Hygiene 1       Toilet Hygiene 1       Sit to Lying 2       Lying to sitting on EOB 1       Sit to stand 1       Chair/Chair -to- bed Transfer 1       Toilet Transfer 1      Mobility Walk 50 ft. ,   2 turns 88       Walk  150 ft. 88       Wheel 50 ft. ,   2 turns 88       Wheel 150 ft. 88

## 2017-12-01 NOTE — PROGRESS NOTES
assisted patient in completing Advance Medical Directives. A copy will be placed in the chart for scanning and extra copies left for the patient upon patient consulting her sister and brother.  completed visit with patient and offered Pastoral care. Chaplains will continue to follow and will provide pastoral care  as needed or requested.  conducted an initial consultation and Spiritual Assessment for Glen Corrigan, who is a 80 y.o.,female. Patients Primary Language is: [place]. According to the patients EMR Mandaen Affiliation is: No preference. The reason the Patient came to the hospital is:   Patient Active Problem List    Diagnosis Date Noted   [facility] Debility 11/28/2017    Fall 11/28/2017    Late onset Alzheimer's disease without behavioral disturbance 11/26/2017    Essential hypertension 11/26/2017    Traumatic rhabdomyolysis ([facility] [address] 11/25/2017    Elevated troponin 11/25/2017    UTI (urinary tract infection) 11/25/2017    A-fib ([facility] [address] 11/25/2017    CHF, acute on chronic ([facility] [address] 04/05/2017        The  provided the following Interventions:  Initiated a relationship of care and support. Explored issues of dolores, spirituality and/or Yarsanism needs while hospitalized. Listened empathically. Provided chaplaincy education. Provided information about Spiritual Care Services. Offered prayer and assurance of continued prayers on patient's behalf. Chart reviewed. The following outcomes were achieved:  Patient shared some information about their medical narrative and spiritual journey/beliefs. Patient processed feeling about current hospitalization. Patient expressed gratitude for the 's visit. Assessment:  Patient did not indicate any spiritual or Yarsanism issues which require Spiritual Care Services interventions at this time.    Patient does not have any Yarsanism/cultural needs that will affect patients preferences in health care.    Plan:  Chaplains will continue to follow and will provide pastoral care on an as needed or requested basis.  recommends bedside caregivers page  on duty if patient shows signs of acute spiritual or emotional distress.       [address], [facility] [address] [facility]

## 2017-12-01 NOTE — ROUTINE PROCESS
Bedside and Verbal shift change report given to maribeth Cuevas lpn (oncoming nurse) by anne Coe lpn (offgoing nurse). Report included the following information SBAR, Kardex and MAR.  Hourly rounds

## 2017-12-01 NOTE — PROGRESS NOTES
conducted a Follow up consultation and Spiritual Assessment for Nate Tomas, who is a 80 y.o.,female. The  provided the following Interventions:  Continued the relationship of care and support. Listened empathically. Offered prayer and assurance of continued prayer on patients behalf. Chart reviewed. The following outcomes were achieved:  Patient expressed gratitude for pastoral care visit. Assessment:  There are no further spiritual or Episcopalian issues which require Spiritual Care Services interventions at this time. Plan:  Chaplains will continue to follow and will provide pastoral care on an as needed/requested basis.  recommends bedside caregivers page  on duty if patient shows signs of acute spiritual or emotional distress.      [address], [place], [address]

## 2017-12-01 NOTE — ROUTINE PROCESS
?  Physical Therapy          Functional goals:            ?   Maintain current functional status           ? Improvement            Functional interventions:             Frequency:       ?x   Occupational Therapy          Functional goals:             ?  Maintain current functional status            x ?  ]   Improvement            Functional interventions:        Functional interventions: Improve bathing, dressing, self feeding, toileting and functional transfers during self care tasks. Frequency:   3 to 6 times per week       ? Speech Therapy          Functional goals:             ?  ] Maintain current functional status             ?      Improvement            Functional interventions:             Frequency:

## 2017-12-01 NOTE — ROUTINE PROCESS
X  Physical Therapy          Functional goals:            ?   Maintain current functional status           X     Improvement            Functional interventions: Improve strength, mobility, endurance, sit to stand and bed to wheelchair transfers, walking, balance, and ability to go over stairs. Frequency: 1-2 times per day, 3-6 days per week      ? Occupational Therapy          Functional goals:             ?  Maintain current functional status             ?  ]   Improvement            Functional interventions:             Frequency:         ?    Speech Therapy          Functional goals:             ?  ] Maintain current functional status             ?      Improvement            Functional interventions:             Frequency:

## 2017-12-01 NOTE — H&P
[address]    Name:  Jaspal Diaz  MR#:  [id number]  :  10/21/1920  Account #:  [de-identified]  Date of Adm:  2017      ROOM [address] in [facility]    HISTORY OF PRESENT ILLNESS: The patient is a 66-year-old  female treated in the hospital following multiple falls with  rhabdomyolysis. She has a history of    1. Chronic obstructive lung disease. 2. Dementia, which is moderate. 3. Breast cancer. 4. Hypertension. 5. Surgically, she has had a mastectomy in the past.    She was admitted to the hospital because of an elevated CPK level  which improved with time and IV hydration. She was maintained on her  usual medications and is now returned to [facility] because of her ongoing  gait and balance disturbance which has created a problem with home  care. MEDICATIONS AT TRANSFER: Include  1. Miralax 17 g.  2. Flonase. 3. Vitamin C 1 tablet a day. 4. Zyrtec 10 mg daily. 5. Toprol 25 mg daily. 6. Aricept 5 mg daily. 7. Plavix 75 mg daily. 8. Zestril 20 mg daily. 9. Potassium 10 mEq daily. 10. Pepcid 40 mg daily. 11. Lasix 40 mg daily. 12. Vitamin D 2000 units daily. 13. Fosamax 40 mg every 7 days. 14. Nitrostat. 15. Zocor 40 mg daily. 16. Tylenol 500 q.4h. p.r.n. pain. SOCIAL HISTORY: She lives with her daughter. REVIEW OF SYSTEMS: Not obtainable due to her dementia. PHYSICAL EXAMINATION:  GENERAL: Revealed a well developed female. HEAD: Normocephalic. EYES: Equal and reactive to light. The extraocular movements were  intact. Disks are flat. Fundi are benign. EARS: Tympanic membranes intact. NECK: Supple. CHEST: Clear. CARDIOVASCULAR: Regular rate and rhythm. ABDOMEN: Soft, bowel sounds were normally active. EXTREMITIES: Deep tendon reflexes were equal. Babinski were  downgoing. NEUROLOGIC: The patient was oriented truly only to person. IMPRESSION:  1. Gait imbalance disturbance. 2. Dementia. 3. Probable superimposed delirium.   4. Osteoporosis by history. 5. Hypertension. She should have enough cognitive ability to be able to participate with  therapy and benefit from that with the intention that her daughter wants  to take her back home.         Saint Fava, MD    [address] [place] [address] / [place]  D:  12/01/2017   12:13  T:  12/01/2017   12:34  Job #:  [id number]

## 2017-12-02 PROCEDURE — 74011250637 HC RX REV CODE- 250/637: Performed by: INTERNAL MEDICINE

## 2017-12-02 RX ADMIN — POLYETHYLENE GLYCOL 3350 17 G: 17 POWDER, FOR SOLUTION ORAL at 10:25

## 2017-12-02 RX ADMIN — SIMVASTATIN 40 MG: 40 TABLET, FILM COATED ORAL at 22:40

## 2017-12-02 RX ADMIN — CLOPIDOGREL BISULFATE 75 MG: 75 TABLET ORAL at 10:25

## 2017-12-02 RX ADMIN — METOPROLOL SUCCINATE 25 MG: 25 TABLET, EXTENDED RELEASE ORAL at 10:25

## 2017-12-02 RX ADMIN — DONEPEZIL HYDROCHLORIDE 5 MG: 5 TABLET, FILM COATED ORAL at 10:25

## 2017-12-02 RX ADMIN — LISINOPRIL 20 MG: 20 TABLET ORAL at 10:25

## 2017-12-02 RX ADMIN — FAMOTIDINE 20 MG: 20 TABLET ORAL at 10:25

## 2017-12-02 RX ADMIN — VITAMIN D, TAB 1000IU (100/BT) 2000 UNITS: 25 TAB at 10:25

## 2017-12-02 RX ADMIN — FLUTICASONE PROPIONATE 2 SPRAY: 50 SPRAY, METERED NASAL at 10:24

## 2017-12-02 NOTE — PROGRESS NOTES
Problem: Self Care Deficits Care Plan (Adult)  Goal: *Acute Goals and Plan of Care (Insert Text)  OCCUPATIONAL THERAPY SHORT TERM GOALS    Initiated 12/1/2017 and to be accomplished within 2 Week(s)    1. Patient will perform Upper body ADL's without adaptive equipment with moderate assistance . 2.  Patient will perform Lower body ADL's with adaptive equipment as needed with maximal assistance . 3. Patient will perform toileting task with maximal assistance with Good safety to reduce falls risk. 4.  Patient will perform toilet transfers with Rolling Walker and moderate assistance . 5. Patient will perform standing static/dynamic balance activities for improved ADL/IADL function with moderate assistance and Good balance and safety awareness. 6.  Patient will improve Barthel index scores to atleast 35/100 to improve functional mobility. 7. Patient will perform self feeding and set up with adaptive equipment with Mod I.      OCCUPATIONAL THERAPY LONG TERM GOALS   Initiated 12/1/2017 and to be accomplished within 4 Week(s)    1. Patient will perform Upper body ADL's without adaptive equipment with modified independence. 2.  Patient will perform Lower body ADL's with adaptive equipment as needed with modified independence . 3. Patient will perform toileting task with modified independence with Good safety to reduce falls risk. 4.  Patient will perform all functional transfers utilizing least restrictive device and durable medical equipment as needed with modified independence and Good balance and safety awareness. 5.  Patient will perform standing static/dynamic activity for improved ADL/IADL function with modified independence an and Good balance and safety awareness. 6.  Patient will improve Barthel index score to 95/100 to improve independence with mobility.       Therapist: Shellie Romero    12/1/2017          [facility]   Occupational Therapy Daily Treatment note      Patient: Donny Chand (10 y.o. female)       Date: 12/2/2017  Attending Physician: Debbra Litten, MD  Primary Diagnosis: Traumatic rhabdomyolysis     Treatment Diagnosis  Treatment Diagnosis: muscle weakness   Treatment Diagnosis 2: difficulty in walking   Precautions : Precautions at Admission: Fall  Transfers:  Functional Transfers  Sit to Stand: Minimum assistance  Stand to Sit: Contact guard assistance  Bed to Chair: Contact guard assistance;Minimum assistance     Balance:  Balance  Sitting: With support  Sitting - Static: Fair (occasional)  Sitting - Dynamic: Fair (occasional)  Standing: With support  Standing - Static: Constant support  Therapeutic Activities:  Pt presented upright in w/c self feeding. Pt required MIN A opening 2/3 packages. FM tasks with B UE's to increase strength and finger dexterity needed for ADL routine. Patient/Caregiver Education:    Pt educated on importance of OOB activity for increased strength and endurance. PT co tx to maximize optimal gains and safety. ASSESSMENT:  Patient continues to demonstrate the need for skilled Occupational Therapy services to improve strength, balance, and endurance.    Progression toward goals:  []      Improving appropriately and progressing toward goals  [x]      Improving slowly and progressing toward goals  []      Not making progress toward goals and plan of care will be adjusted     Treatment session:   46 minutes    Therapist:    LISA Calero,  12/2/2017

## 2017-12-02 NOTE — PROGRESS NOTES
Nutrition initial assessment/Plan of care      RECOMMENDATIONS:     1. Dental Soft diet; chopped meats  2. Ensure Enlive TID  3. Monitor weight and PO intake  4. RD to follow     GOALS:     1. PO intake meets >75% of protein/calorie needs by 12/9  2. Weight Maintenance (+/- 1-2 lb by 12/9)    ASSESSMENT:     Weight status is classified as overweight per BMI of 28.3. PO intake is fair. Added Ensure Enlive TID for additional calories/protein. Labs noted. Nutrition recommendations listed. RD to follow. Nutrition Diagnoses:   Inadequate oral intake related to fair appetite as evidenced by reported intake of meals. Nutrition Risk:  [] High  [] Moderate [x]  Low    SUBJECTIVE/OBJECTIVE:      Transferred from [address] [place] [address] to [facility] on 11/30/17. Patient admitted with traumatic rhabdomyolysis. Has history of COPD, dementia and HTN. No food allergies. Patient has trouble chewing. Per RN, family states she can eat tough steak and popcorn but can't eat beef tips served for lunch meal. OT assisting pt with cutting meats and setting up meal for patient to eat during visit. Obtained food preferences. Will monitor. Information Obtained from:    [x] Chart Review   [x] Patient   [] Family/Caregiver   [x] Nurse/Physician   [] Interdisciplinary Meeting/Rounds    Diet: Dental Soft diet  Medications: [x] Reviewed    Allergies: [x] Reviewed   Patient Active Problem List   Diagnosis Code    CHF, acute on chronic ([facility] [address] I50.9    Traumatic rhabdomyolysis ([facility] [address] T79. 6XXA    Elevated troponin R74.8    UTI (urinary tract infection) N39.0    A-fib ([facility]) I48.91    Late onset Alzheimer's disease without behavioral disturbance G30.1, F02.80    Essential hypertension I10    Debility R53.81    Fall W19. [place] Plan     Past Medical History:   Diagnosis Date    Breast cancer ([facility] [address]     Chronic obstructive pulmonary disease ([facility] [address] [facility]     Dementia     Hypertension       Labs:    Lab Results   Component Value Date/Time    Sodium 145 11/28/2017 03:50 AM    Potassium 3.5 11/28/2017 03:50 AM    Chloride 112 11/28/2017 03:50 AM    CO2 26 11/28/2017 03:50 AM    Anion gap 7 11/28/2017 03:50 AM    Glucose 84 11/28/2017 03:50 AM    BUN 26 11/28/2017 03:50 AM    Creatinine 0.95 11/28/2017 03:50 AM    Calcium 8.8 11/28/2017 03:50 AM    Albumin 3.6 11/25/2017 06:17 PM     Anthropometrics:  BMI: 28.3  There were no vitals filed for this visit. Ht Readings from Last 1 Encounters:   11/25/17 4' 6\" (1.372 m)   11/30/17  53.343 kg (117 lb)  No data found. Estimated Nutrition Needs: [x] MSJ  [] Other:  Calories: 1200 Kcal Based on:   [x] Actual BW    Protein:   55-65 g Based on:   [x] Actual BW    Fluid:       1500 ml Based on:   [x] Actual BW      [x] No Cultural, Congregational or ethnic dietary need identified.     [] Cultural, Congregational and ethnic food preferences identified and addressed     Wt Status:  [] Normal (18.6 - 24.9) [] Underweight (<18.5) [x] Overweight (25 - 29.9) [] Mild Obesity (30 - 34.9)  [] Moderate Obesity (35 - 39.9) [] Morbid Obesity (40+)     Nutrition Problems Identified:   [x] Suboptimal PO intake   [] Food Allergies  [x] Difficulty chewing/swallowing/poor dentition  [] Constipation/Diarrhea   [] Nausea/Vomiting   [] None  [] Other:     Plan:   [] Therapeutic Diet  [x]  Obtained/adjusted food preferences/tolerances and/or snacks options   [x]  Supplements added   [] Occupational therapy following for feeding techniques  []  HS snack added   []  Modify diet texture   []  Modify diet for food allergies   []  Educate patient   []  Assist with menu selection   [x]  Monitor PO intake on meal rounds   [x]  Continue inpatient monitoring and intervention   [x]  Participate in discharge planning/Interdisciplinary rounds/Team meetings   []  Other:     Education Needs:   [] Not appropriate for teaching at this time due to:   [x] Identified and addressed    Nutrition Monitoring and Evaluation:  [x] Continue ongoing monitoring and intervention  [] Other    Bernette Bracket

## 2017-12-02 NOTE — PROGRESS NOTES
Problem: Mobility Impaired (Adult and Pediatric)  Goal: *Acute Goals and Plan of Care (Insert Text)  PHYSICAL THERAPY STG GOALS :  Initiated 12/1/2017 and to be accomplished within 1-2 Weeks    1. Patient will move from supine to sit and sit to supine  and scoot up and down in bed with modified independence. 2.  Patient will transfer from bed to chair and chair to bed with stand by assistance using RW. 3.  Patient will perform sit to stand with stand by assistance with Good balance and safety awareness. 4.  Patient will ambulate with stand by assistance/contact guard assist for 50 feet with RW on level surfaces with 2 turns. 5.  Patient will ascend/descend 5 stairs with bilateral handrail(s) with minimal assistance/contact guard assist to allow for safe home access/exit. 6.  Patient will improve standardized test score for [address] Balance Scale 3      PHYSICAL THERAPY LTG GOALS :  Initiated 12/1/2017 and to be accomplished within 3-4 Weeks    1. Patient will transfer from bed to chair and chair to bed with supervision/set-up using RW. 2.  Patient will perform sit to stand with supervision/set-up with Good balance and safety awareness. 3.  Patient will ambulate with supervision/set-up for 120 feet with RW on level surfaces and be able to maneuver through narrow spaces and obstacles without loss of balance. 4.  Patient will ascend/descend 5 stairs with bilateral handrail(s) with supervision/stand by assistance/set-up to allow for safe home access/exit. 5.  Patient will improve standardized test score for [employer] Balance Scale 4.       Physical Therapist:   Florencio Howard  on 12/1/2017            [facility]   physical Therapy Daily Treatment note      Patient: Shamika Rouse (2 y.o. female)               Date: 12/2/2017    Physician: Harsha Davila MD  Primary Diagnosis: Traumatic rhabdomyolysis           Treatment Diagnosis  Treatment Diagnosis: muscle weakness   Treatment Diagnosis 2: difficulty in walking  Precautions: Fall  Vital Signs       Cognitive Status:     Pain     Bed Mobility Training     Balance  Sitting: With support  Sitting - Static: Fair (occasional)  Sitting - Dynamic: Fair (occasional)  Standing: With support  Standing - Static: Constant support  Transfer Training  Transfer Training  Sit to Stand: Minimum assistance  Stand to Sit: Contact guard assistance  Sit to Stand: Minimum assistance  Gait Training                     With 1 turns. Therapeutic Exercise:   Pt in common area eating breakfast. [place] with OT to maximize functional gains. Pt sitting balance fair. Challenged pt while eating reaching outside ROCK and across midline. Pt presents with poor-fair core and trunk strength, having trouble maintaining unsupported sitting. Transfer training as mentioned above from wheelchair to bedside chair in room. Pt required Merissa for sit>stand, and CGA for turning. Min A and verbal/tactile cuing for walker management. Pt did not complete turn before sitting. Pt education on safety with transfers. Patient/Caregiver Education:   Pt /Caregiver Education on safety and fall prevention, safety awareness was provided to reduce risk for fall with transfers. ASSESSMENT:  Patient continues to benefit from Skilled PT services to improve overall strength and mobility, improve gait and balance, improve functional mobility  Progression toward goals:  []      Improving appropriately and progressing toward goals  [x]      Improving slowly and progressing toward goals  []      Not making progress toward goals and plan of care will be adjusted     Treatment session: 48 minutes.   Therapist:   Michael Mcdonough PTA,          12/2/2017

## 2017-12-02 NOTE — PROGRESS NOTES
Bedside shift change report given to maribeth Wagner (oncoming nurse) by Toby Gar RN (offgoing nurse). Report included the following information SBAR, Kardex and MAR.

## 2017-12-03 PROCEDURE — 74011250637 HC RX REV CODE- 250/637: Performed by: INTERNAL MEDICINE

## 2017-12-03 RX ADMIN — SIMVASTATIN 40 MG: 40 TABLET, FILM COATED ORAL at 21:55

## 2017-12-03 RX ADMIN — FAMOTIDINE 20 MG: 20 TABLET ORAL at 10:21

## 2017-12-03 RX ADMIN — FLUTICASONE PROPIONATE 2 SPRAY: 50 SPRAY, METERED NASAL at 10:21

## 2017-12-03 RX ADMIN — LISINOPRIL 20 MG: 20 TABLET ORAL at 10:21

## 2017-12-03 RX ADMIN — CLOPIDOGREL BISULFATE 75 MG: 75 TABLET ORAL at 10:21

## 2017-12-03 RX ADMIN — VITAMIN D, TAB 1000IU (100/BT) 2000 UNITS: 25 TAB at 10:22

## 2017-12-03 RX ADMIN — DONEPEZIL HYDROCHLORIDE 5 MG: 5 TABLET, FILM COATED ORAL at 10:21

## 2017-12-03 RX ADMIN — METOPROLOL SUCCINATE 25 MG: 25 TABLET, EXTENDED RELEASE ORAL at 10:22

## 2017-12-03 NOTE — ROUTINE PROCESS
MDS SECTION GG NURSING REPORT      Patient: Teresa Sanabria (53 y.o. female)                         Date: 12/3/2017    Primary Diagnosis: Traumatic rhabdomyolysis       Attending Physician: Cheo Raphael MD   Treatment Diagnosis  Treatment Diagnosis: muscle weakness   Treatment Diagnosis 2: difficulty in walking    Use the KEY on right side to code Functional Ability           MDS Section GG Data Collection Tool  Key:      01     Dependent      02     Substantial/Maximal               Assistance             ([place] does > 50%)      03     Partial/Moderate             Assistance             ([place] does < 50%)      04     Supervision or             Touching Assistance      05     Set-up or [facility] 34     Resident Refused      09      not applicable      88     Not Attempted d/t             Medical or Safety      7-3 3-11 11-7      Performance Code Performance Code Performance Code    Self Care Eating   88     Oral Hygiene   88     Toilet Hygiene   88     Sit to Lying   88     Lying to sitting on EOB   88     Sit to stand   88     Chair/Chair -to- bed Transfer   88     Toilet Transfer   88    Mobility Walk 50 ft. ,   2 turns   88     Walk  150 ft.   88     Wheel 50 ft. ,   2 turns   88     Wheel 150 ft.   88

## 2017-12-03 NOTE — ROUTINE PROCESS
Bedside and Verbal shift change report given to jovanny rn(oncoming nurse) by anne Coe lpn (offgoing nurse). Report included the following information SBAR, Kardex and MAR.  Hourly rounds

## 2017-12-03 NOTE — PROGRESS NOTES
Dietary Orders:    X   Dental Soft diet     ? Diabetic:      ?    Cardiac:     ?    Other:       ?    Tube feeding     ? IV fluids for hydration     ? Fluid restrictions:     Residents dietary preferences:  Likes ice cream with Ensure; likes fruits, sweets; chopped meats    Reason for modified diet:  Difficulty chewing     Dietary Risks      ? Weight loss      ? Swallowing problems      X     Chewing problems      ? Missing teeth/poor dentition      ? Edentulous      ? Mouth pain/discomfort         ? Other    Residents dietary goal:    X      Maintain current weight     ? Prevent weight loss     ? Other   Dietary interventions     ? Eats in dining room     ? Eats in room     X      Dentures/partials     ?      Specialty utensils or devices:     ?     Other

## 2017-12-03 NOTE — ROUTINE PROCESS
Bedside and Verbal shift change report given to [facility] LPN (oncoming nurse) by Saskia Slaughter RN (offgoing nurse). Report included the following information SBAR, Kardex and MAR. Hourly rounds made.

## 2017-12-03 NOTE — ROUTINE PROCESS
Bedside and Verbal shift change report given to RONALDO SIMMONSRN (oncoming nurse) by Leonarda Nicholas RN (offgoing nurse). Report included the following information SBAR, Kardex and MAR. QH VISUAL CHECKS DONE.

## 2017-12-04 LAB
ANION GAP SERPL CALC-SCNC: 7 MMOL/L (ref 3–18)
BASOPHILS # BLD: 0 K/UL (ref 0–0.06)
BASOPHILS NFR BLD: 0 % (ref 0–2)
BUN SERPL-MCNC: 19 MG/DL (ref 7–18)
BUN/CREAT SERPL: 23 (ref 12–20)
CALCIUM SERPL-MCNC: 9 MG/DL (ref 8.5–10.1)
CHLORIDE SERPL-SCNC: 108 MMOL/L (ref 100–108)
CO2 SERPL-SCNC: 29 MMOL/L (ref 21–32)
CREAT SERPL-MCNC: 0.84 MG/DL (ref 0.6–1.3)
DIFFERENTIAL METHOD BLD: ABNORMAL
EOSINOPHIL # BLD: 0 K/UL (ref 0–0.4)
EOSINOPHIL NFR BLD: 0 % (ref 0–5)
ERYTHROCYTE [DISTWIDTH] IN BLOOD BY AUTOMATED COUNT: 17.3 % (ref 11.6–14.5)
GLUCOSE SERPL-MCNC: 115 MG/DL (ref 74–99)
HCT VFR BLD AUTO: 48.5 % (ref 35–45)
HGB BLD-MCNC: 15.9 G/DL (ref 12–16)
LYMPHOCYTES # BLD: 1.8 K/UL (ref 0.9–3.6)
LYMPHOCYTES NFR BLD: 12 % (ref 21–52)
MCH RBC QN AUTO: 27.8 PG (ref 24–34)
MCHC RBC AUTO-ENTMCNC: 32.8 G/DL (ref 31–37)
MCV RBC AUTO: 84.9 FL (ref 74–97)
MONOCYTES # BLD: 1.3 K/UL (ref 0.05–1.2)
MONOCYTES NFR BLD: 9 % (ref 3–10)
NEUTS SEG # BLD: 12.3 K/UL (ref 1.8–8)
NEUTS SEG NFR BLD: 79 % (ref 40–73)
PLATELET # BLD AUTO: 201 K/UL (ref 135–420)
PMV BLD AUTO: 10 FL (ref 9.2–11.8)
POTASSIUM SERPL-SCNC: 4.4 MMOL/L (ref 3.5–5.5)
RBC # BLD AUTO: 5.71 M/UL (ref 4.2–5.3)
SODIUM SERPL-SCNC: 144 MMOL/L (ref 136–145)
WBC # BLD AUTO: 15.4 K/UL (ref 4.6–13.2)

## 2017-12-04 PROCEDURE — 85025 COMPLETE CBC W/AUTO DIFF WBC: CPT | Performed by: INTERNAL MEDICINE

## 2017-12-04 PROCEDURE — 80048 BASIC METABOLIC PNL TOTAL CA: CPT | Performed by: INTERNAL MEDICINE

## 2017-12-04 PROCEDURE — 74011250637 HC RX REV CODE- 250/637: Performed by: INTERNAL MEDICINE

## 2017-12-04 PROCEDURE — 36415 COLL VENOUS BLD VENIPUNCTURE: CPT | Performed by: INTERNAL MEDICINE

## 2017-12-04 RX ORDER — AMLODIPINE BESYLATE 5 MG/1
5 TABLET ORAL DAILY
Status: DISCONTINUED | OUTPATIENT
Start: 2017-12-05 | End: 2017-12-20

## 2017-12-04 RX ADMIN — FLUTICASONE PROPIONATE 2 SPRAY: 50 SPRAY, METERED NASAL at 12:15

## 2017-12-04 RX ADMIN — LISINOPRIL 20 MG: 20 TABLET ORAL at 12:12

## 2017-12-04 RX ADMIN — CLOPIDOGREL BISULFATE 75 MG: 75 TABLET ORAL at 12:12

## 2017-12-04 RX ADMIN — VITAMIN D, TAB 1000IU (100/BT) 2000 UNITS: 25 TAB at 12:11

## 2017-12-04 RX ADMIN — DONEPEZIL HYDROCHLORIDE 5 MG: 5 TABLET, FILM COATED ORAL at 12:12

## 2017-12-04 RX ADMIN — POLYETHYLENE GLYCOL 3350 17 G: 17 POWDER, FOR SOLUTION ORAL at 12:12

## 2017-12-04 RX ADMIN — FAMOTIDINE 20 MG: 20 TABLET ORAL at 12:12

## 2017-12-04 RX ADMIN — METOPROLOL SUCCINATE 25 MG: 25 TABLET, EXTENDED RELEASE ORAL at 12:11

## 2017-12-04 RX ADMIN — SIMVASTATIN 40 MG: 40 TABLET, FILM COATED ORAL at 21:49

## 2017-12-04 NOTE — PROGRESS NOTES
GIM     Patient: Rashida Gautam MRN: [id number]  CSN: [id number]    YOB: 1920  Age: 80 y.o. Sex: female    DOA: 11/30/2017 LOS:  LOS: 4 days                    Subjective:     Pt confused still with persistant delerium. VS stable and bp intact. Lab stable but with inc WBC w/o evidence of infection and temp nl    Objective:      Visit Vitals    /79    Pulse (P) 71    Temp (P) 98.3 °F (36.8 °C)    Resp (P) 18    SpO2 99%    Breastfeeding No       Physical Exam:  Chest clear  Cor rr  abd soft  No edema    Intake and Output:  Current Shift:     Last three shifts:       Recent Results (from the past 24 hour(s))   CBC WITH AUTOMATED DIFF    Collection Time: 12/04/17  7:00 AM   Result Value Ref Range    WBC 15.4 (H) 4.6 - 13.2 K/uL    RBC 5.71 (H) 4.20 - 5.30 M/uL    HGB 15.9 12.0 - 16.0 g/dL    HCT 48.5 (H) 35.0 - 45.0 %    MCV 84.9 74.0 - 97.0 FL    MCH 27.8 24.0 - 34.0 PG    MCHC 32.8 31.0 - 37.0 g/dL    RDW 17.3 (H) 11.6 - 14.5 %    PLATELET 019 141 - 942 K/uL    MPV 10.0 9.2 - 11.8 FL    NEUTROPHILS 79 (H) 40 - 73 %    LYMPHOCYTES 12 (L) 21 - 52 %    MONOCYTES 9 3 - 10 %    EOSINOPHILS 0 0 - 5 %    BASOPHILS 0 0 - 2 %    ABS. NEUTROPHILS 12.3 (H) 1.8 - 8.0 K/UL    ABS. LYMPHOCYTES 1.8 0.9 - 3.6 K/UL    ABS. MONOCYTES 1.3 (H) 0.05 - 1.2 K/UL    ABS. EOSINOPHILS 0.0 0.0 - 0.4 K/UL    ABS.  BASOPHILS 0.0 0.0 - 0.06 K/UL    DF AUTOMATED     METABOLIC PANEL, BASIC    Collection Time: 12/04/17  7:00 AM   Result Value Ref Range    Sodium 144 136 - 145 mmol/L    Potassium 4.4 3.5 - 5.5 mmol/L    Chloride 108 100 - 108 mmol/L    CO2 29 21 - 32 mmol/L    Anion gap 7 3.0 - 18 mmol/L    Glucose 115 (H) 74 - 99 mg/dL    BUN 19 (H) 7.0 - 18 MG/DL    Creatinine 0.84 0.6 - 1.3 MG/DL    BUN/Creatinine ratio 23 (H) 12 - 20      GFR est AA >60 >60 ml/min/1.73m2    GFR est non-AA >60 >60 ml/min/1.73m2    Calcium 9.0 8.5 - 10.1 MG/DL       Current Facility-Administered Medications   Medication Dose Route Frequency    cyanocobalamin (VITAMIN B12) injection 1,000 mcg  1,000 mcg IntraMUSCular Q30D    acetaminophen (TYLENOL) tablet 500 mg  500 mg Oral Q4H PRN    clopidogrel (PLAVIX) tablet 75 mg  75 mg Oral DAILY    donepezil (ARICEPT) tablet 5 mg  5 mg Oral DAILY    famotidine (PEPCID) tablet 20 mg  20 mg Oral DAILY    fluticasone (FLONASE) 50 mcg/actuation nasal spray 2 Spray  2 Spray Both Nostrils DAILY    lisinopril (PRINIVIL, ZESTRIL) tablet 20 mg  20 mg Oral DAILY    metoprolol succinate (TOPROL-XL) XL tablet 25 mg  25 mg Oral DAILY    nitroglycerin (NITROSTAT) tablet 0.4 mg  0.4 mg SubLINGual Q5MIN PRN    polyethylene glycol (MIRALAX) packet 17 g  17 g Oral DAILY    simvastatin (ZOCOR) tablet 40 mg  40 mg Oral QHS    cholecalciferol (VITAMIN D3) tablet 2,000 Units  2,000 Units Oral DAILY    [facility] ANESTHESIA   Other PRN    [facility] EMERGENCY/STAT BOX   Other PRN       Lab Results   Component Value Date/Time    Glucose 115 12/04/2017 07:00 AM    Glucose 84 11/28/2017 03:50 AM    Glucose 98 11/27/2017 06:35 AM    Glucose 107 11/26/2017 03:35 AM    Glucose 133 11/25/2017 06:17 PM        Assessment/Plan     Active Problems:    * No active hospital problems.  Tammi Hassan MD  12/4/2017, 11:48 AM

## 2017-12-04 NOTE — PROGRESS NOTES
INITIAL GOALS    My preferred name is: Arnold Braun     My Representative is: Daughter Brown Davis    My discharge goal is:      ?   Discharge to community         Location:         Caregiver:    ?   Discharge to LTC    ? Discharge with Hospice    ? Barriers to discharge/goals :      Residents life history notes prior to        Admission: Retired      Residents daily routine and        preferences:    Residents cultural and ethnic        preferences    Outside coordination:    ? Follow/up appointment:             Date/time:      ? Outpatient Procedure :              Date/time:      ?    Transportation needs:       Resident or Caregiver education needs:   /PSYCHOSOCIAL     ? Able to recognize need for           placement in skilled facility     ? Able to make own decisions         Mental Health Needs:   ?    Mental Health History:          ?   Behavior concerns:     ? PASARR Level II Recommendation:     ?    Depression Screening     ? Psychiatric consult needed      Social Service/Psychosocial interventions:      Behavioral interventions:    Community Services Received prior to admission:    Josh Ambrosio needed:      Outside coordination:    ? Follow/up appointment:             Date/time:    ?     Outpatient Procedure :              Date/time:    ?     Transportation needs:

## 2017-12-04 NOTE — ROUTINE PROCESS
Bedside shift change report given to Martha Brunner LPN (oncoming nurse) by Jenny Ty RN (offgoing nurse). Report included the following information SBAR, Kardex, MAR and Recent Results. VISUALLY CHECKED PT Q 1 HR BY NURSING STAFF. 24 hour order chart check done

## 2017-12-04 NOTE — PROGRESS NOTES
Problem: Mobility Impaired (Adult and Pediatric)  Goal: *Acute Goals and Plan of Care (Insert Text)  PHYSICAL THERAPY STG GOALS :  Initiated 12/1/2017 and to be accomplished within 1-2 Weeks    1. Patient will move from supine to sit and sit to supine  and scoot up and down in bed with modified independence. 2.  Patient will transfer from bed to chair and chair to bed with stand by assistance using RW. 3.  Patient will perform sit to stand with stand by assistance with Good balance and safety awareness. 4.  Patient will ambulate with stand by assistance/contact guard assist for 50 feet with RW on level surfaces with 2 turns. 5.  Patient will ascend/descend 5 stairs with bilateral handrail(s) with minimal assistance/contact guard assist to allow for safe home access/exit. 6.  Patient will improve standardized test score for [address] Balance Scale 3      PHYSICAL THERAPY LTG GOALS :  Initiated 12/1/2017 and to be accomplished within 3-4 Weeks    1. Patient will transfer from bed to chair and chair to bed with supervision/set-up using RW. 2.  Patient will perform sit to stand with supervision/set-up with Good balance and safety awareness. 3.  Patient will ambulate with supervision/set-up for 120 feet with RW on level surfaces and be able to maneuver through narrow spaces and obstacles without loss of balance. 4.  Patient will ascend/descend 5 stairs with bilateral handrail(s) with supervision/stand by assistance/set-up to allow for safe home access/exit. 5.  Patient will improve standardized test score for [employer] Balance Scale 4.       Physical Therapist:   Destiney Howard  on 12/1/2017            [facility]   physical Therapy Daily Treatment note      Patient: Padmaja Nagel (57 y.o. female)               Date: 12/4/2017    Physician: Yael Phillips MD  Primary Diagnosis: Traumatic rhabdomyolysis           Treatment Diagnosis  Treatment Diagnosis: muscle weakness   Treatment Diagnosis 2: difficulty in walking  Precautions: Fall  Vital Signs  Cognitive Status:  Pain  Bed Mobility Training  Balance  Sitting: With support  Sitting - Static: Fair (occasional)  Sitting - Dynamic: Fair (occasional)  Standing: With support  Standing - Static: Fair (((-)))  Standing - Dynamic : Fair (((-)))  Transfer Training  Transfer Training  Sit to Stand: Minimum assistance;Assist x2  Stand to Sit: Contact guard assistance;Minimum assistance  Stand Pivot Transfers: Moderate assistance (x2)  Interventions: Safety awareness training;Verbal cues; Tactile cues  Sit to Stand: Minimum assistance;Assist x2  Gait Training  Gait  Base of Support: Center of gravity altered  Speed/My: Slow  Step Length: Right shortened;Left shortened  Gait Abnormalities: Decreased step clearance;Shuffling gait; Other (thoracic kyphosis )  Ambulation - Level of Assistance: Contact guard assistance;Minimal assistance  Distance (ft): 25 Feet (ft) (x2)  Assistive Device: Gait belt;Walker, rolling  Interventions: Safety awareness training;Verbal cues; Tactile cues  Gait Abnormalities: Decreased step clearance;Shuffling gait; Other (thoracic kyphosis )   With 1 turns. Therapeutic Exercise: Co-treat with OT to maximize functional gains with sit<>stand, transfers, balance, and ambulatory activities. Pt continues to fatigue easily, but was able to follow verbal instructions with slight confusion noted. Sit<>stand throughout session with Min A x2 and verbal cues for hand placement. Pt fitted for high-back reclining w/c due to pt sacral sitting and sliding forward due to heavy posterior lean. SPT from w/c>high back w/c with Mod A x2, pt reported fear of falling and required reassurance. Gait training initiated with abovementioned details and close w/c follow for safety. Pt required Min A for RW management for turns and doorway negotiation.  Static standing balance with 1 to 2 UE support x4' with CGA and verbal cues for upright posture. Patient/Caregiver Education:   Pt /Caregiver Education on safety and fall prevention, and posture was provided to reduce risk of falls. ASSESSMENT:  Patient continues to benefit from Skilled PT services to improve sit<>stand, transfers, strength, balance, gait and stair negotiation. Progression toward goals:  []      Improving appropriately and progressing toward goals  [x]      Improving slowly and progressing toward goals  []      Not making progress toward goals and plan of care will be adjusted     Treatment session: 43 minutes.   Therapist:   Anmol Rosado PTA,          12/4/2017

## 2017-12-04 NOTE — PROGRESS NOTES
I have reviewed this patient's current medication list and recent laboratory results. At this time, I do not suggest any drug therapy adjustments or additional laboratory monitoring. Thank you,  Collette HERNDON  Ph. M. S.  12/4/2017

## 2017-12-04 NOTE — PROGRESS NOTES
Problem: Self Care Deficits Care Plan (Adult)  Goal: *Acute Goals and Plan of Care (Insert Text)  OCCUPATIONAL THERAPY SHORT TERM GOALS    Initiated 12/1/2017 and to be accomplished within 2 Week(s)    1. Patient will perform Upper body ADL's without adaptive equipment with moderate assistance . 2.  Patient will perform Lower body ADL's with adaptive equipment as needed with maximal assistance . 3. Patient will perform toileting task with maximal assistance with Good safety to reduce falls risk. 4.  Patient will perform toilet transfers with Rolling Walker and moderate assistance . 5. Patient will perform standing static/dynamic balance activities for improved ADL/IADL function with moderate assistance and Good balance and safety awareness. 6.  Patient will improve Barthel index scores to atleast 35/100 to improve functional mobility. 7. Patient will perform self feeding and set up with adaptive equipment with Mod I.      OCCUPATIONAL THERAPY LONG TERM GOALS   Initiated 12/1/2017 and to be accomplished within 4 Week(s)    1. Patient will perform Upper body ADL's without adaptive equipment with modified independence. 2.  Patient will perform Lower body ADL's with adaptive equipment as needed with modified independence . 3. Patient will perform toileting task with modified independence with Good safety to reduce falls risk. 4.  Patient will perform all functional transfers utilizing least restrictive device and durable medical equipment as needed with modified independence and Good balance and safety awareness. 5.  Patient will perform standing static/dynamic activity for improved ADL/IADL function with modified independence an and Good balance and safety awareness. 6.  Patient will improve Barthel index score to 95/100 to improve independence with mobility.       Therapist: Amanda Fenton    12/1/2017          [facility]   Occupational Therapy Daily Treatment note      Patient: Megan Gallagher (28 y.o. female)       Date: 12/4/2017  Attending Physician: Namita Medellin MD  Primary Diagnosis: Traumatic rhabdomyolysis     Treatment Diagnosis  Treatment Diagnosis: muscle weakness   Treatment Diagnosis 2: difficulty in walking   Precautions : Precautions at Admission: Fall  Vital Signs:        Cognitive Status:     Pain:        Gross Assessment:     Coordination:     Bed Mobility:     Transfers:  Functional Transfers  Sit to Stand: Minimum assistance;Assist x2  Stand to Sit: Contact guard assistance;Minimum assistance     Balance:  Balance  Sitting: With support  Sitting - Static: Fair (occasional)  Sitting - Dynamic: Fair (occasional)  Standing: With support  Standing - Static: Fair (((-)))  Standing - Dynamic : Fair (((-)))  ADL Self Care:     ADL Intervention:   Unable to assess for OT tx session this date 2/2  Nursing provided assist w/ ADL routine in early AM.                      Therapeutic Activities:  Co-tx with P.T. To maximize functional potential w/ functional mobility and standing balance, functional mobility w/ FWW x 25' x 2x w/ Min A - CGA and able to negotiate a corner, standing tolerance x 4 mins w/ FWW & F- standing balance.  Wheelchair mgmt: patient noted to be sacral sitting w/ anterior sliding forward of hips in 20\" width w/c w/ BLEs not positioned on standard leg rests 2/2 w/c too wide and patient demonstrating heavy posterior lean w/ head/cervical neck extended back over w/c hammock back, patient issued new w/c seating system: 18\" high back, reclining w/c w/ standard leg rests with noted improvement in optimal w/c positioning e.g. No anterior sliding forward of hips and BLEs positioned on standard leg rests, nursing/Dorota notified of new w/c seating system w/ reclining as needed for rest and increasing to upright posture during meals w/ assist for set up for self feeding and SBA-Supv.  UB strengthening and increasing functional activity tolerance w/ balloon batting while reaching in various planes w/ good tolerance, rest breaks as needed and resting RUE majority of there act 2/2 impaired AROM w/ pain, Patient participated in sitting balance while seated in w/c w/ verbal cues to incorporate trunk flexion & rotation while reaching w/ BUEs, RUE as tolerated 2/2 impaired AROM w/ pain, in various planes for cones to improve ADL performance skills        Patient/Caregiver Education:    Jessica Combs Education on new w/c seating system was provided to improve optimal w/c positioning .       ASSESSMENT:  Patient continues to demonstrate the need for skilled Occupational Therapy services to improve   grooming, bathing, upper body dressing, lower body dressing, toileting and toilet transfer  Progression toward goals:  [x]      Improving appropriately and progressing toward goals  []      Improving slowly and progressing toward goals  []      Not making progress toward goals and plan of care will be adjusted     Treatment session:   50 minutes    Therapist:    Shellie Romero,  12/4/2017

## 2017-12-04 NOTE — ROUTINE PROCESS
Bedside and Verbal shift change report given to Ranell Epley RN (oncoming nurse) by Aleisha Foss RN (offgoing nurse). Report included the following information SBAR, ED Summary and MAR. Hourly rounds made.

## 2017-12-05 PROCEDURE — 74011250637 HC RX REV CODE- 250/637: Performed by: INTERNAL MEDICINE

## 2017-12-05 RX ADMIN — DONEPEZIL HYDROCHLORIDE 5 MG: 5 TABLET, FILM COATED ORAL at 11:31

## 2017-12-05 RX ADMIN — POLYETHYLENE GLYCOL 3350 17 G: 17 POWDER, FOR SOLUTION ORAL at 11:32

## 2017-12-05 RX ADMIN — LISINOPRIL 20 MG: 20 TABLET ORAL at 11:31

## 2017-12-05 RX ADMIN — VITAMIN D, TAB 1000IU (100/BT) 2000 UNITS: 25 TAB at 11:30

## 2017-12-05 RX ADMIN — SIMVASTATIN 40 MG: 40 TABLET, FILM COATED ORAL at 22:20

## 2017-12-05 RX ADMIN — CLOPIDOGREL BISULFATE 75 MG: 75 TABLET ORAL at 11:31

## 2017-12-05 RX ADMIN — METOPROLOL SUCCINATE 25 MG: 25 TABLET, EXTENDED RELEASE ORAL at 11:31

## 2017-12-05 RX ADMIN — FAMOTIDINE 20 MG: 20 TABLET ORAL at 11:31

## 2017-12-05 RX ADMIN — AMLODIPINE BESYLATE 5 MG: 5 TABLET ORAL at 11:31

## 2017-12-05 RX ADMIN — FLUTICASONE PROPIONATE 2 SPRAY: 50 SPRAY, METERED NASAL at 11:34

## 2017-12-05 NOTE — PROGRESS NOTES
Problem: Mobility Impaired (Adult and Pediatric)  Goal: *Acute Goals and Plan of Care (Insert Text)  PHYSICAL THERAPY STG GOALS :  Initiated 12/1/2017 and to be accomplished within 1-2 Weeks    1. Patient will move from supine to sit and sit to supine  and scoot up and down in bed with modified independence. 2.  Patient will transfer from bed to chair and chair to bed with stand by assistance using RW. 3.  Patient will perform sit to stand with stand by assistance with Good balance and safety awareness. 4.  Patient will ambulate with stand by assistance/contact guard assist for 50 feet with RW on level surfaces with 2 turns. 5.  Patient will ascend/descend 5 stairs with bilateral handrail(s) with minimal assistance/contact guard assist to allow for safe home access/exit. 6.  Patient will improve standardized test score for [facility] Standing Balance Scale 3      PHYSICAL THERAPY LTG GOALS :  Initiated 12/1/2017 and to be accomplished within 3-4 Weeks    1. Patient will transfer from bed to chair and chair to bed with supervision/set-up using RW. 2.  Patient will perform sit to stand with supervision/set-up with Good balance and safety awareness. 3.  Patient will ambulate with supervision/set-up for 120 feet with RW on level surfaces and be able to maneuver through narrow spaces and obstacles without loss of balance. 4.  Patient will ascend/descend 5 stairs with bilateral handrail(s) with supervision/stand by assistance/set-up to allow for safe home access/exit. 5.  Patient will improve standardized test score for [facility] Balance Scale 4.       Physical Therapist:   Dayna Howard  on 12/1/2017            [facility]   physical Therapy Daily Treatment note      Patient: Eren Vargas (78 y.o. female)               Date: 12/5/2017    Physician: Josselyn Miranda MD  Primary Diagnosis: Traumatic rhabdomyolysis           Treatment Diagnosis  Treatment Diagnosis: muscle weakness   Treatment Diagnosis 2: difficulty in walking  Precautions: Fall  Vital Signs  Cognitive Status:  Pain  Bed Mobility Training  Bed Mobility Training  Supine to Sit: Moderate assistance  Scooting: Moderate assistance  Balance  Sitting: With support  Sitting - Static: Fair (occasional)  Sitting - Dynamic: Poor (constant support)  Standing: With support  Standing - Static: Fair  Standing - Dynamic : Fair (((-)))  Transfer Training  Transfer Training  Sit to Stand: Minimum assistance; Additional time  Stand to Sit: Contact guard assistance  Bed to Chair: Minimum assistance  Interventions: Safety awareness training;Verbal cues; Tactile cues  Sit to Stand: Minimum assistance; Additional time  Gait Training  Gait  Base of Support: Center of gravity altered  Speed/My: Slow  Step Length: Right shortened;Left shortened  Gait Abnormalities: Decreased step clearance;Shuffling gait  Ambulation - Level of Assistance: Contact guard assistance  Distance (ft): 32 Feet (ft)  Assistive Device: Gait belt;Walker, rolling  Interventions: Safety awareness training;Verbal cues; Tactile cues  Gait Abnormalities: Decreased step clearance;Shuffling gait   With 1 turns. Therapeutic Exercise: sit<>stand x4 to promote greater level of independence, see above for details. Pt required verbal/tactile cues for hand placement 100% of the time. Static standing with 1 UE support and CGA/SBA 1x4'40\" 1x2'. Pt stands with widened ROCK and occasional sway noted, but no LOB. Gait training with abovementioned details and close w/c follow for safety. Pt ambulated with lack of heel strike and required cues for proximity to RW for safety. Seated LAQ 2x10 to promote strength for improved functional mobility. Patient/Caregiver Education:   Pt /Caregiver Education on safety and fall prevention was provided to reduce risk of falls.      ASSESSMENT:  Patient continues to benefit from Skilled PT services to improve bed mobility, sit<>stand, balance, strength, gait and stair negotiation. Progression toward goals:  []      Improving appropriately and progressing toward goals  [x]      Improving slowly and progressing toward goals  []      Not making progress toward goals and plan of care will be adjusted     Treatment session: 47 minutes.   Therapist:   Aleida Ricardo PTA,          12/5/2017

## 2017-12-05 NOTE — ROUTINE PROCESS
Bedside shift change report given to Shelton Hernadez LPN (oncoming nurse) by Nj Altamirano RN (offgoing nurse). Report included the following information SBAR, Kardex, MAR and Recent Results. VISUALLY CHECKED PT Q 1 HR BY NURSING STAFF. 24 hour order chart check done

## 2017-12-05 NOTE — PROGRESS NOTES
Pt participated in the Lucia decorating activity for the duration of 40 minutes. With minimal prompting pt was able to follow step by step instructions to assist in assembling the artificial tree. Pt interacted well with peers and RT.

## 2017-12-05 NOTE — PROGRESS NOTES
Problem: Self Care Deficits Care Plan (Adult)  Goal: *Acute Goals and Plan of Care (Insert Text)  OCCUPATIONAL THERAPY SHORT TERM GOALS    Initiated 12/1/2017 and to be accomplished within 2 Week(s)    1. Patient will perform Upper body ADL's without adaptive equipment with moderate assistance . 2.  Patient will perform Lower body ADL's with adaptive equipment as needed with maximal assistance . 3. Patient will perform toileting task with maximal assistance with Good safety to reduce falls risk. 4.  Patient will perform toilet transfers with Rolling Walker and moderate assistance . 5. Patient will perform standing static/dynamic balance activities for improved ADL/IADL function with moderate assistance and Good balance and safety awareness. 6.  Patient will improve Barthel index scores to atleast 35/100 to improve functional mobility. 7. Patient will perform self feeding and set up with adaptive equipment with Mod I.      OCCUPATIONAL THERAPY LONG TERM GOALS   Initiated 12/1/2017 and to be accomplished within 4 Week(s)    1. Patient will perform Upper body ADL's without adaptive equipment with modified independence. 2.  Patient will perform Lower body ADL's with adaptive equipment as needed with modified independence . 3. Patient will perform toileting task with modified independence with Good safety to reduce falls risk. 4.  Patient will perform all functional transfers utilizing least restrictive device and durable medical equipment as needed with modified independence and Good balance and safety awareness. 5.  Patient will perform standing static/dynamic activity for improved ADL/IADL function with modified independence an and Good balance and safety awareness. 6.  Patient will improve Barthel index score to 95/100 to improve independence with mobility.       Therapist: Danielito Dejesus    12/1/2017          [facility]   Occupational Therapy Daily Treatment note      Patient: Padmaja Nagel (27 y.o. female)       Date: 12/5/2017  Attending Physician: Yael Phillips MD  Primary Diagnosis: Traumatic rhabdomyolysis     Treatment Diagnosis  Treatment Diagnosis: muscle weakness   Treatment Diagnosis 2: difficulty in walking   Precautions : Precautions at Admission: Fall  Vital Signs:  Vital Signs  Temp: 97.9 °F (36.6 °C)  Temp Source: Oral  Pulse (Heart Rate): 68  Heart Rate Source: Monitor  Resp Rate: 18  O2 Sat (%): 98 %  Level of Consciousness: Alert  BP: 157/87  MAP (Calculated): 110     Cognitive Status:  Mental Status  Neurologic State: Alert;Confused  Cognition: Appropriate for age attention/concentration  Pain:        Gross Assessment:     Coordination:     Bed Mobility:  Bed Mobility  Supine to Sit: Moderate assistance  Scooting: Moderate assistance  Transfers:  Functional Transfers  Sit to Stand: Minimum assistance; Additional time  Stand to Sit: Contact guard assistance  Bed to Chair: Minimum assistance  Toilet Transfer : Minimum assistance  Functional Transfers  Toilet Transfer : Minimum assistance  Adaptive Equipment: Walker (comment); Wheelchair;Grab bars  Balance:  Balance  Sitting: With support  Sitting - Static: Fair (occasional)  Sitting - Dynamic: Poor (constant support)  Standing: With support  Standing - Static: Fair  Standing - Dynamic : Fair (((-)))  ADL Self Care:     ADL Intervention:  Upper Body Bathing  Bathing Assistance: Stand-by assistance  Position Performed:  (seated on commode)  Upper Body Dressing Assistance  Pullover Shirt: Moderate assistance  Lower Body Bathing  Perineal  : Moderate assistance  Position Performed: Standing  Adaptive Equipment: Walker;Grab bar  Toileting  Bladder Hygiene: Contact guard assistance  Bowel Hygiene: Total assistance (dependent)  Clothing Management: Total assistance (dependent)  Adaptive Equipment: Grab bars; Walker  Grooming  Washing Face: Stand-by assistance  Brushing/Combing Hair: Supervision/set-up  Lower Body Dressing Assistance  Underpants: Total assistance (dependent)  Pants With Button/Zipper: Total assistance (dependent)  Socks: Total assistance (dependent)        ADL routine, toilet transfer and tasks level of assist as mentioned above. Patient/Caregiver Education:      Pt. Education on correct hand & feet placement during toilet transfer w/ 3 in 1  commode over toilet to grade easier w/ FWW was provided to prevent falls.        ASSESSMENT:  Patient continues to demonstrate the need for skilled Occupational Therapy services to improve   grooming, bathing, upper body dressing, lower body dressing, toileting and toilet transfer  Progression toward goals:  [x]      Improving appropriately and progressing toward goals  []      Improving slowly and progressing toward goals  []      Not making progress toward goals and plan of care will be adjusted     Treatment session:   33 minutes    Therapist:    Vanesa Ty,  12/5/2017

## 2017-12-05 NOTE — PROGRESS NOTES
Sybil attempted to complete the social evaluation with pt. Pt is oriented to self only. Pt states that she lives with her 80year old daughter Padma Scott who still works. SW informed pt that SW will call her daughter to clarify information. SYBIL left message or pt's daughter Graham Austin on the home number listed and cell number requesting that she call SYBIL.

## 2017-12-05 NOTE — PROGRESS NOTES
Late entry: YUSUF met with pt and her daughter Brown Davis to complete the social evaluation. Pt's daughter reported that pt was independent with all activities prior to her admission. Pt's daughter works at least 20 hours per week and stated that she can't afford to quit her job. YUSUF provided pt's daughter with brochures for private duty. She indicated that pt doesn't qualify for medicaid due to her income but was told by the  of some type of program that YUSUF isn't familiar with. Pt's goal in rehab is to be able to walk. Pt's daughter would like for pt \" to be able to get well and come home and be able to take care of herself. YUSUF informed pt ands her daughter of the rehab process. YUSUF explained that pt's [employer] insurance determines pt's length of stay and that YUSUF will send an update on 12/6/17 to request additional time. Pt's daughter seems supportive and willing to assist with d/c planning. YUSUF will follow.

## 2017-12-05 NOTE — PROGRESS NOTES
RT met with pt to conduct the initial activity interview. Presented sitting up in w/c, pleasant and cooperative during the interview. Pt confused at times. With minimal prompting pt was able to ID current leisure interests such as community outings w/ family, spiritual/ Temple services, reading magazines/ newspaper, watching TV/ movies, and pets. With prompting from RT pt was able to ID work, outdoor activities, and Bingo as past leisure interests. Educated pt on leisure cabinet and unit activities. Pt agreeable to pet therapy. RT provided pt with reading materials. Will transport pt to activities of interest in the dining room.

## 2017-12-05 NOTE — PROGRESS NOTES
conducted a Follow up consultation and Spiritual Assessment for Sara Barrios, who is a 80 y.o.,female. The  provided the following Interventions:  Continued the relationship of care and support. Listened empathically. Offered prayer and assurance of continued prayer on patients behalf. Chart reviewed. The following outcomes were achieved:  Patient expressed gratitude for 's visit. Assessment:  There are no spiritual or Pentec[employer]al issues which require intervention at this time. Plan:  Chaplains will continue to follow and will provide pastoral care on an as needed/requested basis.  recommends bedside caregivers page  on duty if patient shows signs of acute spiritual or emotional distress. Fernando Benz M.Div.   [facility]  [phone]

## 2017-12-05 NOTE — PROGRESS NOTES
ACTIVITIES/INTERESTS      X? List of activities prior to admission:  Work (factory job), outdoor activities (gardening),   [address] activities, Bingo      X? Activities offered to resident:  Activity calendar, pet therapy, music, leisure cabinet/ unit activities     X? Activities Calendar     X? Barriers to activities:    Limited mobility       X?     Interventions:  Transport pt by w/c to activities of interest in the dining room

## 2017-12-06 PROCEDURE — 74011250637 HC RX REV CODE- 250/637: Performed by: INTERNAL MEDICINE

## 2017-12-06 RX ADMIN — DONEPEZIL HYDROCHLORIDE 5 MG: 5 TABLET, FILM COATED ORAL at 10:06

## 2017-12-06 RX ADMIN — METOPROLOL SUCCINATE 25 MG: 25 TABLET, EXTENDED RELEASE ORAL at 10:05

## 2017-12-06 RX ADMIN — POLYETHYLENE GLYCOL 3350 17 G: 17 POWDER, FOR SOLUTION ORAL at 10:04

## 2017-12-06 RX ADMIN — SIMVASTATIN 40 MG: 40 TABLET, FILM COATED ORAL at 21:01

## 2017-12-06 RX ADMIN — LISINOPRIL 20 MG: 20 TABLET ORAL at 10:05

## 2017-12-06 RX ADMIN — FLUTICASONE PROPIONATE 2 SPRAY: 50 SPRAY, METERED NASAL at 10:06

## 2017-12-06 RX ADMIN — FAMOTIDINE 20 MG: 20 TABLET ORAL at 10:06

## 2017-12-06 RX ADMIN — AMLODIPINE BESYLATE 5 MG: 5 TABLET ORAL at 10:06

## 2017-12-06 RX ADMIN — CLOPIDOGREL BISULFATE 75 MG: 75 TABLET ORAL at 10:05

## 2017-12-06 RX ADMIN — VITAMIN D, TAB 1000IU (100/BT) 2000 UNITS: 25 TAB at 10:06

## 2017-12-06 NOTE — PROGRESS NOTES
Problem: Self Care Deficits Care Plan (Adult)  Goal: *Acute Goals and Plan of Care (Insert Text)  OCCUPATIONAL THERAPY SHORT TERM GOALS    Initiated 12/1/2017 and to be accomplished within 2 Week(s)    1. Patient will perform Upper body ADL's without adaptive equipment with moderate assistance . 2.  Patient will perform Lower body ADL's with adaptive equipment as needed with maximal assistance . 3. Patient will perform toileting task with maximal assistance with Good safety to reduce falls risk. 4.  Patient will perform toilet transfers with Rolling Walker and moderate assistance . 5. Patient will perform standing static/dynamic balance activities for improved ADL/IADL function with moderate assistance and Good balance and safety awareness. 6.  Patient will improve Barthel index scores to atleast 35/100 to improve functional mobility. 7. Patient will perform self feeding and set up with adaptive equipment with Mod I.      OCCUPATIONAL THERAPY LONG TERM GOALS   Initiated 12/1/2017 and to be accomplished within 4 Week(s)    1. Patient will perform Upper body ADL's without adaptive equipment with modified independence. 2.  Patient will perform Lower body ADL's with adaptive equipment as needed with modified independence . 3. Patient will perform toileting task with modified independence with Good safety to reduce falls risk. 4.  Patient will perform all functional transfers utilizing least restrictive device and durable medical equipment as needed with modified independence and Good balance and safety awareness. 5.  Patient will perform standing static/dynamic activity for improved ADL/IADL function with modified independence an and Good balance and safety awareness. 6.  Patient will improve Barthel index score to 95/100 to improve independence with mobility.       Therapist: Jose Friedman    12/1/2017          [facility]   Occupational Therapy Daily Treatment note      Patient: Glen Corrigan (44 y.o. female)       Date: 12/6/2017  Attending Physician: Josias Pierre MD  Primary Diagnosis: Traumatic rhabdomyolysis     Treatment Diagnosis  Treatment Diagnosis: muscle weakness   Treatment Diagnosis 2: difficulty in walking   Precautions : Precautions at Admission: Fall  Vital Signs:        Cognitive Status:     Pain:  Pain Screen  Pain Scale 1: Numeric (0 - 10)  Pain Intensity 1: 0  Patient Stated Pain Goal: 0  Pain Scale 1: Numeric (0 - 10)  Gross Assessment:     Coordination:     Bed Mobility:  Bed Mobility  Supine to Sit: Moderate assistance  Transfers:  Functional Transfers  Sit to Stand: Moderate assistance  Bed to Chair: Moderate assistance;Assist x1     Balance:  Balance  Sitting: With support  Sitting - Static: Fair (occasional)  Sitting - Dynamic: Poor (constant support)  Standing: With support  Standing - Static: Fair  Standing - Dynamic : Poor  ADL Self Care:     ADL Intervention:                 Lower Body Dressing Assistance  Pants With Elastic Waist: Total assistance(dependent)  Feeding  Container Management: Moderate assistance  Cutting Food: Stand-by assistance  Utensil Management: Modified independent  Food to Mouth: Modified independent  Drink to Mouth: Modified independent  Cues: Verbal cues provided      First OT tx session: Self feeding tasks w/ verbal cues to initiate set up e.g. Cutting food w/ knife, opening sugar and salt packets to season food w/ good carryover, patient perseverative regarding whether therapist and CNA had eaten and continuosly offering her food despite max redirection the patient's breakfast was for her consumption only, patient demonstrates Mod I w/ self feeding and beverage retrieval w/ Mod A for container mgmt e.g. Opening drink cup tops, therapist to return once patient completes her meal in order to increase patient's focus and consumption. Therapeutic Activities:   Following UE THERE EX w/ pulley system, patient tolerated BUE reaching, RUE as tolerated, crossing midline and overhead for placement of pegs into vertical peg board while seated w/c level in order to improve functional reach for improved ADL performance skills and functional transfers. Therapeutic Exercises:  UE Pulleys, poor tolerance w/ Flexion II, good tolerance w/ Flexion I seated w/c level w/ upright posture in order to improve AAROM RUE 2/2 pain s/p fall for improved functional reach for ADL performance skills. Patient/Caregiver Education:    Pt.  Education on safety w/ transitional movements and correct hand/feet placement during functional transfer from edge of bed to w/c w/ FWW in order to prevent falls.     ASSESSMENT:  Patient continues to demonstrate the need for skilled Occupational Therapy services to improve   grooming, bathing, upper body dressing, lower body dressing, toileting and toilet transfer  Progression toward goals:  [x]      Improving appropriately and progressing toward goals  []      Improving slowly and progressing toward goals  []      Not making progress toward goals and plan of care will be adjusted     Treatment session:    minutes    Therapist:    Heidi Stephenson,  12/6/2017

## 2017-12-06 NOTE — PROGRESS NOTES
YUSUF faxed clinical update [place] [facility] [place] of [place]'s [facility] stay.

## 2017-12-06 NOTE — PROGRESS NOTES
Problem: Mobility Impaired (Adult and Pediatric)  Goal: *Acute Goals and Plan of Care (Insert Text)  PHYSICAL THERAPY STG GOALS :  Initiated 12/1/2017 and to be accomplished within 1-2 Weeks    1. Patient will move from supine to sit and sit to supine  and scoot up and down in bed with modified independence. 2.  Patient will transfer from bed to chair and chair to bed with stand by assistance using RW. 3.  Patient will perform sit to stand with stand by assistance with Good balance and safety awareness. 4.  Patient will ambulate with stand by assistance/contact guard assist for 50 feet with RW on level surfaces with 2 turns. 5.  Patient will ascend/descend 5 stairs with bilateral handrail(s) with minimal assistance/contact guard assist to allow for safe home access/exit. 6.  Patient will improve standardized test score for [address] Balance Scale 3      PHYSICAL THERAPY LTG GOALS :  Initiated 12/1/2017 and to be accomplished within 3-4 Weeks    1. Patient will transfer from bed to chair and chair to bed with supervision/set-up using RW. 2.  Patient will perform sit to stand with supervision/set-up with Good balance and safety awareness. 3.  Patient will ambulate with supervision/set-up for 120 feet with RW on level surfaces and be able to maneuver through narrow spaces and obstacles without loss of balance. 4.  Patient will ascend/descend 5 stairs with bilateral handrail(s) with supervision/stand by assistance/set-up to allow for safe home access/exit. 5.  Patient will improve standardized test score for [employer] Balance Scale 4.       Physical Therapist:   Lu Howard  on 12/1/2017            [facility]   physical Therapy Daily Treatment note      Patient: Laxmi Pugh (78 y.o. female)               Date: 12/6/2017    Physician: Kt Graves MD  Primary Diagnosis: Traumatic rhabdomyolysis           Treatment Diagnosis  Treatment Diagnosis: muscle weakness   Treatment Diagnosis 2: difficulty in walking  Precautions: Fall  Vital Signs  Cognitive Status:  Mental Status  Neurologic State: Alert  Orientation Level: Oriented to person  Pain  Pain Screen  Pain Scale 1: Numeric (0 - 10)  Pain Intensity 1: 0  Patient Stated Pain Goal: 0  Bed Mobility Training  Bed Mobility Training  Supine to Sit: Moderate assistance  Balance  Sitting: With support  Sitting - Static: Fair (occasional)  Sitting - Dynamic: Poor (constant support)  Standing: With support  Standing - Static: Fair (((-)))  Standing - Dynamic : Poor  Transfer Training  Transfer Training  Sit to Stand: Moderate assistance  Stand to Sit: Minimum assistance  Bed to Chair: Moderate assistance;Assist x1  Interventions: Safety awareness training;Verbal cues; Tactile cues  Sit to Stand: Moderate assistance  Gait Training  Gait  Base of Support: Center of gravity altered  Speed/My: Pace decreased (<100 feet/min)  Step Length: Right shortened;Left shortened  Gait Abnormalities: Decreased step clearance  Ambulation - Level of Assistance: Minimal assistance  Distance (ft): 8 Feet (ft)  Assistive Device: Gait belt;Walker, rolling  Interventions: Safety awareness training;Verbal cues; Tactile cues  Gait Abnormalities: Decreased step clearance    With 0 turns. Therapeutic Exercise: Sit>stand with Mod A and cues for hand placement. Upon standing, pt with strong posterior lean. Pt stated \"I'm so tired\". Pt required much encouragement for ambulation. Pt ambulated 8ft and then requested to sit. Pt reported feeling too fatigued to continue. Upon resting ~5'. Pt continued to decline additional ambulation. Pt stated \"I just can't do it\". Seated B LE TE's rendered to promote strength and endurance for improved functional mobility: HR/TR, LAQ, hip flexion, ball squeezes, hip abd (manual resistance) x10. Pt required additional time for all TE's today with prolonged rest breaks.        Patient/Caregiver Education:   Pt [place] Education on safety and fall prevention was provided to reduce risk of falls. ASSESSMENT:  Patient continues to benefit from Skilled PT services to improve bed mobility, sit<>stand, tranfers, strength, balance, gait and stair negotiation. Progression toward goals:  []      Improving appropriately and progressing toward goals  [x]      Improving slowly and progressing toward goals  []      Not making progress toward goals and plan of care will be adjusted     Treatment session: 35 minutes.   Therapist:   Lan Huerta PTA,          12/6/2017

## 2017-12-06 NOTE — PROGRESS NOTES
[facility]     Patient: Rashida Gautam MRN: [id number]  CSN: [id number]    YOB: 1920  Age: 80 y.o. Sex: female    DOA: 11/30/2017 LOS:  LOS: 6 days                    Subjective:     Pt with UTI and dementia with exacerbation. Alert now and comfortable. Still physically impaired from therapy    Objective:      Visit Vitals    /90    Pulse 61    Temp 98.1 °F (36.7 °C)    Resp 18    Ht 4' 6\" (1.372 m)    Wt 54.4 kg (120 lb)    SpO2 98%    Breastfeeding No    BMI 28.93 kg/m2       Physical Exam:  Chest clear  Cor rr  abd soft  No eema    Intake and Output:  Current Shift:     Last three shifts:       No results found for this or any previous visit (from the past 24 hour(s)).     Current Facility-Administered Medications   Medication Dose Route Frequency    pneumococcal 23-valent (PNEUMOVAX 23) injection 0.5 mL  0.5 mL IntraMUSCular PRIOR TO DISCHARGE    amLODIPine (NORVASC) tablet 5 mg  5 mg Oral DAILY    cyanocobalamin (VITAMIN B12) injection 1,000 mcg  1,000 mcg IntraMUSCular Q30D    acetaminophen (TYLENOL) tablet 500 mg  500 mg Oral Q4H PRN    clopidogrel (PLAVIX) tablet 75 mg  75 mg Oral DAILY    donepezil (ARICEPT) tablet 5 mg  5 mg Oral DAILY    famotidine (PEPCID) tablet 20 mg  20 mg Oral DAILY    fluticasone (FLONASE) 50 mcg/actuation nasal spray 2 Spray  2 Spray Both Nostrils DAILY    lisinopril (PRINIVIL, ZESTRIL) tablet 20 mg  20 mg Oral DAILY    metoprolol succinate (TOPROL-XL) XL tablet 25 mg  25 mg Oral DAILY    nitroglycerin (NITROSTAT) tablet 0.4 mg  0.4 mg SubLINGual Q5MIN PRN    polyethylene glycol (MIRALAX) packet 17 g  17 g Oral DAILY    simvastatin (ZOCOR) tablet 40 mg  40 mg Oral QHS    cholecalciferol (VITAMIN D3) tablet 2,000 Units  2,000 Units Oral DAILY    [address] T[facility] ANESTHESIA   Other PRN    [address] T[facility] EMERGENCY/STAT BOX   Other PRN       Lab Results   Component Value Date/Time    Glucose 115 12/04/2017 07:00 AM    Glucose 84 11/28/2017 03:50 AM    Glucose 98 11/27/2017 06:35 AM    Glucose 107 11/26/2017 03:35 AM    Glucose 133 11/25/2017 06:17 PM        Assessment/Plan     Active Problems:    * No active hospital problems.  Brian Joiner MD  12/6/2017, 10:58 AM

## 2017-12-06 NOTE — PROGRESS NOTES
Bedside shift change report given to TIFFANY Perkins RN (oncoming nurse) by Manfred Martinez. Tomeka Pollard RN (offgoing nurse). Report included the following information SBAR, Kardex and MAR.

## 2017-12-06 NOTE — PROGRESS NOTES
Late entry: Pt participated in the music with the Guitarist activity for the duration of 60 minutes.

## 2017-12-07 PROCEDURE — 74011250637 HC RX REV CODE- 250/637: Performed by: INTERNAL MEDICINE

## 2017-12-07 RX ORDER — FUROSEMIDE 20 MG/1
20 TABLET ORAL DAILY
Status: DISCONTINUED | OUTPATIENT
Start: 2017-12-08 | End: 2017-12-13

## 2017-12-07 RX ORDER — POTASSIUM CHLORIDE 750 MG/1
10 TABLET, FILM COATED, EXTENDED RELEASE ORAL DAILY
Status: DISCONTINUED | OUTPATIENT
Start: 2017-12-08 | End: 2017-12-24 | Stop reason: HOSPADM

## 2017-12-07 RX ADMIN — DONEPEZIL HYDROCHLORIDE 5 MG: 5 TABLET, FILM COATED ORAL at 09:22

## 2017-12-07 RX ADMIN — LISINOPRIL 20 MG: 20 TABLET ORAL at 09:21

## 2017-12-07 RX ADMIN — FLUTICASONE PROPIONATE 2 SPRAY: 50 SPRAY, METERED NASAL at 09:22

## 2017-12-07 RX ADMIN — CLOPIDOGREL BISULFATE 75 MG: 75 TABLET ORAL at 09:22

## 2017-12-07 RX ADMIN — SIMVASTATIN 40 MG: 40 TABLET, FILM COATED ORAL at 22:00

## 2017-12-07 RX ADMIN — AMLODIPINE BESYLATE 5 MG: 5 TABLET ORAL at 09:22

## 2017-12-07 RX ADMIN — FAMOTIDINE 20 MG: 20 TABLET ORAL at 09:22

## 2017-12-07 RX ADMIN — METOPROLOL SUCCINATE 25 MG: 25 TABLET, EXTENDED RELEASE ORAL at 09:21

## 2017-12-07 RX ADMIN — VITAMIN D, TAB 1000IU (100/BT) 2000 UNITS: 25 TAB at 09:21

## 2017-12-07 RX ADMIN — POLYETHYLENE GLYCOL 3350 17 G: 17 POWDER, FOR SOLUTION ORAL at 09:22

## 2017-12-07 NOTE — PROGRESS NOTES
Pt participated in the music with the Trippifipist activity for the duration of 60 minutes. Pt was observed to be singing a long with familiar song choices. Pt interacted well with peers.

## 2017-12-07 NOTE — PROGRESS NOTES
Problem: Mobility Impaired (Adult and Pediatric)  Goal: *Acute Goals and Plan of Care (Insert Text)  PHYSICAL THERAPY STG GOALS :  Initiated 12/1/2017 and to be accomplished within 1-2 Weeks    1. Patient will move from supine to sit and sit to supine  and scoot up and down in bed with modified independence. 2.  Patient will transfer from bed to chair and chair to bed with stand by assistance using RW. 3.  Patient will perform sit to stand with stand by assistance with Good balance and safety awareness. 4.  Patient will ambulate with stand by assistance/contact guard assist for 50 feet with RW on level surfaces with 2 turns. 5.  Patient will ascend/descend 5 stairs with bilateral handrail(s) with minimal assistance/contact guard assist to allow for safe home access/exit. 6.  Patient will improve standardized test score for [facility] Standing Balance Scale 3      PHYSICAL THERAPY LTG GOALS :  Initiated 12/1/2017 and to be accomplished within 3-4 Weeks    1. Patient will transfer from bed to chair and chair to bed with supervision/set-up using RW. 2.  Patient will perform sit to stand with supervision/set-up with Good balance and safety awareness. 3.  Patient will ambulate with supervision/set-up for 120 feet with RW on level surfaces and be able to maneuver through narrow spaces and obstacles without loss of balance. 4.  Patient will ascend/descend 5 stairs with bilateral handrail(s) with supervision/stand by assistance/set-up to allow for safe home access/exit. 5.  Patient will improve standardized test score for [employer] Balance Scale 4.       Physical Therapist:   Jennifer Howard  on 12/1/2017            [facility]   physical Therapy Daily Treatment note      Patient: Keturah Bryant (4 y.o. female)               Date: 12/7/2017    Physician: Farida Sosa MD  Primary Diagnosis: Traumatic rhabdomyolysis           Treatment Diagnosis  Treatment Diagnosis: muscle weakness   Treatment Diagnosis 2: difficulty in walking  Precautions: Fall  Vital Signs  Cognitive Status:  Mental Status  Neurologic State: Alert  Orientation Level: Oriented to person;Oriented to place  Pain  Bed Mobility Training  Balance  Sitting: With support  Sitting - Static: Fair (occasional)  Sitting - Dynamic: Fair (occasional)  Standing: With support  Standing - Static: Fair  Standing - Dynamic : Fair  Transfer Training  Transfer Training  Sit to Stand: Contact guard assistance  Stand to Sit: Stand-by asssistance;Contact guard assistance  Interventions: Safety awareness training;Verbal cues  Sit to Stand: Contact guard assistance  Gait Training  Gait  Base of Support: Center of gravity altered  Speed/My: Pace decreased (<100 feet/min)  Step Length: Right shortened;Left shortened  Gait Abnormalities: Decreased step clearance  Ambulation - Level of Assistance: Contact guard assistance  Distance (ft): 52 Feet (ft)  Assistive Device: Gait belt;Walker, rolling  Interventions: Safety awareness training;Verbal cues  Gait Abnormalities: Decreased step clearance   With 2 turns. Therapeutic Exercise: Session One: Sit<>stand from w/c with CGA at . Pt required additional time with verbal cues for hand placement. Static standing x1' with 1 UE support. Seated dynamic balance with reaching slightly forward, lateral and overhead with SBA. Pt reported being tired. Therapist to check back with pt following breakfast.   Session Two: Pt presented sitting in w/c at bedside. Pt confused and perseverating on wanting a to-go bag for her lunch. Pt stated \"I want to take it home with me\", and \"I'm just as poor as everyone else\". Pt reported that the year is 1938. Pt then answered her age at 80. Pt agreeable to walking. Gait training x52ft with close w/c follow for safety and abovementioned details. Static standing x2' with 1 to 2 UE support and SBA with no noted LOB.       Patient/Caregiver Education:   Pt Ryan Royal Education on safety and fall prevention was provided to reduce risk of falls. ASSESSMENT:  Patient continues to benefit from Skilled PT services to improve bed mobility, sit<>stand, tranfers, strength, balance, gait and stair negotiation. Progression toward goals:  []      Improving appropriately and progressing toward goals  [x]      Improving slowly and progressing toward goals  []      Not making progress toward goals and plan of care will be adjusted     Treatment session: 43 minutes.   Therapist:   Naresh Wong PTA,          12/7/2017

## 2017-12-07 NOTE — PROGRESS NOTES
Problem: Self Care Deficits Care Plan (Adult)  Goal: *Acute Goals and Plan of Care (Insert Text)  OCCUPATIONAL THERAPY SHORT TERM GOALS    Initiated 12/1/2017 and to be accomplished within 2 Week(s)    1. Patient will perform Upper body ADL's without adaptive equipment with moderate assistance . 2.  Patient will perform Lower body ADL's with adaptive equipment as needed with maximal assistance . 3. Patient will perform toileting task with maximal assistance with Good safety to reduce falls risk. 4.  Patient will perform toilet transfers with Rolling Walker and moderate assistance . 5. Patient will perform standing static/dynamic balance activities for improved ADL/IADL function with moderate assistance and Good balance and safety awareness. 6.  Patient will improve Barthel index scores to atleast 35/100 to improve functional mobility. 7. Patient will perform self feeding and set up with adaptive equipment with Mod I.      OCCUPATIONAL THERAPY LONG TERM GOALS   Initiated 12/1/2017 and to be accomplished within 4 Week(s)    1. Patient will perform Upper body ADL's without adaptive equipment with modified independence. 2.  Patient will perform Lower body ADL's with adaptive equipment as needed with modified independence . 3. Patient will perform toileting task with modified independence with Good safety to reduce falls risk. 4.  Patient will perform all functional transfers utilizing least restrictive device and durable medical equipment as needed with modified independence and Good balance and safety awareness. 5.  Patient will perform standing static/dynamic activity for improved ADL/IADL function with modified independence an and Good balance and safety awareness. 6.  Patient will improve Barthel index score to 95/100 to improve independence with mobility.       Therapist: Stan Red    12/1/2017          [facility]   Occupational Therapy Daily Treatment note      Patient: Donny Chand (15 y.o. female)       Date: 12/7/2017  Attending Physician: Debbra Litten, MD  Primary Diagnosis: Traumatic rhabdomyolysis     Treatment Diagnosis  Treatment Diagnosis: muscle weakness   Treatment Diagnosis 2: difficulty in walking   Precautions : Precautions at Admission: Fall  Vital Signs:        Cognitive Status:  Mental Status  Neurologic State: Alert  Orientation Level: Oriented to person;Oriented to place  Pain:        Gross Assessment:     Coordination:     Bed Mobility:     Transfers:  Functional Transfers  Sit to Stand: Contact guard assistance  Stand to Sit: Stand-by asssistance;Contact guard assistance     Balance:  Balance  Sitting: With support  Sitting - Static: Fair (occasional)  Sitting - Dynamic: Fair (occasional)  Standing: With support  Standing - Static: Fair  Standing - Dynamic : Fair       Therapeutic Activities:  Partially co-treated with PT for optimal functional mobility needed for ADLS tasks and transfers. Patient demonstrated increased confusion during treatment session today. Functional mobility utilizing RW from patient's room to therapy room in order to increase functional activity tolerance and independence during task. Patient was able to tolerate 52ft prior to requesting to sit. Static standing activity with one hand release in order to increase standing balance and tolerance needed for ADLS. Patient was able to tolerate standing for 3:30 prior to requesting to sit. Patient/Caregiver Education:    Pt. Jacinda Blanton Education on safe hand placement during stand to sit transfers was provided for optimal safety.       ASSESSMENT:  Patient continues to demonstrate the need for skilled Occupational Therapy services to improve dynamic standing balance needed for bathing  Progression toward goals:  [x]      Improving appropriately and progressing toward goals  [x]      Improving slowly and progressing toward goals  []      Not making progress toward goals and plan of care will be adjusted     Treatment session:   42 minutes    Therapist:    LISA Sánchez,  12/7/2017

## 2017-12-07 NOTE — PROGRESS NOTES
SW requested assistance of pt's nurse Lorna Saldaña to complete the MDS interview with pt due to her confusion.

## 2017-12-07 NOTE — PROGRESS NOTES
SW received a fax from [facility] [employer] re: extension request. Pt extended until 12/13/17 and update will be due on 12/13/17. SW left message for pt's daughter on the cell phone and home phone re: extension.

## 2017-12-07 NOTE — ROUTINE PROCESS
Patient's  Daughter request MD to be called to request lasix and potassium to be restarted. Left message at MD office . Awaiting return call.

## 2017-12-08 PROCEDURE — 74011250637 HC RX REV CODE- 250/637: Performed by: INTERNAL MEDICINE

## 2017-12-08 RX ADMIN — LISINOPRIL 20 MG: 20 TABLET ORAL at 10:59

## 2017-12-08 RX ADMIN — METOPROLOL SUCCINATE 25 MG: 25 TABLET, EXTENDED RELEASE ORAL at 11:01

## 2017-12-08 RX ADMIN — FLUTICASONE PROPIONATE 2 SPRAY: 50 SPRAY, METERED NASAL at 11:01

## 2017-12-08 RX ADMIN — POLYETHYLENE GLYCOL 3350 17 G: 17 POWDER, FOR SOLUTION ORAL at 10:56

## 2017-12-08 RX ADMIN — POTASSIUM CHLORIDE 10 MEQ: 750 TABLET, FILM COATED, EXTENDED RELEASE ORAL at 11:01

## 2017-12-08 RX ADMIN — DONEPEZIL HYDROCHLORIDE 5 MG: 5 TABLET, FILM COATED ORAL at 10:59

## 2017-12-08 RX ADMIN — FAMOTIDINE 20 MG: 20 TABLET ORAL at 11:01

## 2017-12-08 RX ADMIN — CLOPIDOGREL BISULFATE 75 MG: 75 TABLET ORAL at 10:57

## 2017-12-08 RX ADMIN — AMLODIPINE BESYLATE 5 MG: 5 TABLET ORAL at 11:01

## 2017-12-08 RX ADMIN — FUROSEMIDE 20 MG: 20 TABLET ORAL at 10:58

## 2017-12-08 RX ADMIN — SIMVASTATIN 40 MG: 40 TABLET, FILM COATED ORAL at 21:28

## 2017-12-08 RX ADMIN — VITAMIN D, TAB 1000IU (100/BT) 2000 UNITS: 25 TAB at 11:01

## 2017-12-08 NOTE — ROUTINE PROCESS
Bedside shift change report given to Keturah Gamez LPN (oncoming nurse) by Yennifer Medellin RN (offgoing nurse). Report included the following information SBAR, Kardex, MAR and Recent Results. VISUALLY CHECKED PT Q 1 HR BY NURSING STAFF. 24 hour order chart check done

## 2017-12-08 NOTE — PROGRESS NOTES
OCCUPATIONAL THERAPY SHORT TERM GOALS      Updated 12/8/17     1. Patient will perform Upper body bathing with contact guard assist and dressing with minimal assist without adaptive equipment. 2.  Patient will perform Lower body bathing with minimal assist and dressing with maximal assist with adaptive equipment as needed. 3.  Patient will perform toileting task with contact guard assist with clothing management and Supervision with hygeine with Good safety to reduce falls risk. 4.  Patient will perform toilet transfers with [place] and standby assistance . 5. Patient will perform standing static/dynamic balance activities for improved ADL/IADL function with moderate assistance and Good balance and safety awareness. 6.  Patient will improve Barthel index scores to atleast 65/100 to improve functional mobility. 7. Patient will perform self feeding and set up with adaptive equipment with Mod I.    Updated 12/8/17    1. Patient will perform Upper body ADL's without adaptive equipment with moderate assistance. - - progressing w/ bathing, met goal with dressing/upgrade  2. Patient will perform Lower body ADL's with adaptive equipment as needed with maximal assistance. - progressing w/ dressing, met goal with bathing/upgrade  3. Patient will perform toileting task with maximal assistance with Good safety to reduce falls risk. - met goal upgrade  4. Patient will perform toilet transfers with Rolling Walker and moderate assistance . - met goal/upgrade  5. Patient will perform standing static/dynamic balance activities for improved ADL/IADL function with moderate assistance and Good balance and safety awareness. -porgressing  6. Patient will improve Barthel index scores to atleast 35/100 to improve functional mobility. -pmet goal/upgrade  7. Patient will perform self feeding and set up with adaptive equipment with Mod I. - progressing    Initiated 12/1/2017 and to be accomplished within 2 Week(s)    1. Patient will perform Upper body ADL's without adaptive equipment with moderate assistance . 2.  Patient will perform Lower body ADL's with adaptive equipment as needed with maximal assistance . 3. Patient will perform toileting task with maximal assistance with Good safety to reduce falls risk. 4.  Patient will perform toilet transfers with Rolling Walker and moderate assistance . 5. Patient will perform standing static/dynamic balance activities for improved ADL/IADL function with moderate assistance and Good balance and safety awareness. 6.  Patient will improve Barthel index scores to atleast 35/100 to improve functional mobility. 7. Patient will perform self feeding and set up with adaptive equipment with Mod I.      OCCUPATIONAL THERAPY LONG TERM GOALS   Initiated 12/1/2017 and to be accomplished within 4 Week(s)    1. Patient will perform Upper body ADL's without adaptive equipment with modified independence. 2.  Patient will perform Lower body ADL's with adaptive equipment as needed with modified independence . 3. Patient will perform toileting task with modified independence with Good safety to reduce falls risk. 4.  Patient will perform all functional transfers utilizing least restrictive device and durable medical equipment as needed with modified independence and Good balance and safety awareness. 5.  Patient will perform standing static/dynamic activity for improved ADL/IADL function with modified independence an and Good balance and safety awareness. 6.  Patient will improve Barthel index score to 95/100 to improve independence with mobility.       Therapist: Jose Roberto Yip    12/1/2017           [facility]   PHYSICAL THERAPY WEEKLY PROGRESS REPORT  Reporting Period:  Date:  12/1/17to 12/8/17      Patient: Maria Fernanda Men (37 y.o. female)                         Date: 12/8/2017    Primary Diagnosis: Traumatic rhabdomyolysis       Attending Physician: Angie Quinonez MD Treatment Diagnosis  Treatment Diagnosis: muscle weakness   Treatment Diagnosis 2: difficulty in walking  Precautions:  Fall  Rehab Potential : Fair:    Skill interventions and education provided with clinical rationale (include individualized treatment techniques and standardized tests):   Skilled Physical Therapy services were provided with TA to promote greater indpendence with bed mobility, and transfers, TE to build strength and endurance for improved functional mobility, Gait training to address deficits and allow pt to return to PLOF, Stair training to allow pt to safely return home upon DC, Neuromuscular reeducation of movement, coordination and balance for reduced risk of falls. Using a comparative statement, summarize significant progress toward goals as a result of skilled intervention provided:  Patient has made Fair progress towards their Physical Therapy goals in the areas of transfers, sit<>stand, gait and static balance. Identify remaining functional areas, impairments limiting progress and/or barriers to improvement:  Patient would benefit from continues PT services to address the following functional deficits in bed mobility, dynamic balance, gait and stair negotiation. Pt is limited by decreased endurance and confusion.      OBJECTIVE DATA SUMMARY:     INITIAL ASSESSMENT WEEKLY ASSESSMENT   COGNITIVE STATUS COGNITIVE STATUS   Neurologic State: Confused  Orientation Level: Oriented to person  Cognition: Follows commands  Perception: Appears intact  Perseveration: No perseveration noted  Safety/Judgement: Fall prevention Neurologic State: Alert  Orientation Level: Oriented to person, Oriented to place  Cognition: Follows commands  Perception: Appears intact  Perseveration: No perseveration noted  Safety/Judgement: Fall prevention   PAIN PAIN   Pain Scale 1: Numeric (0 - 10)  Pain Intensity 1: 0  Patient Stated Pain Goal: 0  Pain Reassessment 1: Patient sleeping Pain Scale 1: Numeric (0 - 10)  Pain Intensity 1: 0  Patient Stated Pain Goal: 0  Pain Reassessment 1: Yes   GROSS ASSESSMENT GROSS ASSESSMENT   AROM: Generally decreased, functional  PROM: Generally decreased, functional  Strength: Generally decreased, functional (RUE 3-/5, LUE 3/5)  Coordination: Generally decreased, functional  Tone: Normal  Sensation: Intact AROM: Generally decreased, functional  PROM: Generally decreased, functional  Strength: Generally decreased, functional (4-/5 through LLE, 3 through out RLE ; limited due to pain)  Coordination: Generally decreased, functional  Tone: Normal  Sensation: Intact   BED MOBILITY BED MOBILITY   Supine to Sit: Contact guard assistance, Minimum assistance (Merissa for initiating sitting up )  Sit to Supine: Maximum assistance, Assist x2  Scooting: Moderate assistance Supine to Sit: Moderate assistance  Sit to Supine: Maximum assistance, Assist x2  Scooting:  Moderate assistance   GAIT GAIT   Base of Support: Center of gravity altered  Speed/My: Slow  Step Length: Right shortened, Left shortened  Gait Abnormalities: Decreased step clearance, Shuffling gait, Other (thoracic kyphosis )  Ambulation - Level of Assistance: Contact guard assistance, Minimal assistance  Distance (ft): 25 Feet (ft) (x2)  Assistive Device: Gait belt, Walker, rolling  Rail Use: Both  Stairs - Level of Assistance: Minimum assistance, Contact guard assistance  Number of Stairs Trained: 3 (4\" steps)  Interventions: Safety awareness training, Verbal cues, Tactile cues Base of Support: Center of gravity altered  Speed/My: Pace decreased (<100 feet/min)  Step Length: Right shortened, Left shortened  Gait Abnormalities: Decreased step clearance  Ambulation - Level of Assistance: Contact guard assistance  Distance (ft): 24 Feet (ft)  Assistive Device: Gait belt, Walker, rolling  Rail Use: Both  Stairs - Level of Assistance: Minimum assistance, Contact guard assistance  Number of Stairs Trained: 3 (4\" steps)  Interventions: Safety awareness training, Verbal cues                     TRANSFERS TRANSFERS   Sit to Stand: Contact guard assistance, Minimum assistance  Stand to Sit: Contact guard assistance, Minimum assistance  Stand Pivot Transfers: Minimum assistance  Bed to Chair: Maximum assistance, Assist x2 (w/c to bed) Sit to Stand: Contact guard assistance, Additional time  Stand to Sit: Stand-by asssistance, Contact guard assistance  Stand Pivot Transfers: Minimum assistance  Bed to Chair: Contact guard assistance   BALANCE BALANCE   Sitting: With support  Sitting - Static: Fair (occasional) ((-))  Sitting - Dynamic: Poor (constant support)  Standing: With support  Standing - Static: Poor ((-))  Standing - Dynamic :  (not tested) Sitting: With support  Sitting - Static: Fair (occasional)  Sitting - Dynamic: Fair (occasional)  Standing: With support  Standing - Static: Fair  Standing - Dynamic : Fair   WHEELCHAIR MOBILITY/MGMT WHEELCHAIR MOBILITY/MGMT         Activity Tolerance:  Fair Activity Tolerance: Fair   Visual/Perceptual          Visual/Perceptual            Auditory:   Auditory Impairment: None    Auditory:   Auditory  Auditory Impairment: None       Steps: both   Clinical Decision making:  [place] standing balance score: 2+ Clinical Decision making:  [place] standing balance score:2+     Treatment:  Sti<>stand x3 with CGA and additional time and effort. Pt required verbal cues 100% of the time for hand placement. Gait training x24 ft with RW and CGA. Pt reported feeling very tired and stated \"I don't know if I can do this\". Pt required prolonged rest break, but agreeable to stair training. Pt negotiated 3 4\" steps with B HR and Min A with verbal cues for sequencing and safety. Static standing balance x5'10\" with 1 UE support and SBA with no noted LOB.  Seated B LE TE rendered to promote strength and endurance for improved functional mobility: HR/TR, LAQ, hip flexion x10 with verbal and visual cues for proper technique. Patient's response to treatment rendered:  Fair     Patient expected Discharge Location:  []Private Residence  [] [facility]  []LTC  [x]Other: TBD    Plan: Continue Skilled PT services as established by the Plan of Care for 3-6 times a week. PT and Assistant have had a weekly case conference regarding the above treatment:  [x] Yes     [] No       Treatment session:  53 minutes. Therapist: Yung Johnson PTA       Date:12/8/2017  Forward to PT for co-signature when completed.

## 2017-12-08 NOTE — PROGRESS NOTES
Problem: Self Care Deficits Care Plan (Adult)  Goal: *Acute Goals and Plan of Care (Insert Text)  OCCUPATIONAL THERAPY SHORT TERM GOALS      Updated 12/8/17     1. Patient will perform Upper body bathing with contact guard assist and dressing with minimal assist without adaptive equipment. 2.  Patient will perform Lower body bathing with minimal assist and dressing with maximal assist with adaptive equipment as needed. 3.  Patient will perform toileting task with contact guard assist with clothing management and Supervision with hygeine with Good safety to reduce falls risk. 4.  Patient will perform toilet transfers with Lavaun Mince and standby assistance . 5. Patient will perform standing static/dynamic balance activities for improved ADL/IADL function with moderate assistance and Good balance and safety awareness. 6.  Patient will improve Barthel index scores to atleast 35/100 to improve functional mobility. 7. Patient will perform self feeding and set up with adaptive equipment with Mod I.    Updated 12/8/17    1. Patient will perform Upper body ADL's without adaptive equipment with moderate assistance. - - progressing w/ bathing, met goal with dressing/upgrade  2. Patient will perform Lower body ADL's with adaptive equipment as needed with maximal assistance. - progressing w/ dressing, met goal with bathing/upgrade  3. Patient will perform toileting task with maximal assistance with Good safety to reduce falls risk. - met goal upgrade  4. Patient will perform toilet transfers with Rolling Walker and moderate assistance . - met goal/upgrade  5. Patient will perform standing static/dynamic balance activities for improved ADL/IADL function with moderate assistance and Good balance and safety awareness. -porgressing  6. Patient will improve Barthel index scores to atleast 35/100 to improve functional mobility. -progressing  7.  Patient will perform self feeding and set up with adaptive equipment with Mod I. - progressing  Initiated 12/1/2017 and to be accomplished within 2 Week(s)    1. Patient will perform Upper body ADL's without adaptive equipment with moderate assistance . 2.  Patient will perform Lower body ADL's with adaptive equipment as needed with maximal assistance . 3. Patient will perform toileting task with maximal assistance with Good safety to reduce falls risk. 4.  Patient will perform toilet transfers with Rolling Walker and moderate assistance . 5. Patient will perform standing static/dynamic balance activities for improved ADL/IADL function with moderate assistance and Good balance and safety awareness. 6.  Patient will improve Barthel index scores to atleast 35/100 to improve functional mobility. 7. Patient will perform self feeding and set up with adaptive equipment with Mod I.      OCCUPATIONAL THERAPY LONG TERM GOALS   Initiated 12/1/2017 and to be accomplished within 4 Week(s)    1. Patient will perform Upper body ADL's without adaptive equipment with modified independence. 2.  Patient will perform Lower body ADL's with adaptive equipment as needed with modified independence . 3. Patient will perform toileting task with modified independence with Good safety to reduce falls risk. 4.  Patient will perform all functional transfers utilizing least restrictive device and durable medical equipment as needed with modified independence and Good balance and safety awareness. 5.  Patient will perform standing static/dynamic activity for improved ADL/IADL function with modified independence an and Good balance and safety awareness. 6.  Patient will improve Barthel index score to 95/100 to improve independence with mobility.       Therapist: Ang Banuelos    12/1/2017           [facility]  OCCUPATIONAL THERAPY WEEKLY SUMMARY   Reporting period:  from 12/1/17 through 12/8/17       Patient: Sharon Singh (18 y.o. female)   Date: 12/8/2017    Primary Diagnosis: Traumatic rhabdomyolysis        Attending Physician: Yael Phillips MD Treatment Diagnosis  Treatment Diagnosis: muscle weakness   Treatment Diagnosis 2: difficulty in walking  Precautions: Fall  Rehab Potential : Good    Skill interventions and education provided with clinical rationale (include individualized treatment techniques and standardized tests):  Skilled Occupational services were provided utilizing therapeutic activities, therapeutic exercise, adl retraining and w/c mgmt to increase independence w/ self-feeding and increase out of bed activity for increased quality of life. Using a comparative statement, summarize significant progress toward goals as a result of skilled intervention provided:  Patient has made Good progress towards their Occupational Therapy goals in the following areas: self-feeding, grooming, bathing, upper body dressing, lower body dressing, toileting and toilet transfer using FWW. Identify remaining functional areas, impairments limiting progress and/or barriers to improvement:  Patient would benefit from continued skilled Occupational Therapy Services to address the following functional deficits in balance, safety awareness, strength, coordination, ROM and functional activity tolerance in order to improve ADL performance skills and functional transfers for self care tasks.          OBJECTIVE DATA SUMMARY:       INITIAL ASSESSMENT WEEKLY PROGRESS   COGNITIVE STATUS: COGNITIVE STATUS:   Neurologic State: Confused  Orientation Level: Oriented to person  Cognition: Follows commands  Perception: Appears intact  Perseveration: No perseveration noted  Safety/Judgement: Fall prevention Neurologic State: Alert  Orientation Level: Oriented to person, Oriented to place  Cognition: Follows commands  Perception: Appears intact  Perseveration: No perseveration noted  Safety/Judgement: Fall prevention   PAIN: PAIN:   Pain Scale 1: Numeric (0 - 10)  Pain Intensity 1: 0  Patient Stated Pain Goal: 0  Pain Reassessment 1: Patient sleeping     Pain Scale 1: Numeric (0 - 10)  Pain Intensity 1: 0  Patient Stated Pain Goal: 0  Pain Reassessment 1: Yes   BED MOBILITY BED MOBILITY   Supine to Sit: Contact guard assistance, Minimum assistance (Merissa for initiating sitting up )  Sit to Supine: Maximum assistance, Assist x2  Scooting: Moderate assistance Supine to Sit: Moderate assistance  Sit to Supine: Maximum assistance, Assist x2  Scooting: Moderate assistance   ADL SELF CARE ADL SELF CARE     Bathing Assistance: Stand-by assistance  Position Performed:  (seated on commode)    Dressing Assistance: Maximum assistance  Pullover Shirt: Moderate assistance    Bathing Assistance: Total assistance(dependent)  Perineal  : Moderate assistance  Position Performed: Standing  Adaptive Equipment: Edith Estrin bar    Toileting Assistance: Total assistance(dependent)  Bladder Hygiene: Stand-by assistance  Bowel Hygiene: Total assistance (dependent)  Clothing Management: Minimum assistance  Adaptive Equipment: Grab bars, Walker    Grooming Assistance: Minimum assistance  Washing Face: Stand-by assistance  Washing Hands: Contact guard assistance (in stance w/ FWW at sinkside)  Brushing/Combing Hair: Supervision/set-up    Dressing Assistance: Total assistance(dependent)  Underpants: Total assistance (dependent)  Pants With Elastic Waist: Total assistance(dependent)  Pants With Button/Zipper: Total assistance (dependent)  Socks: Total assistance (dependent)    Feeding Assistance: Supervision/set-up  Container Management: Maximum assistance  Cutting Food:  Total assistance (dependent)  Utensil Management: Supervision/set-up  Food to Mouth: Supervision/set-up (verbal cues to initiate)  Drink to Mouth: Supervision/set-up (verbal cues to initiate)  Cues: Verbal cues provided   TRANSFERS TRANSFERS   Sit to Stand: Contact guard assistance, Minimum assistance  Stand to Sit: Contact guard assistance, Minimum assistance  Bed to Chair: Maximum assistance, Assist x2 (w/c to bed)  Toilet Transfer : Other (comment) (patient refused) Sit to Stand: Contact guard assistance  Stand to Sit: Stand-by asssistance, Contact guard assistance  Bed to Chair: Contact guard assistance  Toilet Transfer : Contact guard assistance   Toilet Transfer : Other (comment) (patient refused)  Adaptive Equipment: Charity Brighter (comment), Wheelchair, Grab bars Toilet Transfer : Contact guard assistance  Adaptive Equipment: Walker (comment)   BALANCE BALANCE   Sitting: With support  Sitting - Static: Fair (occasional) ((-))  Sitting - Dynamic: Poor (constant support)  Standing: With support  Standing - Static: Poor ((-))  Standing - Dynamic :  (not tested) Sitting: With support  Sitting - Static: Fair (occasional)  Sitting - Dynamic: Fair (occasional)  Standing: With support  Standing - Static: Fair  Standing - Dynamic : Fair       GROSS ASSESSMENT  GROSS ASSESSMENT   AROM: Generally decreased, functional  PROM: Generally decreased, functional  Strength: Generally decreased, functional (RUE 3-/5, LUE 3/5)  Coordination: Generally decreased, functional  Tone: Normal  Sensation: Intact AROM: Generally decreased, functional  PROM: Generally decreased, functional  Strength: Generally decreased, functional (4-/5 through LLE, 3 through out RLE ; limited due to pain)  Coordination: Generally decreased, functional  Tone: Normal  Sensation: Intact   COORDINATION COORDINATION   Fine Motor Skills-Upper: Left Intact, Right Intact  Gross Motor Skills-Upper: Left Intact, Right Intact Fine Motor Skills-Upper: Left Intact, Right Intact  Gross Motor Skills-Upper: Left Intact, Right Intact   VISUAL/PERCEPTUAL VISUAL/PERCEPTUAL             AUDITORY: AUDITORY:   Auditory Impairment: None Auditory Impairment: None       INSTRUMENTAL  ADL'S:    INSTRUMENTAL ADL'S:          THE BARTHEL INDEX  ACTIVITY   SCORE   FEEDING  0=unable  5=needs help cutting,spreading butter,etc., or modified diet  10= independent 5   BATHING  0=dependent  5=independent (or in shower   0   GROOMING  0=needs help  5=independent face/hair/teeth/shaving (implements provided)   0   DRESSING  0=dependent  5=needs help but can do about half unaided  10=independent(including buttons, zips,laces etc.)   5   BOWELS  0=incontinent  5=occasional accident  10=continent   5   BLADDER  0=incontinent, or catheterized and unable to manage alone  5=occasional accident  10=continent   5   TOILET USE  0=dependent  5=needs some help, but can do something alone  10=independent (on and off, dressing, wiping)   5   TRANSFER (BED TO CHAIR AND BACK)  0=unable, no sitting balance  5=major help(one or two people,physical), can sit  10=minor help(verbal or physical)  15=independent   10   MOBILITY (ON LEVEL SURFACES)  0=immobile or <50 yards  5=wheelchair independent,including corners,>50 yards  10=walkes with help of one person (verbal or physical) >50 yards  15=independent(but may use any aid; for example, stick) >50 yards   10   STAIRS  0=unable  5=needs help (verbal, physical, carrying aid)  10=independent   0            TOTAL:                  45/100     Treatment:  Toilet transfer w/ FWW & CGA, toilet tasks: clothing mgmt: CGA to hike pants and adult brief (pull up style) down over hips and dep to hike back over hips, SBA w/ perihygeine and CGA washing hands in stance w/ FWW at sink side. Patient required max verbal cues to initiate self feeding and dep for cutting food, verbal cues to initiate set up w/ opening small salt packet and season food,nursing/Maddie notified of patient need for assist w/ set up and verbal cues to initiate for increased po intake, nursing/Maddie reports patient ate a big breakfast and may not feel hungry. Patient participated in [facility] HTI reaching e.g. Crossing midline and overhead for placement of graded resistive clamps onto dowel tree, patient demonstrates good participation with increased alertness and sequencing skills.  Patient participated in review of O.T. plan of care and progress towards goals w/ completion of weekly progress note for client centered approach. Patient's response to Treatment rendered:  Good participation in OT tx session w/ progress noted for toilet transfer w/ FWW. Patient expected Discharge Location:  [x]Private Residence  [] [facility]/IL[facility]  []LTC  []Other:    Plan: Continue OT services as established on the Plan of Care for 3-6x per week times a week.     Treatment Minutes:    OT and Assistant have had a weekly case conference regarding the above treatment:  [x] Yes     [] No    Therapist:   Jose Friedman,         Date:12/8/2017     Forward to OT for co-signature when completed

## 2017-12-09 PROCEDURE — 77030011256 HC DRSG MEPILEX <16IN NO BORD MOLN -A

## 2017-12-09 PROCEDURE — 74011250637 HC RX REV CODE- 250/637: Performed by: INTERNAL MEDICINE

## 2017-12-09 RX ADMIN — CLOPIDOGREL BISULFATE 75 MG: 75 TABLET ORAL at 09:41

## 2017-12-09 RX ADMIN — DONEPEZIL HYDROCHLORIDE 5 MG: 5 TABLET, FILM COATED ORAL at 09:40

## 2017-12-09 RX ADMIN — AMLODIPINE BESYLATE 5 MG: 5 TABLET ORAL at 09:41

## 2017-12-09 RX ADMIN — POTASSIUM CHLORIDE 10 MEQ: 750 TABLET, FILM COATED, EXTENDED RELEASE ORAL at 09:41

## 2017-12-09 RX ADMIN — POLYETHYLENE GLYCOL 3350 17 G: 17 POWDER, FOR SOLUTION ORAL at 09:37

## 2017-12-09 RX ADMIN — FLUTICASONE PROPIONATE 2 SPRAY: 50 SPRAY, METERED NASAL at 09:41

## 2017-12-09 RX ADMIN — METOPROLOL SUCCINATE 25 MG: 25 TABLET, EXTENDED RELEASE ORAL at 09:40

## 2017-12-09 RX ADMIN — SIMVASTATIN 40 MG: 40 TABLET, FILM COATED ORAL at 22:00

## 2017-12-09 RX ADMIN — FAMOTIDINE 20 MG: 20 TABLET ORAL at 09:41

## 2017-12-09 RX ADMIN — LISINOPRIL 20 MG: 20 TABLET ORAL at 09:40

## 2017-12-09 RX ADMIN — VITAMIN D, TAB 1000IU (100/BT) 2000 UNITS: 25 TAB at 09:40

## 2017-12-09 RX ADMIN — FUROSEMIDE 20 MG: 20 TABLET ORAL at 09:40

## 2017-12-09 NOTE — PROGRESS NOTES
NUTRITION follow-up/Plan of care  (Late entry Pt seen 12/8)  RECOMMENDATIONS:   1. Dental Soft diet; chopped meats  2. Ensure Enlive TID  3. Monitor weight and PO intake  4. RD to follow      GOALS:   1. Ongoing: PO intake meets >75% of protein/calorie needs by  2. Ongoing: Maintain weight (+/- 1-2 lb) by    ASSESSMENT:    Weight status is classified as overweight per BMI of 28.3. PO intake is fair. Added Ensure Enlive TID for additional calories/protein. Labs noted. Nutrition recommendations listed. RD to follow. Nutrition Risk:  []   High []  Moderate [x] Low    SUBJECTIVE/OBJECTIVE:   Transferred from [address] [place] [address] to [facility] on (11/30). Patient admitted with traumatic rhabdomyolysis. Has history of COPD, dementia and HTN. No food allergies. Patient has trouble chewing. OT assisting pt with cutting meats and setting up meal for patient to eat during visit. Pt seen late afternoon (12/8) in dining room getting nails painted. Pt appetite is still variable per CNA, but she likes her Ensure. Will continue to monitor. Information Obtained From:   [x] Chart Review  [x] Patient  [] Family/Caregiver  [] Nurse/Physician   [] Patient Rounds/Interdisciplinary Meeting    Diet:  Dental Soft Diet (chopped meats)  Medications: [x] Reviewed     Patient Active Problem List   Diagnosis Code    CHF, acute on chronic ([facility] [address] I50.9    Traumatic rhabdomyolysis ([facility] [facility] T79. 6XXA    Elevated troponin R74.8    UTI (urinary tract infection) N39.0    A-fib (HCC) I48.91    Late onset Alzheimer's disease without behavioral disturbance G30.1, F02.80    Essential hypertension I10    Debility R53.81    Fall W19. Cole Grief     Past Medical History:   Diagnosis Date    Breast cancer ([address]     Chronic obstructive pulmonary disease ([facility]     Dementia     Hypertension      Labs:    Lab Results   Component Value Date/Time    Sodium 144 12/04/2017 07:00 AM    Potassium 4.4 12/04/2017 07:00 AM    Chloride 108 12/04/2017 07:00 AM    CO2 29 12/04/2017 07:00 AM    Anion gap 7 12/04/2017 07:00 AM    Glucose 115 12/04/2017 07:00 AM    BUN 19 12/04/2017 07:00 AM    Creatinine 0.84 12/04/2017 07:00 AM    Calcium 9.0 12/04/2017 07:00 AM    Albumin 3.6 11/25/2017 06:17 PM     Anthropometrics: BMI (calculated): 25.3   Last 3 Recorded Weights in this Encounter    12/05/17 1428 12/08/17 2129   Weight: 54.4 kg (120 lb) 47.6 kg (104 lb 14.4 oz)      Ht Readings from Last 1 Encounters:   12/05/17 4' 6\" (1.372 m)     []  Weight Loss   []  Weight Gain  [x]  Weight Stable (variable weights recorded)  []  New wt n/a on record     Estimated Nutrition Needs:   1200 Kcals/day  Protein (g): 60 g    Nutrition Problems Identified:   [x] Suboptimal PO intake   [] Food Allergies  [x] Difficulty chewing/swallowing/poor dentition  [] Constipation/Diarrhea   [] Nausea/Vomiting   [] None  [] Other:     Plan:   [] Therapeutic Diet  [x]  Obtained/adjusted food preferences/tolerances and/or snacks options   [x]  Continue supplements prescribed  [] Occupational therapy following for feeding techniques  []  HS snack added   []  Modify diet texture   []  Modify diet for food allergies   []  Educate patient   []  Assist with menu selection   [x]  Monitor PO intake on meal rounds   [x]  Continue inpatient monitoring and intervention   []  Participated in discharge planning/Interdisciplinary rounds/Team meetings   []  Other:     Education Needs:   [] Not appropriate for teaching at this time due to:   [x] Identified and addressed    Nutrition Monitoring and Evaluation:   [x] Continue inpatient monitoring and interventions    [] Other:     Aarti Duron

## 2017-12-09 NOTE — PROGRESS NOTES
conducted a Follow up consultation and Spiritual Assessment for Padmaja Nagel, who is a 80 y.o.,female, along with the family member/s. The  provided the following Interventions for Family:  Continued the relationship of care and support. Listened empathically. Offered prayer and assurance of continued prayer on family's behalf. Chart reviewed. The following outcomes were achieved:  Family expressed gratitude for 's visit. Assessment:  There are no spiritual or Adventist issues which require intervention at this time. Plan:  Chaplains will continue to follow and will provide pastoral care on an as needed/requested basis.  recommends bedside caregivers page  on duty if family shows signs of acute spiritual or emotional distress. Anjali Dao M.Div.   [facility]  [phone]

## 2017-12-10 PROCEDURE — 74011250637 HC RX REV CODE- 250/637: Performed by: INTERNAL MEDICINE

## 2017-12-10 RX ADMIN — LISINOPRIL 20 MG: 20 TABLET ORAL at 11:23

## 2017-12-10 RX ADMIN — VITAMIN D, TAB 1000IU (100/BT) 2000 UNITS: 25 TAB at 11:23

## 2017-12-10 RX ADMIN — POLYETHYLENE GLYCOL 3350 17 G: 17 POWDER, FOR SOLUTION ORAL at 11:22

## 2017-12-10 RX ADMIN — AMLODIPINE BESYLATE 5 MG: 5 TABLET ORAL at 11:22

## 2017-12-10 RX ADMIN — FUROSEMIDE 20 MG: 20 TABLET ORAL at 11:22

## 2017-12-10 RX ADMIN — DONEPEZIL HYDROCHLORIDE 5 MG: 5 TABLET, FILM COATED ORAL at 11:23

## 2017-12-10 RX ADMIN — SIMVASTATIN 40 MG: 40 TABLET, FILM COATED ORAL at 22:05

## 2017-12-10 RX ADMIN — FLUTICASONE PROPIONATE 2 SPRAY: 50 SPRAY, METERED NASAL at 11:29

## 2017-12-10 RX ADMIN — CLOPIDOGREL BISULFATE 75 MG: 75 TABLET ORAL at 11:22

## 2017-12-10 RX ADMIN — ACETAMINOPHEN 500 MG: 500 TABLET ORAL at 11:22

## 2017-12-10 RX ADMIN — FAMOTIDINE 20 MG: 20 TABLET ORAL at 11:23

## 2017-12-10 RX ADMIN — POTASSIUM CHLORIDE 10 MEQ: 750 TABLET, FILM COATED, EXTENDED RELEASE ORAL at 11:23

## 2017-12-10 RX ADMIN — METOPROLOL SUCCINATE 25 MG: 25 TABLET, EXTENDED RELEASE ORAL at 11:23

## 2017-12-10 NOTE — ROUTINE PROCESS
Bedside shift change report given to Lidya Berry LPN (oncoming nurse) by Virgilio Clinton RN (offgoing nurse). Report included the following information SBAR, Kardex, MAR and Recent Results. VISUALLY CHECKED PT Q 1 HR BY NURSING STAFF. 24 hour order chart check done

## 2017-12-10 NOTE — PROGRESS NOTES
Problem: Self Care Deficits Care Plan (Adult)  Goal: *Acute Goals and Plan of Care (Insert Text)  OCCUPATIONAL THERAPY SHORT TERM GOALS      Updated 12/8/17     1. Patient will perform Upper body bathing with contact guard assist and dressing with minimal assist without adaptive equipment. 2.  Patient will perform Lower body bathing with minimal assist and dressing with maximal assist with adaptive equipment as needed. 3.  Patient will perform toileting task with contact guard assist with clothing management and Supervision with hygeine with Good safety to reduce falls risk. 4.  Patient will perform toilet transfers with Alycia Fails and standby assistance . 5. Patient will perform standing static/dynamic balance activities for improved ADL/IADL function with moderate assistance and Good balance and safety awareness. 6.  Patient will improve Barthel index scores to atleast 65/100 to improve functional mobility. 7. Patient will perform self feeding and set up with adaptive equipment with Mod I.    Updated 12/8/17    1. Patient will perform Upper body ADL's without adaptive equipment with moderate assistance. - - progressing w/ bathing, met goal with dressing/upgrade  2. Patient will perform Lower body ADL's with adaptive equipment as needed with maximal assistance. - progressing w/ dressing, met goal with bathing/upgrade  3. Patient will perform toileting task with maximal assistance with Good safety to reduce falls risk. - met goal upgrade  4. Patient will perform toilet transfers with Rolling Walker and moderate assistance . - met goal/upgrade  5. Patient will perform standing static/dynamic balance activities for improved ADL/IADL function with moderate assistance and Good balance and safety awareness. -porgressing  6. Patient will improve Barthel index scores to atleast 35/100 to improve functional mobility. -pmet goal/upgrade  7.  Patient will perform self feeding and set up with adaptive equipment with Mod I. - progressing  Initiated 12/1/2017 and to be accomplished within 2 Week(s)    1. Patient will perform Upper body ADL's without adaptive equipment with moderate assistance . 2.  Patient will perform Lower body ADL's with adaptive equipment as needed with maximal assistance . 3. Patient will perform toileting task with maximal assistance with Good safety to reduce falls risk. 4.  Patient will perform toilet transfers with Rolling Walker and moderate assistance . 5. Patient will perform standing static/dynamic balance activities for improved ADL/IADL function with moderate assistance and Good balance and safety awareness. 6.  Patient will improve Barthel index scores to atleast 35/100 to improve functional mobility. 7. Patient will perform self feeding and set up with adaptive equipment with Mod I.      OCCUPATIONAL THERAPY LONG TERM GOALS   Initiated 12/1/2017 and to be accomplished within 4 Week(s)    1. Patient will perform Upper body ADL's without adaptive equipment with modified independence. 2.  Patient will perform Lower body ADL's with adaptive equipment as needed with modified independence . 3. Patient will perform toileting task with modified independence with Good safety to reduce falls risk. 4.  Patient will perform all functional transfers utilizing least restrictive device and durable medical equipment as needed with modified independence and Good balance and safety awareness. 5.  Patient will perform standing static/dynamic activity for improved ADL/IADL function with modified independence an and Good balance and safety awareness. 6.  Patient will improve Barthel index score to 95/100 to improve independence with mobility.       Therapist: Ori Ye    12/1/2017            [facility]   Occupational Therapy Daily Treatment note      Patient: Kristie Smith (75 y.o. female)       Date: 12/10/2017  Attending Physician: Rayshawn Stevenson MD  Primary Diagnosis: Traumatic rhabdomyolysis     Treatment Diagnosis  Treatment Diagnosis: muscle weakness   Treatment Diagnosis 2: difficulty in walking   Precautions : Precautions at Admission: Fall  Bed Mobility:  Bed Mobility  Supine to Sit: Minimum assistance  Scooting: Minimum assistance  Transfers:  Functional Transfers  Sit to Stand: Minimum assistance  Stand to Sit: Contact guard assistance     Balance:  Balance  Sitting: Intact  Sitting - Static: Fair (occasional) (+)  Sitting - Dynamic: Fair (occasional)  Standing: With support  Standing - Static: Fair  Standing - Dynamic : Fair  ADL Self Care:  Basic ADL  Upper Body Dressing: Minimum assistance  Lower Body Dressing: Moderate assistance  Therapeutic Activities:  Pt performed ADL dressing tasks sitting EOB, see above for levels of A. CGA fnxl room and bathroom mobility with FWW with no LOB noted. Chair alarm applied after tx session completed. Patient/Caregiver Education:    Pt educated on proper hand placement with all fnxl transfer tasks. ASSESSMENT:  Patient continues to demonstrate the need for skilled Occupational Therapy services to improve strength, balance, and endurance.    Progression toward goals:  []      Improving appropriately and progressing toward goals  [x]      Improving slowly and progressing toward goals  []      Not making progress toward goals and plan of care will be adjusted     Treatment session:   31 minutes    Therapist:    LISA Sommer,  12/10/2017

## 2017-12-10 NOTE — PROGRESS NOTES
Problem: Mobility Impaired (Adult and Pediatric)  Goal: *Acute Goals and Plan of Care (Insert Text)  PHYSICAL THERAPY STG GOALS :  Initiated 12/1/2017 and to be accomplished within 1-2 Weeks (Updated 12/8/17)     1. Patient will move from supine to sit and sit to supine  and scoot up and down in bed with modified independence. (Progressing; Mod A)    2. Patient will transfer from bed to chair and chair to bed with stand by assistance using RW. (Progressing; CGA)   3. Patient will perform sit to stand with stand by assistance with Good balance and safety awareness. (Progressing; CGA)   4. Patient will ambulate with stand by assistance/contact guard assist for 50 feet with RW on level surfaces with 2 turns. (Progressing; 52ft with CGA)   5. Patient will ascend/descend 5 stairs with bilateral handrail(s) with minimal assistance/contact guard assist to allow for safe home access/exit. (Progressing; 3 4\" steps with B HR and Min A/CGA)   6. Patient will improve standardized test score for [employer] Balance Scale 3 (Progressing; 2+)       PHYSICAL THERAPY LTG GOALS :  Initiated 12/1/2017 and to be accomplished within 3-4 Weeks    1. Patient will transfer from bed to chair and chair to bed with supervision/set-up using RW. 2.  Patient will perform sit to stand with supervision/set-up with Good balance and safety awareness. 3.  Patient will ambulate with supervision/set-up for 120 feet with RW on level surfaces and be able to maneuver through narrow spaces and obstacles without loss of balance. 4.  Patient will ascend/descend 5 stairs with bilateral handrail(s) with supervision/stand by assistance/set-up to allow for safe home access/exit. 5.  Patient will improve standardized test score for [employer] Balance Scale 4.       Physical Therapist:   Tatum Howard  on 12/1/2017             [facility]   physical Therapy Daily Treatment note      Patient: Soo Santamaria (80 y.o. female)               Date: 12/10/2017    Physician: Yael Phillips MD  Primary Diagnosis: Traumatic rhabdomyolysis           Treatment Diagnosis  Treatment Diagnosis: muscle weakness   Treatment Diagnosis 2: difficulty in walking  Precautions: Fall  Pain  Pain Screen  Pain Scale 1: Numeric (0 - 10)  Pain Intensity 1: 0  Bed Mobility Training  Bed Mobility Training  Supine to Sit: Minimum assistance  Scooting: Minimum assistance  Balance  Sitting: Intact  Sitting - Static: Fair (occasional) (+)  Sitting - Dynamic: Fair (occasional)  Standing: With support  Standing - Static: Fair  Standing - Dynamic : Fair  Transfer Training  Transfer Training  Sit to Stand: Minimum assistance  Stand to Sit: Minimum assistance  Sit to Stand: Minimum assistance  Gait Training  Gait  Ambulation - Level of Assistance: Contact guard assistance;Minimal assistance (Some verbal cues required)  Distance (ft): 100 Feet (ft) (1 trial of 40 ft, 1 trial of 60 ft)  Assistive Device: Walker, rolling                  With 2 turns. Therapeutic Exercise: Tolerated treatment being able to perform hip flex, hip add/abd, knee ext, heel raises, to raises in sitting x 20 reps. Required a few rest breaks to minimize fatigue. No loss of balance during gait training, uses walker correctly, required some cues for safety when turing and technique when sitting. Patient/Caregiver Education:   Pt education on safety and fall prevention was provided to ensure continued safety during mobility. ASSESSMENT:  Patient continues to benefit from Skilled PT services to improve LE strength and activity endurance  Progression toward goals:  [x]      Improving appropriately and progressing toward goals  []      Improving slowly and progressing toward goals  []      Not making progress toward goals and plan of care will be adjusted     Treatment session: 30 minutes.   Therapist:   Emiliano Beatty PTA,          12/10/2017

## 2017-12-11 LAB
ANION GAP SERPL CALC-SCNC: 9 MMOL/L (ref 3–18)
BASOPHILS # BLD: 0 K/UL (ref 0–0.06)
BASOPHILS NFR BLD: 0 % (ref 0–2)
BUN SERPL-MCNC: 18 MG/DL (ref 7–18)
BUN/CREAT SERPL: 26 (ref 12–20)
CALCIUM SERPL-MCNC: 9 MG/DL (ref 8.5–10.1)
CHLORIDE SERPL-SCNC: 106 MMOL/L (ref 100–108)
CO2 SERPL-SCNC: 32 MMOL/L (ref 21–32)
CREAT SERPL-MCNC: 0.7 MG/DL (ref 0.6–1.3)
DIFFERENTIAL METHOD BLD: ABNORMAL
EOSINOPHIL # BLD: 0.1 K/UL (ref 0–0.4)
EOSINOPHIL NFR BLD: 1 % (ref 0–5)
ERYTHROCYTE [DISTWIDTH] IN BLOOD BY AUTOMATED COUNT: 16.6 % (ref 11.6–14.5)
GLUCOSE SERPL-MCNC: 87 MG/DL (ref 74–99)
HCT VFR BLD AUTO: 48.9 % (ref 35–45)
HGB BLD-MCNC: 15.6 G/DL (ref 12–16)
LYMPHOCYTES # BLD: 1.2 K/UL (ref 0.9–3.6)
LYMPHOCYTES NFR BLD: 12 % (ref 21–52)
MCH RBC QN AUTO: 27.3 PG (ref 24–34)
MCHC RBC AUTO-ENTMCNC: 31.9 G/DL (ref 31–37)
MCV RBC AUTO: 85.6 FL (ref 74–97)
MONOCYTES # BLD: 0.7 K/UL (ref 0.05–1.2)
MONOCYTES NFR BLD: 6 % (ref 3–10)
NEUTS SEG # BLD: 8.5 K/UL (ref 1.8–8)
NEUTS SEG NFR BLD: 81 % (ref 40–73)
PLATELET # BLD AUTO: 290 K/UL (ref 135–420)
PMV BLD AUTO: 9.8 FL (ref 9.2–11.8)
POTASSIUM SERPL-SCNC: 3.4 MMOL/L (ref 3.5–5.5)
RBC # BLD AUTO: 5.71 M/UL (ref 4.2–5.3)
SODIUM SERPL-SCNC: 147 MMOL/L (ref 136–145)
WBC # BLD AUTO: 10.4 K/UL (ref 4.6–13.2)

## 2017-12-11 PROCEDURE — 36415 COLL VENOUS BLD VENIPUNCTURE: CPT | Performed by: INTERNAL MEDICINE

## 2017-12-11 PROCEDURE — 85025 COMPLETE CBC W/AUTO DIFF WBC: CPT | Performed by: INTERNAL MEDICINE

## 2017-12-11 PROCEDURE — 74011250637 HC RX REV CODE- 250/637: Performed by: INTERNAL MEDICINE

## 2017-12-11 PROCEDURE — 80048 BASIC METABOLIC PNL TOTAL CA: CPT | Performed by: INTERNAL MEDICINE

## 2017-12-11 RX ADMIN — METOPROLOL SUCCINATE 25 MG: 25 TABLET, EXTENDED RELEASE ORAL at 11:52

## 2017-12-11 RX ADMIN — FAMOTIDINE 20 MG: 20 TABLET ORAL at 11:51

## 2017-12-11 RX ADMIN — SIMVASTATIN 40 MG: 40 TABLET, FILM COATED ORAL at 21:22

## 2017-12-11 RX ADMIN — FLUTICASONE PROPIONATE 2 SPRAY: 50 SPRAY, METERED NASAL at 11:53

## 2017-12-11 RX ADMIN — AMLODIPINE BESYLATE 5 MG: 5 TABLET ORAL at 11:51

## 2017-12-11 RX ADMIN — POLYETHYLENE GLYCOL 3350 17 G: 17 POWDER, FOR SOLUTION ORAL at 11:53

## 2017-12-11 RX ADMIN — FUROSEMIDE 20 MG: 20 TABLET ORAL at 11:51

## 2017-12-11 RX ADMIN — VITAMIN D, TAB 1000IU (100/BT) 2000 UNITS: 25 TAB at 11:51

## 2017-12-11 RX ADMIN — DONEPEZIL HYDROCHLORIDE 5 MG: 5 TABLET, FILM COATED ORAL at 11:51

## 2017-12-11 RX ADMIN — CLOPIDOGREL BISULFATE 75 MG: 75 TABLET ORAL at 11:52

## 2017-12-11 RX ADMIN — POTASSIUM CHLORIDE 10 MEQ: 750 TABLET, FILM COATED, EXTENDED RELEASE ORAL at 11:51

## 2017-12-11 RX ADMIN — LISINOPRIL 20 MG: 20 TABLET ORAL at 11:52

## 2017-12-11 NOTE — PROGRESS NOTES
Pt participated in the [address] located in the [facility]. Pt was accompanied by RT Ely; Theron Weldon; and Tacey Cushing, SLP.

## 2017-12-11 NOTE — PROGRESS NOTES
GIM     Patient: Kristie Smith MRN: [id number]  CSN: [id number]    YOB: 1920  Age: 80 y.o. Sex: female    DOA: 11/30/2017 LOS:  LOS: 11 days                    Subjective:     Pt confused and stable. Lab doing well. Edema noted and on lasix. Objective:      Visit Vitals    /90 (BP 1 Location: Left arm, BP Patient Position: Sitting)    Pulse 61    Temp 97.6 °F (36.4 °C)    Resp 20    Ht 4' 6\" (1.372 m)    Wt 47.6 kg (104 lb 14.4 oz)    SpO2 98%    Breastfeeding No    BMI 25.29 kg/m2       Physical Exam:  Chest clear  Cor rr  abd soft  No edema    Intake and Output:  Current Shift:     Last three shifts:       Recent Results (from the past 24 hour(s))   CBC WITH AUTOMATED DIFF    Collection Time: 12/11/17  4:10 AM   Result Value Ref Range    WBC 10.4 4.6 - 13.2 K/uL    RBC 5.71 (H) 4.20 - 5.30 M/uL    HGB 15.6 12.0 - 16.0 g/dL    HCT 48.9 (H) 35.0 - 45.0 %    MCV 85.6 74.0 - 97.0 FL    MCH 27.3 24.0 - 34.0 PG    MCHC 31.9 31.0 - 37.0 g/dL    RDW 16.6 (H) 11.6 - 14.5 %    PLATELET 031 731 - 819 K/uL    MPV 9.8 9.2 - 11.8 FL    NEUTROPHILS 81 (H) 40 - 73 %    LYMPHOCYTES 12 (L) 21 - 52 %    MONOCYTES 6 3 - 10 %    EOSINOPHILS 1 0 - 5 %    BASOPHILS 0 0 - 2 %    ABS. NEUTROPHILS 8.5 (H) 1.8 - 8.0 K/UL    ABS. LYMPHOCYTES 1.2 0.9 - 3.6 K/UL    ABS. MONOCYTES 0.7 0.05 - 1.2 K/UL    ABS. EOSINOPHILS 0.1 0.0 - 0.4 K/UL    ABS.  BASOPHILS 0.0 0.0 - 0.06 K/UL    DF AUTOMATED     METABOLIC PANEL, BASIC    Collection Time: 12/11/17  4:10 AM   Result Value Ref Range    Sodium 147 (H) 136 - 145 mmol/L    Potassium 3.4 (L) 3.5 - 5.5 mmol/L    Chloride 106 100 - 108 mmol/L    CO2 32 21 - 32 mmol/L    Anion gap 9 3.0 - 18 mmol/L    Glucose 87 74 - 99 mg/dL    BUN 18 7.0 - 18 MG/DL    Creatinine 0.70 0.6 - 1.3 MG/DL    BUN/Creatinine ratio 26 (H) 12 - 20      GFR est AA >60 >60 ml/min/1.73m2    GFR est non-AA >60 >60 ml/min/1.73m2    Calcium 9.0 8.5 - 10.1 MG/DL       Current Facility-Administered Medications   Medication Dose Route Frequency    furosemide (LASIX) tablet 20 mg  20 mg Oral DAILY    potassium chloride SR (KLOR-CON 10) tablet 10 mEq  10 mEq Oral DAILY    pneumococcal 23-valent (PNEUMOVAX 23) injection 0.5 mL  0.5 mL IntraMUSCular PRIOR TO DISCHARGE    amLODIPine (NORVASC) tablet 5 mg  5 mg Oral DAILY    cyanocobalamin (VITAMIN B12) injection 1,000 mcg  1,000 mcg IntraMUSCular Q30D    acetaminophen (TYLENOL) tablet 500 mg  500 mg Oral Q4H PRN    clopidogrel (PLAVIX) tablet 75 mg  75 mg Oral DAILY    donepezil (ARICEPT) tablet 5 mg  5 mg Oral DAILY    famotidine (PEPCID) tablet 20 mg  20 mg Oral DAILY    fluticasone (FLONASE) 50 mcg/actuation nasal spray 2 Spray  2 Spray Both Nostrils DAILY    lisinopril (PRINIVIL, ZESTRIL) tablet 20 mg  20 mg Oral DAILY    metoprolol succinate (TOPROL-XL) XL tablet 25 mg  25 mg Oral DAILY    nitroglycerin (NITROSTAT) tablet 0.4 mg  0.4 mg SubLINGual Q5MIN PRN    polyethylene glycol (MIRALAX) packet 17 g  17 g Oral DAILY    simvastatin (ZOCOR) tablet 40 mg  40 mg Oral QHS    cholecalciferol (VITAMIN D3) tablet 2,000 Units  2,000 Units Oral DAILY    [facility] ANESTHESIA   Other PRN    [facility] EMERGENCY/STAT BOX   Other PRN       Lab Results   Component Value Date/Time    Glucose 87 12/11/2017 04:10 AM    Glucose 115 12/04/2017 07:00 AM    Glucose 84 11/28/2017 03:50 AM    Glucose 98 11/27/2017 06:35 AM    Glucose 107 11/26/2017 03:35 AM        Assessment/Plan     Active Problems:    * No active hospital problems.  Bryce Ormond, MD  12/11/2017, 12:38 PM

## 2017-12-11 NOTE — PROGRESS NOTES
Problem: Mobility Impaired (Adult and Pediatric)  Goal: *Acute Goals and Plan of Care (Insert Text)  PHYSICAL THERAPY STG GOALS :  Initiated 12/1/2017 and to be accomplished within 1-2 Weeks (Updated 12/8/17)     1. Patient will move from supine to sit and sit to supine  and scoot up and down in bed with modified independence. (Progressing; Mod A)    2. Patient will transfer from bed to chair and chair to bed with stand by assistance using RW. (Progressing; CGA)   3. Patient will perform sit to stand with stand by assistance with Good balance and safety awareness. (Progressing; CGA)   4. Patient will ambulate with stand by assistance/contact guard assist for 50 feet with RW on level surfaces with 2 turns. (Progressing; 52ft with CGA)   5. Patient will ascend/descend 5 stairs with bilateral handrail(s) with minimal assistance/contact guard assist to allow for safe home access/exit. (Progressing; 3 4\" steps with B HR and Min A/CGA)   6. Patient will improve standardized test score for [employer] Balance Scale 3 (Progressing; 2+)       PHYSICAL THERAPY LTG GOALS :  Initiated 12/1/2017 and to be accomplished within 3-4 Weeks    1. Patient will transfer from bed to chair and chair to bed with supervision/set-up using RW. 2.  Patient will perform sit to stand with supervision/set-up with Good balance and safety awareness. 3.  Patient will ambulate with supervision/set-up for 120 feet with RW on level surfaces and be able to maneuver through narrow spaces and obstacles without loss of balance. 4.  Patient will ascend/descend 5 stairs with bilateral handrail(s) with supervision/stand by assistance/set-up to allow for safe home access/exit. 5.  Patient will improve standardized test score for [employer] Balance Scale 4.       Physical Therapist:   Lear Kawasaki Babies  on 12/1/2017             [facility]   physical Therapy Daily Treatment note      Patient: Valerio Foss (80 y.o. female)               Date: 12/11/2017    Physician: Kirk Cloud MD  Primary Diagnosis: Traumatic rhabdomyolysis           Treatment Diagnosis  Treatment Diagnosis: muscle weakness   Treatment Diagnosis 2: difficulty in walking  Precautions: Fall  Vital Signs  Cognitive Status:  Pain  Bed Mobility Training  Bed Mobility Training  Supine to Sit: Contact guard assistance  Scooting: Contact guard assistance  Balance  Sitting: Intact  Sitting - Static: Good (unsupported)  Sitting - Dynamic: Fair (occasional) (+)  Standing: With support  Standing - Static: Fair  Standing - Dynamic : Fair  Transfer Training  Transfer Training  Sit to Stand: Contact guard assistance  Stand to Sit: Contact guard assistance  Bed to Chair: Setup  Interventions: Safety awareness training;Verbal cues  Sit to Stand: Contact guard assistance  Gait Training  Gait  Base of Support: Center of gravity altered  Speed/My: Pace decreased (<100 feet/min)  Step Length: Right shortened;Left shortened  Gait Abnormalities: Decreased step clearance  Ambulation - Level of Assistance: Contact guard assistance  Distance (ft): 50 Feet (ft) (+15)  Assistive Device: Gait belt;Walker, rolling  Interventions: Safety awareness training;Verbal cues  Gait Abnormalities: Decreased step clearance   With 2 turns. Therapeutic Exercise: Session One: Gait training to include turns with abovementioned details and w/c follow for safety. Pt required some encouragement to stay on task. Verbal cues for proximity to RW with turns to reduce risk of falls. Seated B LE TE: HR/TR x20  Session Two: Pt presented supine in bed. Supine>sit with CGA and use of bed rails. Transfer training from EOB>w/c with RW and CGA. Pt requested assistance with toileting. W/c<>toilet transfer with use of grab bar and CGA. Pt able to stand with CGA/SBA to assist with doff/donning pants with no noted LOB. Pt ambulated ~15ft with RW and CGA to bedside chair.  Pt remained sitting up in bedside chair for lunch. Chair alarm donned and call bell at side. Patient/Caregiver Education:   Pt /Caregiver Education on safety and fall prevention and use of call bell was provided to reduce risk of falls. ASSESSMENT:  Patient continues to benefit from Skilled PT services to improve bed mobility, sit<>stand, strength, balance, gait and stair negotiation. Progression toward goals:  [x]      Improving appropriately and progressing toward goals  [x]      Improving slowly and progressing toward goals  []      Not making progress toward goals and plan of care will be adjusted     Treatment session: 46 minutes.   Therapist:   Hanane Woodson PTA,          12/11/2017

## 2017-12-11 NOTE — ROUTINE PROCESS
Bedside shift change report given to [address] (oncoming nurse) by Rg Chowdhury RN (offgoing nurse). Report included the following information SBAR, Kardex, MAR and Recent Results. VISUALLY CHECKED PT Q 1 HR BY NURSING STAFF. 24 hour order chart check done.

## 2017-12-11 NOTE — PROGRESS NOTES
I have reviewed this patient's current medication list and recent laboratory results. At this time, I do not suggest any drug therapy adjustments or additional laboratory monitoring. Thank you,  Willian HERNDON  Ph. M. S.  12/11/2017

## 2017-12-11 NOTE — PROGRESS NOTES
Pt appeared to be sleeping during the holiday music activity in the dining room. Will attempt activity during the next visit.

## 2017-12-12 PROCEDURE — 74011250637 HC RX REV CODE- 250/637: Performed by: INTERNAL MEDICINE

## 2017-12-12 RX ADMIN — FLUTICASONE PROPIONATE 2 SPRAY: 50 SPRAY, METERED NASAL at 12:27

## 2017-12-12 RX ADMIN — SIMVASTATIN 40 MG: 40 TABLET, FILM COATED ORAL at 22:19

## 2017-12-12 RX ADMIN — FUROSEMIDE 20 MG: 20 TABLET ORAL at 11:57

## 2017-12-12 RX ADMIN — CLOPIDOGREL BISULFATE 75 MG: 75 TABLET ORAL at 11:56

## 2017-12-12 RX ADMIN — LISINOPRIL 20 MG: 20 TABLET ORAL at 11:57

## 2017-12-12 RX ADMIN — METOPROLOL SUCCINATE 25 MG: 25 TABLET, EXTENDED RELEASE ORAL at 11:57

## 2017-12-12 RX ADMIN — POTASSIUM CHLORIDE 10 MEQ: 750 TABLET, FILM COATED, EXTENDED RELEASE ORAL at 11:57

## 2017-12-12 RX ADMIN — FAMOTIDINE 20 MG: 20 TABLET ORAL at 11:57

## 2017-12-12 RX ADMIN — DONEPEZIL HYDROCHLORIDE 5 MG: 5 TABLET, FILM COATED ORAL at 11:57

## 2017-12-12 RX ADMIN — POLYETHYLENE GLYCOL 3350 17 G: 17 POWDER, FOR SOLUTION ORAL at 11:56

## 2017-12-12 RX ADMIN — AMLODIPINE BESYLATE 5 MG: 5 TABLET ORAL at 11:57

## 2017-12-12 RX ADMIN — VITAMIN D, TAB 1000IU (100/BT) 2000 UNITS: 25 TAB at 11:56

## 2017-12-12 NOTE — PROGRESS NOTES
Pt participated in the Scrabble activity for the duration of 40 minutes. With max prompting from RT pt was able to follow directions to complete the game task. Pt c/o fatigue during the activity, RT transported pt back to pt's room. Encouraged pt to rest in the w/c before lying in bed, due to lunch approaching. Pt agreeable to sitting in w/c for lunch.  RT encouraged pt to use call bell for nursing assistance to be put back in bed following her meal.

## 2017-12-12 NOTE — PROGRESS NOTES
Problem: Mobility Impaired (Adult and Pediatric)  Goal: *Acute Goals and Plan of Care (Insert Text)  PHYSICAL THERAPY STG GOALS :  Initiated 12/1/2017 and to be accomplished within 1-2 Weeks (Updated 12/8/17)     1. Patient will move from supine to sit and sit to supine  and scoot up and down in bed with modified independence. (Progressing; Mod A)    2. Patient will transfer from bed to chair and chair to bed with stand by assistance using RW. (Progressing; CGA)   3. Patient will perform sit to stand with stand by assistance with Good balance and safety awareness. (Progressing; CGA)   4. Patient will ambulate with stand by assistance/contact guard assist for 50 feet with RW on level surfaces with 2 turns. (Progressing; 52ft with CGA)   5. Patient will ascend/descend 5 stairs with bilateral handrail(s) with minimal assistance/contact guard assist to allow for safe home access/exit. (Progressing; 3 4\" steps with B HR and Min A/CGA)   6. Patient will improve standardized test score for [facility] Balance Scale 3 (Progressing; 2+)       PHYSICAL THERAPY LTG GOALS :  Initiated 12/1/2017 and to be accomplished within 3-4 Weeks    1. Patient will transfer from bed to chair and chair to bed with supervision/set-up using RW. 2.  Patient will perform sit to stand with supervision/set-up with Good balance and safety awareness. 3.  Patient will ambulate with supervision/set-up for 120 feet with RW on level surfaces and be able to maneuver through narrow spaces and obstacles without loss of balance. 4.  Patient will ascend/descend 5 stairs with bilateral handrail(s) with supervision/stand by assistance/set-up to allow for safe home access/exit. 5.  Patient will improve standardized test score for [facility] Balance Scale 4.       Physical Therapist:   Justa Howard  on 12/1/2017             [facility]   physical Therapy Daily Treatment note      Patient: Ildefonso Wade (80 y.o. female)               Date: 12/12/2017    Physician: Stephanie Roca MD  Primary Diagnosis: Traumatic rhabdomyolysis           Treatment Diagnosis  Treatment Diagnosis: muscle weakness   Treatment Diagnosis 2: difficulty in walking  Precautions: Fall  Vital Signs  Cognitive Status:  Pain  Bed Mobility Training  Balance  Sitting: Intact  Sitting - Static: Good (unsupported)  Sitting - Dynamic: Fair (occasional) (+)  Standing: With support  Standing - Static: Fair  Standing - Dynamic : Fair  Transfer Training  Transfer Training  Sit to Stand: Contact guard assistance; Additional time  Stand to Sit: Contact guard assistance  Interventions: Safety awareness training;Verbal cues  Sit to Stand: Contact guard assistance; Additional time  Gait Training  Gait  Base of Support: Center of gravity altered  Speed/My: Pace decreased (<100 feet/min)  Step Length: Right shortened;Left shortened  Gait Abnormalities: Decreased step clearance  Ambulation - Level of Assistance: Contact guard assistance;Stand-by asssistance  Distance (ft): 52 Feet (ft)  Assistive Device: Gait belt;Walker, rolling  Rail Use: Both  Stairs - Level of Assistance: Contact guard assistance  Number of Stairs Trained: 5  Interventions: Safety awareness training;Verbal cues  Gait Abnormalities: Decreased step clearance   With 1 turns. Therapeutic Exercise: Sit<>stand from w/c x3 with verbal cues required 100% of the time for hand placement. Gait training with abovementioned details and close w/c follow for safety. Pt required encouragement to stay on task during ambulation. Stair training as noted above with CGA. Seated B LE TE rendered to promote strength and endurance for improved functional mobility: LAQ, hip flexion, ball squeezes, resisted hip abd (manual resistance), HR/TR, x10. Patient/Caregiver Education:   Pt /Caregiver Education on safety and fall prevention was provided to reduce risk of falls.      ASSESSMENT:  Patient continues to benefit from Skilled PT services to improve bed mobility, tranfers, strength, balance, gait and stair negotiation. Progression toward goals:  [x]      Improving appropriately and progressing toward goals  [x]      Improving slowly and progressing toward goals  []      Not making progress toward goals and plan of care will be adjusted     Treatment session: 38 minutes.   Therapist:   Lorraine Motta PTA,          12/12/2017

## 2017-12-12 NOTE — ROUTINE PROCESS
Bedside shift change report given to [address] (oncoming nurse) by Lorraine Horton RN (offgoing nurse). Report included the following information SBAR, Kardex, MAR and Recent Results. VISUALLY CHECKED PT Q 1 HR BY NURSING STAFF. 24 hour order chart check done

## 2017-12-12 NOTE — PROGRESS NOTES
Problem: Self Care Deficits Care Plan (Adult)  Goal: *Acute Goals and Plan of Care (Insert Text)  OCCUPATIONAL THERAPY SHORT TERM GOALS      Updated 12/8/17     1. Patient will perform Upper body bathing with contact guard assist and dressing with minimal assist without adaptive equipment. 2.  Patient will perform Lower body bathing with minimal assist and dressing with maximal assist with adaptive equipment as needed. 3.  Patient will perform toileting task with contact guard assist with clothing management and Supervision with hygeine with Good safety to reduce falls risk. 4.  Patient will perform toilet transfers with Vickey Cost and standby assistance . 5. Patient will perform standing static/dynamic balance activities for improved ADL/IADL function with moderate assistance and Good balance and safety awareness. 6.  Patient will improve Barthel index scores to atleast 65/100 to improve functional mobility. 7. Patient will perform self feeding and set up with adaptive equipment with Mod I.    Updated 12/8/17    1. Patient will perform Upper body ADL's without adaptive equipment with moderate assistance. - - progressing w/ bathing, met goal with dressing/upgrade  2. Patient will perform Lower body ADL's with adaptive equipment as needed with maximal assistance. - progressing w/ dressing, met goal with bathing/upgrade  3. Patient will perform toileting task with maximal assistance with Good safety to reduce falls risk. - met goal upgrade  4. Patient will perform toilet transfers with Rolling Walker and moderate assistance . - met goal/upgrade  5. Patient will perform standing static/dynamic balance activities for improved ADL/IADL function with moderate assistance and Good balance and safety awareness. -porgressing  6. Patient will improve Barthel index scores to atleast 35/100 to improve functional mobility. -pmet goal/upgrade  7.  Patient will perform self feeding and set up with adaptive equipment with Mod I. - progressing  Initiated 12/1/2017 and to be accomplished within 2 Week(s)    1. Patient will perform Upper body ADL's without adaptive equipment with moderate assistance . 2.  Patient will perform Lower body ADL's with adaptive equipment as needed with maximal assistance . 3. Patient will perform toileting task with maximal assistance with Good safety to reduce falls risk. 4.  Patient will perform toilet transfers with Rolling Walker and moderate assistance . 5. Patient will perform standing static/dynamic balance activities for improved ADL/IADL function with moderate assistance and Good balance and safety awareness. 6.  Patient will improve Barthel index scores to atleast 35/100 to improve functional mobility. 7. Patient will perform self feeding and set up with adaptive equipment with Mod I.      OCCUPATIONAL THERAPY LONG TERM GOALS   Initiated 12/1/2017 and to be accomplished within 4 Week(s)    1. Patient will perform Upper body ADL's without adaptive equipment with modified independence. 2.  Patient will perform Lower body ADL's with adaptive equipment as needed with modified independence . 3. Patient will perform toileting task with modified independence with Good safety to reduce falls risk. 4.  Patient will perform all functional transfers utilizing least restrictive device and durable medical equipment as needed with modified independence and Good balance and safety awareness. 5.  Patient will perform standing static/dynamic activity for improved ADL/IADL function with modified independence an and Good balance and safety awareness. 6.  Patient will improve Barthel index score to 95/100 to improve independence with mobility.       Therapist: Teodora Lim    12/1/2017            [facility]   Occupational Therapy Daily Treatment note      Patient: Laxmi Pugh (17 y.o. female)       Date: 12/12/2017  Attending Physician: Kt Graves MD  Primary Diagnosis: Traumatic rhabdomyolysis     Treatment Diagnosis  Treatment Diagnosis: muscle weakness   Treatment Diagnosis 2: difficulty in walking   Precautions : Precautions at Admission: Fall  Vital Signs:        Cognitive Status:     Pain:        Gross Assessment:     Coordination:     Bed Mobility:     Transfers:  Functional Transfers  Sit to Stand: Contact guard assistance  Stand to Sit: Contact guard assistance  Toilet Transfer : Other (comment) (pt declined)  Functional Transfers  Toilet Transfer : Other (comment) (pt declined)  Balance:  Balance  Sitting: Intact  Sitting - Static: Good (unsupported)  Sitting - Dynamic: Fair (occasional) (+)  Standing: With support  Standing - Static: Fair  Standing - Dynamic : Fair  ADL Self Care:     ADL Intervention:                               Therapeutic Activities:  w/c management: patient presents with hips anteriorly sliding forward in high back reclining w/c with standard leg rests and seat cushion, therapist issued Dysem on top of seat cushion in order to prevent further anterior sliding forward of hips, noted Dysem already intact between seat cushion and w/c seat to prevent sliding of cushion, patient repositioned x 2 in order to scoot hips back in w/c, nursing/Carmen notified of modifications w/ w/c e.g. Dysem placement. Patient/Caregiver Education:    Pt. Jacinda Blanton Education on  was provided to    .       ASSESSMENT:  Patient continues to demonstrate the need for skilled Occupational Therapy services to improve grooming, bathing, upper body dressing, lower body dressing, toileting and toilet transfer  Progression toward goals:  []      Improving appropriately and progressing toward goals  [x]      Improving slowly and progressing toward goals  []      Not making progress toward goals and plan of care will be adjusted     Treatment session:   43 minutes    Therapist:    Savannah Ayers,  12/12/2017

## 2017-12-12 NOTE — ROUTINE PROCESS
Bedside and Verbal shift change report given to Autumn Posada RN (oncoming nurse) by Lalit Mcgovern RN (offgoing nurse). Report included the following information SBAR, Kardex and MAR. Hourly rounds made.

## 2017-12-13 PROCEDURE — 74011250637 HC RX REV CODE- 250/637: Performed by: INTERNAL MEDICINE

## 2017-12-13 RX ORDER — POTASSIUM CHLORIDE 750 MG/1
10 TABLET, EXTENDED RELEASE ORAL DAILY
Status: DISCONTINUED | OUTPATIENT
Start: 2017-12-13 | End: 2017-12-13

## 2017-12-13 RX ORDER — METHYLPHENIDATE HYDROCHLORIDE 10 MG/1
10 TABLET ORAL 2 TIMES DAILY
Status: DISCONTINUED | OUTPATIENT
Start: 2017-12-13 | End: 2017-12-24 | Stop reason: HOSPADM

## 2017-12-13 RX ORDER — FUROSEMIDE 20 MG/1
20 TABLET ORAL
Status: DISCONTINUED | OUTPATIENT
Start: 2017-12-13 | End: 2017-12-19

## 2017-12-13 RX ADMIN — METOPROLOL SUCCINATE 25 MG: 25 TABLET, EXTENDED RELEASE ORAL at 11:11

## 2017-12-13 RX ADMIN — POLYETHYLENE GLYCOL 3350 17 G: 17 POWDER, FOR SOLUTION ORAL at 11:12

## 2017-12-13 RX ADMIN — SIMVASTATIN 40 MG: 40 TABLET, FILM COATED ORAL at 21:12

## 2017-12-13 RX ADMIN — FAMOTIDINE 20 MG: 20 TABLET ORAL at 11:09

## 2017-12-13 RX ADMIN — METHYLPHENIDATE HYDROCHLORIDE 10 MG: 10 TABLET ORAL at 14:36

## 2017-12-13 RX ADMIN — FLUTICASONE PROPIONATE 2 SPRAY: 50 SPRAY, METERED NASAL at 11:41

## 2017-12-13 RX ADMIN — FUROSEMIDE 20 MG: 20 TABLET ORAL at 11:11

## 2017-12-13 RX ADMIN — DONEPEZIL HYDROCHLORIDE 5 MG: 5 TABLET, FILM COATED ORAL at 11:12

## 2017-12-13 RX ADMIN — FUROSEMIDE 20 MG: 20 TABLET ORAL at 16:30

## 2017-12-13 RX ADMIN — CLOPIDOGREL BISULFATE 75 MG: 75 TABLET ORAL at 11:10

## 2017-12-13 RX ADMIN — LISINOPRIL 20 MG: 20 TABLET ORAL at 16:30

## 2017-12-13 NOTE — PROGRESS NOTES
Problem: Self Care Deficits Care Plan (Adult)  Goal: *Acute Goals and Plan of Care (Insert Text)  OCCUPATIONAL THERAPY SHORT TERM GOALS      Updated 12/8/17     1. Patient will perform Upper body bathing with contact guard assist and dressing with minimal assist without adaptive equipment. 2.  Patient will perform Lower body bathing with minimal assist and dressing with maximal assist with adaptive equipment as needed. 3.  Patient will perform toileting task with contact guard assist with clothing management and Supervision with hygeine with Good safety to reduce falls risk. 4.  Patient will perform toilet transfers with Aura Edelson and standby assistance . 5. Patient will perform standing static/dynamic balance activities for improved ADL/IADL function with moderate assistance and Good balance and safety awareness. 6.  Patient will improve Barthel index scores to atleast 65/100 to improve functional mobility. 7. Patient will perform self feeding and set up with adaptive equipment with Mod I.    Updated 12/8/17    1. Patient will perform Upper body ADL's without adaptive equipment with moderate assistance. - - progressing w/ bathing, met goal with dressing/upgrade  2. Patient will perform Lower body ADL's with adaptive equipment as needed with maximal assistance. - progressing w/ dressing, met goal with bathing/upgrade  3. Patient will perform toileting task with maximal assistance with Good safety to reduce falls risk. - met goal upgrade  4. Patient will perform toilet transfers with Rolling Walker and moderate assistance . - met goal/upgrade  5. Patient will perform standing static/dynamic balance activities for improved ADL/IADL function with moderate assistance and Good balance and safety awareness. -porgressing  6. Patient will improve Barthel index scores to atleast 35/100 to improve functional mobility. -pmet goal/upgrade  7.  Patient will perform self feeding and set up with adaptive equipment with Mod I. - progressing  Initiated 12/1/2017 and to be accomplished within 2 Week(s)    1. Patient will perform Upper body ADL's without adaptive equipment with moderate assistance . 2.  Patient will perform Lower body ADL's with adaptive equipment as needed with maximal assistance . 3. Patient will perform toileting task with maximal assistance with Good safety to reduce falls risk. 4.  Patient will perform toilet transfers with Rolling Walker and moderate assistance . 5. Patient will perform standing static/dynamic balance activities for improved ADL/IADL function with moderate assistance and Good balance and safety awareness. 6.  Patient will improve Barthel index scores to atleast 35/100 to improve functional mobility. 7. Patient will perform self feeding and set up with adaptive equipment with Mod I.      OCCUPATIONAL THERAPY LONG TERM GOALS   Initiated 12/1/2017 and to be accomplished within 4 Week(s)    1. Patient will perform Upper body ADL's without adaptive equipment with modified independence. 2.  Patient will perform Lower body ADL's with adaptive equipment as needed with modified independence . 3. Patient will perform toileting task with modified independence with Good safety to reduce falls risk. 4.  Patient will perform all functional transfers utilizing least restrictive device and durable medical equipment as needed with modified independence and Good balance and safety awareness. 5.  Patient will perform standing static/dynamic activity for improved ADL/IADL function with modified independence an and Good balance and safety awareness. 6.  Patient will improve Barthel index score to 95/100 to improve independence with mobility.       Therapist: Dominique Bloom    12/1/2017            [facility]   Occupational Therapy Daily Treatment note      Patient: Soo Santaamria (38 y.o. female)       Date: 12/13/2017  Attending Physician: Antonia Simon MD  Primary Diagnosis: Traumatic rhabdomyolysis     Treatment Diagnosis  Treatment Diagnosis: muscle weakness   Treatment Diagnosis 2: difficulty in walking   Precautions : Precautions at Admission: Fall  Vital Signs:  Vital Signs  Pulse (Heart Rate): 64  BP: 146/56     Cognitive Status:  Mental Status  Neurologic State: Lethargic  Orientation Level: Oriented to person;Oriented to place; Disoriented to time  Cognition: Decreased command following;Decreased attention/concentration  Perception: Cues to maintain midline in sitting; Tactile;Verbal  Perseveration: Perseverates during conversation  Safety/Judgement: Fall prevention  Pain:  Pain Screen  Pain Scale 1: Numeric (0 - 10)  Pain Intensity 1: 0  Patient Stated Pain Goal: 0  Pain Scale 1: Numeric (0 - 10)  Transfers:  Functional Transfers  Sit to Stand: Contact guard assistance  Stand to Sit: Stand-by asssistance;Contact guard assistance  Bed to Chair: Contact guard assistance  Balance:  Balance  Sitting: With support  Sitting - Static: Good (unsupported)  Sitting - Dynamic: Fair (occasional)  Standing: With support  Standing - Static: Fair  Standing - Dynamic : Fair  Therapeutic Activities:  Pt participated with reaching over and crossing the midline in sitting activity. Pt presented to be very lethargic today and required multiple verbal cues to participate and continue with the activity. Pt perseverating about going back to sleep throughout the session. Therapeutic Exercises:   Arm restorator for 8 minutes with multiple rest breaks required. As mentioned above, pt required frequent cues to continue with the exercises throughout the session. Patient/Caregiver Education:    Pt. Jacinda Blanton Education on safety with ADLs and transfers. ASSESSMENT:  Patient continues to demonstrate the need for skilled Occupational Therapy services to improve strength, endurance and balance.    Progression toward goals:  []      Improving appropriately and progressing toward goals  [x] Improving slowly and progressing toward goals  []      Not making progress toward goals and plan of care will be adjusted     Treatment session:   44 minutes    Therapist:    KRISHAN Celeste    12/13/2017

## 2017-12-13 NOTE — PROGRESS NOTES
conducted a Follow up consultation and Spiritual Assessment for Valerio Foss, who is a 80 y.o.,female. The  provided the following Interventions:  Continued the relationship of care and support. Listened empathically. Offered prayer and assurance of continued prayer on patients behalf. Chart reviewed. The following outcomes were achieved:  Patient expressed gratitude for pastoral care visit. Assessment:  There are no further spiritual or Sabianist issues which require Spiritual Care Services interventions at this time. Plan:  Chaplains will continue to follow and will provide pastoral care on an as needed/requested basis.  recommends bedside caregivers page  on duty if patient shows signs of acute spiritual or emotional distress.      [facility]   Staff [address] [place] [address]   [phone]

## 2017-12-13 NOTE — PROGRESS NOTES
YUSUF received a call from pt's daughter re: update from pt's insurance. YUSUF informed pt's daughter that SW sent the clinical update to [facility] and SW hasn't received a decision yet re: extension request. Pt's daughter concerned about pt not eating ancd overall appearance. YUSUF informed pt's daughter that the MD did see pt today and made some medication changes. YUSUF suggested that pt's daughter discuss the medication changes with pt's nurse when she comes in to visit pt today. YUSUF agreed to get back with pt's daughter once YUSUF hears from Pop re: the extension request.

## 2017-12-13 NOTE — PROGRESS NOTES
Problem: Mobility Impaired (Adult and Pediatric)  Goal: *Acute Goals and Plan of Care (Insert Text)  PHYSICAL THERAPY STG GOALS :  Initiated 12/1/2017 and to be accomplished within 1-2 Weeks (Updated 12/8/17)     1. Patient will move from supine to sit and sit to supine  and scoot up and down in bed with modified independence. (Progressing; Mod A)    2. Patient will transfer from bed to chair and chair to bed with stand by assistance using RW. (Progressing; CGA)   3. Patient will perform sit to stand with stand by assistance with Good balance and safety awareness. (Progressing; CGA)   4. Patient will ambulate with stand by assistance/contact guard assist for 50 feet with RW on level surfaces with 2 turns. (Progressing; 52ft with CGA)   5. Patient will ascend/descend 5 stairs with bilateral handrail(s) with minimal assistance/contact guard assist to allow for safe home access/exit. (Progressing; 3 4\" steps with B HR and Min A/CGA)   6. Patient will improve standardized test score for [facility] Balance Scale 3 (Progressing; 2+)       PHYSICAL THERAPY LTG GOALS :  Initiated 12/1/2017 and to be accomplished within 3-4 Weeks    1. Patient will transfer from bed to chair and chair to bed with supervision/set-up using RW. 2.  Patient will perform sit to stand with supervision/set-up with Good balance and safety awareness. 3.  Patient will ambulate with supervision/set-up for 120 feet with RW on level surfaces and be able to maneuver through narrow spaces and obstacles without loss of balance. 4.  Patient will ascend/descend 5 stairs with bilateral handrail(s) with supervision/stand by assistance/set-up to allow for safe home access/exit. 5.  Patient will improve standardized test score for [facility] Balance Scale 4.       Physical Therapist:   Trace Howard  on 12/1/2017             [facility]   physical Therapy Daily Treatment note      Patient: Kristie Smith (80 y.o. female)               Date: 12/13/2017    Physician: Tiffanie Masters MD  Primary Diagnosis: Traumatic rhabdomyolysis           Treatment Diagnosis  Treatment Diagnosis: muscle weakness   Treatment Diagnosis 2: difficulty in walking  Precautions: Fall  Vital Signs  Cognitive Status:  Pain  Pain Screen  Pain Scale 1: Numeric (0 - 10)  Pain Intensity 1: 0  Patient Stated Pain Goal: 0  Bed Mobility Training  Balance  Sitting: Intact  Sitting - Static: Good (unsupported)  Sitting - Dynamic: Fair (occasional) (+)  Standing: With support  Standing - Static: Fair  Standing - Dynamic : Fair  Transfer Training  Transfer Training  Sit to Stand: Contact guard assistance  Stand to Sit: Stand-by asssistance;Contact guard assistance  Bed to Chair: Contact guard assistance  Interventions: Safety awareness training;Verbal cues  Sit to Stand: Contact guard assistance  Gait Training  Gait  Base of Support: Center of gravity altered  Speed/My: Pace decreased (<100 feet/min)  Step Length: Right shortened;Left shortened  Gait Abnormalities: Decreased step clearance  Ambulation - Level of Assistance: Contact guard assistance;Stand-by asssistance  Distance (ft): 65 Feet (ft)  Assistive Device: Gait belt;Walker, rolling  Rail Use: Both  Stairs - Level of Assistance: Contact guard assistance  Number of Stairs Trained: 5  Interventions: Safety awareness training;Verbal cues  Gait Abnormalities: Decreased step clearance   With 1 turns. Therapeutic Exercise: Pt presented sitting in bedside chair. Stand-step transfer from bedside chair>w/c with RW and CGA. Gait training as noted above with w/c follow for safety. Pt required frequent verbal cues to slowdown and proximity to RW for safety. Pt negotiated 5 steps with B HR and CGA. Seated B LE TE rendered to promote strength and endurance for improved functional mobility: HR/TR, ball squeezes, resisted hip abd (orange band) 2x15, LAQ, hip flexion 2x10.  Pt required verbal and intermittent visual cues for proper technique with TE's. Sit<>stand x2 from w/c with CGA and verbal cues for hand placement. Patient/Caregiver Education:   Pt /Caregiver Education on safety and fall prevention was provided to reduce risk of falls. ASSESSMENT:  Patient continues to benefit from Skilled PT services to improve bed mobility, transfers, strength, balance, gait and stair negotiation. Progression toward goals:  []      Improving appropriately and progressing toward goals  [x]      Improving slowly and progressing toward goals  []      Not making progress toward goals and plan of care will be adjusted     Treatment session: 38 minutes.   Therapist:   Naresh Wong PTA,          12/13/2017

## 2017-12-13 NOTE — ROUTINE PROCESS
Patient daughter made aware of medications patient refused today.  Medication attempt  given at daughter request.

## 2017-12-13 NOTE — PROGRESS NOTES
[facility]     Patient: Criselda Darden MRN: [id number]  CSN: [id number]    YOB: 1920  Age: 80 y.o. Sex: female    DOA: 11/30/2017 LOS:  LOS: 13 days                    Subjective:     Pt demented and frequently refuses meds. Sleepy and not attending will add ritalin. Edema persists and will inc lasix    Objective:      Visit Vitals    /56    Pulse 64    Temp 97.9 °F (36.6 °C)    Resp 17    Ht 4' 6\" (1.372 m)    Wt 53.8 kg (118 lb 9.6 oz)    SpO2 95%    Breastfeeding No    BMI 28.6 kg/m2       Physical Exam:  Chest clear  Cor rr  abd soft  No edema    Intake and Output:  Current Shift:     Last three shifts:       No results found for this or any previous visit (from the past 24 hour(s)).     Current Facility-Administered Medications   Medication Dose Route Frequency    methylphenidate HCl (RITALIN) tablet 10 mg  10 mg Oral BID    furosemide (LASIX) tablet 20 mg  20 mg Oral ACB&D    potassium chloride SR (KLOR-CON 10) tablet 10 mEq  10 mEq Oral DAILY    amLODIPine (NORVASC) tablet 5 mg  5 mg Oral DAILY    cyanocobalamin (VITAMIN B12) injection 1,000 mcg  1,000 mcg IntraMUSCular Q30D    acetaminophen (TYLENOL) tablet 500 mg  500 mg Oral Q4H PRN    clopidogrel (PLAVIX) tablet 75 mg  75 mg Oral DAILY    donepezil (ARICEPT) tablet 5 mg  5 mg Oral DAILY    famotidine (PEPCID) tablet 20 mg  20 mg Oral DAILY    fluticasone (FLONASE) 50 mcg/actuation nasal spray 2 Spray  2 Spray Both Nostrils DAILY    lisinopril (PRINIVIL, ZESTRIL) tablet 20 mg  20 mg Oral DAILY    metoprolol succinate (TOPROL-XL) XL tablet 25 mg  25 mg Oral DAILY    nitroglycerin (NITROSTAT) tablet 0.4 mg  0.4 mg SubLINGual Q5MIN PRN    polyethylene glycol (MIRALAX) packet 17 g  17 g Oral DAILY    simvastatin (ZOCOR) tablet 40 mg  40 mg Oral QHS    cholecalciferol (VITAMIN D3) tablet 2,000 Units  2,000 Units Oral DAILY    [facility] TCC ANESTHESIA   Other PRN    [facility] TCC EMERGENCY/STAT BOX   Other PRN       Lab Results Component Value Date/Time    Glucose 87 12/11/2017 04:10 AM    Glucose 115 12/04/2017 07:00 AM    Glucose 84 11/28/2017 03:50 AM    Glucose 98 11/27/2017 06:35 AM    Glucose 107 11/26/2017 03:35 AM        Assessment/Plan     Active Problems:    * No active hospital problems.  Hayden Hernandez MD  12/13/2017, 12:20 PM

## 2017-12-13 NOTE — ROUTINE PROCESS
Bedside shift change report given to shady lpn (oncoming nurse) by shandra garcia (offgoing nurse). Report included the following information Kardex, MAR and Recent Results.

## 2017-12-14 PROCEDURE — 74011250637 HC RX REV CODE- 250/637: Performed by: INTERNAL MEDICINE

## 2017-12-14 RX ADMIN — METHYLPHENIDATE HYDROCHLORIDE 10 MG: 10 TABLET ORAL at 14:43

## 2017-12-14 RX ADMIN — FAMOTIDINE 20 MG: 20 TABLET ORAL at 09:28

## 2017-12-14 RX ADMIN — FLUTICASONE PROPIONATE 2 SPRAY: 50 SPRAY, METERED NASAL at 09:30

## 2017-12-14 RX ADMIN — DONEPEZIL HYDROCHLORIDE 5 MG: 5 TABLET, FILM COATED ORAL at 09:29

## 2017-12-14 RX ADMIN — AMLODIPINE BESYLATE 5 MG: 5 TABLET ORAL at 09:27

## 2017-12-14 RX ADMIN — POLYETHYLENE GLYCOL 3350 17 G: 17 POWDER, FOR SOLUTION ORAL at 09:39

## 2017-12-14 RX ADMIN — LISINOPRIL 20 MG: 20 TABLET ORAL at 09:27

## 2017-12-14 RX ADMIN — POTASSIUM CHLORIDE 10 MEQ: 750 TABLET, FILM COATED, EXTENDED RELEASE ORAL at 09:28

## 2017-12-14 RX ADMIN — VITAMIN D, TAB 1000IU (100/BT) 2000 UNITS: 25 TAB at 09:28

## 2017-12-14 RX ADMIN — SIMVASTATIN 40 MG: 40 TABLET, FILM COATED ORAL at 21:15

## 2017-12-14 RX ADMIN — CLOPIDOGREL BISULFATE 75 MG: 75 TABLET ORAL at 09:26

## 2017-12-14 RX ADMIN — METHYLPHENIDATE HYDROCHLORIDE 10 MG: 10 TABLET ORAL at 09:26

## 2017-12-14 RX ADMIN — METOPROLOL SUCCINATE 25 MG: 25 TABLET, EXTENDED RELEASE ORAL at 09:27

## 2017-12-14 RX ADMIN — FUROSEMIDE 20 MG: 20 TABLET ORAL at 17:39

## 2017-12-14 RX ADMIN — FUROSEMIDE 20 MG: 20 TABLET ORAL at 09:29

## 2017-12-14 NOTE — PROGRESS NOTES
Pt participated in the luncheon with the [employer] activity for the duration of 60 minutes. Pt interacted well with peers.

## 2017-12-14 NOTE — PROGRESS NOTES
NUTRITION follow-up/Plan of care    RECOMMENDATIONS:   1. Dental Soft diet; chopped meats  2. Ensure Enlive TID  3. Monitor weight and PO intake  4. RD to follow      GOALS:   1. Ongoing: PO intake meets >75% of protein/calorie needs by 12/21  2. Ongoing: Maintain weight (+/- 1-2 lb) by 12/21    ASSESSMENT:    Weight status is classified as overweight per BMI of 28.6. Variable PO intake. Added Ensure Enlive TID for additional calories/protein. Labs noted. Pt w/ hypernatremia and hypokalemia per lab values on (12/11). Nutrition recommendations listed. RD to follow. Nutrition Risk:  []   High []  Moderate [x] Low    SUBJECTIVE/OBJECTIVE:   Transferred from [address] [place] [address] to [facility] on (11/30). Patient admitted with traumatic rhabdomyolysis. Has history of COPD, dementia and HTN. No food allergies. Patient has trouble chewing. OT assisting pt with cutting meats and setting up meal for patient to eat during visit. Pt seen after lunch in room with Ensure on table. Appetite is still variable; observed 35% of lunch and 15 % Ensure consumed. Wt loss of 1.6 lb equating to 1.3% change noted. Will continue to monitor. Information Obtained From:   [x] Chart Review  [x] Patient  [] Family/Caregiver  [] Nurse/Physician   [] Patient Rounds/Interdisciplinary Meeting    Diet:  Dental Soft Diet (chopped meats)  Medications: [x] Reviewed     Patient Active Problem List   Diagnosis Code    CHF, acute on chronic ([address] I50.9    Traumatic rhabdomyolysis ([address] T79. 6XXA    Elevated troponin R74.8    UTI (urinary tract infection) N39.0    A-fib (HCC) I48.91    Late onset Alzheimer's disease without behavioral disturbance G30.1, F02.80    Essential hypertension I10    Debility R53.81    Fall W19. Aleah Walker     Past Medical History:   Diagnosis Date    Breast cancer ([address]     Chronic obstructive pulmonary disease [facility]     Dementia     Hypertension      Labs:    Lab Results   Component Value Date/Time    Sodium 147 12/11/2017 04:10 AM    Potassium 3.4 12/11/2017 04:10 AM    Chloride 106 12/11/2017 04:10 AM    CO2 32 12/11/2017 04:10 AM    Anion gap 9 12/11/2017 04:10 AM    Glucose 87 12/11/2017 04:10 AM    BUN 18 12/11/2017 04:10 AM    Creatinine 0.70 12/11/2017 04:10 AM    Calcium 9.0 12/11/2017 04:10 AM    Albumin 3.6 11/25/2017 06:17 PM     Anthropometrics: BMI (calculated): 28.6   Last 3 Recorded Weights in this Encounter    12/05/17 1428 12/08/17 2129 12/12/17 1459   Weight: 54.4 kg (120 lb) 47.6 kg (104 lb 14.4 oz) 53.8 kg (118 lb 9.6 oz)      Ht Readings from Last 1 Encounters:   12/12/17 4' 6\" (1.372 m)     [x]  Weight Loss (1.6 lb or 1.3%)  []  Weight Gain  []  Weight Stable   []  New wt n/a on record     Estimated Nutrition Needs:   1200 Kcals/day  Protein (g): 60 g    Nutrition Problems Identified:   [x] Suboptimal PO intake   [] Food Allergies  [x] Difficulty chewing/swallowing/poor dentition  [] Constipation/Diarrhea   [] Nausea/Vomiting   [] None  [] Other:     Plan:   [] Therapeutic Diet  [x]  Obtained/adjusted food preferences/tolerances and/or snacks options   [x]  Continue supplements prescribed  [] Occupational therapy following for feeding techniques  []  HS snack added   []  Modify diet texture   []  Modify diet for food allergies   []  Educate patient   []  Assist with menu selection   [x]  Monitor PO intake on meal rounds   [x]  Continue inpatient monitoring and intervention   [x]  Participated in discharge planning/Interdisciplinary rounds/Team meetings   []  Other:     Education Needs:   [] Not appropriate for teaching at this time due to:   [x] Identified and addressed    Nutrition Monitoring and Evaluation:   [x] Continue inpatient monitoring and interventions    [] Other:     Khang Ba

## 2017-12-14 NOTE — PROGRESS NOTES
Problem: Self Care Deficits Care Plan (Adult)  Goal: *Acute Goals and Plan of Care (Insert Text)  OCCUPATIONAL THERAPY SHORT TERM GOALS      Updated 12/8/17     1. Patient will perform Upper body bathing with contact guard assist and dressing with minimal assist without adaptive equipment. 2.  Patient will perform Lower body bathing with minimal assist and dressing with maximal assist with adaptive equipment as needed. 3.  Patient will perform toileting task with contact guard assist with clothing management and Supervision with hygeine with Good safety to reduce falls risk. 4.  Patient will perform toilet transfers with [place] and standby assistance . 5. Patient will perform standing static/dynamic balance activities for improved ADL/IADL function with moderate assistance and Good balance and safety awareness. 6.  Patient will improve Barthel index scores to atleast 65/100 to improve functional mobility. 7. Patient will perform self feeding and set up with adaptive equipment with Mod I.    Updated 12/8/17    1. Patient will perform Upper body ADL's without adaptive equipment with moderate assistance. - - progressing w/ bathing, met goal with dressing/upgrade  2. Patient will perform Lower body ADL's with adaptive equipment as needed with maximal assistance. - progressing w/ dressing, met goal with bathing/upgrade  3. Patient will perform toileting task with maximal assistance with Good safety to reduce falls risk. - met goal upgrade  4. Patient will perform toilet transfers with Rolling Walker and moderate assistance . - met goal/upgrade  5. Patient will perform standing static/dynamic balance activities for improved ADL/IADL function with moderate assistance and Good balance and safety awareness. -porgressing  6. Patient will improve Barthel index scores to atleast 35/100 to improve functional mobility. -pmet goal/upgrade  7.  Patient will perform self feeding and set up with adaptive equipment with Mod I. - progressing  Initiated 12/1/2017 and to be accomplished within 2 Week(s)    1. Patient will perform Upper body ADL's without adaptive equipment with moderate assistance . 2.  Patient will perform Lower body ADL's with adaptive equipment as needed with maximal assistance . 3. Patient will perform toileting task with maximal assistance with Good safety to reduce falls risk. 4.  Patient will perform toilet transfers with Rolling Walker and moderate assistance . 5. Patient will perform standing static/dynamic balance activities for improved ADL/IADL function with moderate assistance and Good balance and safety awareness. 6.  Patient will improve Barthel index scores to atleast 35/100 to improve functional mobility. 7. Patient will perform self feeding and set up with adaptive equipment with Mod I.      OCCUPATIONAL THERAPY LONG TERM GOALS   Initiated 12/1/2017 and to be accomplished within 4 Week(s)    1. Patient will perform Upper body ADL's without adaptive equipment with modified independence. 2.  Patient will perform Lower body ADL's with adaptive equipment as needed with modified independence . 3. Patient will perform toileting task with modified independence with Good safety to reduce falls risk. 4.  Patient will perform all functional transfers utilizing least restrictive device and durable medical equipment as needed with modified independence and Good balance and safety awareness. 5.  Patient will perform standing static/dynamic activity for improved ADL/IADL function with modified independence an and Good balance and safety awareness. 6.  Patient will improve Barthel index score to 95/100 to improve independence with mobility.       Therapist: Yas Viveros    12/1/2017            [facility]   Occupational Therapy Daily Treatment note      Patient: Rpuesh Zuluaga (88 y.o. female)       Date: 12/14/2017  Attending Physician: Lenore Hernandez MD  Primary Diagnosis: Traumatic rhabdomyolysis     Treatment Diagnosis  Treatment Diagnosis: muscle weakness   Treatment Diagnosis 2: difficulty in walking   Precautions : Precautions at Admission: Fall  Vital Signs:        Cognitive Status:     Pain:        Gross Assessment:     Coordination:     Bed Mobility:  Bed Mobility  Supine to Sit: Minimum assistance  Scooting: Contact guard assistance  Transfers:  Functional Transfers  Sit to Stand: Contact guard assistance  Stand to Sit: Stand-by asssistance  Bed to Chair: Contact guard assistance     Balance:  Balance  Sitting: With support  Sitting - Static: Good (unsupported)  Sitting - Dynamic: Fair (occasional)  Standing: With support  Standing - Static: Fair (+)  Standing - Dynamic : Fair  Therapeutic Activities:   Functional mobility utilizing RW from recliner to w/c in order to increase functional activity tolerance and independence during task. Patient able to complete with close SBA. Static standing activity with one hand release in order to increase standing balance and tolerance needed for ADLS. Patient was able to tolerate 3 mins of standing prior to requesting to sit. Item retrieval within pink T-Putty in order to increase hand dexterity and  strength needed for manipulation of buttons with UB ADLS. Patient/Caregiver Education:    Jessica White Education on safe RW placement during transfers was provided for optimal safety.       ASSESSMENT:  Patient continues to demonstrate the need for skilled Occupational Therapy services to improve dynamic standing balance needed for bathing  Progression toward goals:  [x]      Improving appropriately and progressing toward goals  []      Improving slowly and progressing toward goals  []      Not making progress toward goals and plan of care will be adjusted     Treatment session:   41 minutes    Therapist:    LISA Cabrales,  12/14/2017

## 2017-12-14 NOTE — PROGRESS NOTES
YUSUF received a fax from [place] [facility] that [facility] was extended until 12/20/17. YUSUF spoke with pt's daughter re: the extension. Pt's daughter inquired about private duty caregivers. YUSUF informed pt's daughter that YUSUF isn't familiar with any private duty caregivers but YUSUF will leave another flier for private duty in pt's room.

## 2017-12-14 NOTE — PROGRESS NOTES
Problem: Mobility Impaired (Adult and Pediatric)  Goal: *Acute Goals and Plan of Care (Insert Text)  PHYSICAL THERAPY STG GOALS :  Initiated 12/1/2017 and to be accomplished within 1-2 Weeks (Updated 12/8/17)     1. Patient will move from supine to sit and sit to supine  and scoot up and down in bed with modified independence. (Progressing; Mod A)    2. Patient will transfer from bed to chair and chair to bed with stand by assistance using RW. (Progressing; CGA)   3. Patient will perform sit to stand with stand by assistance with Good balance and safety awareness. (Progressing; CGA)   4. Patient will ambulate with stand by assistance/contact guard assist for 50 feet with RW on level surfaces with 2 turns. (Progressing; 52ft with CGA)   5. Patient will ascend/descend 5 stairs with bilateral handrail(s) with minimal assistance/contact guard assist to allow for safe home access/exit. (Progressing; 3 4\" steps with B HR and Min A/CGA)   6. Patient will improve standardized test score for [facility] Balance Scale 3 (Progressing; 2+)       PHYSICAL THERAPY LTG GOALS :  Initiated 12/1/2017 and to be accomplished within 3-4 Weeks    1. Patient will transfer from bed to chair and chair to bed with supervision/set-up using RW. 2.  Patient will perform sit to stand with supervision/set-up with Good balance and safety awareness. 3.  Patient will ambulate with supervision/set-up for 120 feet with RW on level surfaces and be able to maneuver through narrow spaces and obstacles without loss of balance. 4.  Patient will ascend/descend 5 stairs with bilateral handrail(s) with supervision/stand by assistance/set-up to allow for safe home access/exit. 5.  Patient will improve standardized test score for [facility] Balance Scale 4.       Physical Therapist:   Jenny Howard  on 12/1/2017             [facility]   physical Therapy Daily Treatment note      Patient: Sharon Francisco (80 y.o. female)               Date: 12/14/2017    Physician: Shankar Allred MD  Primary Diagnosis: Traumatic rhabdomyolysis           Treatment Diagnosis  Treatment Diagnosis: muscle weakness   Treatment Diagnosis 2: difficulty in walking  Precautions: Fall  Vital Signs  Cognitive Status:  Pain  Bed Mobility Training  Bed Mobility Training  Supine to Sit: Minimum assistance  Scooting: Contact guard assistance  Balance  Sitting: With support  Sitting - Static: Good (unsupported)  Sitting - Dynamic: Fair (occasional)  Standing: With support  Standing - Static: Fair (+)  Standing - Dynamic : Fair  Transfer Training  Transfer Training  Sit to Stand: Contact guard assistance  Stand to Sit: Stand-by asssistance  Bed to Chair: Contact guard assistance  Interventions: Safety awareness training;Verbal cues  Sit to Stand: Contact guard assistance  Gait Training  Gait  Base of Support: Center of gravity altered  Speed/My: Pace decreased (<100 feet/min)  Step Length: Right shortened;Left shortened  Gait Abnormalities: Decreased step clearance  Ambulation - Level of Assistance: Contact guard assistance;Stand-by asssistance  Distance (ft): 82 Feet (ft)  Assistive Device: Gait belt;Walker, rolling  Rail Use: Both  Stairs - Level of Assistance: Contact guard assistance  Number of Stairs Trained: 5  Interventions: Safety awareness training;Verbal cues  Gait Abnormalities: Decreased step clearance    With 2 turns. Therapeutic Exercise: Pt presented supine in bed. Bed mobility as noted above. Gait training with abovementioned details and w/c follow for safety. Pt had less c/o of feeling tired today. Pt negotiated 5 steps with B HR and CGA with verbal cues for safety. Static standing balance x9' with 1 to no UE support and SBA with no noted LOB. Seated B LE TE rendered to promote strength and endurance for improved functional mobility: LAQ, HR/TR 2x10.        Patient/Caregiver Education:   Pt /Caregiver Education on safety and fall prevention was provided to reduce risk of falls. ASSESSMENT:  Patient continues to benefit from Skilled PT services to improve bed mobility, strength, balance, gait and stair negotiation. Progression toward goals:  []      Improving appropriately and progressing toward goals  [x]      Improving slowly and progressing toward goals  []      Not making progress toward goals and plan of care will be adjusted     Treatment session: 42 minutes.   Therapist:   Parvin Núñez PTA,          12/14/2017

## 2017-12-15 PROCEDURE — 74011250637 HC RX REV CODE- 250/637: Performed by: INTERNAL MEDICINE

## 2017-12-15 RX ADMIN — SIMVASTATIN 40 MG: 40 TABLET, FILM COATED ORAL at 22:52

## 2017-12-15 RX ADMIN — METHYLPHENIDATE HYDROCHLORIDE 10 MG: 10 TABLET ORAL at 14:00

## 2017-12-15 RX ADMIN — CLOPIDOGREL BISULFATE 75 MG: 75 TABLET ORAL at 11:09

## 2017-12-15 RX ADMIN — DONEPEZIL HYDROCHLORIDE 5 MG: 5 TABLET, FILM COATED ORAL at 11:10

## 2017-12-15 RX ADMIN — POLYETHYLENE GLYCOL 3350 17 G: 17 POWDER, FOR SOLUTION ORAL at 11:11

## 2017-12-15 RX ADMIN — FUROSEMIDE 20 MG: 20 TABLET ORAL at 17:00

## 2017-12-15 RX ADMIN — AMLODIPINE BESYLATE 5 MG: 5 TABLET ORAL at 11:10

## 2017-12-15 RX ADMIN — VITAMIN D, TAB 1000IU (100/BT) 2000 UNITS: 25 TAB at 11:10

## 2017-12-15 RX ADMIN — POTASSIUM CHLORIDE 10 MEQ: 750 TABLET, FILM COATED, EXTENDED RELEASE ORAL at 11:10

## 2017-12-15 RX ADMIN — METHYLPHENIDATE HYDROCHLORIDE 10 MG: 10 TABLET ORAL at 08:00

## 2017-12-15 RX ADMIN — FAMOTIDINE 20 MG: 20 TABLET ORAL at 11:09

## 2017-12-15 RX ADMIN — FLUTICASONE PROPIONATE 2 SPRAY: 50 SPRAY, METERED NASAL at 11:11

## 2017-12-15 RX ADMIN — METOPROLOL SUCCINATE 25 MG: 25 TABLET, EXTENDED RELEASE ORAL at 11:09

## 2017-12-15 RX ADMIN — FUROSEMIDE 20 MG: 20 TABLET ORAL at 11:10

## 2017-12-15 RX ADMIN — LISINOPRIL 20 MG: 20 TABLET ORAL at 11:10

## 2017-12-15 NOTE — PROGRESS NOTES
Problem: Self Care Deficits Care Plan (Adult)  Goal: *Acute Goals and Plan of Care (Insert Text)  OCCUPATIONAL THERAPY SHORT TERM GOALS      Updated 12/15/17    1. Patient will perform Upper body bathing with contact guard assist and dressing with minimal assist without adaptive equipment. 2.  Patient will perform Lower body bathing with minimal assist and dressing with maximal assist with adaptive equipment as needed. 3.  Patient will perform toileting task with Supervisoin with clothing management and Supervision with hygeine with Good safety to reduce falls risk. 4.  Patient will perform toilet transfers with Rudean Coyer and standby assistance . 5. Patient will perform standing static/dynamic balance activities for improved ADL/IADL function with SBA and Good balance and safety awareness. 6.  Patient will improve Barthel index scores to atleast 65/100 to improve functional mobility    Updated 12/8/17     1. Patient will perform Upper body bathing with contact guard assist and dressing with minimal assist without adaptive equipment. (progressing-currently Mod A)  2. Patient will perform Lower body bathing with minimal assist and dressing with maximal assist with adaptive equipment as needed. (progressing-currently Max A)  3. Patient will perform toileting task with contact guard assist with clothing management and Supervision with hygeine with Good safety to reduce falls risk. (goal met. UPG Supervision)  4. Patient will perform toilet transfers with Rudean Coyer and standby assistance . (progressing-currently CGA)  5. Patient will perform standing static/dynamic balance activities for improved ADL/IADL function with moderate assistance and Good balance and safety awareness. (goal met-UPG SBA)  6. Patient will improve Barthel index scores to atleast 65/100 to improve functional mobility.(progressing)  7.  Patient will perform self feeding and set up with adaptive equipment with Mod I. (goal met-D/C)    Updated 12/8/17    1. Patient will perform Upper body ADL's without adaptive equipment with moderate assistance. - - progressing w/ bathing, met goal with dressing/upgrade  2. Patient will perform Lower body ADL's with adaptive equipment as needed with maximal assistance. - progressing w/ dressing, met goal with bathing/upgrade  3. Patient will perform toileting task with maximal assistance with Good safety to reduce falls risk. - met goal upgrade  4. Patient will perform toilet transfers with Rolling Walker and moderate assistance . - met goal/upgrade  5. Patient will perform standing static/dynamic balance activities for improved ADL/IADL function with moderate assistance and Good balance and safety awareness. -porgressing  6. Patient will improve Barthel index scores to atleast 35/100 to improve functional mobility. -pmet goal/upgrade  7. Patient will perform self feeding and set up with adaptive equipment with Mod I. - progressing    Initiated 12/1/2017 and to be accomplished within 2 Week(s)    1. Patient will perform Upper body ADL's without adaptive equipment with moderate assistance . 2.  Patient will perform Lower body ADL's with adaptive equipment as needed with maximal assistance . 3. Patient will perform toileting task with maximal assistance with Good safety to reduce falls risk. 4.  Patient will perform toilet transfers with Rolling Walker and moderate assistance . 5. Patient will perform standing static/dynamic balance activities for improved ADL/IADL function with moderate assistance and Good balance and safety awareness. 6.  Patient will improve Barthel index scores to atleast 35/100 to improve functional mobility. 7. Patient will perform self feeding and set up with adaptive equipment with Mod I.      OCCUPATIONAL THERAPY LONG TERM GOALS   Initiated 12/1/2017 and to be accomplished within 4 Week(s)    1.   Patient will perform Upper body ADL's without adaptive equipment with modified independence. 2.  Patient will perform Lower body ADL's with adaptive equipment as needed with modified independence . 3. Patient will perform toileting task with modified independence with Good safety to reduce falls risk. 4.  Patient will perform all functional transfers utilizing least restrictive device and durable medical equipment as needed with modified independence and Good balance and safety awareness. 5.  Patient will perform standing static/dynamic activity for improved ADL/IADL function with modified independence an and Good balance and safety awareness. 6.  Patient will improve Barthel index score to 95/100 to improve independence with mobility. Therapist: Eric Fuentes    12/1/2017             [facility]  OCCUPATIONAL THERAPY WEEKLY SUMMARY   Reporting period:  from 12/08/17 through 12/15/17       Patient: Darryl Liu (57 y.o. female)   Date: 12/15/2017    Primary Diagnosis: Traumatic rhabdomyolysis        Attending Physician: Tyrell Charles MD Treatment Diagnosis  Treatment Diagnosis: muscle weakness   Treatment Diagnosis 2: difficulty in walking  Precautions: Fall  Rehab Potential : Good    Skill interventions and education provided with clinical rationale (include individualized treatment techniques and standardized tests):  Skilled Occupational services were provided utilizing Therapeutic exercises, therapeutic activities, functional mobility, functional transfers, and EC/WS. Using a comparative statement, summarize significant progress toward goals as a result of skilled intervention provided:  Patient has made Fair progress towards their Occupational Therapy goals in the following areas: increased independence and performance with grooming, bathing, upper body dressing, lower body dressing, toileting and toilet transfer using [place]. Patient has met 4/6 STGS and making slow but steady progression towards LTGS.   Identify remaining functional areas, impairments limiting progress and/or barriers to improvement:  Patient would benefit from continued skilled Occupational Therapy Services to address the following functional deficits in  decreased dynamic standing balance and tolerance needed for safely complete ADL/IADLS. OBJECTIVE DATA SUMMARY:       INITIAL ASSESSMENT WEEKLY PROGRESS   COGNITIVE STATUS: COGNITIVE STATUS:   Neurologic State: Confused  Orientation Level: Oriented to person  Cognition: Follows commands  Perception: Appears intact  Perseveration: No perseveration noted  Safety/Judgement: Fall prevention Neurologic State: Alert  Orientation Level: Oriented to person  Cognition: Decreased command following, Decreased attention/concentration  Perception: Cues to maintain midline in sitting, Tactile, Verbal  Perseveration: Perseverates during conversation  Safety/Judgement: Fall prevention   PAIN: PAIN:   Pain Scale 1: Numeric (0 - 10)  Pain Intensity 1: 0  Patient Stated Pain Goal: 0  Pain Reassessment 1: Patient sleeping     Pain Scale 1: Numeric (0 - 10)  Pain Intensity 1: 0  Patient Stated Pain Goal: 0  Pain Reassessment 1: Yes   BED MOBILITY BED MOBILITY   Supine to Sit: Contact guard assistance, Minimum assistance (Merissa for initiating sitting up )  Sit to Supine: Maximum assistance, Assist x2  Scooting: Moderate assistance Supine to Sit: Contact guard assistance  Sit to Supine: Minimum assistance  Scooting: Stand-by asssistance   ADL SELF CARE ADL SELF CARE   Upper Body Dressing: Minimum assistance  Lower Body Dressing:  Moderate assistance Bathing Assistance: Stand-by assistance  Position Performed: Seated edge of bed    Dressing Assistance: Minimum assistance  Pullover Shirt: Moderate assistance  Front Opened Shirt: Minimum assistance    Bathing Assistance: Contact guard assistance  Perineal  : Contact guard assistance  Position Performed: Standing  Adaptive Equipment:  ([address])    Toileting Assistance: Stand-by assistance  Bladder Hygiene: Stand-by assistance  Bowel Hygiene: Total assistance (dependent)  Clothing Management: Stand-by assistance  Adaptive Equipment: Grab bars, Walker    Grooming Assistance: Stand-by assistance  Washing Face: Stand-by assistance  Washing Hands: Stand-by assistance (at standing)  Brushing Teeth: Stand-by assistance  Brushing/Combing Hair: Stand-by assistance    Dressing Assistance: Total assistance(dependent)  Underpants: Moderate assistance  Pants With Elastic Waist: Total assistance(dependent)  Pants With Button/Zipper: Total assistance (dependent)  Socks: Total assistance (dependent)    Feeding Assistance: Supervision/set-up  Container Management: Maximum assistance  Cutting Food:  Total assistance (dependent)  Utensil Management: Supervision/set-up  Food to Mouth: Supervision/set-up (verbal cues to initiate)  Drink to Mouth: Supervision/set-up (verbal cues to initiate)  Cues: Verbal cues provided   TRANSFERS TRANSFERS   Sit to Stand: Contact guard assistance, Minimum assistance  Stand to Sit: Contact guard assistance, Minimum assistance  Bed to Chair: Maximum assistance, Assist x2 (w/c to bed)  Toilet Transfer : Other (comment) (patient refused) Sit to Stand: Contact guard assistance  Stand to Sit: Contact guard assistance  Bed to Chair: Contact guard assistance  Toilet Transfer : Stand-by asssistance (close)   Bathroom Mobility: Stand-by assistance (close)  Toilet Transfer : Other (comment) (patient refused)  Adaptive Equipment: Ashishon Chun (comment), Wheelchair, Grab bars Bathroom Mobility: Stand-by assistance (close)  Toilet Transfer : Stand-by asssistance (close)  Adaptive Equipment: Walker (comment)   BALANCE BALANCE   Sitting: With support  Sitting - Static: Fair (occasional) ((-))  Sitting - Dynamic: Poor (constant support)  Standing: With support  Standing - Static: Poor ((-))  Standing - Dynamic :  (not tested) Sitting: Without support  Sitting - Static: Good (unsupported)  Sitting - Dynamic: Fair (occasional)  Standing: With support  Standing - Static: Fair (+)  Standing - Dynamic : Fair       GROSS ASSESSMENT  GROSS ASSESSMENT   AROM: Generally decreased, functional  PROM: Generally decreased, functional  Strength: Generally decreased, functional (RUE 3-/5, LUE 3/5)  Coordination: Generally decreased, functional  Tone: Normal  Sensation: Intact AROM: Generally decreased, functional  PROM: Generally decreased, functional  Strength: Generally decreased, functional (4-/5 through LLE, 3 through out RLE ; limited due to pain)  Coordination: Generally decreased, functional  Tone: Normal  Sensation: Intact   COORDINATION COORDINATION   Fine Motor Skills-Upper: Left Intact, Right Intact  Gross Motor Skills-Upper: Left Intact, Right Intact Fine Motor Skills-Upper: Left Intact, Right Intact  Gross Motor Skills-Upper: Left Intact, Right Intact   VISUAL/PERCEPTUAL VISUAL/PERCEPTUAL             AUDITORY: AUDITORY:   Auditory Impairment: None Auditory Impairment: None       INSTRUMENTAL  ADL'S:    INSTRUMENTAL ADL'S:          THE BARTHEL INDEX  ACTIVITY   SCORE   FEEDING  0=unable  5=needs help cutting,spreading butter,etc., or modified diet  10= independent   10   BATHING  0=dependent  5=independent (or in shower   0   GROOMING  0=needs help  5=independent face/hair/teeth/shaving (implements provided)   5   DRESSING  0=dependent  5=needs help but can do about half unaided  10=independent(including buttons, zips,laces etc.)   5   BOWELS  0=incontinent  5=occasional accident  10=continent   10   BLADDER  0=incontinent, or catheterized and unable to manage alone  5=occasional accident  10=continent   10   TOILET USE  0=dependent  5=needs some help, but can do something alone  10=independent (on and off, dressing, wiping)   5   TRANSFER (BED TO CHAIR AND BACK)  0=unable, no sitting balance  5=major help(one or two people,physical), can sit  10=minor help(verbal or physical)  15=independent   10   MOBILITY (ON LEVEL SURFACES)  0=immobile or <50 yards  5=wheelchair independent,including corners,>50 yards  10=walkes with help of one person (verbal or physical) >50 yards  15=independent(but may use any aid; for example, stick) >50 yards   10   STAIRS  0=unable  5=needs help (verbal, physical, carrying aid)  10=independent   0            TOTAL:                  65/100     Treatment:  UB Strengthening with 2#, 2 sets x 10 reps in order to increase functional activity tolerance and UB muscle strength needed for ADLS. Assisted patient with bed mobility, donning house coat, bathroom mobility utilizing RW, toileting routine as well as grooming task, at standing, in order to assess safety and independence during task. See above for levels of A needed. Cueing needed for push to stand vs. Pulling on RW for optimal safety and independence. After mobility, patient demonstrated increased coughing and dry having. GONZALEZ notified patient's nurse Dunia Estrada, due to patient taking medications prior to treatment session. Patient's response to Treatment rendered:  Patient is pleasant and receptive to therapist cueing and recommendations. Patient expected Discharge Location:  [x]Private Residence  [] [facility]  []LTC  []Other:    Plan: Continue OT services as established on the Plan of Care for 3-6 times a week.     Treatment Minutes:  40  OT and Assistant have had a weekly case conference regarding the above treatment:  [] Yes     [] No    Therapist:   Jose Sivlerio,         Date:12/15/2017     Forward to OT for co-signature when completed

## 2017-12-15 NOTE — PROGRESS NOTES
Problem: Mobility Impaired (Adult and Pediatric)  Goal: *Acute Goals and Plan of Care (Insert Text)  PHYSICAL THERAPY STG GOALS :  Initiated 12/1/2017 and to be accomplished within 1-2 Weeks (Updated 12/15/17)     1. Patient will move from supine to sit and sit to supine  and scoot up and down in bed with modified independence. (Progressing; CGA)    2.  Patient will transfer from bed to chair and chair to bed with stand by assistance using RW. (Progressing; CGA)   3. Patient will perform sit to stand with stand by assistance with Good balance and safety awareness. (Progressing; CGA)   4. Patient will ambulate with stand by assistance/contact guard assist for 50 feet with RW on level surfaces with 2 turns. (Progressing; 52ft with CGA)    5. Patient will ascend/descend 5 stairs with bilateral handrail(s) with minimal assistance/contact guard assist to allow for safe home access/exit. (achieved)   6. Patient will improve standardized test score for [employer] Balance Scale 3 (Progressing; 2+)       PHYSICAL THERAPY STG GOALS :  Initiated 12/1/2017 and to be accomplished within 1-2 Weeks (Updated 12/8/17)     1. Patient will move from supine to sit and sit to supine  and scoot up and down in bed with modified independence. (Progressing; Mod A)    2. Patient will transfer from bed to chair and chair to bed with stand by assistance using RW. (Progressing; CGA)   3. Patient will perform sit to stand with stand by assistance with Good balance and safety awareness. (Progressing; CGA)   4. Patient will ambulate with stand by assistance/contact guard assist for 50 feet with RW on level surfaces with 2 turns. (Progressing; 52ft with CGA)    5. Patient will ascend/descend 5 stairs with bilateral handrail(s) with minimal assistance/contact guard assist to allow for safe home access/exit. (Progressing; 3 4\" steps with B HR and Min A/CGA)   6.   Patient will improve standardized test score for [employer] Standing Balance Scale 3 (Progressing; 2+)       PHYSICAL THERAPY LTG GOALS :  Initiated 12/1/2017 and to be accomplished within 3-4 Weeks    1. Patient will transfer from bed to chair and chair to bed with supervision/set-up using RW. 2.  Patient will perform sit to stand with supervision/set-up with Good balance and safety awareness. 3.  Patient will ambulate with supervision/set-up for 120 feet with RW on level surfaces and be able to maneuver through narrow spaces and obstacles without loss of balance. 4.  Patient will ascend/descend 5 stairs with bilateral handrail(s) with supervision/stand by assistance/set-up to allow for safe home access/exit. 5.  Patient will improve standardized test score for [employer] Balance Scale 4. Physical Therapist:   Kalia Howard  on 12/1/2017              [facility]   PHYSICAL THERAPY WEEKLY PROGRESS REPORT  Reporting Period:  Date:  12/8/2017 to 12/15/2017      Patient: Andrew Botello (89 y.o. female)                         Date: 12/15/2017    Primary Diagnosis: Traumatic rhabdomyolysis       Attending Physician: Kavya Benedict MD   Treatment Diagnosis  Treatment Diagnosis: muscle weakness   Treatment Diagnosis 2: difficulty in walking  Precautions:  Fall  Rehab Potential : Good:    Skill interventions and education provided with clinical rationale (include individualized treatment techniques and standardized tests):   Skilled Physical Therapy services were provided with  Therapeutic exerciese rendered to address strength, endruance, balance and coordination. Gait training renderd to address gait and stair negotiation and obstacle negotiation.  Therapeutic activities renderd to address safety awareness and fall prevention with bed mobility (supine <> sit) and transfers (sit <> stand and bed <> chair)        Using a comparative statement, summarize significant progress toward goals as a result of skilled intervention provided:  Patient has made Good progress towards their Physical Therapy goals in the areas of gait training for ambulation endurance and stair negotiation   Identify remaining functional areas, impairments limiting progress and/or barriers to improvement:  Patient would benefit from continues PT services to address the following functional deficits in balance, and safety with bed mobility ad transfers (sit <> stand and bed <> chair)    OBJECTIVE DATA SUMMARY:     INITIAL ASSESSMENT WEEKLY ASSESSMENT   COGNITIVE STATUS COGNITIVE STATUS   Neurologic State: Confused  Orientation Level: Oriented to person  Cognition: Follows commands  Perception: Appears intact  Perseveration: No perseveration noted  Safety/Judgement: Fall prevention Neurologic State: Alert  Orientation Level: Oriented to person, Oriented to place, Disoriented to time  Cognition: Decreased command following, Decreased attention/concentration  Perception: Cues to maintain midline in sitting, Tactile, Verbal  Perseveration: Perseverates during conversation  Safety/Judgement: Fall prevention   PAIN PAIN   Pain Scale 1: Numeric (0 - 10)  Pain Intensity 1: 0  Patient Stated Pain Goal: 0  Pain Reassessment 1: Patient sleeping Pain Scale 1: Numeric (0 - 10)  Pain Intensity 1: 0  Patient Stated Pain Goal: 0  Pain Reassessment 1: Yes   GROSS ASSESSMENT GROSS ASSESSMENT   AROM: Generally decreased, functional  PROM: Generally decreased, functional  Strength: Generally decreased, functional (RUE 3-/5, LUE 3/5)  Coordination: Generally decreased, functional  Tone: Normal  Sensation: Intact AROM: Generally decreased, functional  PROM: Generally decreased, functional  Strength: Generally decreased, functional (4-/5 through LLE, 3 through out RLE ; limited due to pain)  Coordination: Generally decreased, functional  Tone: Normal  Sensation: Intact   BED MOBILITY BED MOBILITY   Supine to Sit: Contact guard assistance, Minimum assistance (Merissa for initiating sitting up )  Sit to Supine: Maximum assistance, Assist x2  Scooting:  Moderate assistance Supine to Sit: Contact guard assistance  Sit to Supine: Maximum assistance, Assist x2  Scooting: Stand-by asssistance   GAIT GAIT   Base of Support: Center of gravity altered  Speed/My: Slow  Step Length: Right shortened, Left shortened  Gait Abnormalities: Decreased step clearance, Shuffling gait, Other (thoracic kyphosis )  Ambulation - Level of Assistance: Contact guard assistance, Minimal assistance  Distance (ft): 25 Feet (ft) (x2)  Assistive Device: Gait belt, Walker, rolling  Rail Use: Both  Stairs - Level of Assistance: Minimum assistance, Contact guard assistance  Number of Stairs Trained: 3 (4\" steps)  Interventions: Safety awareness training, Verbal cues, Tactile cues Base of Support: Center of gravity altered  Speed/My: Pace decreased (<100 feet/min)  Step Length: Left shortened, Right shortened  Gait Abnormalities: Decreased step clearance  Ambulation - Level of Assistance: Contact guard assistance  Distance (ft): 204 Feet (ft)  Assistive Device: Gait belt, Walker, rolling  Rail Use: Both  Stairs - Level of Assistance: Contact guard assistance  Number of Stairs Trained: 5  Interventions: Safety awareness training, Verbal cues                     TRANSFERS TRANSFERS   Sit to Stand: Contact guard assistance, Minimum assistance  Stand to Sit: Contact guard assistance, Minimum assistance  Stand Pivot Transfers: Minimum assistance  Bed to Chair: Maximum assistance, Assist x2 (w/c to bed) Sit to Stand: Contact guard assistance  Stand to Sit: Contact guard assistance  Stand Pivot Transfers: Minimum assistance  Bed to Chair: Contact guard assistance   BALANCE BALANCE   Sitting: With support  Sitting - Static: Fair (occasional) ((-))  Sitting - Dynamic: Poor (constant support)  Standing: With support  Standing - Static: Poor ((-))  Standing - Dynamic :  (not tested) Sitting: Without support  Sitting - Static: Good (unsupported)  Sitting - Dynamic: Fair (occasional)  Standing: With support  Standing - Static: Fair (+)  Standing - Dynamic : Fair   WHEELCHAIR MOBILITY/MGMT WHEELCHAIR MOBILITY/MGMT         Activity Tolerance:  Fair Activity Tolerance: Fair   Visual/Perceptual          Visual/Perceptual            Auditory:   Auditory Impairment: None    Auditory:   Auditory  Auditory Impairment: None       Steps: both   Clinical Decision making:  [place] standing balance score 2+ Clinical Decision making:  [place] standing balance score 2+     Treatment:   Pt received lying in bed; agreable to PT. Bed mobility and transfers as mentioned above. Gait training as mentioned above; pt motivated to ambulate to [facility] [place]. Thereapeutic exercises rendered to address strength, endurance, balance and coordination: standing activity with pegs for 8 minutes, standing tick tack toe for 3 minutes. [place] x20. Pt educated on progress within the last week. Pt in chair at [place]. Patient's response to treatment rendered:  Pt to continue with skilled PT POC to continue to improve deficits mentioned above. Patient expected Discharge Location:  [x]Private Residence  [] jail/ILF  []LTC  []Other:    Plan: Continue Skilled PT services as established by the Plan of Care for 3-6 times a week. PT and Assistant have had a weekly case conference regarding the above treatment:  [x] Yes     [] No       Treatment session:  43 minutes. Therapist: Fabi Howard       Date:12/15/2017  Forward to PT for co-signature when completed.

## 2017-12-15 NOTE — ROUTINE PROCESS
Bedside shift change report given to Francesca Hall LPN (oncoming nurse) by Georgette Cornelius RN (offgoing nurse). Report included the following information SBAR, Kardex, MAR and Recent Results. VISUALLY CHECKED PT Q 1 HR BY NURSING STAFF. 24 hour order chart check done

## 2017-12-16 PROCEDURE — 77030011256 HC DRSG MEPILEX <16IN NO BORD MOLN -A

## 2017-12-16 PROCEDURE — 74011250637 HC RX REV CODE- 250/637: Performed by: INTERNAL MEDICINE

## 2017-12-16 RX ADMIN — FUROSEMIDE 20 MG: 20 TABLET ORAL at 09:37

## 2017-12-16 RX ADMIN — POLYETHYLENE GLYCOL 3350 17 G: 17 POWDER, FOR SOLUTION ORAL at 09:00

## 2017-12-16 RX ADMIN — METHYLPHENIDATE HYDROCHLORIDE 10 MG: 10 TABLET ORAL at 09:42

## 2017-12-16 RX ADMIN — CLOPIDOGREL BISULFATE 75 MG: 75 TABLET ORAL at 09:39

## 2017-12-16 RX ADMIN — VITAMIN D, TAB 1000IU (100/BT) 2000 UNITS: 25 TAB at 09:39

## 2017-12-16 RX ADMIN — SIMVASTATIN 40 MG: 40 TABLET, FILM COATED ORAL at 21:34

## 2017-12-16 RX ADMIN — POTASSIUM CHLORIDE 10 MEQ: 750 TABLET, FILM COATED, EXTENDED RELEASE ORAL at 09:37

## 2017-12-16 RX ADMIN — FLUTICASONE PROPIONATE 2 SPRAY: 50 SPRAY, METERED NASAL at 09:00

## 2017-12-16 RX ADMIN — FUROSEMIDE 20 MG: 20 TABLET ORAL at 16:48

## 2017-12-16 RX ADMIN — METOPROLOL SUCCINATE 25 MG: 25 TABLET, EXTENDED RELEASE ORAL at 09:36

## 2017-12-16 RX ADMIN — AMLODIPINE BESYLATE 5 MG: 5 TABLET ORAL at 09:37

## 2017-12-16 RX ADMIN — FAMOTIDINE 20 MG: 20 TABLET ORAL at 09:39

## 2017-12-16 RX ADMIN — DONEPEZIL HYDROCHLORIDE 5 MG: 5 TABLET, FILM COATED ORAL at 09:37

## 2017-12-16 RX ADMIN — LISINOPRIL 20 MG: 20 TABLET ORAL at 09:39

## 2017-12-16 RX ADMIN — METHYLPHENIDATE HYDROCHLORIDE 10 MG: 10 TABLET ORAL at 13:31

## 2017-12-16 NOTE — ROUTINE PROCESS
Bedside shift change report given to jelly rn (oncoming nurse) by shandra garcia (offgoing nurse). Report included the following information Kardex, MAR and Recent Results.

## 2017-12-16 NOTE — PROGRESS NOTES
Problem: Mobility Impaired (Adult and Pediatric)  Goal: *Acute Goals and Plan of Care (Insert Text)  PHYSICAL THERAPY STG GOALS :  Initiated 12/1/2017 and to be accomplished within 1-2 Weeks (Updated 12/15/17)     1. Patient will move from supine to sit and sit to supine  and scoot up and down in bed with modified independence. (Progressing; CGA)    2.  Patient will transfer from bed to chair and chair to bed with stand by assistance using RW. (Progressing; CGA)   3. Patient will perform sit to stand with stand by assistance with Good balance and safety awareness. (Progressing; CGA)   4. Patient will ambulate with stand by assistance/contact guard assist for 50 feet with RW on level surfaces with 2 turns. (Progressing; 52ft with CGA)    5. Patient will ascend/descend 5 stairs with bilateral handrail(s) with minimal assistance/contact guard assist to allow for safe home access/exit. (achieved)   6. Patient will improve standardized test score for [facility] Balance Scale 3 (Progressing; 2+)       PHYSICAL THERAPY STG GOALS :  Initiated 12/1/2017 and to be accomplished within 1-2 Weeks (Updated 12/8/17)     1. Patient will move from supine to sit and sit to supine  and scoot up and down in bed with modified independence. (Progressing; Mod A)    2. Patient will transfer from bed to chair and chair to bed with stand by assistance using RW. (Progressing; CGA)   3. Patient will perform sit to stand with stand by assistance with Good balance and safety awareness. (Progressing; CGA)   4. Patient will ambulate with stand by assistance/contact guard assist for 50 feet with RW on level surfaces with 2 turns. (Progressing; 52ft with CGA)    5. Patient will ascend/descend 5 stairs with bilateral handrail(s) with minimal assistance/contact guard assist to allow for safe home access/exit. (Progressing; 3 4\" steps with B HR and Min A/CGA)   6.   Patient will improve standardized test score for [employer] Standing Balance Scale 3 (Progressing; 2+)       PHYSICAL THERAPY LTG GOALS :  Initiated 12/1/2017 and to be accomplished within 3-4 Weeks    1. Patient will transfer from bed to chair and chair to bed with supervision/set-up using RW. 2.  Patient will perform sit to stand with supervision/set-up with Good balance and safety awareness. 3.  Patient will ambulate with supervision/set-up for 120 feet with RW on level surfaces and be able to maneuver through narrow spaces and obstacles without loss of balance. 4.  Patient will ascend/descend 5 stairs with bilateral handrail(s) with supervision/stand by assistance/set-up to allow for safe home access/exit. 5.  Patient will improve standardized test score for [facility] Balance Scale 4.       Physical Therapist:   Colleen Howard  on 12/1/2017               [address] [place] [address]   physical Therapy Daily Treatment note      Patient: Chanel Singh (16 y.o. female)               Date: 12/16/2017    Physician: Felisha Muse MD  Primary Diagnosis: Traumatic rhabdomyolysis           Treatment Diagnosis  Treatment Diagnosis: muscle weakness   Treatment Diagnosis 2: difficulty in walking  Precautions: Fall  Vital Signs        Cognitive Status:     Pain     Bed Mobility Training     Balance  Sitting: With support  Sitting - Static: Fair (occasional)  Sitting - Dynamic: Fair (occasional)  Standing: With support  Standing - Static: Fair  Standing - Dynamic : Fair  Transfer Training  Transfer Training  Sit to Stand: Contact guard assistance;Minimum assistance  Stand to Sit: Contact guard assistance  Sit to Stand: Contact guard assistance;Minimum assistance  Gait Training  Gait  Base of Support: Center of gravity altered;Narrowed  Speed/My: Pace decreased (<100 feet/min)  Step Length: Left shortened;Right shortened  Gait Abnormalities: Decreased step clearance  Ambulation - Level of Assistance: Contact guard assistance  Distance (ft): 60 Feet (ft) (x82)  Assistive Device: Gait belt;Walker, rolling     Gait Abnormalities: Decreased step clearance            With 1 turns. Therapeutic Exercise:   Pt sitting in chair upon arrival to room. States she is tired but agreeable to therapy. Seated TE for LE strengthening and mobility: heel/toe raises, LAQs, marches, resisted Abd/Add with orange band x20 reps of each. Ambulation as noted above x60ft, x82 ft with one seated rest break. Pt presents with path deviations with ambulation, verbal cuing for corrections. Pt requires moderate cuing for hand placement and safety with sit>stand transfers. Patient/Caregiver Education:   Pt /Caregiver Education on safety and fall prevention,  Transfer training was provided to reduce risk for falls with standing. ASSESSMENT:  Patient continues to benefit from Skilled PT services to improve overall strength and mobility, improve gait, improve balance, improve functional mobility  Progression toward goals:  [x]      Improving appropriately and progressing toward goals  []      Improving slowly and progressing toward goals  []      Not making progress toward goals and plan of care will be adjusted     Treatment session: 42 minutes.   Therapist:   Kaden Littlejohn PTA,          12/16/2017

## 2017-12-17 PROCEDURE — 74011250637 HC RX REV CODE- 250/637: Performed by: INTERNAL MEDICINE

## 2017-12-17 RX ADMIN — FLUTICASONE PROPIONATE 2 SPRAY: 50 SPRAY, METERED NASAL at 09:00

## 2017-12-17 RX ADMIN — AMLODIPINE BESYLATE 5 MG: 5 TABLET ORAL at 10:31

## 2017-12-17 RX ADMIN — FUROSEMIDE 20 MG: 20 TABLET ORAL at 10:31

## 2017-12-17 RX ADMIN — METHYLPHENIDATE HYDROCHLORIDE 10 MG: 10 TABLET ORAL at 08:00

## 2017-12-17 RX ADMIN — CLOPIDOGREL BISULFATE 75 MG: 75 TABLET ORAL at 10:31

## 2017-12-17 RX ADMIN — LISINOPRIL 20 MG: 20 TABLET ORAL at 10:30

## 2017-12-17 RX ADMIN — DONEPEZIL HYDROCHLORIDE 5 MG: 5 TABLET, FILM COATED ORAL at 10:31

## 2017-12-17 RX ADMIN — VITAMIN D, TAB 1000IU (100/BT) 2000 UNITS: 25 TAB at 10:31

## 2017-12-17 RX ADMIN — POTASSIUM CHLORIDE 10 MEQ: 750 TABLET, FILM COATED, EXTENDED RELEASE ORAL at 10:31

## 2017-12-17 RX ADMIN — FAMOTIDINE 20 MG: 20 TABLET ORAL at 10:30

## 2017-12-17 RX ADMIN — METOPROLOL SUCCINATE 25 MG: 25 TABLET, EXTENDED RELEASE ORAL at 10:30

## 2017-12-17 RX ADMIN — METHYLPHENIDATE HYDROCHLORIDE 10 MG: 10 TABLET ORAL at 14:00

## 2017-12-17 RX ADMIN — SIMVASTATIN 40 MG: 40 TABLET, FILM COATED ORAL at 21:38

## 2017-12-17 RX ADMIN — FUROSEMIDE 20 MG: 20 TABLET ORAL at 17:26

## 2017-12-17 RX ADMIN — POLYETHYLENE GLYCOL 3350 17 G: 17 POWDER, FOR SOLUTION ORAL at 10:31

## 2017-12-17 NOTE — ROUTINE PROCESS
Bedside shift change report given to Bj Johnson rn (oncoming nurse) by shandra garcia (offgoing nurse). Report included the following information Kardex, MAR and Recent Results.

## 2017-12-17 NOTE — ROUTINE PROCESS
Bedside and Verbal shift change report given to [facility] LPN  (oncoming nurse) by Alexy Mccray RN (offgoing nurse). Report included the following information SBAR, Kardex and MAR. Hourly rounds made.

## 2017-12-17 NOTE — PROGRESS NOTES
Problem: Self Care Deficits Care Plan (Adult)  Goal: *Acute Goals and Plan of Care (Insert Text)  OCCUPATIONAL THERAPY SHORT TERM GOALS      Updated 12/15/17    1. Patient will perform Upper body bathing with contact guard assist and dressing with minimal assist without adaptive equipment. 2.  Patient will perform Lower body bathing with minimal assist and dressing with maximal assist with adaptive equipment as needed. 3.  Patient will perform toileting task with Supervisoin with clothing management and Supervision with hygeine with Good safety to reduce falls risk. 4.  Patient will perform toilet transfers with Joseline Monika and standby assistance . 5. Patient will perform standing static/dynamic balance activities for improved ADL/IADL function with SBA and Good balance and safety awareness. 6.  Patient will improve Barthel index scores to atleast 75/100 to improve functional mobility    Updated 12/8/17     1. Patient will perform Upper body bathing with contact guard assist and dressing with minimal assist without adaptive equipment. (progressing-currently Mod A)  2. Patient will perform Lower body bathing with minimal assist and dressing with maximal assist with adaptive equipment as needed. (progressing-currently Max A)  3. Patient will perform toileting task with contact guard assist with clothing management and Supervision with hygeine with Good safety to reduce falls risk. (goal met. [facility] Supervision)  4. Patient will perform toilet transfers with Joseline Monika and standby assistance . (progressing-currently CGA)  5. Patient will perform standing static/dynamic balance activities for improved ADL/IADL function with moderate assistance and Good balance and safety awareness. (goal met-[facility] SBA)  6. Patient will improve Barthel index scores to atleast 65/100 to improve functional mobility.(goal met-75/100)  7.  Patient will perform self feeding and set up with adaptive equipment with Mod I. (goal met-D/C)    Updated 12/8/17    1. Patient will perform Upper body ADL's without adaptive equipment with moderate assistance. - - progressing w/ bathing, met goal with dressing/upgrade  2. Patient will perform Lower body ADL's with adaptive equipment as needed with maximal assistance. - progressing w/ dressing, met goal with bathing/upgrade  3. Patient will perform toileting task with maximal assistance with Good safety to reduce falls risk. - met goal upgrade  4. Patient will perform toilet transfers with Rolling Walker and moderate assistance . - met goal/upgrade  5. Patient will perform standing static/dynamic balance activities for improved ADL/IADL function with moderate assistance and Good balance and safety awareness. -porgressing  6. Patient will improve Barthel index scores to atleast 35/100 to improve functional mobility. -pmet goal/upgrade  7. Patient will perform self feeding and set up with adaptive equipment with Mod I. - progressing    Initiated 12/1/2017 and to be accomplished within 2 Week(s)    1. Patient will perform Upper body ADL's without adaptive equipment with moderate assistance . 2.  Patient will perform Lower body ADL's with adaptive equipment as needed with maximal assistance . 3. Patient will perform toileting task with maximal assistance with Good safety to reduce falls risk. 4.  Patient will perform toilet transfers with Rolling Walker and moderate assistance . 5. Patient will perform standing static/dynamic balance activities for improved ADL/IADL function with moderate assistance and Good balance and safety awareness. 6.  Patient will improve Barthel index scores to atleast 35/100 to improve functional mobility. 7. Patient will perform self feeding and set up with adaptive equipment with Mod I.      OCCUPATIONAL THERAPY LONG TERM GOALS   Initiated 12/1/2017 and to be accomplished within 4 Week(s)    1.   Patient will perform Upper body ADL's without adaptive equipment with modified independence. 2.  Patient will perform Lower body ADL's with adaptive equipment as needed with modified independence . 3. Patient will perform toileting task with modified independence with Good safety to reduce falls risk. 4.  Patient will perform all functional transfers utilizing least restrictive device and durable medical equipment as needed with modified independence and Good balance and safety awareness. 5.  Patient will perform standing static/dynamic activity for improved ADL/IADL function with modified independence an and Good balance and safety awareness. 6.  Patient will improve Barthel index score to 95/100 to improve independence with mobility.       Therapist: Atiya Patel    12/1/2017              [facility]   Occupational Therapy Daily Treatment note      Patient: Urbano Patterson (24 y.o. female)       Date: 12/17/2017  Attending Physician: Kali Cantu MD  Primary Diagnosis: Traumatic rhabdomyolysis     Treatment Diagnosis  Treatment Diagnosis: muscle weakness   Treatment Diagnosis 2: difficulty in walking   Precautions : Precautions at Admission: Fall  Vital Signs:  Vital Signs  Temp: 96.5 °F (35.8 °C)  Temp Source: Oral  Pulse (Heart Rate): 61  Heart Rate Source: Monitor  Resp Rate: 16  BP: 138/72  MAP (Calculated): 94  BP 1 Method: Automatic  BP 1 Location: Left arm  BP Patient Position: Sitting     Cognitive Status:  Mental Status  Neurologic State: Alert  Pain:  Pain Screen  Pain Scale 1: Numeric (0 - 10)  Pain Intensity 1: 0  Pain Scale 1: Numeric (0 - 10)  Gross Assessment:     Coordination:     Bed Mobility:     Transfers:  Functional Transfers  Sit to Stand: Contact guard assistance  Toilet Transfer : Contact guard assistance  Functional Transfers  Toilet Transfer : Contact guard assistance  Balance:     ADL Self Care:  Basic ADL  Oral Facial Hygiene/Grooming: Supervision  ADL Intervention:      Pt performed toileting and simple grooming in bathroom with levels as described below and above. Toileting  Toileting Assistance: Contact guard assistance  Bladder Hygiene: Modified independent  Clothing Management: Stand-by assistance  Adaptive Equipment: Elevated seat              Raised Toilet Seat    Instrumental ADL's:     Therapeutic Activities:  Pt performed sit>stand with education on hand placement with CGA. Pt performed functional ambulation throughout room with CGA and FWW. Pt able to stand at mirror and sink x 3 minutes with G balance. Therapeutic Exercises:   Pt performed there ex with 2# dowel chantel for bicep curls 2x15, shld flexion with AAROM assist from OT 2x10 and chest press 2x10 with AAROM from OT. Pt performed to increase UB strength for I in self care. Patient/Caregiver Education:    Pt. Herchel Skiff Education on pt educated on proper hand placement during functional sit>stands for increased safety.      ASSESSMENT:  Patient continues to demonstrate the need for skilled Occupational Therapy services to improve grooming, bathing, upper body dressing, lower body dressing and toileting  Progression toward goals:  [x]      Improving appropriately and progressing toward goals  []      Improving slowly and progressing toward goals  []      Not making progress toward goals and plan of care will be adjusted     Treatment session:   22 minutes    Therapist:    Boyd Matamoros OT,  12/17/2017

## 2017-12-18 LAB
ANION GAP SERPL CALC-SCNC: 12 MMOL/L (ref 3–18)
BASOPHILS # BLD: 0 K/UL (ref 0–0.06)
BASOPHILS NFR BLD: 0 % (ref 0–2)
BUN SERPL-MCNC: 24 MG/DL (ref 7–18)
BUN/CREAT SERPL: 24 (ref 12–20)
CALCIUM SERPL-MCNC: 9.3 MG/DL (ref 8.5–10.1)
CHLORIDE SERPL-SCNC: 96 MMOL/L (ref 100–108)
CO2 SERPL-SCNC: 36 MMOL/L (ref 21–32)
CREAT SERPL-MCNC: 1.02 MG/DL (ref 0.6–1.3)
DIFFERENTIAL METHOD BLD: ABNORMAL
EOSINOPHIL # BLD: 0 K/UL (ref 0–0.4)
EOSINOPHIL NFR BLD: 0 % (ref 0–5)
ERYTHROCYTE [DISTWIDTH] IN BLOOD BY AUTOMATED COUNT: 16.4 % (ref 11.6–14.5)
GLUCOSE SERPL-MCNC: 88 MG/DL (ref 74–99)
HCT VFR BLD AUTO: 51.8 % (ref 35–45)
HGB BLD-MCNC: 16.3 G/DL (ref 12–16)
LYMPHOCYTES # BLD: 1.6 K/UL (ref 0.9–3.6)
LYMPHOCYTES NFR BLD: 15 % (ref 21–52)
MCH RBC QN AUTO: 27.3 PG (ref 24–34)
MCHC RBC AUTO-ENTMCNC: 31.5 G/DL (ref 31–37)
MCV RBC AUTO: 86.9 FL (ref 74–97)
MONOCYTES # BLD: 0.7 K/UL (ref 0.05–1.2)
MONOCYTES NFR BLD: 7 % (ref 3–10)
NEUTS SEG # BLD: 8.3 K/UL (ref 1.8–8)
NEUTS SEG NFR BLD: 78 % (ref 40–73)
PLATELET # BLD AUTO: 295 K/UL (ref 135–420)
PMV BLD AUTO: 10.8 FL (ref 9.2–11.8)
POTASSIUM SERPL-SCNC: 3.8 MMOL/L (ref 3.5–5.5)
RBC # BLD AUTO: 5.96 M/UL (ref 4.2–5.3)
SODIUM SERPL-SCNC: 144 MMOL/L (ref 136–145)
WBC # BLD AUTO: 10.6 K/UL (ref 4.6–13.2)

## 2017-12-18 PROCEDURE — 80048 BASIC METABOLIC PNL TOTAL CA: CPT | Performed by: INTERNAL MEDICINE

## 2017-12-18 PROCEDURE — 36415 COLL VENOUS BLD VENIPUNCTURE: CPT | Performed by: INTERNAL MEDICINE

## 2017-12-18 PROCEDURE — 85025 COMPLETE CBC W/AUTO DIFF WBC: CPT | Performed by: INTERNAL MEDICINE

## 2017-12-18 PROCEDURE — 74011250637 HC RX REV CODE- 250/637: Performed by: INTERNAL MEDICINE

## 2017-12-18 RX ORDER — MIRTAZAPINE 15 MG/1
7.5 TABLET, FILM COATED ORAL
Status: DISCONTINUED | OUTPATIENT
Start: 2017-12-18 | End: 2017-12-24 | Stop reason: HOSPADM

## 2017-12-18 RX ADMIN — FUROSEMIDE 20 MG: 20 TABLET ORAL at 17:52

## 2017-12-18 RX ADMIN — POTASSIUM CHLORIDE 10 MEQ: 750 TABLET, FILM COATED, EXTENDED RELEASE ORAL at 09:00

## 2017-12-18 RX ADMIN — METHYLPHENIDATE HYDROCHLORIDE 10 MG: 10 TABLET ORAL at 08:00

## 2017-12-18 RX ADMIN — VITAMIN D, TAB 1000IU (100/BT) 2000 UNITS: 25 TAB at 09:00

## 2017-12-18 RX ADMIN — FAMOTIDINE 20 MG: 20 TABLET ORAL at 09:00

## 2017-12-18 RX ADMIN — FUROSEMIDE 20 MG: 20 TABLET ORAL at 09:00

## 2017-12-18 RX ADMIN — SIMVASTATIN 40 MG: 40 TABLET, FILM COATED ORAL at 21:14

## 2017-12-18 RX ADMIN — CLOPIDOGREL BISULFATE 75 MG: 75 TABLET ORAL at 09:00

## 2017-12-18 RX ADMIN — POLYETHYLENE GLYCOL 3350 17 G: 17 POWDER, FOR SOLUTION ORAL at 09:00

## 2017-12-18 RX ADMIN — LISINOPRIL 20 MG: 20 TABLET ORAL at 09:00

## 2017-12-18 RX ADMIN — AMLODIPINE BESYLATE 5 MG: 5 TABLET ORAL at 09:00

## 2017-12-18 RX ADMIN — METHYLPHENIDATE HYDROCHLORIDE 10 MG: 10 TABLET ORAL at 14:29

## 2017-12-18 RX ADMIN — FLUTICASONE PROPIONATE 2 SPRAY: 50 SPRAY, METERED NASAL at 09:00

## 2017-12-18 RX ADMIN — DONEPEZIL HYDROCHLORIDE 5 MG: 5 TABLET, FILM COATED ORAL at 09:00

## 2017-12-18 RX ADMIN — METOPROLOL SUCCINATE 25 MG: 25 TABLET, EXTENDED RELEASE ORAL at 09:00

## 2017-12-18 NOTE — PROGRESS NOTES
I have reviewed this patient's current medication list and recent laboratory results. At this time, I do not suggest any drug therapy adjustments or additional laboratory monitoring. Thank you,  Nikolai HERNDON  Ph. M. S.  12/18/2017

## 2017-12-18 NOTE — PROGRESS NOTES
GIM     Patient: Padmaja Nagel MRN: [id number]  CSN: [id number]    YOB: 1920  Age: 80 y.o. Sex: female    DOA: 11/30/2017 LOS:  LOS: 18 days                    Subjective:     Pt  Not eating well and will start appitite stim. Still sleepy despite ritalin. Not functioning well. resp status stable     Objective:      Visit Vitals    /68 (BP 1 Location: Left arm, BP Patient Position: Head of bed elevated (Comment degrees))    Pulse (!) 50    Temp 97.9 °F (36.6 °C)    Resp 17    Ht 4' 6\" (1.372 m)    Wt 53.8 kg (118 lb 9.6 oz)    SpO2 95%    Breastfeeding No    BMI 28.6 kg/m2       Physical Exam:  Chest clear  Cor rr  abd soft  No edema    Intake and Output:  Current Shift:     Last three shifts:       Recent Results (from the past 24 hour(s))   CBC WITH AUTOMATED DIFF    Collection Time: 12/18/17  5:50 AM   Result Value Ref Range    WBC 10.6 4.6 - 13.2 K/uL    RBC 5.96 (H) 4.20 - 5.30 M/uL    HGB 16.3 (H) 12.0 - 16.0 g/dL    HCT 51.8 (H) 35.0 - 45.0 %    MCV 86.9 74.0 - 97.0 FL    MCH 27.3 24.0 - 34.0 PG    MCHC 31.5 31.0 - 37.0 g/dL    RDW 16.4 (H) 11.6 - 14.5 %    PLATELET 034 656 - 112 K/uL    MPV 10.8 9.2 - 11.8 FL    NEUTROPHILS 78 (H) 40 - 73 %    LYMPHOCYTES 15 (L) 21 - 52 %    MONOCYTES 7 3 - 10 %    EOSINOPHILS 0 0 - 5 %    BASOPHILS 0 0 - 2 %    ABS. NEUTROPHILS 8.3 (H) 1.8 - 8.0 K/UL    ABS. LYMPHOCYTES 1.6 0.9 - 3.6 K/UL    ABS. MONOCYTES 0.7 0.05 - 1.2 K/UL    ABS. EOSINOPHILS 0.0 0.0 - 0.4 K/UL    ABS.  BASOPHILS 0.0 0.0 - 0.06 K/UL    DF AUTOMATED     METABOLIC PANEL, BASIC    Collection Time: 12/18/17  5:50 AM   Result Value Ref Range    Sodium 144 136 - 145 mmol/L    Potassium 3.8 3.5 - 5.5 mmol/L    Chloride 96 (L) 100 - 108 mmol/L    CO2 36 (H) 21 - 32 mmol/L    Anion gap 12 3.0 - 18 mmol/L    Glucose 88 74 - 99 mg/dL    BUN 24 (H) 7.0 - 18 MG/DL    Creatinine 1.02 0.6 - 1.3 MG/DL    BUN/Creatinine ratio 24 (H) 12 - 20      GFR est AA >60 >60 ml/min/1.73m2    GFR est non-AA 50 (L) >60 ml/min/1.73m2    Calcium 9.3 8.5 - 10.1 MG/DL       Current Facility-Administered Medications   Medication Dose Route Frequency    methylphenidate HCl (RITALIN) tablet 10 mg  10 mg Oral BID    furosemide (LASIX) tablet 20 mg  20 mg Oral ACB&D    potassium chloride SR (KLOR-CON 10) tablet 10 mEq  10 mEq Oral DAILY    amLODIPine (NORVASC) tablet 5 mg  5 mg Oral DAILY    cyanocobalamin (VITAMIN B12) injection 1,000 mcg  1,000 mcg IntraMUSCular Q30D    acetaminophen (TYLENOL) tablet 500 mg  500 mg Oral Q4H PRN    clopidogrel (PLAVIX) tablet 75 mg  75 mg Oral DAILY    donepezil (ARICEPT) tablet 5 mg  5 mg Oral DAILY    famotidine (PEPCID) tablet 20 mg  20 mg Oral DAILY    fluticasone (FLONASE) 50 mcg/actuation nasal spray 2 Spray  2 Spray Both Nostrils DAILY    lisinopril (PRINIVIL, ZESTRIL) tablet 20 mg  20 mg Oral DAILY    metoprolol succinate (TOPROL-XL) XL tablet 25 mg  25 mg Oral DAILY    nitroglycerin (NITROSTAT) tablet 0.4 mg  0.4 mg SubLINGual Q5MIN PRN    polyethylene glycol (MIRALAX) packet 17 g  17 g Oral DAILY    simvastatin (ZOCOR) tablet 40 mg  40 mg Oral QHS    cholecalciferol (VITAMIN D3) tablet 2,000 Units  2,000 Units Oral DAILY    [facility] ANESTHESIA   Other PRN    [facility] EMERGENCY/STAT BOX   Other PRN       Lab Results   Component Value Date/Time    Glucose 88 12/18/2017 05:50 AM    Glucose 87 12/11/2017 04:10 AM    Glucose 115 12/04/2017 07:00 AM    Glucose 84 11/28/2017 03:50 AM    Glucose 98 11/27/2017 06:35 AM        Assessment/Plan     Active Problems:    * No active hospital problems.  Veronica Wilson MD  12/18/2017, 1:28 PM

## 2017-12-18 NOTE — ROUTINE PROCESS
Bedside and Verbal shift change report given to MARQUIS Robles RN (oncoming nurse) by Juan Vicente RN (offgoing nurse). Report included the following information SBAR, Kardex and MAR. Hourly rounds made.

## 2017-12-18 NOTE — PROGRESS NOTES
Problem: Self Care Deficits Care Plan (Adult)  Goal: *Acute Goals and Plan of Care (Insert Text)  OCCUPATIONAL THERAPY SHORT TERM GOALS      Updated 12/15/17    1. Patient will perform Upper body bathing with contact guard assist and dressing with minimal assist without adaptive equipment. 2.  Patient will perform Lower body bathing with minimal assist and dressing with maximal assist with adaptive equipment as needed. 3.  Patient will perform toileting task with Supervisoin with clothing management and Supervision with hygeine with Good safety to reduce falls risk. 4.  Patient will perform toilet transfers with Christian Litter and standby assistance . 5. Patient will perform standing static/dynamic balance activities for improved ADL/IADL function with SBA and Good balance and safety awareness. 6.  Patient will improve Barthel index scores to atleast 75/100 to improve functional mobility    Updated 12/8/17     1. Patient will perform Upper body bathing with contact guard assist and dressing with minimal assist without adaptive equipment. (progressing-currently Mod A)  2. Patient will perform Lower body bathing with minimal assist and dressing with maximal assist with adaptive equipment as needed. (progressing-currently Max A)  3. Patient will perform toileting task with contact guard assist with clothing management and Supervision with hygeine with Good safety to reduce falls risk. (goal met. [facility] Supervision)  4. Patient will perform toilet transfers with Christian Litter and standby assistance . (progressing-currently CGA)  5. Patient will perform standing static/dynamic balance activities for improved ADL/IADL function with moderate assistance and Good balance and safety awareness. (goal met-[facility] SBA)  6. Patient will improve Barthel index scores to atleast 65/100 to improve functional mobility.(goal met-75/100)  7.  Patient will perform self feeding and set up with adaptive equipment with Mod I. (goal met-D/C)    Updated 12/8/17    1. Patient will perform Upper body ADL's without adaptive equipment with moderate assistance. - - progressing w/ bathing, met goal with dressing/upgrade  2. Patient will perform Lower body ADL's with adaptive equipment as needed with maximal assistance. - progressing w/ dressing, met goal with bathing/upgrade  3. Patient will perform toileting task with maximal assistance with Good safety to reduce falls risk. - met goal upgrade  4. Patient will perform toilet transfers with Rolling Walker and moderate assistance . - met goal/upgrade  5. Patient will perform standing static/dynamic balance activities for improved ADL/IADL function with moderate assistance and Good balance and safety awareness. -porgressing  6. Patient will improve Barthel index scores to atleast 35/100 to improve functional mobility. -pmet goal/upgrade  7. Patient will perform self feeding and set up with adaptive equipment with Mod I. - progressing    Initiated 12/1/2017 and to be accomplished within 2 Week(s)    1. Patient will perform Upper body ADL's without adaptive equipment with moderate assistance . 2.  Patient will perform Lower body ADL's with adaptive equipment as needed with maximal assistance . 3. Patient will perform toileting task with maximal assistance with Good safety to reduce falls risk. 4.  Patient will perform toilet transfers with Rolling Walker and moderate assistance . 5. Patient will perform standing static/dynamic balance activities for improved ADL/IADL function with moderate assistance and Good balance and safety awareness. 6.  Patient will improve Barthel index scores to atleast 35/100 to improve functional mobility. 7. Patient will perform self feeding and set up with adaptive equipment with Mod I.      OCCUPATIONAL THERAPY LONG TERM GOALS   Initiated 12/1/2017 and to be accomplished within 4 Week(s)    1.   Patient will perform Upper body ADL's without adaptive equipment with modified independence. 2.  Patient will perform Lower body ADL's with adaptive equipment as needed with modified independence . 3. Patient will perform toileting task with modified independence with Good safety to reduce falls risk. 4.  Patient will perform all functional transfers utilizing least restrictive device and durable medical equipment as needed with modified independence and Good balance and safety awareness. 5.  Patient will perform standing static/dynamic activity for improved ADL/IADL function with modified independence an and Good balance and safety awareness. 6.  Patient will improve Barthel index score to 95/100 to improve independence with mobility. Therapist: Poli Atkinson    12/1/2017              [facility]   Occupational Therapy Daily Treatment note      Patient: Rashida Gautam (89 y.o. female)       Date: 12/18/2017  Attending Physician: Shankar Allred MD  Primary Diagnosis: Traumatic rhabdomyolysis     Treatment Diagnosis  Treatment Diagnosis: muscle weakness   Treatment Diagnosis 2: difficulty in walking   Precautions : Precautions at Admission: Fall  Vital Signs:       Cognitive Status:  Mental Status  Neurologic State: Alert; Appropriate for age  Orientation Level: Oriented to person  Cognition: Follows commands  Pain:        Gross Assessment:     Coordination:     Bed Mobility:  Bed Mobility  Rolling: Contact guard assistance;Setup  Supine to Sit: Contact guard assistance;Setup  Sit to Supine: Contact guard assistance  Scooting: Contact guard assistance  Transfers:  Functional Transfers  Sit to Stand: Contact guard assistance  Stand to Sit: Contact guard assistance     Balance:  Balance  Sitting: With support  Sitting - Static: Fair (occasional) (+)  Sitting - Dynamic: Fair (occasional) (+)  Standing: With support; Impaired  Standing - Static: Fair  Standing - Dynamic : Fair       Therapeutic Activities:  Clothing retrieval and transport activity, at high and low levels in order to challenge balance and stability needed for home management task. Patient able to complete with close SBA and good management of RW during retrieval. Assisted patient with w/c>bed transfer in order to assess safety and independence during task. Patient able to complete with CGA and able to put B LE on bed with allotted time. Therapeutic Exercises:  UB strengthening with 2#, 2 sets x 20 reps in order to increase functional activity tolerance and UB muscle strength needed for ADLs  Patient/Caregiver Education:    Jessica Kelly Education on safe hand placement during sit to stand was provided for optimal safety.       ASSESSMENT:  Patient continues to demonstrate the need for skilled Occupational Therapy services to improve dynamic standing balance needed for bathing  Progression toward goals:  []      Improving appropriately and progressing toward goals  [x]      Improving slowly and progressing toward goals  []      Not making progress toward goals and plan of care will be adjusted     Treatment session:  44 minutes    Therapist:    LISA Alexander,  12/18/2017

## 2017-12-18 NOTE — PROGRESS NOTES
Pt was present during the luncheon with the Guitarist for the duration of 60 minutes. Pt was present but did not participate in the activity.

## 2017-12-18 NOTE — PROGRESS NOTES
Problem: Mobility Impaired (Adult and Pediatric)  Goal: *Acute Goals and Plan of Care (Insert Text)  PHYSICAL THERAPY STG GOALS :  Initiated 12/1/2017 and to be accomplished within 1-2 Weeks (Updated 12/15/17)     1. Patient will move from supine to sit and sit to supine  and scoot up and down in bed with modified independence. (Progressing; CGA)    2.  Patient will transfer from bed to chair and chair to bed with stand by assistance using RW. (Progressing; CGA)   3. Patient will perform sit to stand with stand by assistance with Good balance and safety awareness. (Progressing; CGA)   4. Patient will ambulate with stand by assistance/contact guard assist for 50 feet with RW on level surfaces with 2 turns. (Progressing; 52ft with CGA)    5. Patient will ascend/descend 5 stairs with bilateral handrail(s) with minimal assistance/contact guard assist to allow for safe home access/exit. (achieved)   6. Patient will improve standardized test score for [employer] Balance Scale 3 (Progressing; 2+)       PHYSICAL THERAPY STG GOALS :  Initiated 12/1/2017 and to be accomplished within 1-2 Weeks (Updated 12/8/17)     1. Patient will move from supine to sit and sit to supine  and scoot up and down in bed with modified independence. (Progressing; Mod A)    2. Patient will transfer from bed to chair and chair to bed with stand by assistance using RW. (Progressing; CGA)   3. Patient will perform sit to stand with stand by assistance with Good balance and safety awareness. (Progressing; CGA)   4. Patient will ambulate with stand by assistance/contact guard assist for 50 feet with RW on level surfaces with 2 turns. (Progressing; 52ft with CGA)    5. Patient will ascend/descend 5 stairs with bilateral handrail(s) with minimal assistance/contact guard assist to allow for safe home access/exit. (Progressing; 3 4\" steps with B HR and Min A/CGA)   6.   Patient will improve standardized test score for [employer] Standing Balance Scale 3 (Progressing; 2+)       PHYSICAL THERAPY LTG GOALS :  Initiated 12/1/2017 and to be accomplished within 3-4 Weeks    1. Patient will transfer from bed to chair and chair to bed with supervision/set-up using RW. 2.  Patient will perform sit to stand with supervision/set-up with Good balance and safety awareness. 3.  Patient will ambulate with supervision/set-up for 120 feet with RW on level surfaces and be able to maneuver through narrow spaces and obstacles without loss of balance. 4.  Patient will ascend/descend 5 stairs with bilateral handrail(s) with supervision/stand by assistance/set-up to allow for safe home access/exit. 5.  Patient will improve standardized test score for [facility] Balance Scale 4. Physical Therapist:   Dayna Howard  on 12/1/2017               [address] [place] [address]   physical Therapy Daily Treatment note      Patient: Eren Vargas (45 y.o. female)               Date: 12/18/2017    Physician: Josselyn Miranda MD  Primary Diagnosis: Traumatic rhabdomyolysis           Treatment Diagnosis  Treatment Diagnosis: muscle weakness   Treatment Diagnosis 2: difficulty in walking  Precautions: Fall  Vital Signs       Cognitive Status:     Pain     Bed Mobility Training  Bed Mobility Training  Rolling: Contact guard assistance;Setup  Supine to Sit: Contact guard assistance;Setup  Scooting: Contact guard assistance  Balance  Sitting: With support  Sitting - Static: Fair (occasional) (+)  Sitting - Dynamic: Fair (occasional) (+)  Standing: With support; Impaired  Standing - Static: Fair  Standing - Dynamic : Fair  Transfer Training  Transfer Training  Sit to Stand: Contact guard assistance  Stand to Sit: Contact guard assistance  Sit to Stand: Contact guard assistance  Gait Training  Gait  Base of Support: Center of gravity altered  Speed/My: Slow  Step Length: Left shortened;Right shortened  Gait Abnormalities: Decreased step clearance  Ambulation - Level of Assistance: Stand-by asssistance  Distance (ft): 136 Feet (ft)  Assistive Device: Gait belt;Walker, rolling     Gait Abnormalities: Decreased step clearance            With 2 turns. Therapeutic Exercise:    Bed mobility with verbal cueing for proper hand placement on hand rails, CGA required. Sit <> stand transfers with SBA/CGA. Pt doubts her  Ability to complete bed mobility and transfers without therapist holding onto her. Gait training x 136 ft using RW with SBA, CGA implemented as pt began to decrease speed. Stair training to address safety and fluency with stair negotiation, completed with CGA/SBA. Pt's daughter Leslie Cabrera requested for PT to call. Therapist called at [phone]. Dtr inquired about progress with PT services, therapist updated on current level of function and progressions, she seemed pleased. She then reported her concerns about pt sleeping a lot and not eating or drinking enough. Therapist then referred her to Dr. Nitish Castro and informed that physician does rounds on Wednesdays starting at [address] [place] [address].       Patient/Caregiver Education:   Pt Miki Howard Education on safety and fall prevention to reduce fall risk. ASSESSMENT:  Patient continues to benefit from Skilled PT services to improve strength, endurance, mobility, gait, balance. Progression toward goals:  []      Improving appropriately and progressing toward goals  [x]      Improving slowly and progressing toward goals  []      Not making progress toward goals and plan of care will be adjusted     Treatment session: 57 minutes.   Therapist:   Shelia Wright, PT,          12/18/2017

## 2017-12-19 PROCEDURE — 74011250637 HC RX REV CODE- 250/637: Performed by: INTERNAL MEDICINE

## 2017-12-19 RX ORDER — FUROSEMIDE 20 MG/1
20 TABLET ORAL DAILY
Status: DISCONTINUED | OUTPATIENT
Start: 2017-12-20 | End: 2017-12-20

## 2017-12-19 RX ADMIN — DONEPEZIL HYDROCHLORIDE 5 MG: 5 TABLET, FILM COATED ORAL at 08:57

## 2017-12-19 RX ADMIN — POTASSIUM CHLORIDE 10 MEQ: 750 TABLET, FILM COATED, EXTENDED RELEASE ORAL at 08:57

## 2017-12-19 RX ADMIN — POLYETHYLENE GLYCOL 3350 17 G: 17 POWDER, FOR SOLUTION ORAL at 08:56

## 2017-12-19 RX ADMIN — SIMVASTATIN 40 MG: 40 TABLET, FILM COATED ORAL at 22:56

## 2017-12-19 RX ADMIN — FAMOTIDINE 20 MG: 20 TABLET ORAL at 08:56

## 2017-12-19 RX ADMIN — FUROSEMIDE 20 MG: 20 TABLET ORAL at 08:57

## 2017-12-19 RX ADMIN — MIRTAZAPINE 7.5 MG: 15 TABLET, FILM COATED ORAL at 22:56

## 2017-12-19 RX ADMIN — FLUTICASONE PROPIONATE 2 SPRAY: 50 SPRAY, METERED NASAL at 09:12

## 2017-12-19 RX ADMIN — METHYLPHENIDATE HYDROCHLORIDE 10 MG: 10 TABLET ORAL at 14:22

## 2017-12-19 RX ADMIN — CLOPIDOGREL BISULFATE 75 MG: 75 TABLET ORAL at 08:58

## 2017-12-19 RX ADMIN — METHYLPHENIDATE HYDROCHLORIDE 10 MG: 10 TABLET ORAL at 08:59

## 2017-12-19 RX ADMIN — VITAMIN D, TAB 1000IU (100/BT) 2000 UNITS: 25 TAB at 08:56

## 2017-12-19 NOTE — PROGRESS NOTES
Problem: Mobility Impaired (Adult and Pediatric)  Goal: *Acute Goals and Plan of Care (Insert Text)  PHYSICAL THERAPY STG GOALS :  Initiated 12/1/2017 and to be accomplished within 1-2 Weeks (Updated 12/15/17)     1. Patient will move from supine to sit and sit to supine  and scoot up and down in bed with modified independence. (Progressing; CGA)    2.  Patient will transfer from bed to chair and chair to bed with stand by assistance using RW. (Progressing; CGA)   3. Patient will perform sit to stand with stand by assistance with Good balance and safety awareness. (Progressing; CGA)   4. Patient will ambulate with stand by assistance/contact guard assist for 50 feet with RW on level surfaces with 2 turns. (Progressing; 52ft with CGA)    5. Patient will ascend/descend 5 stairs with bilateral handrail(s) with minimal assistance/contact guard assist to allow for safe home access/exit. (achieved)   6. Patient will improve standardized test score for [facility] Balance Scale 3 (Progressing; 2+)       PHYSICAL THERAPY STG GOALS :  Initiated 12/1/2017 and to be accomplished within 1-2 Weeks (Updated 12/8/17)     1. Patient will move from supine to sit and sit to supine  and scoot up and down in bed with modified independence. (Progressing; Mod A)    2. Patient will transfer from bed to chair and chair to bed with stand by assistance using RW. (Progressing; CGA)   3. Patient will perform sit to stand with stand by assistance with Good balance and safety awareness. (Progressing; CGA)   4. Patient will ambulate with stand by assistance/contact guard assist for 50 feet with RW on level surfaces with 2 turns. (Progressing; 52ft with CGA)    5. Patient will ascend/descend 5 stairs with bilateral handrail(s) with minimal assistance/contact guard assist to allow for safe home access/exit. (Progressing; 3 4\" steps with B HR and Min A/CGA)   6.   Patient will improve standardized test score for [employer] Standing Balance Scale 3 (Progressing; 2+)       PHYSICAL THERAPY LTG GOALS :  Initiated 12/1/2017 and to be accomplished within 3-4 Weeks    1. Patient will transfer from bed to chair and chair to bed with supervision/set-up using RW. 2.  Patient will perform sit to stand with supervision/set-up with Good balance and safety awareness. 3.  Patient will ambulate with supervision/set-up for 120 feet with RW on level surfaces and be able to maneuver through narrow spaces and obstacles without loss of balance. 4.  Patient will ascend/descend 5 stairs with bilateral handrail(s) with supervision/stand by assistance/set-up to allow for safe home access/exit. 5.  Patient will improve standardized test score for [employer] Balance Scale 4. Physical Therapist:   Justa Howard  on 12/1/2017               [address] [place] [address]   physical Therapy Daily Treatment note      Patient: Ildefonso Wade (32 y.o. female)               Date: 12/19/2017    Physician: Kayla Narvaez MD  Primary Diagnosis: Traumatic rhabdomyolysis           Treatment Diagnosis  Treatment Diagnosis: muscle weakness   Treatment Diagnosis 2: difficulty in walking  Precautions: Fall  Vital Signs  Vital Signs  Temp: 97.2 °F (36.2 °C)  Temp Source: Oral  Pulse (Heart Rate): 75  Resp Rate: 18  BP: 129/76  MAP (Calculated): 94  BP 1 Method: Automatic  BP 1 Location: Left arm  BP Patient Position: Sitting;Post activity     Cognitive Status:     Pain     Bed Mobility Training     Balance  Sitting: With support  Sitting - Static: Fair (occasional) (+)  Sitting - Dynamic: Fair (occasional) (+)  Standing: Impaired; With support  Standing - Static: Fair (+)  Standing - Dynamic : Fair  Transfer Training  Transfer Training  Sit to Stand: Contact guard assistance  Stand to Sit: Contact guard assistance  Sit to Stand: Contact guard assistance  Gait Training  Gait  Base of Support: Center of gravity altered  Speed/My: Slow  Step Length: Left shortened;Right shortened  Gait Abnormalities: Decreased step clearance  Ambulation - Level of Assistance: Contact guard assistance;Stand-by asssistance  Distance (ft): 71 Feet (ft)  Assistive Device: Gait belt;Walker, rolling  Stairs - Level of Assistance: Contact guard assistance;Stand-by asssistance  Number of Stairs Trained: 5     Gait Abnormalities: Decreased step clearance            With 1 turns. Therapeutic Exercise:    Pt appears more fatigued than usual, evidenced by pt requiring more frequent rest breaks than usual. Gait training as mentioned above with encouragement to achieve 71 ft as pt repeatedly attempted to ambulate into other patients' rooms to lie down. Stair training as mentioned above with verbal cueing to initiate, pt completing with CGA/SBA without additional cueing for safety. TE rendered to include LAQ 10 reps each LE to address strength, endurance, mobility; however, pt began falling asleep during TE. Transitioned tx to more standing activities to more actively engage pt and aid in staying awake. Dynamic standing balance with single UE with facilitation of reaching outside ROCK, completed with SBA, fair + balance, x 56 sec, 47 sec. Pt required transfer from w/c <> toilet, completed with CGA. Patient/Caregiver Education:   Pt /Caregiver Education on safety and fall prevention to reduce fall risk. ASSESSMENT:  Patient continues to benefit from Skilled PT services to improve strength, endurance, mobility, gait. Progression toward goals:  []      Improving appropriately and progressing toward goals  [x]      Improving slowly and progressing toward goals  []      Not making progress toward goals and plan of care will be adjusted     Treatment session: 49 minutes.   Therapist:   Phillip Ya, PT,          12/19/2017

## 2017-12-19 NOTE — PROGRESS NOTES
Problem: Self Care Deficits Care Plan (Adult)  Goal: *Acute Goals and Plan of Care (Insert Text)  OCCUPATIONAL THERAPY SHORT TERM GOALS      Updated 12/15/17    1. Patient will perform Upper body bathing with contact guard assist and dressing with minimal assist without adaptive equipment. 2.  Patient will perform Lower body bathing with minimal assist and dressing with maximal assist with adaptive equipment as needed. 3.  Patient will perform toileting task with Supervisoin with clothing management and Supervision with hygeine with Good safety to reduce falls risk. 4.  Patient will perform toilet transfers with Hobert Perry and standby assistance . 5. Patient will perform standing static/dynamic balance activities for improved ADL/IADL function with SBA and Good balance and safety awareness. 6.  Patient will improve Barthel index scores to atleast 75/100 to improve functional mobility    Updated 12/8/17     1. Patient will perform Upper body bathing with contact guard assist and dressing with minimal assist without adaptive equipment. (progressing-currently Mod A)  2. Patient will perform Lower body bathing with minimal assist and dressing with maximal assist with adaptive equipment as needed. (progressing-currently Max A)  3. Patient will perform toileting task with contact guard assist with clothing management and Supervision with hygeine with Good safety to reduce falls risk. (goal met. UPG Supervision)  4. Patient will perform toilet transfers with Hobert Perry and standby assistance . (progressing-currently CGA)  5. Patient will perform standing static/dynamic balance activities for improved ADL/IADL function with moderate assistance and Good balance and safety awareness. (goal met-UPG SBA)  6. Patient will improve Barthel index scores to atleast 65/100 to improve functional mobility.(goal met-75/100)  7.  Patient will perform self feeding and set up with adaptive equipment with Mod I. (goal met-D/C)    Updated 12/8/17    1. Patient will perform Upper body ADL's without adaptive equipment with moderate assistance. - - progressing w/ bathing, met goal with dressing/upgrade  2. Patient will perform Lower body ADL's with adaptive equipment as needed with maximal assistance. - progressing w/ dressing, met goal with bathing/upgrade  3. Patient will perform toileting task with maximal assistance with Good safety to reduce falls risk. - met goal upgrade  4. Patient will perform toilet transfers with Rolling Walker and moderate assistance . - met goal/upgrade  5. Patient will perform standing static/dynamic balance activities for improved ADL/IADL function with moderate assistance and Good balance and safety awareness. -porgressing  6. Patient will improve Barthel index scores to atleast 35/100 to improve functional mobility. -pmet goal/upgrade  7. Patient will perform self feeding and set up with adaptive equipment with Mod I. - progressing    Initiated 12/1/2017 and to be accomplished within 2 Week(s)    1. Patient will perform Upper body ADL's without adaptive equipment with moderate assistance . 2.  Patient will perform Lower body ADL's with adaptive equipment as needed with maximal assistance . 3. Patient will perform toileting task with maximal assistance with Good safety to reduce falls risk. 4.  Patient will perform toilet transfers with Rolling Walker and moderate assistance . 5. Patient will perform standing static/dynamic balance activities for improved ADL/IADL function with moderate assistance and Good balance and safety awareness. 6.  Patient will improve Barthel index scores to atleast 35/100 to improve functional mobility. 7. Patient will perform self feeding and set up with adaptive equipment with Mod I.      OCCUPATIONAL THERAPY LONG TERM GOALS   Initiated 12/1/2017 and to be accomplished within 4 Week(s)    1.   Patient will perform Upper body ADL's without adaptive equipment with modified independence. 2.  Patient will perform Lower body ADL's with adaptive equipment as needed with modified independence . 3. Patient will perform toileting task with modified independence with Good safety to reduce falls risk. 4.  Patient will perform all functional transfers utilizing least restrictive device and durable medical equipment as needed with modified independence and Good balance and safety awareness. 5.  Patient will perform standing static/dynamic activity for improved ADL/IADL function with modified independence an and Good balance and safety awareness. 6.  Patient will improve Barthel index score to 95/100 to improve independence with mobility. Therapist: Afia aMrshall    12/1/2017              [facility]   Occupational Therapy Daily Treatment note      Patient: Shamika Rouse (3 y.o. female)       Date: 12/19/2017  Attending Physician: Harsha Davila MD  Primary Diagnosis: Traumatic rhabdomyolysis     Treatment Diagnosis  Treatment Diagnosis: muscle weakness   Treatment Diagnosis 2: difficulty in walking   Precautions : Precautions at Admission: Fall  Vital Signs:  Vital Signs  Pulse (Heart Rate): 75  Resp Rate: 18  O2 Sat (%): 95 %  BP: 116/73  MAP (Calculated): 87  BP 1 Method: Automatic  BP 1 Location: Left arm  BP Patient Position: Sitting     Cognitive Status:     Pain:        Gross Assessment:     Coordination:     Bed Mobility:     Transfers:  Functional Transfers  Sit to Stand: Contact guard assistance     Balance:  Balance  Sitting: With support  Sitting - Static: Fair (occasional)  Sitting - Dynamic: Fair (occasional)  Standing: With support  Standing - Static: Fair  Standing - Dynamic : Fair  Therapeutic Activities:   Functional mobility utilizing RW from patient's room to therapy room in order to increase functional activity tolerance and independence during task.  Patient was able to tolerate 60ft of mobility with SBA prior to requesting to sit. Functional reach activity, seated, with B UE 1#, in order to increase UB muscle strength and functional reach needed for UB ADLS. Attempted LB dressing however patient states she is unable to complete today due to increase B LE fatigue. Patient also informed GONZALEZ her daughter assists her with dressing routine at home. Patient/Caregiver Education:    Pt. Jacinda Blanton Education on pushing up from chair during sit to stand transfers was provided for optimal independence.       ASSESSMENT:  Patient continues to demonstrate the need for skilled Occupational Therapy services to improve independence with lower body dressing  Progression toward goals:  []      Improving appropriately and progressing toward goals  [x]      Improving slowly and progressing toward goals  []      Not making progress toward goals and plan of care will be adjusted     Treatment session:   41 minutes    Therapist:    LISA Garcia,  12/19/2017

## 2017-12-19 NOTE — PROGRESS NOTES
Patient is unable to communicate at this time.  offered prayer. Chaplains will continue to follow and will provide pastoral care on an as needed/requested basis.     [address] [facility]   Staff [address] [place] [address]   [phone]

## 2017-12-19 NOTE — ROUTINE PROCESS
Bedside shift change report given to Mike Gibsb LPN (oncoming nurse) by Bishop Shantel RN (offgoing nurse). Report included the following information SBAR, Kardex, MAR and Recent Results. VISUALLY CHECKED PT Q 1 HR BY NURSING STAFF. 24 hour order chart check done

## 2017-12-20 PROCEDURE — 77030011256 HC DRSG MEPILEX <16IN NO BORD MOLN -A

## 2017-12-20 PROCEDURE — 74011250637 HC RX REV CODE- 250/637: Performed by: INTERNAL MEDICINE

## 2017-12-20 RX ORDER — AMLODIPINE BESYLATE 2.5 MG/1
2.5 TABLET ORAL DAILY
Status: DISCONTINUED | OUTPATIENT
Start: 2017-12-20 | End: 2017-12-24 | Stop reason: HOSPADM

## 2017-12-20 RX ORDER — LISINOPRIL 10 MG/1
10 TABLET ORAL DAILY
Status: DISCONTINUED | OUTPATIENT
Start: 2017-12-21 | End: 2017-12-24 | Stop reason: HOSPADM

## 2017-12-20 RX ADMIN — METHYLPHENIDATE HYDROCHLORIDE 10 MG: 10 TABLET ORAL at 08:00

## 2017-12-20 RX ADMIN — FLUTICASONE PROPIONATE 2 SPRAY: 50 SPRAY, METERED NASAL at 11:10

## 2017-12-20 RX ADMIN — ACETAMINOPHEN 500 MG: 500 TABLET ORAL at 21:21

## 2017-12-20 RX ADMIN — VITAMIN D, TAB 1000IU (100/BT) 2000 UNITS: 25 TAB at 11:09

## 2017-12-20 RX ADMIN — METHYLPHENIDATE HYDROCHLORIDE 10 MG: 10 TABLET ORAL at 14:05

## 2017-12-20 RX ADMIN — METOPROLOL SUCCINATE 25 MG: 25 TABLET, EXTENDED RELEASE ORAL at 10:56

## 2017-12-20 RX ADMIN — AMLODIPINE BESYLATE 2.5 MG: 5 TABLET ORAL at 11:09

## 2017-12-20 RX ADMIN — MIRTAZAPINE 7.5 MG: 15 TABLET, FILM COATED ORAL at 21:21

## 2017-12-20 RX ADMIN — POLYETHYLENE GLYCOL 3350 17 G: 17 POWDER, FOR SOLUTION ORAL at 10:53

## 2017-12-20 RX ADMIN — DONEPEZIL HYDROCHLORIDE 5 MG: 5 TABLET, FILM COATED ORAL at 11:04

## 2017-12-20 RX ADMIN — SIMVASTATIN 40 MG: 40 TABLET, FILM COATED ORAL at 21:21

## 2017-12-20 RX ADMIN — POTASSIUM CHLORIDE 10 MEQ: 750 TABLET, FILM COATED, EXTENDED RELEASE ORAL at 11:06

## 2017-12-20 RX ADMIN — FAMOTIDINE 20 MG: 20 TABLET ORAL at 11:10

## 2017-12-20 RX ADMIN — CLOPIDOGREL BISULFATE 75 MG: 75 TABLET ORAL at 11:00

## 2017-12-20 NOTE — PROGRESS NOTES
NUTRITION follow-up/Plan of care    RECOMMENDATIONS:   1. Dental Soft diet; chopped meats  2. Ensure Enlive TID  3. Monitor weight and PO intake  4. RD to follow      GOALS:   1. Ongoing: PO intake meets >75% of protein/calorie needs by 12/27  2. Ongoing: Maintain weight (+/- 1-2 lb) by 12/27    ASSESSMENT:    Weight status is classified as overweight per BMI of 28.1. Poor PO intake. Ensure Enlive TID for additional calories/protein. Labs noted. Nutrition recommendations listed. RD to follow. Meets Criteria for Acute Malnutrition      [x]Moderate Malnutrition, as evidenced by:   [] Mild muscle wasting, loss of subcutaneous fat   [x] Nutritional intake <75% of recommended intake for >1 week   [x] Weight loss of 1-2% in 1 week, 5% in 1 month, 7.5% in 3 months, or 10% in 6 months   [] Mild edema      Nutrition Risk:  [x]   High []  Moderate [] Low    SUBJECTIVE/OBJECTIVE:   Transferred from [address] [place] [address] to [facility] on (11/30). Patient admitted with traumatic rhabdomyolysis. Has history of COPD, dementia and HTN. No food allergies. Patient has trouble chewing. OT assisting pt with cutting meats and setting up meal for patient to eat during visit. Pt seen after lunch in room sleeping. 0% consumed for breakfast or lunch. Per d/w CNA about 25% of Ensure consumed. Pt continues to have a poor appetite and wt loss equating to 3.1 lbs or 2.6% over 2 weeks. Has been very fatigued lately and not engaging. Will continue to monitor. Information Obtained From:   [x] Chart Review  [] Patient  [] Family/Caregiver  [x] Nurse/Physician   [x] Patient Rounds/Interdisciplinary Meeting    Diet:  Dental Soft Diet (chopped meats)  Medications: [x] Reviewed     Patient Active Problem List   Diagnosis Code    CHF, acute on chronic ([address] I50.9    Traumatic rhabdomyolysis ([facility] T79. 6XXA    Elevated troponin R74.8    UTI (urinary tract infection) N39.0    A-fib (HCC) I48.91    Late onset Alzheimer's disease without behavioral disturbance G30.1, F02.80    Essential hypertension I10    Debility R53.81    Fall W19. Teresa Funes     Past Medical History:   Diagnosis Date    Breast cancer ([facility]     Chronic obstructive pulmonary disease ([facility]     Dementia     Hypertension      Labs:    Lab Results   Component Value Date/Time    Sodium 144 12/18/2017 05:50 AM    Potassium 3.8 12/18/2017 05:50 AM    Chloride 96 12/18/2017 05:50 AM    CO2 36 12/18/2017 05:50 AM    Anion gap 12 12/18/2017 05:50 AM    Glucose 88 12/18/2017 05:50 AM    BUN 24 12/18/2017 05:50 AM    Creatinine 1.02 12/18/2017 05:50 AM    Calcium 9.3 12/18/2017 05:50 AM    Albumin 3.6 11/25/2017 06:17 PM     Anthropometrics: BMI (calculated): 28.1   Last 3 Recorded Weights in this Encounter    12/08/17 2129 12/12/17 1459 12/19/17 1511   Weight: 47.6 kg (104 lb 14.4 oz) 53.8 kg (118 lb 9.6 oz) 53 kg (116 lb 12.8 oz)      Ht Readings from Last 1 Encounters:   12/19/17 4' 6\" (1.372 m)     [x]  Weight Loss (1.5 lb or 1.2%)  []  Weight Gain  []  Weight Stable   []  New wt n/a on record     Estimated Nutrition Needs:   1200 Kcals/day  Protein (g): 60 g    Nutrition Problems Identified:   [x] Suboptimal PO intake   [] Food Allergies  [x] Difficulty chewing/swallowing/poor dentition  [] Constipation/Diarrhea   [] Nausea/Vomiting   [] None  [] Other:     Plan:   [] Therapeutic Diet  [x]  Obtained/adjusted food preferences/tolerances and/or snacks options   [x]  Continue supplements prescribed  [] Occupational therapy following for feeding techniques  []  HS snack added   []  Modify diet texture   []  Modify diet for food allergies   []  Educate patient   []  Assist with menu selection   [x]  Monitor PO intake on meal rounds   [x]  Continue inpatient monitoring and intervention   [x]  Participated in discharge planning/Interdisciplinary rounds/Team meetings   []  Other:     Education Needs:   [x] Not appropriate for teaching at this time    [] Identified and addressed    Nutrition Monitoring and Evaluation:   [x] Continue inpatient monitoring and interventions    [] Other:     Carolina Martinez

## 2017-12-20 NOTE — PROGRESS NOTES
Problem: Mobility Impaired (Adult and Pediatric)  Goal: *Acute Goals and Plan of Care (Insert Text)  PHYSICAL THERAPY STG GOALS :  Initiated 12/1/2017 and to be accomplished within 1-2 Weeks (Updated 12/15/17)     1. Patient will move from supine to sit and sit to supine  and scoot up and down in bed with modified independence. (Progressing; CGA)    2.  Patient will transfer from bed to chair and chair to bed with stand by assistance using RW. (Progressing; CGA)   3. Patient will perform sit to stand with stand by assistance with Good balance and safety awareness. (Progressing; CGA)   4. Patient will ambulate with stand by assistance/contact guard assist for 50 feet with RW on level surfaces with 2 turns. (Progressing; 52ft with CGA)    5. Patient will ascend/descend 5 stairs with bilateral handrail(s) with minimal assistance/contact guard assist to allow for safe home access/exit. (achieved)   6. Patient will improve standardized test score for [facility] Balance Scale 3 (Progressing; 2+)       PHYSICAL THERAPY STG GOALS :  Initiated 12/1/2017 and to be accomplished within 1-2 Weeks (Updated 12/8/17)     1. Patient will move from supine to sit and sit to supine  and scoot up and down in bed with modified independence. (Progressing; Mod A)    2. Patient will transfer from bed to chair and chair to bed with stand by assistance using RW. (Progressing; CGA)   3. Patient will perform sit to stand with stand by assistance with Good balance and safety awareness. (Progressing; CGA)   4. Patient will ambulate with stand by assistance/contact guard assist for 50 feet with RW on level surfaces with 2 turns. (Progressing; 52ft with CGA)    5. Patient will ascend/descend 5 stairs with bilateral handrail(s) with minimal assistance/contact guard assist to allow for safe home access/exit. (Progressing; 3 4\" steps with B HR and Min A/CGA)   6.   Patient will improve standardized test score for [employer] Standing Balance Scale 3 (Progressing; 2+)       PHYSICAL THERAPY LTG GOALS :  Initiated 12/1/2017 and to be accomplished within 3-4 Weeks    1. Patient will transfer from bed to chair and chair to bed with supervision/set-up using RW. 2.  Patient will perform sit to stand with supervision/set-up with Good balance and safety awareness. 3.  Patient will ambulate with supervision/set-up for 120 feet with RW on level surfaces and be able to maneuver through narrow spaces and obstacles without loss of balance. 4.  Patient will ascend/descend 5 stairs with bilateral handrail(s) with supervision/stand by assistance/set-up to allow for safe home access/exit. 5.  Patient will improve standardized test score for [facility] Balance Scale 4.       Physical Therapist:   Jorge Howard  on 12/1/2017               [address] [place] [address]   physical Therapy Daily Treatment note      Patient: Glen Corrigan (12 y.o. female)               Date: 12/20/2017    Physician: Josias Pierre MD  Primary Diagnosis: Traumatic rhabdomyolysis           Treatment Diagnosis  Treatment Diagnosis: muscle weakness   Treatment Diagnosis 2: difficulty in walking  Precautions: Fall  Vital Signs  Vital Signs  Temp: 96.8 °F (36 °C)  Temp Source: Oral  Pulse (Heart Rate): 68  Resp Rate: 18  BP: 136/78  MAP (Calculated): 97     Cognitive Status:     Pain  Pain Screen  Pain Scale 1: Numeric (0 - 10)  Pain Intensity 1: 0  Patient Stated Pain Goal: 0  Bed Mobility Training  Bed Mobility Training  Supine to Sit: Stand-by asssistance  Balance  Sitting: With support  Sitting - Static: Fair (occasional)  Sitting - Dynamic: Fair (occasional)  Standing: With support  Standing - Static: Fair  Standing - Dynamic : Fair  Transfer Training  Transfer Training  Sit to Stand: Contact guard assistance  Sit to Stand: Contact guard assistance  Gait Training  Gait  Base of Support: Center of gravity altered  Speed/My: Slow  Step Length: Left shortened;Right shortened  Gait Abnormalities: Decreased step clearance  Ambulation - Level of Assistance: Contact guard assistance  Distance (ft): 136 Feet (ft)  Assistive Device: Gait belt;Walker, rolling  Rail Use: Both  Stairs - Level of Assistance: Contact guard assistance  Number of Stairs Trained: 5  Interventions: Verbal cues     Gait Abnormalities: Decreased step clearance            With 2 turns. Therapeutic Exercise:    Pt required a bit of encouragement for participation. Continues to request assistance for bed mobility, reporting, \"I can't do it. \" However, pt is able to complete bed mobility with SBA. Sit <> stand with CGA, she's able to complete transfers with SBA; however, she continues to report, \"I can't do it\". Gait training and stair training as mentioned above. Pt continues to be fatigued with falling asleep during session. Patient/Caregiver Education:   Pt /Caregiver Education on safety and fall prevention to reduce fall risk. ASSESSMENT:   Patient continues to benefit from Skilled PT services to improve strength, endurance, mobility, gait, balance. Progression toward goals:  []      Improving appropriately and progressing toward goals  [x]      Improving slowly and progressing toward goals  []      Not making progress toward goals and plan of care will be adjusted     Treatment session: 47 minutes.   Therapist:   Joel Ayala PT,          12/20/2017

## 2017-12-20 NOTE — ROUTINE PROCESS
Pt left the facility with daughter, no s/s of distress, no complaints of pain, pt encouraged to be back before 12am

## 2017-12-20 NOTE — PROGRESS NOTES
Problem: Self Care Deficits Care Plan (Adult)  Goal: *Acute Goals and Plan of Care (Insert Text)  OCCUPATIONAL THERAPY SHORT TERM GOALS      Updated 12/15/17    1. Patient will perform Upper body bathing with contact guard assist and dressing with minimal assist without adaptive equipment. 2.  Patient will perform Lower body bathing with minimal assist and dressing with maximal assist with adaptive equipment as needed. 3.  Patient will perform toileting task with Supervisoin with clothing management and Supervision with hygeine with Good safety to reduce falls risk. 4.  Patient will perform toilet transfers with Blade Kida and standby assistance . 5. Patient will perform standing static/dynamic balance activities for improved ADL/IADL function with SBA and Good balance and safety awareness. 6.  Patient will improve Barthel index scores to atleast 75/100 to improve functional mobility    Updated 12/8/17     1. Patient will perform Upper body bathing with contact guard assist and dressing with minimal assist without adaptive equipment. (progressing-currently Mod A)  2. Patient will perform Lower body bathing with minimal assist and dressing with maximal assist with adaptive equipment as needed. (progressing-currently Max A)  3. Patient will perform toileting task with contact guard assist with clothing management and Supervision with hygeine with Good safety to reduce falls risk. (goal met. UPG Supervision)  4. Patient will perform toilet transfers with Blade Kida and standby assistance . (progressing-currently CGA)  5. Patient will perform standing static/dynamic balance activities for improved ADL/IADL function with moderate assistance and Good balance and safety awareness. (goal met-UPG SBA)  6. Patient will improve Barthel index scores to atleast 65/100 to improve functional mobility.(goal met-75/100)  7.  Patient will perform self feeding and set up with adaptive equipment with Mod I. (goal met-D/C)    Updated 12/8/17    1. Patient will perform Upper body ADL's without adaptive equipment with moderate assistance. - - progressing w/ bathing, met goal with dressing/upgrade  2. Patient will perform Lower body ADL's with adaptive equipment as needed with maximal assistance. - progressing w/ dressing, met goal with bathing/upgrade  3. Patient will perform toileting task with maximal assistance with Good safety to reduce falls risk. - met goal upgrade  4. Patient will perform toilet transfers with Rolling Walker and moderate assistance . - met goal/upgrade  5. Patient will perform standing static/dynamic balance activities for improved ADL/IADL function with moderate assistance and Good balance and safety awareness. -porgressing  6. Patient will improve Barthel index scores to atleast 35/100 to improve functional mobility. -pmet goal/upgrade  7. Patient will perform self feeding and set up with adaptive equipment with Mod I. - progressing    Initiated 12/1/2017 and to be accomplished within 2 Week(s)    1. Patient will perform Upper body ADL's without adaptive equipment with moderate assistance . 2.  Patient will perform Lower body ADL's with adaptive equipment as needed with maximal assistance . 3. Patient will perform toileting task with maximal assistance with Good safety to reduce falls risk. 4.  Patient will perform toilet transfers with Rolling Walker and moderate assistance . 5. Patient will perform standing static/dynamic balance activities for improved ADL/IADL function with moderate assistance and Good balance and safety awareness. 6.  Patient will improve Barthel index scores to atleast 35/100 to improve functional mobility. 7. Patient will perform self feeding and set up with adaptive equipment with Mod I.      OCCUPATIONAL THERAPY LONG TERM GOALS   Initiated 12/1/2017 and to be accomplished within 4 Week(s)    1.   Patient will perform Upper body ADL's without adaptive equipment with modified independence. 2.  Patient will perform Lower body ADL's with adaptive equipment as needed with modified independence . 3. Patient will perform toileting task with modified independence with Good safety to reduce falls risk. 4.  Patient will perform all functional transfers utilizing least restrictive device and durable medical equipment as needed with modified independence and Good balance and safety awareness. 5.  Patient will perform standing static/dynamic activity for improved ADL/IADL function with modified independence an and Good balance and safety awareness. 6.  Patient will improve Barthel index score to 95/100 to improve independence with mobility. Therapist: Wanda Morrison    12/1/2017              [facility]   Occupational Therapy Daily Treatment note      Patient: Clarke Ayala (48 y.o. female)       Date: 12/20/2017  Attending Physician: Shyanne Dobbins MD  Primary Diagnosis: Traumatic rhabdomyolysis     Treatment Diagnosis  Treatment Diagnosis: muscle weakness   Treatment Diagnosis 2: difficulty in walking   Precautions : Precautions at Admission: Fall  Vital Signs:        Cognitive Status:     Pain:  Pain Screen  Pain Scale 1: Numeric (0 - 10)  Pain Intensity 1: 0  Patient Stated Pain Goal: 0  Pain Scale 1: Numeric (0 - 10)  Gross Assessment:     Coordination:     Bed Mobility:  Bed Mobility  Supine to Sit: Stand-by asssistance  Transfers:  Functional Transfers  Sit to Stand: Contact guard assistance     Balance:  Balance  Sitting: With support  Sitting - Static: Fair (occasional)  Sitting - Dynamic: Fair (occasional)  Standing: With support  Standing - Static: Fair  Standing - Dynamic : Fair  Therapeutic Activities:  Functional mobility utilizing RW from patient's room to therapy room in order to increase functional activity tolerance and independence during task.  Attempted static standing activity however patient reports increased fatigue limiting her today. Completed table top activity in order to increase functional reach needed for UB ADLS. Therapeutic Exercises:   UB strengthening with 2#, 2 sets x 20 reps in order to increase functional activity tolerance and UB muscle strength needed for ADLs  Patient/Caregiver Education:    Pt. Val Pedroza Education on safe hand placement during transfers was provided for optimal safety.       ASSESSMENT:  Patient continues to demonstrate the need for skilled Occupational Therapy services to improve dynamic standing balance needed for bathing  Progression toward goals:  []      Improving appropriately and progressing toward goals  [x]      Improving slowly and progressing toward goals  []      Not making progress toward goals and plan of care will be adjusted     Treatment session:   44 minutes    Therapist:    LISA Adair,  12/20/2017

## 2017-12-20 NOTE — PROGRESS NOTES
GIM     Patient: Kodi Garcia MRN: [id number]  CSN: [id number]    YOB: 1920  Age: 80 y.o. Sex: female    DOA: 11/30/2017 LOS:  LOS: 20 days                    Subjective:     Pt remains lethargic despite ritalin. edeam resloved and will d/c lasix. Taper bp meds. apptitie stim started. Objective:      Visit Vitals    /78    Pulse 68    Temp 96.8 °F (36 °C)    Resp 18    Ht 4' 6\" (1.372 m)    Wt 53 kg (116 lb 12.8 oz)    SpO2 96%    Breastfeeding No    BMI 28.16 kg/m2       Physical Exam:  Chest clear  Cor rr  abd soft  No edema    Intake and Output:  Current Shift:     Last three shifts:       No results found for this or any previous visit (from the past 24 hour(s)).     Current Facility-Administered Medications   Medication Dose Route Frequency    amLODIPine (NORVASC) tablet 2.5 mg  2.5 mg Oral DAILY    [START ON 12/21/2017] lisinopril (PRINIVIL, ZESTRIL) tablet 10 mg  10 mg Oral DAILY    mirtazapine (REMERON) tablet 7.5 mg  7.5 mg Oral QHS    methylphenidate HCl (RITALIN) tablet 10 mg  10 mg Oral BID    potassium chloride SR (KLOR-CON 10) tablet 10 mEq  10 mEq Oral DAILY    cyanocobalamin (VITAMIN B12) injection 1,000 mcg  1,000 mcg IntraMUSCular Q30D    acetaminophen (TYLENOL) tablet 500 mg  500 mg Oral Q4H PRN    clopidogrel (PLAVIX) tablet 75 mg  75 mg Oral DAILY    donepezil (ARICEPT) tablet 5 mg  5 mg Oral DAILY    famotidine (PEPCID) tablet 20 mg  20 mg Oral DAILY    fluticasone (FLONASE) 50 mcg/actuation nasal spray 2 Spray  2 Spray Both Nostrils DAILY    metoprolol succinate (TOPROL-XL) XL tablet 25 mg  25 mg Oral DAILY    nitroglycerin (NITROSTAT) tablet 0.4 mg  0.4 mg SubLINGual Q5MIN PRN    polyethylene glycol (MIRALAX) packet 17 g  17 g Oral DAILY    simvastatin (ZOCOR) tablet 40 mg  40 mg Oral QHS    cholecalciferol (VITAMIN D3) tablet 2,000 Units  2,000 Units Oral DAILY    [facility] ANESTHESIA   Other PRN    [facility] EMERGENCY/STAT BOX   Other PRN Lab Results   Component Value Date/Time    Glucose 88 12/18/2017 05:50 AM    Glucose 87 12/11/2017 04:10 AM    Glucose 115 12/04/2017 07:00 AM    Glucose 84 11/28/2017 03:50 AM    Glucose 98 11/27/2017 06:35 AM        Assessment/Plan     Active Problems:    * No active hospital problems.  Cristopher Win MD  12/20/2017, 10:48 AM

## 2017-12-20 NOTE — PROGRESS NOTES
Sybil faxed clinical update to Fermín Gaitna [facility]'s [facility] stay.

## 2017-12-20 NOTE — ROUTINE PROCESS
Bedside and verbal shift report given to Aysha Fonseca (oncoming nurse) by Lori Presley LPN (off going nurse) Report included SBAR,Kardex, Mars and recent results

## 2017-12-21 PROCEDURE — 74011250637 HC RX REV CODE- 250/637: Performed by: INTERNAL MEDICINE

## 2017-12-21 RX ORDER — ADHESIVE BANDAGE
30 BANDAGE TOPICAL DAILY PRN
Status: DISCONTINUED | OUTPATIENT
Start: 2017-12-21 | End: 2017-12-24 | Stop reason: HOSPADM

## 2017-12-21 RX ORDER — FACIAL-BODY WIPES
10 EACH TOPICAL DAILY PRN
Status: DISCONTINUED | OUTPATIENT
Start: 2017-12-21 | End: 2017-12-24 | Stop reason: HOSPADM

## 2017-12-21 RX ADMIN — METOPROLOL SUCCINATE 25 MG: 25 TABLET, EXTENDED RELEASE ORAL at 09:45

## 2017-12-21 RX ADMIN — MAGNESIUM HYDROXIDE 30 ML: 400 SUSPENSION ORAL at 06:34

## 2017-12-21 RX ADMIN — METHYLPHENIDATE HYDROCHLORIDE 10 MG: 10 TABLET ORAL at 09:46

## 2017-12-21 RX ADMIN — AMLODIPINE BESYLATE 2.5 MG: 5 TABLET ORAL at 09:46

## 2017-12-21 RX ADMIN — LISINOPRIL 10 MG: 10 TABLET ORAL at 09:46

## 2017-12-21 RX ADMIN — MIRTAZAPINE 7.5 MG: 15 TABLET, FILM COATED ORAL at 22:02

## 2017-12-21 RX ADMIN — DONEPEZIL HYDROCHLORIDE 5 MG: 5 TABLET, FILM COATED ORAL at 09:46

## 2017-12-21 RX ADMIN — POTASSIUM CHLORIDE 10 MEQ: 750 TABLET, FILM COATED, EXTENDED RELEASE ORAL at 09:45

## 2017-12-21 RX ADMIN — FAMOTIDINE 20 MG: 20 TABLET ORAL at 09:46

## 2017-12-21 RX ADMIN — SIMVASTATIN 40 MG: 40 TABLET, FILM COATED ORAL at 22:03

## 2017-12-21 RX ADMIN — FLUTICASONE PROPIONATE 2 SPRAY: 50 SPRAY, METERED NASAL at 09:00

## 2017-12-21 RX ADMIN — METHYLPHENIDATE HYDROCHLORIDE 10 MG: 10 TABLET ORAL at 13:33

## 2017-12-21 RX ADMIN — VITAMIN D, TAB 1000IU (100/BT) 2000 UNITS: 25 TAB at 09:46

## 2017-12-21 RX ADMIN — POLYETHYLENE GLYCOL 3350 17 G: 17 POWDER, FOR SOLUTION ORAL at 09:45

## 2017-12-21 RX ADMIN — CLOPIDOGREL BISULFATE 75 MG: 75 TABLET ORAL at 09:46

## 2017-12-21 NOTE — PROGRESS NOTES
Pt participated in the music with the [employer] luncheon for the duration of 50 minutes. Pt interacted well with RT.

## 2017-12-21 NOTE — ROUTINE PROCESS
Bedside shift change report given to JESSICA Cary lpn (oncoming nurse) by Talisha Lamar rn (offgoing nurse). Report included the following information Kardex, MAR and Recent Results.

## 2017-12-21 NOTE — PROGRESS NOTES
Problem: Self Care Deficits Care Plan (Adult)  Goal: *Acute Goals and Plan of Care (Insert Text)  OCCUPATIONAL THERAPY SHORT TERM GOALS      Updated 12/15/17    1. Patient will perform Upper body bathing with contact guard assist and dressing with minimal assist without adaptive equipment. 2.  Patient will perform Lower body bathing with minimal assist and dressing with maximal assist with adaptive equipment as needed. 3.  Patient will perform toileting task with Supervisoin with clothing management and Supervision with hygeine with Good safety to reduce falls risk. 4.  Patient will perform toilet transfers with [address] and standby assistance . 5. Patient will perform standing static/dynamic balance activities for improved ADL/IADL function with SBA and Good balance and safety awareness. 6.  Patient will improve Barthel index scores to atleast 75/100 to improve functional mobility    Updated 12/8/17     1. Patient will perform Upper body bathing with contact guard assist and dressing with minimal assist without adaptive equipment. (progressing-currently Mod A)  2. Patient will perform Lower body bathing with minimal assist and dressing with maximal assist with adaptive equipment as needed. (progressing-currently Max A)  3. Patient will perform toileting task with contact guard assist with clothing management and Supervision with hygeine with Good safety to reduce falls risk. (goal met. [facility] Supervision)  4. Patient will perform toilet transfers with [address] and standby assistance . (progressing-currently CGA)  5. Patient will perform standing static/dynamic balance activities for improved ADL/IADL function with moderate assistance and Good balance and safety awareness. (goal met-[facility] SBA)  6. Patient will improve Barthel index scores to atleast 65/100 to improve functional mobility.(goal met-75/100)  7.  Patient will perform self feeding and set up with adaptive equipment with Mod I. (goal met-D/C)    Updated 12/8/17    1. Patient will perform Upper body ADL's without adaptive equipment with moderate assistance. - - progressing w/ bathing, met goal with dressing/upgrade  2. Patient will perform Lower body ADL's with adaptive equipment as needed with maximal assistance. - progressing w/ dressing, met goal with bathing/upgrade  3. Patient will perform toileting task with maximal assistance with Good safety to reduce falls risk. - met goal upgrade  4. Patient will perform toilet transfers with Rolling Walker and moderate assistance . - met goal/upgrade  5. Patient will perform standing static/dynamic balance activities for improved ADL/IADL function with moderate assistance and Good balance and safety awareness. -porgressing  6. Patient will improve Barthel index scores to atleast 35/100 to improve functional mobility. -pmet goal/upgrade  7. Patient will perform self feeding and set up with adaptive equipment with Mod I. - progressing    Initiated 12/1/2017 and to be accomplished within 2 Week(s)    1. Patient will perform Upper body ADL's without adaptive equipment with moderate assistance . 2.  Patient will perform Lower body ADL's with adaptive equipment as needed with maximal assistance . 3. Patient will perform toileting task with maximal assistance with Good safety to reduce falls risk. 4.  Patient will perform toilet transfers with Rolling Walker and moderate assistance . 5. Patient will perform standing static/dynamic balance activities for improved ADL/IADL function with moderate assistance and Good balance and safety awareness. 6.  Patient will improve Barthel index scores to atleast 35/100 to improve functional mobility. 7. Patient will perform self feeding and set up with adaptive equipment with Mod I.      OCCUPATIONAL THERAPY LONG TERM GOALS   Initiated 12/1/2017 and to be accomplished within 4 Week(s)    1.   Patient will perform Upper body ADL's without adaptive equipment with modified independence. 2.  Patient will perform Lower body ADL's with adaptive equipment as needed with modified independence . 3. Patient will perform toileting task with modified independence with Good safety to reduce falls risk. 4.  Patient will perform all functional transfers utilizing least restrictive device and durable medical equipment as needed with modified independence and Good balance and safety awareness. 5.  Patient will perform standing static/dynamic activity for improved ADL/IADL function with modified independence an and Good balance and safety awareness. 6.  Patient will improve Barthel index score to 95/100 to improve independence with mobility. Therapist: Eric Fuentes    12/1/2017              [facility]   Occupational Therapy Daily Treatment note      Patient: Darryl Liu (89 y.o. female)       Date: 12/21/2017  Attending Physician: Tyrell Charles MD  Primary Diagnosis: Traumatic rhabdomyolysis     Treatment Diagnosis  Treatment Diagnosis: muscle weakness   Treatment Diagnosis 2: difficulty in walking   Precautions : Precautions at Admission: Fall  Vital Signs:  Vital Signs  Temp: 97.4 °F (36.3 °C)  Temp Source: Oral  Pulse (Heart Rate): 64  Heart Rate Source: Monitor  Resp Rate: 16  Level of Consciousness: Alert  BP: 127/71  MAP (Calculated): 90  BP 1 Method: Automatic  BP 1 Location: Left arm  BP Patient Position: At rest  MEWS Score: 1     Cognitive Status:     Pain:        Gross Assessment:     Coordination:     Bed Mobility:     Transfers:  Functional Transfers  Sit to Stand: Stand-by asssistance  Stand to Sit: Stand-by asssistance     Balance:  Balance  Sitting: With support  Sitting - Static: Fair (occasional)  Sitting - Dynamic: Fair (occasional)  Standing: Impaired; With support  Standing - Static: Fair (+)  Standing - Dynamic : Fair (+)  Therapeutic Activities:   Static standing activity with one hand release in order to increase standing balance and tolerance needed for ADLS. Patient was able to tolerate standing for 3 mins prior to requesting to sit. Functional mobility utilizing RW from patient's room to therapy room in order to increase functional activity tolerance and independence during task. Functional reach activity, in order to increase B UE ROM and muscle strength needed for UB ADLS. Patient/Caregiver Education:    PtNegin White Education on reaching back for w/c during stand to sit was provided for optimal safety.       ASSESSMENT:  Patient continues to demonstrate the need for skilled Occupational Therapy services to improve dynamic standing balance needed for bathing  Progression toward goals:  []      Improving appropriately and progressing toward goals  [x]      Improving slowly and progressing toward goals  []      Not making progress toward goals and plan of care will be adjusted     Treatment session:  41 minutes    Therapist:    LISA Cabrales,  12/21/2017

## 2017-12-21 NOTE — ROUTINE PROCESS
Pt is back from her outing, no s/s of distress, no complaints of pain, skin intact, no injury,pt is resting in bed, will continue to monitor, call bell within reach

## 2017-12-21 NOTE — PROGRESS NOTES
Problem: Mobility Impaired (Adult and Pediatric)  Goal: *Acute Goals and Plan of Care (Insert Text)  PHYSICAL THERAPY STG GOALS :  Initiated 12/1/2017 and to be accomplished within 1-2 Weeks (Updated 12/15/17)     1. Patient will move from supine to sit and sit to supine  and scoot up and down in bed with modified independence. (Progressing; CGA)    2.  Patient will transfer from bed to chair and chair to bed with stand by assistance using RW. (Progressing; CGA)   3. Patient will perform sit to stand with stand by assistance with Good balance and safety awareness. (Progressing; CGA)   4. Patient will ambulate with stand by assistance/contact guard assist for 50 feet with RW on level surfaces with 2 turns. (Progressing; 52ft with CGA)    5. Patient will ascend/descend 5 stairs with bilateral handrail(s) with minimal assistance/contact guard assist to allow for safe home access/exit. (achieved)   6. Patient will improve standardized test score for [facility] Balance Scale 3 (Progressing; 2+)       PHYSICAL THERAPY STG GOALS :  Initiated 12/1/2017 and to be accomplished within 1-2 Weeks (Updated 12/8/17)     1. Patient will move from supine to sit and sit to supine  and scoot up and down in bed with modified independence. (Progressing; Mod A)    2. Patient will transfer from bed to chair and chair to bed with stand by assistance using RW. (Progressing; CGA)   3. Patient will perform sit to stand with stand by assistance with Good balance and safety awareness. (Progressing; CGA)   4. Patient will ambulate with stand by assistance/contact guard assist for 50 feet with RW on level surfaces with 2 turns. (Progressing; 52ft with CGA)    5. Patient will ascend/descend 5 stairs with bilateral handrail(s) with minimal assistance/contact guard assist to allow for safe home access/exit. (Progressing; 3 4\" steps with B HR and Min A/CGA)   6.   Patient will improve standardized test score for [employer] Standing Balance Scale 3 (Progressing; 2+)       PHYSICAL THERAPY LTG GOALS :  Initiated 12/1/2017 and to be accomplished within 3-4 Weeks    1. Patient will transfer from bed to chair and chair to bed with supervision/set-up using RW. 2.  Patient will perform sit to stand with supervision/set-up with Good balance and safety awareness. 3.  Patient will ambulate with supervision/set-up for 120 feet with RW on level surfaces and be able to maneuver through narrow spaces and obstacles without loss of balance. 4.  Patient will ascend/descend 5 stairs with bilateral handrail(s) with supervision/stand by assistance/set-up to allow for safe home access/exit. 5.  Patient will improve standardized test score for [facility] Balance Scale 4. Physical Therapist:   Fernando Howard  on 12/1/2017               [address] [place] [address]   physical Therapy Daily Treatment note      Patient: Clarke Ayala (6 y.o. female)               Date: 12/21/2017    Physician: Shyanne Dobbins MD  Primary Diagnosis: Traumatic rhabdomyolysis           Treatment Diagnosis  Treatment Diagnosis: muscle weakness   Treatment Diagnosis 2: difficulty in walking  Precautions: Fall  Vital Signs  Vital Signs  Temp: 97.4 °F (36.3 °C)  Temp Source: Oral  Pulse (Heart Rate): 64  Heart Rate Source: Monitor  Resp Rate: 16  Level of Consciousness: Alert  BP: 127/71  MAP (Calculated): 90  BP 1 Method: Automatic  BP 1 Location: Left arm  BP Patient Position: At rest  MEWS Score: 1     Cognitive Status:     Pain     Bed Mobility Training     Balance  Sitting: With support  Sitting - Static: Fair (occasional)  Sitting - Dynamic: Fair (occasional)  Standing: Impaired; With support  Standing - Static: Fair (+)  Standing - Dynamic : Fair (+)  Transfer Training  Transfer Training  Sit to Stand: Stand-by asssistance  Stand to Sit: Stand-by asssistance  Sit to Stand: Stand-by asssistance  Gait Training  Gait  Base of Support: Center of gravity altered  Speed/My: Pace decreased (<100 feet/min)  Step Length: Left shortened;Right shortened  Gait Abnormalities: Decreased step clearance  Ambulation - Level of Assistance: Stand-by asssistance;Contact guard assistance  Distance (ft): 106 Feet (ft)  Assistive Device: Gait belt;Walker, rolling  Rail Use: Both  Stairs - Level of Assistance: Contact guard assistance;Stand-by asssistance  Number of Stairs Trained: 5  Interventions: Safety awareness training     Gait Abnormalities: Decreased step clearance            With 3 turns. Therapeutic Exercise:    Gait training as mentioned above, pt demonstrates decreased tolerance for ambulating over past week and requires extensive encouragement to ambulate beyond about 50 ft. Stair training to address safe stair negotiation. Neuro re-education to address dynamic standing balance with bean bag toss activity x 1 min 46 sec, 1 min 12 sec using single UE support with facilitation of reaching outside ROCK and across midline. Frequent rest breaks required as pt reported being \"so very sleep\" throughout session. Patient/Caregiver Education:   Pt /Caregiver Education on safety and fall prevention to reduce fall risk. ASSESSMENT:  Patient continues to benefit from Skilled PT services to improve endurance, mobility. Progression toward goals:  []      Improving appropriately and progressing toward goals  [x]      Improving slowly and progressing toward goals  [x]      Not making progress toward goals and plan of care will be adjusted      Treatment session: 46 minutes.   Therapist:   Estephanie Johnson, PT,          12/21/2017

## 2017-12-21 NOTE — PROGRESS NOTES
YUSUF left another message for pt's daughter re: the Medicare notice and her decision re: appealing the decision. YUSUF left message the MD needed to be informed of her intentions because he won't be back on the after tomorrow until Tues 12/26/17.

## 2017-12-21 NOTE — PROGRESS NOTES
SW receive a message from Mic Terry RN from [address] [place] [address] that the regional medical director has reviewed the medical records and pt no longer meets the criteria for continued stay in a skilled nursing facility. Pt's last covered day will be on 12/23/17 with d/c on 12/24/17. YUSUF called pt's daughter to inform her of the denial and that pt's last covered day will be on 12/23/17 with d/c on 12/24/17. YUSUF informed pt's daughter that pt has a right to appeal the decision and this will need to be made by 12:00noon on 12/22/17. YUSUF requested that pt's daughter call SW back to arrange a time to meet today to review the notice.

## 2017-12-21 NOTE — PROGRESS NOTES
Late entry: SW received a message from pt's daughter informing SW that she wasn't able to get off work and will be in tomorrow. SW left message for pt's daughter at 4:50pm informing her of the importance of her coming in to sign the notice and make a decision re: the appeal. SW requested that pt's daughter call SW back to clarify if she plans to come in today to visit with her mother.

## 2017-12-21 NOTE — PROGRESS NOTES
YUSUF received a return call from pt's daughter as requested. YUSUF informed pt's daughter again that pt was denied for continued stay and that she a right to appeal the decision with [employer]. Pt's daughter requested that YUSUF assist with this. YUSUF requested that pt's daughter come in today by 4:30pm if possible. Pt's daughter stated tat she was working but will ask her employer if she can get off earlier today. She agreed to call YUSUF back.

## 2017-12-22 PROCEDURE — 74011250637 HC RX REV CODE- 250/637: Performed by: INTERNAL MEDICINE

## 2017-12-22 RX ORDER — METHYLPHENIDATE HYDROCHLORIDE 10 MG/1
TABLET ORAL
Qty: 30 TAB | Refills: 0 | Status: SHIPPED | OUTPATIENT
Start: 2017-12-22

## 2017-12-22 RX ORDER — FAMOTIDINE 20 MG/1
20 TABLET, FILM COATED ORAL DAILY
Qty: 60 TAB | Refills: 0 | Status: SHIPPED | OUTPATIENT
Start: 2017-12-23

## 2017-12-22 RX ORDER — DONEPEZIL HYDROCHLORIDE 5 MG/1
5 TABLET, FILM COATED ORAL DAILY
Qty: 60 TAB | Refills: 0 | Status: SHIPPED | OUTPATIENT
Start: 2017-12-23

## 2017-12-22 RX ORDER — FLUTICASONE PROPIONATE 50 MCG
SPRAY, SUSPENSION (ML) NASAL
Qty: 1 BOTTLE | Refills: 0 | Status: SHIPPED | OUTPATIENT
Start: 2017-12-23

## 2017-12-22 RX ORDER — SIMVASTATIN 40 MG/1
40 TABLET, FILM COATED ORAL
Qty: 60 TAB | Refills: 0 | Status: SHIPPED | OUTPATIENT
Start: 2017-12-22

## 2017-12-22 RX ORDER — CLOPIDOGREL BISULFATE 75 MG/1
75 TABLET ORAL DAILY
Qty: 60 TAB | Refills: 0 | Status: SHIPPED | OUTPATIENT
Start: 2017-12-23

## 2017-12-22 RX ORDER — MELATONIN
2000 DAILY
Qty: 60 TAB | Refills: 0 | Status: SHIPPED | OUTPATIENT
Start: 2017-12-23

## 2017-12-22 RX ORDER — MIRTAZAPINE 7.5 MG/1
7.5 TABLET, FILM COATED ORAL
Qty: 60 TAB | Refills: 0 | Status: SHIPPED | OUTPATIENT
Start: 2017-12-22

## 2017-12-22 RX ORDER — NITROGLYCERIN 0.4 MG/1
0.4 TABLET SUBLINGUAL
Qty: 1 BOTTLE | Refills: 0 | Status: SHIPPED | OUTPATIENT
Start: 2017-12-22

## 2017-12-22 RX ORDER — METOPROLOL SUCCINATE 25 MG/1
25 TABLET, EXTENDED RELEASE ORAL DAILY
Qty: 60 TAB | Refills: 0 | Status: SHIPPED | OUTPATIENT
Start: 2017-12-23 | End: 2018-03-15

## 2017-12-22 RX ORDER — ACETAMINOPHEN 500 MG
500 TABLET ORAL
Qty: 60 TAB | Refills: 0 | Status: SHIPPED | OUTPATIENT
Start: 2017-12-22

## 2017-12-22 RX ORDER — POLYETHYLENE GLYCOL 3350 17 G/17G
17 POWDER, FOR SOLUTION ORAL DAILY
Qty: 30 PACKET | Refills: 0 | Status: SHIPPED | OUTPATIENT
Start: 2017-12-23

## 2017-12-22 RX ORDER — LISINOPRIL 10 MG/1
10 TABLET ORAL DAILY
Qty: 30 TAB | Refills: 0 | Status: SHIPPED | OUTPATIENT
Start: 2017-12-23

## 2017-12-22 RX ADMIN — DONEPEZIL HYDROCHLORIDE 5 MG: 5 TABLET, FILM COATED ORAL at 10:33

## 2017-12-22 RX ADMIN — LISINOPRIL 10 MG: 10 TABLET ORAL at 10:33

## 2017-12-22 RX ADMIN — METHYLPHENIDATE HYDROCHLORIDE 10 MG: 10 TABLET ORAL at 10:33

## 2017-12-22 RX ADMIN — VITAMIN D, TAB 1000IU (100/BT) 2000 UNITS: 25 TAB at 10:33

## 2017-12-22 RX ADMIN — POLYETHYLENE GLYCOL 3350 17 G: 17 POWDER, FOR SOLUTION ORAL at 10:34

## 2017-12-22 RX ADMIN — FLUTICASONE PROPIONATE 2 SPRAY: 50 SPRAY, METERED NASAL at 10:34

## 2017-12-22 RX ADMIN — AMLODIPINE BESYLATE 2.5 MG: 5 TABLET ORAL at 10:33

## 2017-12-22 RX ADMIN — METOPROLOL SUCCINATE 25 MG: 25 TABLET, EXTENDED RELEASE ORAL at 10:33

## 2017-12-22 RX ADMIN — METHYLPHENIDATE HYDROCHLORIDE 10 MG: 10 TABLET ORAL at 13:27

## 2017-12-22 RX ADMIN — FAMOTIDINE 20 MG: 20 TABLET ORAL at 10:33

## 2017-12-22 RX ADMIN — POTASSIUM CHLORIDE 10 MEQ: 750 TABLET, FILM COATED, EXTENDED RELEASE ORAL at 10:33

## 2017-12-22 RX ADMIN — CLOPIDOGREL BISULFATE 75 MG: 75 TABLET ORAL at 10:32

## 2017-12-22 NOTE — PROGRESS NOTES
conducted a brief follow-up visit with Andrew Botello, who is a 80 y.o.,female. Patient's CNA was trying to get her settled after attempting to get up from her chair without supervision. She had disconnected her alarm from the bed, where staff had attached it. The  provided the following interventions:  Continued the relationship of care and support. Offered support to patient, encouraging her to stay in her chair and eat her dinner that was before her. However, patient was not interested in eating, and stated that she wanted to give it to her children that were visiting. It seemed that she thought they were bedside at the time we were talking. When patient ignored my encouragement to stay in her chair and stood up, I alerted her CNA, who loaded her into her wheelchair and brought her to the dining area, where she could be watched more closely. Plan:  Chaplains will continue to follow and will provide pastoral care on an as-needed/requested basis.     [facility] Certified [address] [place] [address],[address] [place] [address]    [phone]

## 2017-12-22 NOTE — ROUTINE PROCESS
Bedside shift change report given to monik rn (oncoming nurse) by andrew rn (offgoing nurse). Report included the following information Kardex, MAR and Recent Results.

## 2017-12-22 NOTE — ROUTINE PROCESS
Bedside shift change report given to Alana Mckinney (oncoming nurse) by Mango Alvarez RN (offgoing nurse). Report included the following information SBAR, Kardex, MAR and Recent Results. VISUALLY CHECKED PT Q 1 HR BY NURSING STAFF. 24 hour order chart check done

## 2017-12-22 NOTE — PROGRESS NOTES
YUSUF left message for pt's daughter requesting that she call SW back with a decision re: appealing pt's d/c on 12/24/17. YUSUF also left message that private pay begins on 12/24/17 and the cost will be $450 per day for room and board, cost of meds, supplies, labs, xrays and therapy if requested. YUSUF also left message that SW needed a decision asap due to MD schedule.

## 2017-12-22 NOTE — ROUTINE PROCESS
Bedside shift change report given to TATO Damon  (oncoming nurse) by Rosibel Guardado LPN (offgoing nurse). Report included the following information SBAR, MAR, Accordion and Recent Results.

## 2017-12-22 NOTE — PROGRESS NOTES
Pt will need to have w/c in order to be as independent as possible with mobility. Pt is able to ambulate with SBA/(S) but is inconsistent with gait due to levels of confusion. Therapist advised pt not to ambulation without assistance due to risk of falls.

## 2017-12-22 NOTE — PROGRESS NOTES
SYBIL faxed order for a wheelchair to [facility]. SW requested that pt's daughter be contacted re: any copayments. Sybil also requested that wheelchair be delivered to pt's room by 12:00 noon for pt's d/c on 12/24/17.

## 2017-12-22 NOTE — PROGRESS NOTES
Sybil spoke with pt's daughter re: her decision to appeal pt's stay on [facility]. Pt's daughter inquired about pt being referred for hospice to obtain assistance in the home. SYBIL informed pt's daughter that she will need to speak with Dr. Neville Quiñones re: this. SW also informed pt's daughter that pt's that go home with hospice still need a caregiver. Pt's daughter has been informed since pt's admission that pt will need 24 hour supervision at home and she has made no arrangements for this. Stated that she has to work and her siblings aren't able to assist her. SW inquired about pt being eligible for Medicaid. Pt's daughter stated that pt is over income and that she doesn;t qualify. SW suggested that pt's daughter contact [address] re:  services. SYBIL informed pt's daughter that pt will be private pay beginning on 12/24/17 if she's not discharged and the cost will be $ 450 per day for room and board, cost of meds, labs, xrays, supplies and therapy if needed. SYBIL also informed pt's daughter that she needed to appeal before 12:00noon today if she planned to appeal the notice.

## 2017-12-22 NOTE — PROGRESS NOTES
Late entry: YUSUF met with pt's daughter after MD met with them in pt's room. Pt is agreeable to pt's d/c home on 12/24/17. YUSUF discussed home health referral. Pt's daughter signed the [place] of [facility] for [facility] and was given a copy. Pt's daughter is interested in pt being evaluated for hospice. Pt's daughter requested that SW order pt a wheelchair for home use. YUSUF spoke with [address] [place] [address] liaison re: the referral and pt's d/c home on 12/24/17.

## 2017-12-22 NOTE — ROUTINE PROCESS
Per  request, therapist met with pt's daughter Christy Villafana  re: pt's last covered day on 12/23/17. Daughter asks pt's current level of function, and what's required upon return home. Therapist informed that pt requires SBA/supervision and will need 24 hour supervision.  Daughter requests to speak to physician re: filing appeal.

## 2017-12-22 NOTE — PROGRESS NOTES
Problem: Mobility Impaired (Adult and Pediatric)  Goal: *Acute Goals and Plan of Care (Insert Text)  PHYSICAL THERAPY LTG GOALS :  Initiated 12/1/2017 and to be accomplished within 3-4 Weeks (Updated 12/22/17)     1. Patient will transfer from bed to chair and chair to bed with supervision/set-up using RW. (Achieved)   2. Patient will perform sit to stand with supervision/set-up with Good balance and safety awareness. (Achieved)   3. Patient will ambulate with supervision/set-up for 120 feet with RW on level surfaces and be able to maneuver through narrow spaces and obstacles without loss of balance. (progressing; 136ft with RW and (S)/SBA with verbal cues for safety)   4. Patient will ascend/descend 5 stairs with bilateral handrail(s) with supervision/stand by assistance/set-up to allow for safe home access/exit. (Achieved)   5. Patient will improve standardized test score for [facility] Balance Scale 4. (Progressing; 3)     PHYSICAL THERAPY STG GOALS :  Initiated 12/1/2017 and to be accomplished within 1-2 Weeks (Updated 12/22/17)     1. Patient will move from supine to sit and sit to supine  and scoot up and down in bed with modified independence. (Progressing; (S) with additioanl time)    2. Patient will transfer from bed to chair and chair to bed with stand by assistance using RW. (Achieved)   3. Patient will perform sit to stand with stand by assistance with Good balance and safety awareness. (Achieved)   4. Patient will ambulate with stand by assistance/contact guard assist for 50 feet with RW on level surfaces with 2 turns. (Achieved)    5. Patient will ascend/descend 5 stairs with bilateral handrail(s) with minimal assistance/contact guard assist to allow for safe home access/exit. (achieved)   6.   Patient will improve standardized test score for [facility] Balance Scale 3 (Achieved)     PHYSICAL THERAPY STG GOALS :  Initiated 12/1/2017 and to be accomplished within 1-2 Weeks (Updated 12/15/17)     1. Patient will move from supine to sit and sit to supine  and scoot up and down in bed with modified independence. (Progressing; CGA)    2.  Patient will transfer from bed to chair and chair to bed with stand by assistance using RW. (Progressing; CGA)   3. Patient will perform sit to stand with stand by assistance with Good balance and safety awareness. (Progressing; CGA)   4. Patient will ambulate with stand by assistance/contact guard assist for 50 feet with RW on level surfaces with 2 turns. (Progressing; 52ft with CGA)    5. Patient will ascend/descend 5 stairs with bilateral handrail(s) with minimal assistance/contact guard assist to allow for safe home access/exit. (achieved)   6. Patient will improve standardized test score for [employer] Balance Scale 3 (Progressing; 2+)       PHYSICAL THERAPY STG GOALS :  Initiated 12/1/2017 and to be accomplished within 1-2 Weeks (Updated 12/8/17)     1. Patient will move from supine to sit and sit to supine  and scoot up and down in bed with modified independence. (Progressing; Mod A)    2. Patient will transfer from bed to chair and chair to bed with stand by assistance using RW. (Progressing; CGA)   3. Patient will perform sit to stand with stand by assistance with Good balance and safety awareness. (Progressing; CGA)   4. Patient will ambulate with stand by assistance/contact guard assist for 50 feet with RW on level surfaces with 2 turns. (Progressing; 52ft with CGA)    5. Patient will ascend/descend 5 stairs with bilateral handrail(s) with minimal assistance/contact guard assist to allow for safe home access/exit. (Progressing; 3 4\" steps with B HR and Min A/CGA)   6. Patient will improve standardized test score for [employer] Balance Scale 3 (Progressing; 2+)       PHYSICAL THERAPY LTG GOALS :  Initiated 12/1/2017 and to be accomplished within 3-4 Weeks    1.   Patient will transfer from bed to chair and chair to bed with supervision/set-up using RW. 2.  Patient will perform sit to stand with supervision/set-up with Good balance and safety awareness. 3.  Patient will ambulate with supervision/set-up for 120 feet with RW on level surfaces and be able to maneuver through narrow spaces and obstacles without loss of balance. 4.  Patient will ascend/descend 5 stairs with bilateral handrail(s) with supervision/stand by assistance/set-up to allow for safe home access/exit. 5.  Patient will improve standardized test score for [employer] Balance Scale 4. Physical Therapist:   Fabi Howard  on 12/1/2017                 [facility]   PHYSICAL THERAPY WEEKLY PROGRESS REPORT  Reporting Period:  Date: 12/15/17to 12/22/17      Patient: Emmanuel Duran (3 y.o. female)                         Date: 12/22/2017    Primary Diagnosis: Traumatic rhabdomyolysis       Attending Physician: Wilmer Ferreira MD   Treatment Diagnosis  Treatment Diagnosis: muscle weakness   Treatment Diagnosis 2: difficulty in walking  Precautions:  Fall  Rehab Potential : Fair:    Skill interventions and education provided with clinical rationale (include individualized treatment techniques and standardized tests):   Skilled Physical Therapy services were provided with TA and pt is now at (S) level for bed mobility and (S)/SBA for transfers, TE to build strength and endurance for improved functional mobility, Gait training to address deficits and promote safety awareness with use of RW, Stair training to allow pt to safely return home upon DC, Neuromuscular reeducation of movement, coordination and balance for reduced risk of falls. Using a comparative statement, summarize significant progress toward goals as a result of skilled intervention provided:  Patient has made Good progress towards their Physical Therapy goals in the areas of bed mobility, transfers, balance, gait and stair negotiation. Identify remaining functional areas, impairments limiting progress and/or barriers to improvement:  Patient would benefit from continues PT services to address the following functional deficits in dynamic balance, strength. Pt has progressed well with therapy as evidenced by achieving 5/6 STG's and 3/6 LTG's at this time. Pt is limited by confusion and drowsiness.       OBJECTIVE DATA SUMMARY:     INITIAL ASSESSMENT WEEKLY ASSESSMENT   COGNITIVE STATUS COGNITIVE STATUS   Neurologic State: Confused  Orientation Level: Oriented to person  Cognition: Follows commands  Perception: Appears intact  Perseveration: No perseveration noted  Safety/Judgement: Fall prevention Neurologic State: Confused, Alert  Orientation Level: Oriented to person  Cognition: Follows commands  Perception: Cues to maintain midline in sitting, Tactile, Verbal  Perseveration: Perseverates during conversation  Safety/Judgement: Fall prevention   PAIN PAIN   Pain Scale 1: Numeric (0 - 10)  Pain Intensity 1: 0  Pain Location 1: Generalized  Pain Description 1: Aching  Pain Intervention(s) 1: Medication (see MAR)  Patient Stated Pain Goal: 0  Pain Reassessment 1: Patient sleeping Pain Scale 1: Numeric (0 - 10)  Pain Intensity 1: 6  Pain Location 1: Generalized  Pain Description 1: Aching  Pain Intervention(s) 1: Medication (see MAR)  Patient Stated Pain Goal: 0  Pain Reassessment 1: Yes   GROSS ASSESSMENT GROSS ASSESSMENT   AROM: Generally decreased, functional  PROM: Generally decreased, functional  Strength: Generally decreased, functional (RUE 3-/5, LUE 3/5)  Coordination: Generally decreased, functional  Tone: Normal  Sensation: Intact AROM: Generally decreased, functional  PROM: Generally decreased, functional  Strength: Generally decreased, functional (4-/5 through LLE, 3 through out RLE ; limited due to pain)  Coordination: Generally decreased, functional  Tone: Normal  Sensation: Intact   BED MOBILITY BED MOBILITY   Rolling: Contact guard assistance, Setup  Supine to Sit: Contact guard assistance, Minimum assistance (Merissa for initiating sitting up )  Sit to Supine: Maximum assistance, Assist x2  Scooting:  Moderate assistance Rolling: Contact guard assistance, Setup  Supine to Sit: Stand-by asssistance  Sit to Supine: Contact guard assistance  Scooting: Contact guard assistance   GAIT GAIT   Base of Support: Center of gravity altered  Speed/My: Slow  Step Length: Right shortened, Left shortened  Gait Abnormalities: Decreased step clearance, Shuffling gait, Other (thoracic kyphosis )  Ambulation - Level of Assistance: Contact guard assistance, Minimal assistance  Distance (ft): 25 Feet (ft) (x2)  Assistive Device: Gait belt, Walker, rolling  Rail Use: Both  Stairs - Level of Assistance: Minimum assistance, Contact guard assistance  Number of Stairs Trained: 3 (4\" steps)  Interventions: Safety awareness training, Verbal cues, Tactile cues Base of Support: Center of gravity altered  Speed/My: Pace decreased (<100 feet/min)  Step Length: Right shortened, Left shortened  Gait Abnormalities: Decreased step clearance  Ambulation - Level of Assistance: Supervision (close (S) )  Distance (ft): 78 Feet (ft)  Assistive Device: Gait belt, Walker, rolling  Rail Use: Both  Stairs - Level of Assistance: Supervision (close (S) )  Number of Stairs Trained: 5  Interventions: Safety awareness training                     TRANSFERS TRANSFERS   Sit to Stand: Contact guard assistance, Minimum assistance  Stand to Sit: Contact guard assistance, Minimum assistance  Stand Pivot Transfers: Minimum assistance  Bed to Chair: Maximum assistance, Assist x2 (w/c to bed) Sit to Stand: Supervision  Stand to Sit: Supervision  Stand Pivot Transfers: Minimum assistance  Bed to Chair: Contact guard assistance   BALANCE BALANCE   Sitting: With support  Sitting - Static: Fair (occasional) ((-))  Sitting - Dynamic: Poor (constant support)  Standing: With support  Standing - Static: Poor ((-))  Standing - Dynamic :  (not tested) Sitting: With support  Sitting - Static: Fair (occasional)  Sitting - Dynamic: Fair (occasional)  Standing: With support  Standing - Static: Good  Standing - Dynamic : Fair (+)   WHEELCHAIR MOBILITY/MGMT WHEELCHAIR MOBILITY/MGMT         Activity Tolerance:  Fair Activity Tolerance: Fair   Visual/Perceptual          Visual/Perceptual            Auditory:   Auditory Impairment: None    Auditory:   Auditory  Auditory Impairment: None       Steps: both   Clinical Decision making:  [place] standing balance score: 3 Clinical Decision making:  [place] standing balance score:3     Treatment:   Pt is progressing well with therapy as noted above. Pt continues to be confused and somewhat drowsy throughout sessions and required encouragement for participation. Gait training x78ft with RW and close (S). Pt negotiated 5 steps with B HR and close (S). Static standing balance with 1 to no UE support and (S) x8' and x2' with no noted LOB. Seated B LE TE rendered to promote strength and endurance for improved functional mobility: HR/TR, LAQ, hip flexion, ball squeezes x10 with verbal and occasional visual cues for proper technique. Pt's daughter present during end of session. Pt's daughter updated on pt's progress and the need for 24 hour (S) due to confusion/safety concerns. Patient's response to treatment rendered:  Fair     Patient expected Discharge Location:  [x]Private Residence  [] USP/ILF  []LTC  []Other:    Plan: Continue Skilled PT services as established by the Plan of Care for 3-6 times a week. PT and Assistant have had a weekly case conference regarding the above treatment:  [x] Yes     [] No       Treatment session:  38 minutes. Therapist: Enrrique Randall, PTA       Date:12/22/2017  Forward to PT for co-signature when completed.

## 2017-12-22 NOTE — PROGRESS NOTES
Problem: Self Care Deficits Care Plan (Adult)  Goal: *Acute Goals and Plan of Care (Insert Text)  OCCUPATIONAL THERAPY SHORT TERM GOALS      Updated 12/15/17    1. Patient will perform Upper body bathing with contact guard assist and dressing with minimal assist without adaptive equipment. 2.  Patient will perform Lower body bathing with minimal assist and dressing with maximal assist with adaptive equipment as needed. 3.  Patient will perform toileting task with Supervisoin with clothing management and Supervision with hygeine with Good safety to reduce falls risk. 4.  Patient will perform toilet transfers with Rudean Coyer and standby assistance . 5. Patient will perform standing static/dynamic balance activities for improved ADL/IADL function with SBA and Good balance and safety awareness. 6.  Patient will improve Barthel index scores to atleast 75/100 to improve functional mobility    Updated 12/8/17     1. Patient will perform Upper body bathing with contact guard assist and dressing with minimal assist without adaptive equipment. (progressing-currently Mod A)  2. Patient will perform Lower body bathing with minimal assist and dressing with maximal assist with adaptive equipment as needed. (progressing-currently Max A)  3. Patient will perform toileting task with contact guard assist with clothing management and Supervision with hygeine with Good safety to reduce falls risk. (goal met. [facility] Supervision)  4. Patient will perform toilet transfers with Rudean Coyer and standby assistance . (progressing-currently CGA)  5. Patient will perform standing static/dynamic balance activities for improved ADL/IADL function with moderate assistance and Good balance and safety awareness. (goal met-UPG SBA)  6. Patient will improve Barthel index scores to atleast 65/100 to improve functional mobility.(goal met-75/100)  7.  Patient will perform self feeding and set up with adaptive equipment with Mod I. (goal met-D/C)    Updated 12/8/17    1. Patient will perform Upper body ADL's without adaptive equipment with moderate assistance. - - progressing w/ bathing, met goal with dressing/upgrade  2. Patient will perform Lower body ADL's with adaptive equipment as needed with maximal assistance. - progressing w/ dressing, met goal with bathing/upgrade  3. Patient will perform toileting task with maximal assistance with Good safety to reduce falls risk. - met goal upgrade  4. Patient will perform toilet transfers with Rolling Walker and moderate assistance . - met goal/upgrade  5. Patient will perform standing static/dynamic balance activities for improved ADL/IADL function with moderate assistance and Good balance and safety awareness. -porgressing  6. Patient will improve Barthel index scores to atleast 35/100 to improve functional mobility. -pmet goal/upgrade  7. Patient will perform self feeding and set up with adaptive equipment with Mod I. - progressing    Initiated 12/1/2017 and to be accomplished within 2 Week(s)    1. Patient will perform Upper body ADL's without adaptive equipment with moderate assistance . 2.  Patient will perform Lower body ADL's with adaptive equipment as needed with maximal assistance . 3. Patient will perform toileting task with maximal assistance with Good safety to reduce falls risk. 4.  Patient will perform toilet transfers with Rolling Walker and moderate assistance . 5. Patient will perform standing static/dynamic balance activities for improved ADL/IADL function with moderate assistance and Good balance and safety awareness. 6.  Patient will improve Barthel index scores to atleast 35/100 to improve functional mobility. 7. Patient will perform self feeding and set up with adaptive equipment with Mod I.      OCCUPATIONAL THERAPY LONG TERM GOALS   Initiated 12/1/2017 and to be accomplished within 4 Week(s)    1.   Patient will perform Upper body ADL's without adaptive equipment with modified independence. 2.  Patient will perform Lower body ADL's with adaptive equipment as needed with modified independence . 3. Patient will perform toileting task with modified independence with Good safety to reduce falls risk. 4.  Patient will perform all functional transfers utilizing least restrictive device and durable medical equipment as needed with modified independence and Good balance and safety awareness. 5.  Patient will perform standing static/dynamic activity for improved ADL/IADL function with modified independence an and Good balance and safety awareness. 6.  Patient will improve Barthel index score to 95/100 to improve independence with mobility.       Therapist: Savannah Ayers    12/1/2017              [facility]   Occupational Therapy Daily Treatment note      Patient: Donny Chand (89 y.o. female)       Date: 12/22/2017  Attending Physician: Debbra Litten, MD  Primary Diagnosis: Traumatic rhabdomyolysis     Treatment Diagnosis  Treatment Diagnosis: muscle weakness   Treatment Diagnosis 2: difficulty in walking   Precautions : Precautions at Admission: Fall  Vital Signs:  Vital Signs  Temp: 97.6 °F (36.4 °C)  Temp Source: Oral  Pulse (Heart Rate): 68  Heart Rate Source: Monitor  Resp Rate: 16  O2 Sat (%): 97 %  Level of Consciousness: Alert  BP: 128/62  MAP (Calculated): 84  BP 1 Method: Automatic  BP 1 Location: Left arm  BP Patient Position: Sitting  MEWS Score: 1     Cognitive Status:  Mental Status  Neurologic State: Alert;Confused  Pain:   No pain     Gross Assessment:     Coordination:     Bed Mobility:  Bed Mobility  Supine to Sit: Minimum assistance  Transfers:  Functional Transfers  Sit to Stand: Stand-by asssistance  Stand to Sit: Supervision  Bed to Chair: Stand-by asssistance     Balance:  Balance  Sitting: Intact  Sitting - Static: Good (unsupported)  Sitting - Dynamic: Fair (occasional)  Standing: With support  Standing - Static: Fair  Standing - Dynamic : Fair  ADL Self Care:     ADL Intervention:     Upper Body Dressing Assistance  Dressing Assistance: Minimum assistance           Lower Body Dressing Assistance  Dressing Assistance: Minimum assistance  Pants With Elastic Waist: Minimum assistance        Pt performed UB and LB dressing routine with v/c for completion of unzipping robe due to pt not fully completing. Pt also required min A for threading L foot through pants due to limited reach. Pt required TD for buttoning 3 shirt buttons. Pt demonstrated difficulty pulling L UE into jacket arm sleeve and required assistance for this step of dressing. Instrumental ADL's:    Did not test  Therapeutic Activities:  Pt trained in bed mobility with v/c for hand placement on bed rails. Pt trained in proper hand placement during sit>stand from bed and w/c. Pt performed seated FM there act to increase independence in clothing management for buttons, zippers, snaps. Pt performed reaching for coins on table top and placing in small coin bank x 25 reps, hooking c-shaped beads x 30 and buttoning/unbuttoning 1/2 inch buttons on me's shirt placed in lap. Pt also performed FM there act with tan theraputty to remove geovanna bead to increase hand strength and dexterity. Therapeutic Exercises:   Pt performed bicep curls 2x15 with 1 lb HW and chest press 2x15 with 1 lb HW and CGA for proper positioning  To increase UB strength for increased independence in self care. Patient/Caregiver Education:    Pt. Herchel Skiff Education on safe hand placement for sit>stand and pivot transfers was provided to increase pt safety during functional transfers.       ASSESSMENT:  Patient continues to demonstrate the need for skilled Occupational Therapy services to improve grooming, bathing, upper body dressing, lower body dressing, toileting and toilet transfer  Progression toward goals:  [x]      Improving appropriately and progressing toward goals  []      Improving slowly and progressing toward goals  []      Not making progress toward goals and plan of care will be adjusted     Treatment session:   52 minutes    Therapist:    Lalitha Guzman OT,  12/22/2017

## 2017-12-23 PROCEDURE — 74011250637 HC RX REV CODE- 250/637: Performed by: INTERNAL MEDICINE

## 2017-12-23 RX ADMIN — POTASSIUM CHLORIDE 10 MEQ: 750 TABLET, FILM COATED, EXTENDED RELEASE ORAL at 10:33

## 2017-12-23 RX ADMIN — FAMOTIDINE 20 MG: 20 TABLET ORAL at 10:33

## 2017-12-23 RX ADMIN — LISINOPRIL 10 MG: 10 TABLET ORAL at 10:33

## 2017-12-23 RX ADMIN — METHYLPHENIDATE HYDROCHLORIDE 10 MG: 10 TABLET ORAL at 14:00

## 2017-12-23 RX ADMIN — SIMVASTATIN 40 MG: 40 TABLET, FILM COATED ORAL at 21:22

## 2017-12-23 RX ADMIN — AMLODIPINE BESYLATE 2.5 MG: 5 TABLET ORAL at 10:33

## 2017-12-23 RX ADMIN — METOPROLOL SUCCINATE 25 MG: 25 TABLET, EXTENDED RELEASE ORAL at 10:34

## 2017-12-23 RX ADMIN — FLUTICASONE PROPIONATE 2 SPRAY: 50 SPRAY, METERED NASAL at 10:55

## 2017-12-23 RX ADMIN — DONEPEZIL HYDROCHLORIDE 5 MG: 5 TABLET, FILM COATED ORAL at 10:33

## 2017-12-23 RX ADMIN — MIRTAZAPINE 7.5 MG: 15 TABLET, FILM COATED ORAL at 21:22

## 2017-12-23 RX ADMIN — METHYLPHENIDATE HYDROCHLORIDE 10 MG: 10 TABLET ORAL at 08:00

## 2017-12-23 RX ADMIN — VITAMIN D, TAB 1000IU (100/BT) 2000 UNITS: 25 TAB at 12:35

## 2017-12-23 RX ADMIN — CLOPIDOGREL BISULFATE 75 MG: 75 TABLET ORAL at 10:33

## 2017-12-23 RX ADMIN — POLYETHYLENE GLYCOL 3350 17 G: 17 POWDER, FOR SOLUTION ORAL at 10:34

## 2017-12-23 NOTE — DISCHARGE SUMMARY
Fab Finnegan  MR#: [id number]  : 10/21/1920  ACCOUNT #: [de-identified]   ADMIT DATE: 2017  DISCHARGE DATE:     The patient is being discharged from [address] [place], [place] [address] for problems of:  1. Multiple falls with rhabdomyolysis. 2.  Chronic obstructive lung disease. 3.  Moderate to severe dementia. 4.  Hypertension, currently hypotensive. 5.  Remote history of breast cancer status post mastectomy. She was noted to have a gait and balance disturbance and superimposed delirium on admission with a history of osteoporosis as well. She was not very interactive secondary to her underlying dementia and was able to participate with physical therapy. She remained very sedate and in fact was tried on Ritalin with minimal improvement and will continue it as a p.r.n. at discharge. Her p.o. intake was rather poor, and she was started on Remeron with some improvement. She had been on Fosamax; this has been discontinued. Her blood pressure actually was somewhat low. She had been on amlodipine, which has been discontinued, and the lisinopril has been decreased to 10 mg a day with a better blood pressure control. Her Lasix was discontinued as well, which may have been there because of the effects of the amlodipine. She has improved or at least stabilized with therapy and is now being discharged to the care of her daughter. She, however, has severe enough dementia that she may be a candidate for hospice and is being referred to hospice at the time of discharge. LABORATORY DATA:  Showed a creatinine of 1.02, BUN of 24 at discharge, hemoglobin was 16 grams with a white count of 10.5.     DISCHARGE MEDICATIONS:  Include Tylenol 500 mg every 4 hours p.r.n. pain, vitamin D 2000 units a day, Plavix 75 mg a day, Aricept 5 mg a day, Pepcid 20 mg a day, Flonase 2 sprays each nostril once a day, lisinopril 10 mg a day, Ritalin 10 mg twice a day as needed, Toprol 25 mg a day, Remeron 7.5 mg at bedtime, nitroglycerin 0.4 mg sublingual p.r.n., MiraLax 17 grams a day, Zocor 40 mg a day.       MD CRYSTAL Kumar/MN  D: 12/22/2017 10:51     T: 12/22/2017 23:11  JOB #: [id number]

## 2017-12-24 VITALS
TEMPERATURE: 97.3 F | OXYGEN SATURATION: 96 % | BODY MASS INDEX: 27.03 KG/M2 | WEIGHT: 116.8 LBS | SYSTOLIC BLOOD PRESSURE: 139 MMHG | HEART RATE: 50 BPM | DIASTOLIC BLOOD PRESSURE: 80 MMHG | HEIGHT: 55 IN | RESPIRATION RATE: 18 BRPM

## 2017-12-24 PROCEDURE — 74011250637 HC RX REV CODE- 250/637: Performed by: INTERNAL MEDICINE

## 2017-12-24 RX ADMIN — AMLODIPINE BESYLATE 2.5 MG: 5 TABLET ORAL at 09:41

## 2017-12-24 RX ADMIN — FLUTICASONE PROPIONATE 2 SPRAY: 50 SPRAY, METERED NASAL at 09:00

## 2017-12-24 RX ADMIN — DONEPEZIL HYDROCHLORIDE 5 MG: 5 TABLET, FILM COATED ORAL at 09:41

## 2017-12-24 RX ADMIN — CLOPIDOGREL BISULFATE 75 MG: 75 TABLET ORAL at 09:41

## 2017-12-24 RX ADMIN — METHYLPHENIDATE HYDROCHLORIDE 10 MG: 10 TABLET ORAL at 09:41

## 2017-12-24 RX ADMIN — VITAMIN D, TAB 1000IU (100/BT) 2000 UNITS: 25 TAB at 09:41

## 2017-12-24 RX ADMIN — METOPROLOL SUCCINATE 25 MG: 25 TABLET, EXTENDED RELEASE ORAL at 09:41

## 2017-12-24 RX ADMIN — POTASSIUM CHLORIDE 10 MEQ: 750 TABLET, FILM COATED, EXTENDED RELEASE ORAL at 09:41

## 2017-12-24 RX ADMIN — POLYETHYLENE GLYCOL 3350 17 G: 17 POWDER, FOR SOLUTION ORAL at 09:00

## 2017-12-24 RX ADMIN — FAMOTIDINE 20 MG: 20 TABLET ORAL at 09:41

## 2017-12-24 RX ADMIN — LISINOPRIL 10 MG: 10 TABLET ORAL at 09:41

## 2017-12-24 NOTE — ROUTINE PROCESS
MDS SECTION GG NURSING REPORT      Patient: Padmaja Nagel (2 y.o. female)                         Date: 12/24/2017    Primary Diagnosis: Traumatic rhabdomyolysis       Attending Physician: Yael Phillips MD   Treatment Diagnosis  Treatment Diagnosis: muscle weakness   Treatment Diagnosis 2: difficulty in walking    Use the KEY on right side to code Functional Ability           MDS Section GG Data Collection Tool  Key:      01     Dependent      02     Substantial/Maximal               Assistance             ([place] does > 50%)      03     Partial/Moderate             Assistance             ([place] does < 50%)      04     Supervision or             Touching Assistance      05     Set-up or [facility] 34     Resident Refused      09      not applicable      88     Not Attempted d/t             Medical or Safety      7-3 3-11 11-7      Performance Code Performance Code Performance Code    Self Care Eating   88     Oral Hygiene   06     Toilet Hygiene   05     Sit to Lying   88     Lying to sitting on EOB   04     Sit to stand   04     Chair/Chair -to- bed Transfer   88     Toilet Transfer   05    Mobility Walk 50 ft. ,   2 turns   88     Walk  150 ft.   88     Wheel 50 ft. ,   2 turns   88     Wheel 150 ft.   88

## 2017-12-24 NOTE — ROUTINE PROCESS
Bedside shift change report given to [address] [place] [address] (oncoming nurse) by Eli Brian RN (offgoing nurse). Report included the following information SBAR, Kardex, MAR and Recent Results. VISUALLY CHECKED PT Q 1 HR BY NURSING STAFF. 24 hour order chart check

## 2017-12-26 ENCOUNTER — HOME HEALTH ADMISSION (OUTPATIENT)
Dept: HOME HEALTH SERVICES | Facility: HOME HEALTH | Age: 82
End: 2017-12-26

## 2017-12-26 NOTE — PROGRESS NOTES
Call to [facility] intake nurse Nu Fowler LPN. Verified that intake was aware of the home care orders entered 12-24-17 for f/u.

## 2017-12-28 ENCOUNTER — HOME CARE VISIT (OUTPATIENT)
Dept: SCHEDULING | Facility: HOME HEALTH | Age: 82
End: 2017-12-28

## 2018-01-02 NOTE — PROGRESS NOTES
Problem: Mobility Impaired (Adult and Pediatric)  Goal: *Acute Goals and Plan of Care (Insert Text)  PHYSICAL THERAPY LTG GOALS :  Initiated 12/1/2017 and to be accomplished within 3-4 Weeks (Updated 12/22/17)     1. Patient will transfer from bed to chair and chair to bed with supervision/set-up using RW. (Achieved)   2. Patient will perform sit to stand with supervision/set-up with Good balance and safety awareness. (Achieved)   3. Patient will ambulate with supervision/set-up for 120 feet with RW on level surfaces and be able to maneuver through narrow spaces and obstacles without loss of balance. (progressing; 136ft with RW and (S)/SBA with verbal cues for safety)   4. Patient will ascend/descend 5 stairs with bilateral handrail(s) with supervision/stand by assistance/set-up to allow for safe home access/exit. (Achieved)   5. Patient will improve standardized test score for [facility] Balance Scale 4. (Progressing; 3)     PHYSICAL THERAPY STG GOALS :  Initiated 12/1/2017 and to be accomplished within 1-2 Weeks (Updated 12/22/17)     1. Patient will move from supine to sit and sit to supine  and scoot up and down in bed with modified independence. (Progressing; (S) with additioanl time)    2. Patient will transfer from bed to chair and chair to bed with stand by assistance using RW. (Achieved)   3. Patient will perform sit to stand with stand by assistance with Good balance and safety awareness. (Achieved)   4. Patient will ambulate with stand by assistance/contact guard assist for 50 feet with RW on level surfaces with 2 turns. (Achieved)    5. Patient will ascend/descend 5 stairs with bilateral handrail(s) with minimal assistance/contact guard assist to allow for safe home access/exit. (achieved)   6.   Patient will improve standardized test score for [facility] Balance Scale 3 (Achieved)     PHYSICAL THERAPY STG GOALS :  Initiated 12/1/2017 and to be accomplished within 1-2 Weeks (Updated 12/15/17)     1. Patient will move from supine to sit and sit to supine  and scoot up and down in bed with modified independence. (Progressing; CGA)    2.  Patient will transfer from bed to chair and chair to bed with stand by assistance using RW. (Progressing; CGA)   3. Patient will perform sit to stand with stand by assistance with Good balance and safety awareness. (Progressing; CGA)   4. Patient will ambulate with stand by assistance/contact guard assist for 50 feet with RW on level surfaces with 2 turns. (Progressing; 52ft with CGA)    5. Patient will ascend/descend 5 stairs with bilateral handrail(s) with minimal assistance/contact guard assist to allow for safe home access/exit. (achieved)   6. Patient will improve standardized test score for [facility] Balance Scale 3 (Progressing; 2+)       PHYSICAL THERAPY STG GOALS :  Initiated 12/1/2017 and to be accomplished within 1-2 Weeks (Updated 12/8/17)     1. Patient will move from supine to sit and sit to supine  and scoot up and down in bed with modified independence. (Progressing; Mod A)    2. Patient will transfer from bed to chair and chair to bed with stand by assistance using RW. (Progressing; CGA)   3. Patient will perform sit to stand with stand by assistance with Good balance and safety awareness. (Progressing; CGA)   4. Patient will ambulate with stand by assistance/contact guard assist for 50 feet with RW on level surfaces with 2 turns. (Progressing; 52ft with CGA)    5. Patient will ascend/descend 5 stairs with bilateral handrail(s) with minimal assistance/contact guard assist to allow for safe home access/exit. (Progressing; 3 4\" steps with B HR and Min A/CGA)   6. Patient will improve standardized test score for [facility] Balance Scale 3 (Progressing; 2+)       PHYSICAL THERAPY LTG GOALS :  Initiated 12/1/2017 and to be accomplished within 3-4 Weeks    1.   Patient will transfer from bed to chair and chair to bed with supervision/set-up using RW. 2.  Patient will perform sit to stand with supervision/set-up with Good balance and safety awareness. 3.  Patient will ambulate with supervision/set-up for 120 feet with RW on level surfaces and be able to maneuver through narrow spaces and obstacles without loss of balance. 4.  Patient will ascend/descend 5 stairs with bilateral handrail(s) with supervision/stand by assistance/set-up to allow for safe home access/exit. 5.  Patient will improve standardized test score for [facility] Balance Scale 4. Physical Therapist:   Jorge Howard  on 2017                  [phone]  [facility] physical Therapy Discharge Summary      Patient: Bishop Mercado (29 y.o. female)                  Date: 2018    Attending Physician: Gina zabala. providers found  Primary Diagnosis: Traumatic rhabdomyolysis        Treatment Diagnosis  Treatment Diagnosis: muscle weakness   Treatment Diagnosis 2: difficulty in walking         Precautions: Fall   MEDICAL HISTORY:   Past Medical History:   Diagnosis Date    Breast cancer ([facility] [address]     Chronic obstructive pulmonary disease ([facility] [address]     Dementia     Hypertension      Past Surgical History:   Procedure Laterality Date    HX MASTECTOMY         Reason for Discharge: [] Goals Met   []Met highest potential  []    [] Progress ceased  []Hospitalized  [x]Other: Per insurance  Discharge Location:  [x] Private Residence  [] [facility]  [] LTC  []  [address] []Other: 24 hr.supervision for safety. Summarize skilled services provided and significant progress attained since last daily/weekly note (include individualized treatment techniques and standardized tests): This Patient has received skilled PT services from 17  through 17 and has made Fair progress towards their PT goals. Improvements noted in bed mobility, transfers, ambulation, stair negotiation.    Summary of education/recommendation provided to Patient/Caregivers:    Pt. Education provided to use Rolling Walker      Objective Data:     INITIAL  ASSESSMENT DISCHARGE ASSESSMENT   COGNITIVE STATUS COGNITIVE STATUS   Neurologic State: Confused  Orientation Level: Oriented to person  Cognition: Follows commands  Perception: Appears intact  Perseveration: No perseveration noted  Safety/Judgement: Fall prevention Neurologic State: Confused, Alert  Orientation Level: Disoriented to time, Disoriented to place  Cognition: Follows commands  Perception: Cues to maintain midline in sitting, Tactile, Verbal  Perseveration: Perseverates during conversation  Safety/Judgement: Fall prevention   PAIN PAIN   Pain Scale 1: Numeric (0 - 10)  Pain Intensity 1: 0  Pain Location 1: Generalized  Pain Description 1: Aching  Pain Intervention(s) 1: Medication (see MAR)  Patient Stated Pain Goal: 0  Pain Reassessment 1: Patient sleeping Pain Scale 1: Numeric (0 - 10)  Pain Intensity 1: 0  Pain Location 1: Generalized  Pain Description 1: Aching  Pain Intervention(s) 1: Medication (see MAR)  Patient Stated Pain Goal: 0  Pain Reassessment 1: Yes   GROSS ASSESSMENT GROSS ASSESSMENT   AROM: Generally decreased, functional  PROM: Generally decreased, functional  Strength: Generally decreased, functional (RUE 3-/5, LUE 3/5)  Coordination: Generally decreased, functional  Tone: Normal  Sensation: Intact AROM: Generally decreased, functional  PROM: Generally decreased, functional  Strength: Generally decreased, functional (4-/5 through LLE, 3 through out RLE ; limited due to pain)  Coordination: Generally decreased, functional  Tone: Normal  Sensation: Intact   BED MOBILITY BED MOBILITY   Rolling: Contact guard assistance, Setup  Supine to Sit: Contact guard assistance, Minimum assistance (Merissa for initiating sitting up )  Sit to Supine: Maximum assistance, Assist x2  Scooting:  Moderate assistance Rolling: Contact guard assistance, Setup  Supine to Sit: Minimum assistance  Sit to Supine: Contact guard assistance  Scooting: Contact guard assistance   GAIT GAIT   Base of Support: Center of gravity altered  Speed/My: Slow  Step Length: Right shortened, Left shortened  Gait Abnormalities: Decreased step clearance, Shuffling gait, Other (thoracic kyphosis )  Ambulation - Level of Assistance: Contact guard assistance, Minimal assistance  Distance (ft): 25 Feet (ft) (x2)  Assistive Device: Gait belt, Walker, rolling  Rail Use: Both  Stairs - Level of Assistance: Minimum assistance, Contact guard assistance  Number of Stairs Trained: 3 (4\" steps)  Interventions: Safety awareness training, Verbal cues, Tactile cues Base of Support: Center of gravity altered  Speed/My: Pace decreased (<100 feet/min)  Step Length: Right shortened, Left shortened  Gait Abnormalities: Decreased step clearance  Ambulation - Level of Assistance: Supervision (close (S) )  Distance (ft): 78 Feet (ft)  Assistive Device: Gait belt, Walker, rolling  Rail Use: Both  Stairs - Level of Assistance: Supervision (close (S) )  Number of Stairs Trained: 5  Interventions: Safety awareness training                      With 3 turns.    TRANSFERS TRANSFERS   Sit to Stand: Contact guard assistance, Minimum assistance  Stand to Sit: Contact guard assistance, Minimum assistance  Stand Pivot Transfers: Minimum assistance  Bed to Chair: Maximum assistance, Assist x2 (w/c to bed) Sit to Stand: Stand-by asssistance  Stand to Sit: Supervision  Stand Pivot Transfers: Minimum assistance  Bed to Chair: Stand-by asssistance   BALANCE BALANCE   Sitting: With support  Sitting - Static: Fair (occasional) ((-))  Sitting - Dynamic: Poor (constant support)  Standing: With support  Standing - Static: Poor ((-))  Standing - Dynamic :  (not tested) Sitting: Intact  Sitting - Static: Good (unsupported)  Sitting - Dynamic: Fair (occasional)  Standing: With support  Standing - Static: Fair  Standing - Dynamic : Fair   WHEELCHAIR MOBILITY/MGMT WHEELCHAIR MOBILITY/MGMT             Visual/Perceptual           Auditory:   Auditory  Auditory Impairment: None         Visual/Perceptual           Auditory:   Auditory  Auditory Impairment: None          Activity Tolerance:  Fair                     Treatment:   No treatment rendered on date of d/c. Pt d/c due to end of insurance coverage. Recommend home health PT upon return home, strongly recommend 24 hour supervision and assistance. Discharge Recommendations:  [x]  Home Exercise Program     [x]  Home Health PT   [x]  Remove throw rugs/clear environmental barriers    [x]  Assistance with: bed mobility, transfers, ambulation, stair negotiation     [x]  Ambulation Device: Rolling Walker   [x]  Steps with both     [x]  Wheelchair, 18 inch   []  Life Line/alert   []  WC  Ramp       Treatment minutes: 0 minutes.     Therapist :  Sonia Rahman, PT            Date:1/2/2018

## 2018-01-02 NOTE — PROGRESS NOTES
Problem: Self Care Deficits Care Plan (Adult)  Goal: *Acute Goals and Plan of Care (Insert Text)  OCCUPATIONAL THERAPY SHORT TERM GOALS      Discharge 12/22/17  1. Patient will perform Upper body bathing with contact guard assist and dressing with minimal assist without adaptive equipment. - met goal  2. Patient will perform Lower body bathing with minimal assist and dressing with maximal assist with adaptive equipment as needed. -met goal  3. Patient will perform toileting task with Supervision with clothing management and Supervision with hygeine with Good safety to reduce falls risk. -goal not met, SBA w/ clothing mgmt & Mod I w/ bladder mgmt/perihygeine  4. Patient will perform toilet transfers with Pamela Fellers and standby assistance . -goal not met, CGA w/ FWW  5. Patient will perform standing static/dynamic balance activities for improved ADL/IADL function with SBA and Good balance and safety awareness.-goal not met  6. Patient will improve Barthel index scores to atleast 75/100 to improve functional mobility-goal not met    Updated 12/15/17    1. Patient will perform Upper body bathing with contact guard assist and dressing with minimal assist without adaptive equipment. 2.  Patient will perform Lower body bathing with minimal assist and dressing with maximal assist with adaptive equipment as needed. 3.  Patient will perform toileting task with Supervisoin with clothing management and Supervision with hygeine with Good safety to reduce falls risk. 4.  Patient will perform toilet transfers with Pamela Fellers and standby assistance . 5. Patient will perform standing static/dynamic balance activities for improved ADL/IADL function with SBA and Good balance and safety awareness. 6.  Patient will improve Barthel index scores to atleast 75/100 to improve functional mobility    Updated 12/8/17     1.   Patient will perform Upper body bathing with contact guard assist and dressing with minimal assist without adaptive equipment. (progressing-currently Mod A)  2. Patient will perform Lower body bathing with minimal assist and dressing with maximal assist with adaptive equipment as needed. (progressing-currently Max A)  3. Patient will perform toileting task with contact guard assist with clothing management and Supervision with hygeine with Good safety to reduce falls risk. (goal met. [facility] Supervision)  4. Patient will perform toilet transfers with Vicki Amador and standby assistance . (progressing-currently CGA)  5. Patient will perform standing static/dynamic balance activities for improved ADL/IADL function with moderate assistance and Good balance and safety awareness. (goal met-[facility] SBA)  6. Patient will improve Barthel index scores to atleast 65/100 to improve functional mobility.(goal met-75/100)  7. Patient will perform self feeding and set up with adaptive equipment with Mod I. (goal met-D/C)    Updated 12/8/17    1. Patient will perform Upper body ADL's without adaptive equipment with moderate assistance. - - progressing w/ bathing, met goal with dressing/upgrade  2. Patient will perform Lower body ADL's with adaptive equipment as needed with maximal assistance. - progressing w/ dressing, met goal with bathing/upgrade  3. Patient will perform toileting task with maximal assistance with Good safety to reduce falls risk. - met goal upgrade  4. Patient will perform toilet transfers with Rolling Walker and moderate assistance . - met goal/upgrade  5. Patient will perform standing static/dynamic balance activities for improved ADL/IADL function with moderate assistance and Good balance and safety awareness. -porgressing  6. Patient will improve Barthel index scores to atleast 35/100 to improve functional mobility. -pmet goal/upgrade  7.  Patient will perform self feeding and set up with adaptive equipment with Mod I. - progressing    Initiated 12/1/2017 and to be accomplished within 2 Week(s)    1. Patient will perform Upper body ADL's without adaptive equipment with moderate assistance . 2.  Patient will perform Lower body ADL's with adaptive equipment as needed with maximal assistance . 3. Patient will perform toileting task with maximal assistance with Good safety to reduce falls risk. 4.  Patient will perform toilet transfers with Rolling Walker and moderate assistance . 5. Patient will perform standing static/dynamic balance activities for improved ADL/IADL function with moderate assistance and Good balance and safety awareness. 6.  Patient will improve Barthel index scores to atleast 35/100 to improve functional mobility. 7. Patient will perform self feeding and set up with adaptive equipment with Mod I.      OCCUPATIONAL THERAPY LONG TERM GOALS   Initiated 12/1/2017 and to be accomplished within 4 Week(s)    1. Patient will perform Upper body ADL's without adaptive equipment with modified independence.-goal not met, bathe w/ SBA, dress w/ Min A  2. Patient will perform Lower body ADL's with adaptive equipment as needed with modified independence. -goal not met, bathe w/ CGA, dress w/ Min A  3. Patient will perform toileting task with modified independence with Good safety to reduce falls risk. -goal not met, SBA w/ clothing mgmt & Mod I w/ bladder mgmt/perihygeine  4. Patient will perform all functional transfers utilizing least restrictive device and durable medical equipment as needed with modified independence and Good balance and safety awareness. -goal not met, CGA w/ FWW  5. Patient will perform standing static/dynamic activity for improved ADL/IADL function with modified independence an and Good balance and safety awareness.-goal not met  6.   Patient will improve Barthel index score to 95/100 to improve independence with mobility.-goal not met      Therapist: Jose Friedman    12/1/2017               [facility]  OCCUPATIONAL THERAPY DISCHARGE SUMMARY      Patient: Chen Reed (70 y.o. female)               Date: 2018    Attending Physician: No att. providers found  Primary Diagnosis: Traumatic rhabdomyolysis        Treatment Diagnosis  Treatment Diagnosis: muscle weakness   Treatment Diagnosis 2: difficulty in walking         Precautions: Fall     Medical History:   Past Medical History:   Diagnosis Date    Breast cancer ([address]     Chronic obstructive pulmonary disease ([address]     Dementia     Hypertension      Past Surgical History:   Procedure Laterality Date    HX MASTECTOMY         Reason for Discharge: []Goals Met   []Met highest potential  []Hospitalized   []  Progress ceased  []   [x]Other: Patient/family requesting D/C from facility   Discharge Location:  [x]Private Residence  [] prison []LTC  [] [facility] [x] 24 hr. Supervision for safety    Summarize skilled services provided and significant progress attained since last daily/weekly note (include individualized treatment techniques and standardized tests):   Patient has received skilled OT services from 17 to 17 and has made Good progress towards their OT goals. Improvements noted in: self-feeding, grooming, bathing, upper body dressing, lower body dressing, toileting and toilet transfer    Summary of education/recommendation provided to Patient/Caregivers in the following areas: Assist w/ ADLs, IADLs, functional transfers/mobility, cooking and housekeeping tasks, Remove barriers/rugs, mobility w/ Rolling Walker & w/c for grooming, bathing, upper body dressing, lower body dressing, toileting and toilet transfer.        Objective Data:      INITIAL ASSESSMENT DISCHARGE ASSESSMENT   COGNITIVE STATUS: COGNITIVE STATUS:   Neurologic State: Confused  Orientation Level: Oriented to person  Cognition: Follows commands  Perception: Appears intact  Perseveration: No perseveration noted  Safety/Judgement: Fall prevention Mental Status  Neurologic State: Confused, Alert  Orientation Level: Disoriented to time, Disoriented to place  Cognition: Follows commands  Perception: Cues to maintain midline in sitting, Tactile, Verbal  Perseveration: Perseverates during conversation  Safety/Judgement: Fall prevention   PAIN: PAIN:   Pain Scale 1: Numeric (0 - 10)  Pain Intensity 1: 0  Pain Location 1: Generalized  Pain Description 1: Aching  Pain Intervention(s) 1: Medication (see MAR)  Patient Stated Pain Goal: 0  Pain Reassessment 1: Patient sleeping Pain Screen  Pain Scale 1: Numeric (0 - 10)  Pain Intensity 1: 0  Pain Location 1: Generalized  Pain Description 1: Aching  Pain Intervention(s) 1: Medication (see MAR)  Patient Stated Pain Goal: 0  Pain Reassessment 1: Yes   BED MOBILITY BED MOBILITY   Rolling: Contact guard assistance, Setup  Supine to Sit: Contact guard assistance, Minimum assistance (Merissa for initiating sitting up )  Sit to Supine: Maximum assistance, Assist x2  Scooting: Moderate assistance Bed Mobility  Rolling: Contact guard assistance, Setup  Supine to Sit: Minimum assistance  Sit to Supine: Contact guard assistance  Scooting: Contact guard assistance   ADL SELF CARE ADL SELF CARE   Oral Facial Hygiene/Grooming: Supervision  Upper Body Dressing: Minimum assistance  Lower Body Dressing: Moderate assistance Basic ADL  Oral Facial Hygiene/Grooming: Supervision  Upper Body Dressing: Minimum assistance  Lower Body Dressing:  Moderate assistance   ADL INTERVENTION ADL INTERVENTION   Bathing Assistance: Maximum assistance  Position Performed:  (seated on commode) Upper Body Bathing  Bathing Assistance: Stand-by assistance  Position Performed: Seated edge of bed    Upper Body Dressing Assistance  Dressing Assistance: Minimum assistance  Pullover Shirt: Moderate assistance  Front Opened Shirt: Minimum assistance    Lower Body Bathing  Bathing Assistance: Contact guard assistance  Perineal  : Contact guard assistance  Position Performed: Standing  Adaptive Equipment:  (FWW) Toileting  Toileting Assistance: Contact guard assistance  Bladder Hygiene: Modified independent  Bowel Hygiene: Total assistance (dependent)  Clothing Management: Stand-by assistance  Adaptive Equipment: Elevated seat    Grooming  Grooming Assistance: Stand-by assistance  Washing Face: Stand-by assistance  Washing Hands: Stand-by assistance (at standing)  Brushing Teeth: Stand-by assistance  Brushing/Combing Hair: Stand-by assistance    Feeding  Feeding Assistance: Supervision/set-up  Container Management: Maximum assistance  Cutting Food:  Total assistance (dependent)  Utensil Management: Supervision/set-up  Food to Mouth: Supervision/set-up (verbal cues to initiate)  Drink to Mouth: Supervision/set-up (verbal cues to initiate)  Cues: Verbal cues provided   TRANSFERS TRANSFERS   Sit to Stand: Contact guard assistance, Minimum assistance  Stand to Sit: Contact guard assistance, Minimum assistance  Bed to Chair: Maximum assistance, Assist x2 (w/c to bed)  Toilet Transfer : Other (comment) (patient refused) Functional Transfers  Sit to Stand: Stand-by asssistance  Stand to Sit: Supervision  Bed to Chair: Stand-by asssistance  Toilet Transfer : Contact guard assistance   BALANCE BALANCE   Sitting: With support  Sitting - Static: Fair (occasional) ((-))  Sitting - Dynamic: Poor (constant support)  Standing: With support  Standing - Static: Poor ((-))  Standing - Dynamic :  (not tested) Balance  Sitting: Intact  Sitting - Static: Good (unsupported)  Sitting - Dynamic: Fair (occasional)  Standing: With support  Standing - Static: Fair  Standing - Dynamic : Fair   GROSS ASSESSMENT  GROSS ASSESSMENT   AROM: Generally decreased, functional  PROM: Generally decreased, functional  Strength: Generally decreased, functional (RUE 3-/5, LUE 3/5)  Coordination: Generally decreased, functional  Tone: Normal  Sensation: Intact Gross Assessment  AROM: Generally decreased, functional  PROM: Generally decreased, functional  Strength: Generally decreased, functional (4-/5 through LLE, 3 through out RLE ; limited due to pain)  Coordination: Generally decreased, functional  Tone: Normal  Sensation: Intact   COORDINATION COORDINATION   Fine Motor Skills-Upper: Left Intact, Right Intact  Gross Motor Skills-Upper: Left Intact, Right Intact Coordination  Fine Motor Skills-Upper: Left Intact, Right Intact  Gross Motor Skills-Upper: Left Intact, Right Intact   VISUAL/PERCEPTUAL VISUAL/PERCEPTUAL             AUDITORY: AUDITORY:   Auditory Impairment: None Auditory  Auditory Impairment: None   I ADL'S:  I ADL'S:           THE BARTHEL INDEX    ACTIVITY   SCORE   FEEDING  0=unable  5=needs help cutting,spreading butter,etc., or modified diet  10= independent   5   BATHING  0=dependent  5=independent (or in shower   0   GROOMING  0=needs help  5=independent face/hair/teeth/shaving (implements provided)   5   DRESSING  0=dependent  5=needs help but can do about half unaided  10=independent(including buttons, zips,laces etc.)   5   BOWELS  0=incontinent  5=occasional accident  10=continent   5   BLADDER  0=incontinent, or catheterized and unable to manage alone  5=occasional accident  10=continent   5   TOILET USE  0=dependent  5=needs some help, but can do something alone  10=independent (on and off, dressing, wiping)   5   TRANSFER (BED TO CHAIR AND BACK)  0=unable, no sitting balance  5=major help(one or two people,physical), can sit  10=minor help(verbal or physical)  15=independent   10   MOBILITY (ON LEVEL SURFACES)  0=immobile or <50 yards  5=wheelchair independent,including corners,>50 yards  10=walkes with help of one person (verbal or physical) >50 yards  15=independent(but may use any aid; for example, stick) >50 yards   5   STAIRS  0=unable  5=needs help (verbal, physical, carrying aid)  10=independent   5              TOTAL:               45/100     Treatment :  n/a  Discharge Recommendations:  [] Home Exercise Program    [x] Elevated toilet seat/3 in 1 commode   [] Shower Chair      []Shower Bench    [x] Life Line/alert   [] Splint/orthotic application/schedule  [x] Grab Bars: Location next to toilet  [x] Home Health OT       [x] Rolling Walker  [x] WC 18 inch     Treatment session:  0 minutes.     Therapist: Daquan Sosa      Date:1/2/2018

## 2018-03-10 ENCOUNTER — HOSPITAL ENCOUNTER (INPATIENT)
Age: 83
LOS: 5 days | Discharge: NURSING, REHAB OR LONG-TERM CARE | DRG: 872 | End: 2018-03-15
Attending: EMERGENCY MEDICINE | Admitting: HOSPITALIST
Payer: MEDICARE

## 2018-03-10 ENCOUNTER — APPOINTMENT (OUTPATIENT)
Dept: GENERAL RADIOLOGY | Age: 83
DRG: 872 | End: 2018-03-10
Attending: EMERGENCY MEDICINE
Payer: MEDICARE

## 2018-03-10 DIAGNOSIS — N30.01 ACUTE CYSTITIS WITH HEMATURIA: Primary | ICD-10-CM

## 2018-03-10 DIAGNOSIS — A41.9 SEPSIS, DUE TO UNSPECIFIED ORGANISM: ICD-10-CM

## 2018-03-10 DIAGNOSIS — G30.1 LATE ONSET ALZHEIMER'S DISEASE WITHOUT BEHAVIORAL DISTURBANCE (HCC): ICD-10-CM

## 2018-03-10 DIAGNOSIS — F02.80 LATE ONSET ALZHEIMER'S DISEASE WITHOUT BEHAVIORAL DISTURBANCE (HCC): ICD-10-CM

## 2018-03-10 DIAGNOSIS — F02.80 ALZHEIMER'S DEMENTIA WITHOUT BEHAVIORAL DISTURBANCE, UNSPECIFIED TIMING OF DEMENTIA ONSET: ICD-10-CM

## 2018-03-10 DIAGNOSIS — G30.9 ALZHEIMER'S DEMENTIA WITHOUT BEHAVIORAL DISTURBANCE, UNSPECIFIED TIMING OF DEMENTIA ONSET: ICD-10-CM

## 2018-03-10 DIAGNOSIS — N39.0 URINARY TRACT INFECTION WITHOUT HEMATURIA, SITE UNSPECIFIED: ICD-10-CM

## 2018-03-10 DIAGNOSIS — R53.81 DEBILITY: ICD-10-CM

## 2018-03-10 DIAGNOSIS — Z71.89 ADVANCED CARE PLANNING/COUNSELING DISCUSSION: ICD-10-CM

## 2018-03-10 PROBLEM — G89.29 CHRONIC PAIN: Status: ACTIVE | Noted: 2018-03-10

## 2018-03-10 LAB
ALBUMIN SERPL-MCNC: 2.5 G/DL (ref 3.4–5)
ALBUMIN/GLOB SERPL: 0.8 {RATIO} (ref 0.8–1.7)
ALP SERPL-CCNC: 521 U/L (ref 45–117)
ALT SERPL-CCNC: 162 U/L (ref 13–56)
ANION GAP SERPL CALC-SCNC: 12 MMOL/L (ref 3–18)
APPEARANCE UR: ABNORMAL
AST SERPL-CCNC: 274 U/L (ref 15–37)
BACTERIA URNS QL MICRO: ABNORMAL /HPF
BASOPHILS # BLD: 0 K/UL (ref 0–0.06)
BASOPHILS NFR BLD: 0 % (ref 0–2)
BILIRUB SERPL-MCNC: 5.1 MG/DL (ref 0.2–1)
BILIRUB UR QL: ABNORMAL
BUN SERPL-MCNC: 32 MG/DL (ref 7–18)
BUN/CREAT SERPL: 26 (ref 12–20)
CALCIUM SERPL-MCNC: 8.4 MG/DL (ref 8.5–10.1)
CHLORIDE SERPL-SCNC: 106 MMOL/L (ref 100–108)
CO2 SERPL-SCNC: 25 MMOL/L (ref 21–32)
COLOR UR: ABNORMAL
CREAT SERPL-MCNC: 1.24 MG/DL (ref 0.6–1.3)
DIFFERENTIAL METHOD BLD: ABNORMAL
EOSINOPHIL # BLD: 0 K/UL (ref 0–0.4)
EOSINOPHIL NFR BLD: 0 % (ref 0–5)
EPITH CASTS URNS QL MICRO: ABNORMAL /LPF (ref 0–5)
ERYTHROCYTE [DISTWIDTH] IN BLOOD BY AUTOMATED COUNT: 16.6 % (ref 11.6–14.5)
GLOBULIN SER CALC-MCNC: 3.3 G/DL (ref 2–4)
GLUCOSE SERPL-MCNC: 90 MG/DL (ref 74–99)
GLUCOSE UR STRIP.AUTO-MCNC: NEGATIVE MG/DL
HCT VFR BLD AUTO: 40.8 % (ref 35–45)
HGB BLD-MCNC: 13.6 G/DL (ref 12–16)
HGB UR QL STRIP: ABNORMAL
KETONES UR QL STRIP.AUTO: NEGATIVE MG/DL
LACTATE BLD-SCNC: 2.6 MMOL/L (ref 0.4–2)
LACTATE BLD-SCNC: 3.2 MMOL/L (ref 0.4–2)
LEUKOCYTE ESTERASE UR QL STRIP.AUTO: ABNORMAL
LYMPHOCYTES # BLD: 0.5 K/UL (ref 0.9–3.6)
LYMPHOCYTES NFR BLD: 3 % (ref 21–52)
MCH RBC QN AUTO: 28.3 PG (ref 24–34)
MCHC RBC AUTO-ENTMCNC: 33.3 G/DL (ref 31–37)
MCV RBC AUTO: 84.8 FL (ref 74–97)
MONOCYTES # BLD: 0.6 K/UL (ref 0.05–1.2)
MONOCYTES NFR BLD: 3 % (ref 3–10)
NEUTS SEG # BLD: 18.4 K/UL (ref 1.8–8)
NEUTS SEG NFR BLD: 94 % (ref 40–73)
NITRITE UR QL STRIP.AUTO: POSITIVE
PH UR STRIP: 5 [PH] (ref 5–8)
PLATELET # BLD AUTO: 175 K/UL (ref 135–420)
PMV BLD AUTO: 9.7 FL (ref 9.2–11.8)
POTASSIUM SERPL-SCNC: 3.7 MMOL/L (ref 3.5–5.5)
PROT SERPL-MCNC: 5.8 G/DL (ref 6.4–8.2)
PROT UR STRIP-MCNC: 30 MG/DL
RBC # BLD AUTO: 4.81 M/UL (ref 4.2–5.3)
RBC #/AREA URNS HPF: ABNORMAL /HPF (ref 0–5)
SODIUM SERPL-SCNC: 143 MMOL/L (ref 136–145)
SP GR UR REFRACTOMETRY: 1.02 (ref 1–1.03)
UROBILINOGEN UR QL STRIP.AUTO: 1 EU/DL (ref 0.2–1)
WBC # BLD AUTO: 19.5 K/UL (ref 4.6–13.2)
WBC URNS QL MICRO: ABNORMAL /HPF (ref 0–4)

## 2018-03-10 PROCEDURE — 99285 EMERGENCY DEPT VISIT HI MDM: CPT

## 2018-03-10 PROCEDURE — 71045 X-RAY EXAM CHEST 1 VIEW: CPT

## 2018-03-10 PROCEDURE — 87186 SC STD MICRODIL/AGAR DIL: CPT | Performed by: EMERGENCY MEDICINE

## 2018-03-10 PROCEDURE — 96365 THER/PROPH/DIAG IV INF INIT: CPT

## 2018-03-10 PROCEDURE — 65270000029 HC RM PRIVATE

## 2018-03-10 PROCEDURE — 74011250637 HC RX REV CODE- 250/637: Performed by: EMERGENCY MEDICINE

## 2018-03-10 PROCEDURE — 85025 COMPLETE CBC W/AUTO DIFF WBC: CPT | Performed by: EMERGENCY MEDICINE

## 2018-03-10 PROCEDURE — 80053 COMPREHEN METABOLIC PANEL: CPT | Performed by: EMERGENCY MEDICINE

## 2018-03-10 PROCEDURE — 87040 BLOOD CULTURE FOR BACTERIA: CPT | Performed by: EMERGENCY MEDICINE

## 2018-03-10 PROCEDURE — 74018 RADEX ABDOMEN 1 VIEW: CPT

## 2018-03-10 PROCEDURE — 96361 HYDRATE IV INFUSION ADD-ON: CPT

## 2018-03-10 PROCEDURE — 87077 CULTURE AEROBIC IDENTIFY: CPT | Performed by: EMERGENCY MEDICINE

## 2018-03-10 PROCEDURE — 81001 URINALYSIS AUTO W/SCOPE: CPT | Performed by: EMERGENCY MEDICINE

## 2018-03-10 PROCEDURE — 83605 ASSAY OF LACTIC ACID: CPT

## 2018-03-10 PROCEDURE — 93005 ELECTROCARDIOGRAM TRACING: CPT

## 2018-03-10 PROCEDURE — 77030005563 HC CATH URETH INT MMGH -A

## 2018-03-10 PROCEDURE — 74011250636 HC RX REV CODE- 250/636: Performed by: EMERGENCY MEDICINE

## 2018-03-10 RX ORDER — ACETAMINOPHEN 325 MG/1
650 TABLET ORAL
Status: DISCONTINUED | OUTPATIENT
Start: 2018-03-10 | End: 2018-03-15 | Stop reason: HOSPADM

## 2018-03-10 RX ORDER — SODIUM CHLORIDE 0.9 % (FLUSH) 0.9 %
5-10 SYRINGE (ML) INJECTION AS NEEDED
Status: DISCONTINUED | OUTPATIENT
Start: 2018-03-10 | End: 2018-03-15 | Stop reason: HOSPADM

## 2018-03-10 RX ORDER — MORPHINE SULFATE 2 MG/ML
1 INJECTION, SOLUTION INTRAMUSCULAR; INTRAVENOUS
Status: DISCONTINUED | OUTPATIENT
Start: 2018-03-10 | End: 2018-03-15 | Stop reason: HOSPADM

## 2018-03-10 RX ORDER — ACETAMINOPHEN 650 MG/1
650 SUPPOSITORY RECTAL
Status: COMPLETED | OUTPATIENT
Start: 2018-03-10 | End: 2018-03-10

## 2018-03-10 RX ORDER — LEVOFLOXACIN 5 MG/ML
750 INJECTION, SOLUTION INTRAVENOUS
Status: COMPLETED | OUTPATIENT
Start: 2018-03-10 | End: 2018-03-10

## 2018-03-10 RX ORDER — SODIUM CHLORIDE 9 MG/ML
50 INJECTION, SOLUTION INTRAVENOUS CONTINUOUS
Status: DISCONTINUED | OUTPATIENT
Start: 2018-03-11 | End: 2018-03-14

## 2018-03-10 RX ORDER — ONDANSETRON 2 MG/ML
4 INJECTION INTRAMUSCULAR; INTRAVENOUS
Status: DISCONTINUED | OUTPATIENT
Start: 2018-03-10 | End: 2018-03-15 | Stop reason: HOSPADM

## 2018-03-10 RX ORDER — LEVOFLOXACIN 5 MG/ML
500 INJECTION, SOLUTION INTRAVENOUS EVERY 24 HOURS
Status: DISCONTINUED | OUTPATIENT
Start: 2018-03-11 | End: 2018-03-11

## 2018-03-10 RX ADMIN — ACETAMINOPHEN 650 MG: 650 SUPPOSITORY RECTAL at 20:07

## 2018-03-10 RX ADMIN — SODIUM CHLORIDE 1407 ML: 900 INJECTION, SOLUTION INTRAVENOUS at 20:06

## 2018-03-10 RX ADMIN — LEVOFLOXACIN 750 MG: 5 INJECTION, SOLUTION INTRAVENOUS at 20:37

## 2018-03-10 NOTE — IP AVS SNAPSHOT
[address] [address] 
[phone] Patient: Cristina Chaves MRN: [id number] BEL:[birth year] About your hospitalization You were admitted on:  March 10, 2018 You last received care in the:  [address] NEURO [facility] You were discharged on:  March 15, 2018 Why you were hospitalized Your primary diagnosis was:  Uti (Urinary Tract Infection) Your diagnoses also included:  Chronic Pain, Sepsis (Formerly [facility]), Essential Hypertension, Late Onset Alzheimer's Disease Without Behavioral Disturbance, Debility, A-Fib (Formerly [facility]), Chronic Obstructive Pulmonary Disease (Formerly [facility]), Advanced Care Planning/Counseling Discussion, Alzheimer's Disease Follow-up Information Follow up With Details Comments Contact Info Hazel Whitley MD In 1 week  [address] [facility] [address] [phone] Discharge Orders None A check tash indicates which time of day the medication should be taken. My Medications START taking these medications Instructions Each Dose to Equal  
 Morning Noon Evening Bedtime  
 sulfamethoxazole-trimethoprim 200-40 mg/5 mL suspension Commonly known as:  BACTRIM;SEPTRA Your last dose was: Your next dose is: Take 10 mL by mouth two (2) times a day for 10 days. 10 mL CONTINUE taking these medications Instructions Each Dose to Equal  
 Morning Noon Evening Bedtime  
 acetaminophen 500 mg tablet Commonly known as:  TYLENOL Your last dose was: Your next dose is: Take 1 Tab by mouth every four (4) hours as needed. Indications: Pain 500 mg  
    
   
   
   
  
 alendronate 40 mg tablet Commonly known as:  FOSAMAX Your last dose was: Your next dose is: Take 40 mg by mouth every seven (7) days. 70 mg  
 40 mg  
    
   
   
   
  
 cetirizine 10 mg tablet Commonly known as:  ZYRTEC Your last dose was: Your next dose is: Take 10 mg by mouth daily as needed. for allergy 10 mg  
    
   
   
   
  
 cholecalciferol 1,000 unit tablet Commonly known as:  VITAMIN D3 Your last dose was: Your next dose is: Take 2 Tabs by mouth daily. 2000 Units  
    
   
   
   
  
 clopidogrel 75 mg Tab Commonly known as:  PLAVIX Your last dose was: Your next dose is: Take 1 Tab by mouth daily. 75 mg  
    
   
   
   
  
 donepezil 5 mg tablet Commonly known as:  ARICEPT Your last dose was: Your next dose is: Take 1 Tab by mouth daily. 5 mg  
    
   
   
   
  
 famotidine 20 mg tablet Commonly known as:  PEPCID Your last dose was: Your next dose is: Take 1 Tab by mouth daily. 20 mg  
    
   
   
   
  
 fluticasone 50 mcg/actuation nasal spray Commonly known as:  Garcia Fuentes Your last dose was: Your next dose is:    
   
   
 2 sprays both nostriles daily LASIX 20 mg tablet Generic drug:  furosemide Your last dose was: Your next dose is: Take 40 mg by mouth daily. 40 mg  
    
   
   
   
  
 lisinopril 10 mg tablet Commonly known as:  Darcy Meade Your last dose was: Your next dose is: Take 1 Tab by mouth daily. 10 mg  
    
   
   
   
  
 methylphenidate HCl 10 mg tablet Commonly known as:  RITALIN Your last dose was: Your next dose is: One pill twice a day as needed  Indications: Narcolepsy Syndrome  
     
   
   
   
  
 metoprolol succinate 25 mg XL tablet Commonly known as:  TOPROL-XL Your last dose was: Your next dose is: Take 1 Tab by mouth daily. 25 mg  
    
   
   
   
  
 mirtazapine 7.5 mg tablet Commonly known as:  Von Torrez  
   
 Your last dose was: Your next dose is: Take 1 Tab by mouth nightly. 7.5 mg  
    
   
   
   
  
 nitroglycerin 0.4 mg SL tablet Commonly known as:  NITROSTAT Your last dose was: Your next dose is:    
   
   
 1 Tab by SubLINGual route every five (5) minutes as needed for Chest Pain. 0.4 mg  
    
   
   
   
  
 polyethylene glycol 17 gram packet Commonly known as:  Roly [place] Your last dose was: Your next dose is: Take 1 Packet by mouth daily. 17 g  
    
   
   
   
  
 potassium chloride SR 10 mEq tablet Commonly known as:  KLOR-CON 10 Your last dose was: Your next dose is: Take  by mouth every other day. PRESERVISION LUTEIN 226 mg-200 unit -5 mg-0.8 mg Cap Generic drug:  Vit C-Vit E-Copper-ZnOx-Lutein Your last dose was: Your next dose is: Take 1 Cap by mouth daily. 1 Cap  
    
   
   
   
  
 simvastatin 40 mg tablet Commonly known as:  ZOCOR Your last dose was: Your next dose is: Take 1 Tab by mouth nightly. 40 mg Where to Get Your Medications Information on where to get these meds will be given to you by the nurse or doctor. ! Ask your nurse or doctor about these medications  
  metoprolol succinate 25 mg XL tablet  
 sulfamethoxazole-trimethoprim 200-40 mg/5 mL suspension Discharge Instructions DISCHARGE SUMMARY from Nurse PATIENT INSTRUCTIONS: 
 
 
F-face looks uneven A-arms unable to move or move unevenly S-speech slurred or non-existent T-time-call 911 as soon as signs and symptoms begin-DO NOT go Back to bed or wait to see if you get better-TIME IS BRAIN. Warning Signs of HEART ATTACK Call 911 if you have these symptoms: 
? Chest discomfort. Most heart attacks involve discomfort in the center of the chest that lasts more than a few minutes, or that goes away and comes back. It can feel like uncomfortable pressure, squeezing, fullness, or pain. ? Discomfort in other areas of the upper body. Symptoms can include pain or discomfort in one or both arms, the back, neck, jaw, or stomach. ? Shortness of breath with or without chest discomfort. ? Other signs may include breaking out in a cold sweat, nausea, or lightheadedness. Don't wait more than five minutes to call [address]! Fast action can save your life. Calling 911 is almost always the fastest way to get lifesaving treatment. Emergency Medical Services staff can begin treatment when they arrive  up to an hour sooner than if someone gets to the hospital by car. The discharge information has been reviewed with the patient, caregiver and SNF. The caregiver and SNF verbalized understanding. Discharge medications reviewed with the patient, caregiver and SNF and appropriate educational materials and side effects teaching were provided. ___________________________________________________________________________________________________________________________________ Urinary Tract Infection in Women: Care Instructions Your Care Instructions A urinary tract infection, or UTI, is a general term for an infection anywhere between the kidneys and the urethra (where urine comes out). Most UTIs are bladder infections. They often cause pain or burning when you urinate. UTIs are caused by bacteria and can be cured with antibiotics. Be sure to complete your treatment so that the infection goes away. Follow-up care is a key part of your treatment and safety. Be sure to make and go to all appointments, and call your doctor if you are having problems. It's also a good idea to know your test results and keep a list of the medicines you take. How can you care for yourself at home? · Take your antibiotics as directed. Do not stop taking them just because you feel better. You need to take the full course of antibiotics. · Drink extra water and other fluids for the next day or two. This may help wash out the bacteria that are causing the infection. (If you have kidney, heart, or liver disease and have to limit fluids, talk with your doctor before you increase your fluid intake.) · Avoid drinks that are carbonated or have caffeine. They can irritate the bladder. · Urinate often. Try to empty your bladder each time. · To relieve pain, take a hot bath or lay a heating pad set on low over your lower belly or genital area. Never go to sleep with a heating pad in place. To prevent UTIs · Drink plenty of water each day. This helps you urinate often, which clears bacteria from your system. (If you have kidney, heart, or liver disease and have to limit fluids, talk with your doctor before you increase your fluid intake.) · Urinate when you need to. · Urinate right after you have sex. · Change sanitary pads often. · Avoid douches, bubble baths, feminine hygiene sprays, and other feminine hygiene products that have deodorants. · After going to the bathroom, wipe from front to back. When should you call for help? Call your doctor now or seek immediate medical care if: 
? · Symptoms such as fever, chills, nausea, or vomiting get worse or appear for the first time. ? · You have new pain in your back just below your rib cage. This is called flank pain. ? · There is new blood or pus in your urine. ? · You have any problems with your antibiotic medicine. ?Watch closely for changes in your health, and be sure to contact your doctor if: 
? · You are not getting better after taking an antibiotic for 2 days. ? · Your symptoms go away but then come back. Where can you learn more? Go to http://denys-florencio.info/. Enter D753 in the search box to learn more about \"Urinary Tract Infection in Women: Care Instructions. \" Current as of: May 12, 2017 Content Version: 11.4 © 6870-0015 [employer]. Care instructions adapted under license by "[employer] (which disclaims liability or warranty for this information). If you have questions about a medical condition or this instruction, always ask your healthcare professional. [address] any warranty or liability for your use of this information. Helping A Person With Dementia: Care Instructions Your Care Instructions Dementia is a loss of mental skills that affects daily life. It is different from mild memory loss that occurs with aging. Dementia can cause problems with memory, thinking clearly, and planning. It is different for everyone. But it usually gets worse slowly. Some people who have dementia can function well for a long time. But at some point it may become hard for the person to care for himself or herself. It can be upsetting to learn that a loved one has this condition. You may be afraid and worried about what will happen. You may wonder how you will care for the person. There is no cure for dementia. But medicine may be able to slow memory loss and improve thinking for a while. Other medicines may help with sleep, depression, and behavior changes. Dementia is different for everyone. In some cases, people can function well for a long time. You can help your loved one by making his or her home life easier and safer. You also need to take care of yourself. Caregiving can be stressful.  But support is available to help you and give you a break when you need it. The Alzheimer's Association offers good information and support. If you are caring for someone with dementia, you can help make life safer and more comfortable. You can also help your loved one make decisions about future care. You may also want to bring up legal and financial issues. These are hard but important conversations to have. Follow-up care is a key part of your loved one's treatment and safety. Be sure to make and go to all appointments, and call your doctor if your loved one is having problems. It's also a good idea to know your loved one's test results and keep a list of the medicines he or she takes. How can you care for your loved one at home? Taking care of the person · If the person takes medicine for dementia, help him or her take it exactly as prescribed. Call the doctor if you notice any problems with the medicine. · Make a list of the person's medicines. Review it with all of his or her doctors. · Help the person eat a balanced diet. Serve plenty of whole grains, fruits, and vegetables every day. If the person is not hungry at mealtimes, give snacks at midmorning and in the afternoon. Offer drinks such as Boost, Ensure, or Sustacal if the person is losing weight. · Encourage exercise. Walking and other activities may slow the decline of mental ability. Help the person stay active mentally with reading, crossword puzzles, or other hobbies. · Take steps to help if the person is sundowning. This is the restless behavior and trouble with sleeping that may occur in late afternoon and at night. Try not to let the person nap during the day. Offer a glass of warm milk or caffeine-free tea before bedtime. · Develop a routine. Your loved one will feel less frustrated or confused with a clear, simple plan of what to do every day. · Be patient. A task may take the person longer than it used to. · For as long as he or she is able, allow your loved one to make decisions about activities, food, clothing, and other choices. Let him or her be independent, even if tasks take more time or are not done perfectly. Tailor tasks to the person's abilities. For example, if cooking is no longer safe, ask for other help. Your loved one can help set the table, or make simple dishes such as a salad. When the person needs help, offer it gently. Staying safe · Make your home (or your loved one's home) safe. Tack down rugs, and put no-slip tape in the tub. Install handrails, and put safety switches on stoves and appliances. Keep rooms free of clutter. Make sure walkways around furniture are clear. Do not move furniture around, because the person may become confused. · Use locks on doors and cupboards. Lock up knives, scissors, medicines, cleaning supplies, and other dangerous things. · Do not let the person drive or cook if he or she can't do it safely. A person with dementia should not drive unless he or she is able to pass an on-road driving test. Your state 's license bureau can do a driving test if there is any question. · Get medical alert jewelry for the person so that you can be contacted if he or she wanders away. If possible, provide a safe place for wandering, such as an enclosed yard or garden. Taking care of yourself · Ask your doctor about support groups and other resources in your area. · Take care of your health. Be sure to eat healthy foods and get enough rest and exercise. · Take time for yourself. Respite services provide someone to stay with the person for a short time while you get out of the house for a few hours. · Make time for an activity that you enjoy. Read, listen to music, paint, do crafts, or play an instrument, even if it's only for a few minutes a day. · Spend time with family, friends, and others in your support system. When should you call for help? Call 911 anytime you think the person may need emergency care. For example, call if: 
? · The person who has dementia wanders away and you can't find him or her. ? · The person who has dementia is seriously injured. ?Call the doctor now or seek immediate medical care if: 
? · The person suddenly sees things that are not there (hallucinates). ? · The person has a sudden change in his or her behavior. ? Watch closely for changes in the person's health, and be sure to contact the doctor if: 
? · The person has symptoms that could cause injury. ? · The person has problems with his or her medicine. ? · You need more information to care for a person with dementia. ? · You need respite care so you can take a break. Where can you learn more? Go to http://denys-florencio.info/. Enter F018 in the search box to learn more about \"Helping A Person With Dementia: Care Instructions. \" Current as of: May 12, 2017 Content Version: 11.4 © 7579-8696 [employer]. Care instructions adapted under license by [employer] (which disclaims liability or warranty for this information). If you have questions about a medical condition or this instruction, always ask your healthcare professional. [place] [address] any warranty or liability for your use of this information. Patient discharged without removing armband and transfered to another healthcare acute, sub acute , or extended care facility. Informed of privacy risks if armband lost or stolen. [employer] Announcement We are excited to announce that we are making your provider's discharge notes available to you in [employer]. You will see these notes when they are completed and signed by the physician that discharged you from your recent hospital stay.   If you have any questions or concerns about any information you see in [employer], please call the [facility] Department where you were seen or reach out to your Primary Care Provider for more information about your plan of care. Introducing hospitals & HEALTH SERVICES! Clint Soto introduces [employer] patient portal. Now you can access parts of your medical record, email your doctor's office, and request medication refills online. 1. In your internet browser, go to [email or web address] 2. Click on the First Time User? Click Here link in the Sign In box. You will see the New Member Sign Up page. 3. Enter your [employer] Access Code exactly as it appears below. You will not need to use this code after youve completed the sign-up process. If you do not sign up before the expiration date, you must request a new code. · [employer] Access Code: [email or web address] Expires: 6/12/2018 10:05 AM 
 
4. Enter the last four digits of your Social Security Number (xxxx) and Date of Birth (mm/dd/yyyy) as indicated and click Submit. You will be taken to the next sign-up page. 5. Create a [employer] ID. This will be your [employer] login ID and cannot be changed, so think of one that is secure and easy to remember. 6. Create a [employer] password. You can change your password at any time. 7. Enter your Password Reset Question and Answer. This can be used at a later time if you forget your password. 8. Enter your e-mail address. You will receive e-mail notification when new information is available in [address]. 9. Click Sign Up. You can now view and download portions of your medical record. 10. Click the Download Summary menu link to download a portable copy of your medical information. If you have questions, please visit the Frequently Asked Questions section of the [employer] website. Remember, [employer] is NOT to be used for urgent needs. For medical emergencies, dial 911. Now available from your iPhone and Android! Providers Seen During Your Hospitalization Provider Specialty Primary office phone Jenny Braden MD Emergency Medicine [phone] Maikol Mack MD Internal Medicine [phone] Abdoul Frank DO Internal Medicine [phone] Norma Contreras MD Internal Medicine [phone] Your Primary Care Physician (PCP) Primary Care Physician Office Phone Office Fax Neymar Granados [phone] You are allergic to the following Allergen Reactions Azithromycin Anaphylaxis Penicillin Anaphylaxis Recent Documentation Height Weight Breastfeeding? BMI OB Status Smoking Status 1.372 m 47.5 kg No 25.25 kg/m2 Postmenopausal Never Smoker Emergency Contacts Name Discharge Info Relation Home Work Mobile Sommer Travis DISCHARGE CAREGIVER [3] Daughter [21] [phone] Patient Belongings The following personal items are in your possession at time of discharge: 
  Dental Appliances: None  Visual Aid: Glasses      Home Medications: None   Jewelry: None  Clothing: None    Other Valuables: None  Personal Items Sent to Safe: none Discharge Instructions Attachments/References MEFS - METOPROLOL (LOPRESSOR, TOPROL XL) - (BY MOUTH) (ENGLISH) MEFS - SULFAMETHOXAZOLE/TRIMETHOPRIM (BACTRIM, BACTRIM DS, SMZ-TMP PEDIATRIC, SEPTRA) - (BY MOUTH) (ENGLISH) Patient Handouts Metoprolol (Lopressor, Toprol XL) - (By mouth) Why this medicine is used:  
Treats high blood pressure, chest pain, and heart failure. Contact a nurse or doctor right away if you have: · Lightheadedness, dizziness, fainting · Rapid weight gain; swelling in your hands, ankles, or feet Common side effects: · Mild dizziness · Tiredness © 2017 [facility] Information is for End User's use only and may not be sold, redistributed or otherwise used for commercial purposes. Sulfamethoxazole/Trimethoprim (Bactrim, Bactrim DS, SMZ-TMP Pediatric, Septra) - (By mouth) Why this medicine is used:  
Treats or prevents infections. Contact a nurse or doctor right away if you have: · Severe nausea, vomiting, or stomach pain · Dark urine or pale stools · Confusion, weakness · Severe or bloody diarrhea 
· Skin rash, purple spots on your skin, or very pale or yellow skin Common side effects: · Mild nausea or vomiting · Loss of apetite © 2017 [address] Information is for End User's use only and may not be sold, redistributed or otherwise used for commercial purposes. Please provide this summary of care documentation to your next provider. Signatures-by signing, you are acknowledging that this After Visit Summary has been reviewed with you and you have received a copy. Patient Signature:  ____________________________________________________________ Date:  ____________________________________________________________  
  
[facility] Provider Signature:  ____________________________________________________________ Date:  ____________________________________________________________

## 2018-03-11 PROBLEM — J44.9 CHRONIC OBSTRUCTIVE PULMONARY DISEASE (HCC): Status: ACTIVE | Noted: 2018-03-11

## 2018-03-11 PROBLEM — A41.9 SEPSIS (HCC): Status: ACTIVE | Noted: 2018-03-11

## 2018-03-11 LAB
ALBUMIN SERPL-MCNC: 2.1 G/DL (ref 3.4–5)
ALBUMIN/GLOB SERPL: 0.7 {RATIO} (ref 0.8–1.7)
ALP SERPL-CCNC: 403 U/L (ref 45–117)
ALT SERPL-CCNC: 114 U/L (ref 13–56)
ANION GAP SERPL CALC-SCNC: 12 MMOL/L (ref 3–18)
APTT PPP: 32.2 SEC (ref 23–36.4)
AST SERPL-CCNC: 158 U/L (ref 15–37)
ATRIAL RATE: 375 BPM
BILIRUB SERPL-MCNC: 4.8 MG/DL (ref 0.2–1)
BUN SERPL-MCNC: 34 MG/DL (ref 7–18)
BUN/CREAT SERPL: 33 (ref 12–20)
CALCIUM SERPL-MCNC: 7.8 MG/DL (ref 8.5–10.1)
CALCULATED R AXIS, ECG10: -119 DEGREES
CALCULATED T AXIS, ECG11: 26 DEGREES
CHLORIDE SERPL-SCNC: 109 MMOL/L (ref 100–108)
CHOLEST SERPL-MCNC: 93 MG/DL
CO2 SERPL-SCNC: 23 MMOL/L (ref 21–32)
CREAT SERPL-MCNC: 1.03 MG/DL (ref 0.6–1.3)
DIAGNOSIS, 93000: NORMAL
ERYTHROCYTE [DISTWIDTH] IN BLOOD BY AUTOMATED COUNT: 17.2 % (ref 11.6–14.5)
GLOBULIN SER CALC-MCNC: 3 G/DL (ref 2–4)
GLUCOSE SERPL-MCNC: 60 MG/DL (ref 74–99)
HCT VFR BLD AUTO: 37.2 % (ref 35–45)
HDLC SERPL-MCNC: 13 MG/DL (ref 40–60)
HDLC SERPL: 7.2 {RATIO} (ref 0–5)
HGB BLD-MCNC: 12 G/DL (ref 12–16)
LDLC SERPL CALC-MCNC: 57.8 MG/DL (ref 0–100)
LIPID PROFILE,FLP: ABNORMAL
MCH RBC QN AUTO: 27.8 PG (ref 24–34)
MCHC RBC AUTO-ENTMCNC: 32.3 G/DL (ref 31–37)
MCV RBC AUTO: 86.1 FL (ref 74–97)
PLATELET # BLD AUTO: 174 K/UL (ref 135–420)
PMV BLD AUTO: 10.3 FL (ref 9.2–11.8)
POTASSIUM SERPL-SCNC: 3.5 MMOL/L (ref 3.5–5.5)
PROT SERPL-MCNC: 5.1 G/DL (ref 6.4–8.2)
Q-T INTERVAL, ECG07: 400 MS
QRS DURATION, ECG06: 132 MS
QTC CALCULATION (BEZET), ECG08: 481 MS
RBC # BLD AUTO: 4.32 M/UL (ref 4.2–5.3)
SODIUM SERPL-SCNC: 144 MMOL/L (ref 136–145)
TRIGL SERPL-MCNC: 111 MG/DL (ref ?–150)
VENTRICULAR RATE, ECG03: 87 BPM
VLDLC SERPL CALC-MCNC: 22.2 MG/DL
WBC # BLD AUTO: 17.5 K/UL (ref 4.6–13.2)

## 2018-03-11 PROCEDURE — 65270000029 HC RM PRIVATE

## 2018-03-11 PROCEDURE — 74011250636 HC RX REV CODE- 250/636: Performed by: HOSPITALIST

## 2018-03-11 PROCEDURE — 85730 THROMBOPLASTIN TIME PARTIAL: CPT | Performed by: HOSPITALIST

## 2018-03-11 PROCEDURE — 85027 COMPLETE CBC AUTOMATED: CPT | Performed by: HOSPITALIST

## 2018-03-11 PROCEDURE — 77030011256 HC DRSG MEPILEX <16IN NO BORD MOLN -A

## 2018-03-11 PROCEDURE — 80061 LIPID PANEL: CPT | Performed by: HOSPITALIST

## 2018-03-11 PROCEDURE — 77030020263 HC SOL INJ SOD CL0.9% LFCR 1000ML

## 2018-03-11 PROCEDURE — 36415 COLL VENOUS BLD VENIPUNCTURE: CPT | Performed by: HOSPITALIST

## 2018-03-11 PROCEDURE — 80053 COMPREHEN METABOLIC PANEL: CPT | Performed by: HOSPITALIST

## 2018-03-11 RX ORDER — LEVOFLOXACIN 5 MG/ML
500 INJECTION, SOLUTION INTRAVENOUS
Status: DISCONTINUED | OUTPATIENT
Start: 2018-03-12 | End: 2018-03-15 | Stop reason: HOSPADM

## 2018-03-11 RX ADMIN — SODIUM CHLORIDE 50 ML/HR: 900 INJECTION, SOLUTION INTRAVENOUS at 00:19

## 2018-03-11 RX ADMIN — SODIUM CHLORIDE 50 ML/HR: 900 INJECTION, SOLUTION INTRAVENOUS at 21:12

## 2018-03-11 NOTE — PROGRESS NOTES
Problem: Falls - Risk of  Goal: *Absence of Falls  Document Malu Fall Risk and appropriate interventions in the flowsheet.   Outcome: Progressing Towards Goal  Fall Risk Interventions:       Mentation Interventions: Bed/chair exit alarm, Door open when patient unattended    Medication Interventions: Bed/chair exit alarm, Patient to call before getting OOB    Elimination Interventions: Call light in reach

## 2018-03-11 NOTE — ACP (ADVANCE CARE PLANNING)
Patient has designated _____daughter___________________ to participate in his/her discharge plan and to receive any needed information.      Name: Stormse Sharp as pt  Phone [phone]

## 2018-03-11 NOTE — PROGRESS NOTES
met with Patient completed the initial Spiritual Assessment of the patient, and offered Pastoral Care, see flow sheets for interventions. Patient does not have any Oriental orthodox/cultural needs that will affect patients preferences in health care. Charted reviewed. Chaplains will continue to follow and will provide pastoral care on an as needed/requested basis.       Cari, MPH,   [address] [facility] Care Department

## 2018-03-11 NOTE — ROUTINE PROCESS
Bedside and Verbal shift change report given to Sparrow RN (oshncoming nurse) by Mo Gramajo RN (offgoing nurse). Report included the folowing information SBAR, Kardex, Intake/Output, MAR and Recent Results.

## 2018-03-11 NOTE — PROGRESS NOTES
NUTRITION Initial assessment/Plan of care    RECOMMENDATIONS:   1. Dental Soft diet; chopped meats  2. Ensure Enlive TID  3. Monitor weight and PO intake  4. RD to follow      GOALS:   1. PO intake meets >75% of protein/calorie needs by 3/16  2. Maintain weight (+/- 1-2 lb) by 3/18    ASSESSMENT:    Weight status is classified as normal per BMI of 24.9. Poor PO intake. Ensure Enlive TID for additional calories/protein. Labs noted. Pt w/ hypocalcemia, hypoalbuminemia, WBC (17.5) and elevated LFTs /Bilirubin but trending down. Nutrition recommendations listed. RD to follow. Nutrition Diagnoses:   Inadequate energy intake related to poor appetite as evidenced by <25% intake of meals. Nutrition Risk:  []   High [x]  Moderate [] Low    SUBJECTIVE/OBJECTIVE:   Pt admitted with UTI. PMHx including COPD, dementia and HTN. Familiar with Pt from last admission to [facility]. No food allergies, but does has trouble chewing. Pt seen during lunch in room with daughter at bedside; observed 0% consumed for  lunch. Per d/w daughter Pt has not eaten anything in a couple of days d/t being in pain. Ordered Ensure Enlive TID for additional calories/protein and obtained preferences. (Yogurt in AM, soup and ice cream on trays.) Encouraged intake. Will monitor. Information Obtained From:   [x] Chart Review  [x] Patient  [x] Family/Caregiver  [x] Nurse/Physician   [] Patient Rounds/Interdisciplinary Meeting    Diet:  Dental Soft Diet (chopped meats)    Medications: [x] Reviewed     Patient Active Problem List   Diagnosis Code    CHF, acute on chronic ([address] I50.9    Traumatic rhabdomyolysis ([facility] T79. 6XXA    Elevated troponin R74.8    UTI (urinary tract infection) N39.0    A-fib (HCC) I48.91    Late onset Alzheimer's disease without behavioral disturbance G30.1, F02.80    Essential hypertension I10    Debility R53.81    Fall W19. XXXA    Chronic pain G89.29    Sepsis ([address] A41.9    Chronic obstructive pulmonary disease ([facility] J44.9     Past Medical History:   Diagnosis Date    Breast cancer ([facility]     Chronic obstructive pulmonary disease ([facility]     Dementia     Hypertension      Labs:    Lab Results   Component Value Date/Time    Sodium 144 03/11/2018 05:45 AM    Potassium 3.5 03/11/2018 05:45 AM    Chloride 109 (H) 03/11/2018 05:45 AM    CO2 23 03/11/2018 05:45 AM    Anion gap 12 03/11/2018 05:45 AM    Glucose 60 (L) 03/11/2018 05:45 AM    BUN 34 (H) 03/11/2018 05:45 AM    Creatinine 1.03 03/11/2018 05:45 AM    Calcium 7.8 (L) 03/11/2018 05:45 AM    Albumin 2.1 (L) 03/11/2018 05:45 AM     Anthropometrics: BMI (calculated): 24.9   Last 3 Recorded Weights in this Encounter    03/10/18 1931   Weight: 46.9 kg (103 lb 6.4 oz)      Ht Readings from Last 1 Encounters:   03/10/18 4' 6\" (1.372 m)     Weight Metrics 3/10/2018 12/19/2017 11/30/2017 11/30/2017 5/4/2017 4/24/2017 4/6/2017   Weight 103 lb 6.4 oz 116 lb 12.8 oz 117 lb 9.6 oz - 105 lb 106 lb 108 lb   BMI 24.93 kg/m2 - 28.16 kg/m2 28.35 kg/m2 25.32 kg/m2 25.56 kg/m2 26.04 kg/m2       [x]  Weight Loss   []  Weight Gain  []  Weight Stable   []  New wt n/a on record     Estimated Nutrition Needs: [x] MSJ  [] Other:  Calories: 1200 Kcal Based on:   [x] Actual BW    Protein:   65 g Based on:   [x] Actual BW    Fluid:       1500 ml Based on:   [x] Actual BW       Nutrition Problems Identified:   [x] Suboptimal PO intake   [] Food Allergies  [x] Difficulty chewing/swallowing/poor dentition  [] Constipation/Diarrhea   [] Nausea/Vomiting   [] None  [] Other:     Plan:   [] Therapeutic Diet  [x]  Obtained/adjusted food preferences/tolerances and/or snacks options   [x]  Supplements prescribed  [] Occupational therapy following for feeding techniques  []  HS snack added   []  Modify diet texture   []  Modify diet for food allergies   []  Educate patient   []  Assist with menu selection   [x]  Monitor PO intake on meal rounds   [x]  Continue inpatient monitoring and intervention   [] Participated in discharge planning/Interdisciplinary rounds/Team meetings   []  Other:     Education Needs:   [x] Not appropriate for teaching at this time    [] Identified and addressed    Nutrition Monitoring and Evaluation:   [x] Continue inpatient monitoring and interventions    [] Other:     Parvin Sky

## 2018-03-11 NOTE — ED PROVIDER NOTES
EMERGENCY DEPARTMENT HISTORY AND PHYSICAL EXAM    7:35 PM      Date: 3/10/2018  Patient Name: Daisy Gaines    History of Presenting Illness     Chief Complaint   Patient presents with    Fever         History Provided By: EMS    Chief Complaint: Fever  Duration:  unknown Time  Timing:  N/A  Location: N/A  Quality: N/A  Severity: N/A  Modifying Factors: Patient is unable to dictate  Associated Symptoms: Patient is unable to dictate      Additional History (Context): Daisy Gaines is a 80 y.o. female with Breast Cancer, COPD, HTN, and Dementia who presents with a fever for an unknown amount of time. Per EMS, the patient is coming from home febrile with a temperature of 101.4. Patient also has dark-colored urine. Patient is on hospice. HPI is limited secondary to the patient's Dementia. Per patient daughter: she was feeling unwell x 2 days, decreased energy. Is on hospice, but this was discussed with [facility] and told to come to hospital.    PCP: Pola Perez MD    Current Facility-Administered Medications   Medication Dose Route Frequency Provider Last Rate Last Dose    sodium chloride (NS) flush 5-10 mL  5-10 mL IntraVENous PRN David Fernando MD         Current Outpatient Prescriptions   Medication Sig Dispense Refill    acetaminophen (TYLENOL) 500 mg tablet Take 1 Tab by mouth every four (4) hours as needed. Indications: Pain 60 Tab 0    clopidogrel (PLAVIX) 75 mg tab Take 1 Tab by mouth daily. 60 Tab 0    donepezil (ARICEPT) 5 mg tablet Take 1 Tab by mouth daily. 60 Tab 0    famotidine (PEPCID) 20 mg tablet Take 1 Tab by mouth daily. 60 Tab 0    fluticasone (FLONASE) 50 mcg/actuation nasal spray 2 sprays both nostriles daily 1 Bottle 0    nitroglycerin (NITROSTAT) 0.4 mg SL tablet 1 Tab by SubLINGual route every five (5) minutes as needed for Chest Pain. 1 Bottle 0    polyethylene glycol (MIRALAX) 17 gram packet Take 1 Packet by mouth daily.  30 Packet 0    simvastatin (ZOCOR) 40 mg tablet Take 1 Tab by mouth nightly. 60 Tab 0    metoprolol succinate (TOPROL-XL) 25 mg XL tablet Take 1 Tab by mouth daily. 60 Tab 0    lisinopril (PRINIVIL, ZESTRIL) 10 mg tablet Take 1 Tab by mouth daily. 30 Tab 0    cholecalciferol (VITAMIN D3) 1,000 unit tablet Take 2 Tabs by mouth daily. 60 Tab 0    methylphenidate HCl (RITALIN) 10 mg tablet One pill twice a day as needed  Indications: Narcolepsy Syndrome 30 Tab 0    mirtazapine (REMERON) 7.5 mg tablet Take 1 Tab by mouth nightly. 60 Tab 0    Vit C-Vit E-Copper-ZnOx-Lutein (PRESERVISION LUTEIN) 226 mg-200 unit -5 mg-0.8 mg cap Take 1 Cap by mouth daily.  cetirizine (ZYRTEC) 10 mg tablet Take 10 mg by mouth daily as needed. for allergy      potassium chloride SR (KLOR-CON 10) 10 mEq tablet Take  by mouth every other day.  furosemide (LASIX) 20 mg tablet Take 40 mg by mouth daily.  alendronate (FOSAMAX) 40 mg tablet Take 40 mg by mouth every seven (7) days. 70 mg         Past History     Past Medical History:  Past Medical History:   Diagnosis Date    [facility] ([facility]     Chronic obstructive pulmonary disease ([facility]     Dementia     Hypertension        Past Surgical History:  Past Surgical History:   Procedure Laterality Date    HX MASTECTOMY         Family History:  No family history on file. Social History:  Social History   Substance Use Topics    Smoking status: Never Smoker    Smokeless tobacco: Never Used    Alcohol use No       Allergies: Allergies   Allergen Reactions    Azithromycin Anaphylaxis    Penicillin Anaphylaxis         Review of Systems     Review of Systems   Unable to perform ROS: Dementia         Physical Exam     Visit Vitals    /58    Pulse 81    Temp 100.3 °F (37.9 °C)    Resp 15    Wt 46.9 kg (103 lb 6.4 oz)    SpO2 96%    BMI 24.93 kg/m2         Physical Exam   Constitutional:   General: A thin elderly female mildly warm to touch  Head:  Normocephalic atraumatic.     Eyes:   extraocular movements intact. .    Nose:  No rhinorrhea, inspection grossly normal.    Ears:  Grossly normal to inspection, no discharge. Mouth:  Mucous membranes moist, no appreciable intraoral lesion. Neck/Back:  Trachea midline, no asymmetry. No JVD  Chest:  Grossly normal inspection, symmetric chest rise. Pulmonary:  Clear to auscultation bilaterally no wheezes rhonchi or rales. Cardiovascular:  S1-S2 no murmurs rubs or gallops. Abdomen: Soft, nontender, nondistended no guarding rebound or peritoneal signs. Extremities:  Grossly normal to inspection, peripheral pulses intact  RIGHT upper extremity tender to palpation with history of fracture, 2+ dorsalis pedis and posterior tibial pulses trace lower extremity pitting edema symmetric bilaterally  Neurologic: Mumbles name does not follow commands moving arms and legs equally. Skin:  Warm and dry  Nursing note reviewed, vital signs reviewed.            Diagnostic Study Results     Labs -  Recent Results (from the past 12 hour(s))   CULTURE, BLOOD    Collection Time: 03/10/18  7:45 PM   Result Value Ref Range    Special Requests: PERIPHERAL      Culture result: PENDING    URINALYSIS W/ RFLX MICROSCOPIC    Collection Time: 03/10/18  7:45 PM   Result Value Ref Range    Color DARK YELLOW      Appearance TURBID      Specific gravity 1.017 1.005 - 1.030      pH (UA) 5.0 5.0 - 8.0      Protein 30 (A) NEG mg/dL    Glucose NEGATIVE  NEG mg/dL    Ketone NEGATIVE  NEG mg/dL    Bilirubin LARGE (A) NEG      Blood SMALL (A) NEG      Urobilinogen 1.0 0.2 - 1.0 EU/dL    Nitrites POSITIVE (A) NEG      Leukocyte Esterase SMALL (A) NEG     METABOLIC PANEL, COMPREHENSIVE    Collection Time: 03/10/18  7:45 PM   Result Value Ref Range    Sodium 143 136 - 145 mmol/L    Potassium 3.7 3.5 - 5.5 mmol/L    Chloride 106 100 - 108 mmol/L    CO2 25 21 - 32 mmol/L    Anion gap 12 3.0 - 18 mmol/L    Glucose 90 74 - 99 mg/dL    BUN 32 (H) 7.0 - 18 MG/DL    Creatinine 1.24 0.6 - 1.3 MG/DL BUN/Creatinine ratio 26 (H) 12 - 20      GFR est AA 49 (L) >60 ml/min/1.73m2    GFR est non-AA 40 (L) >60 ml/min/1.73m2    Calcium 8.4 (L) 8.5 - 10.1 MG/DL    Bilirubin, total 5.1 (H) 0.2 - 1.0 MG/DL    ALT (SGPT) 162 (H) 13 - 56 U/L    AST (SGOT) 274 (H) 15 - 37 U/L    Alk. phosphatase 521 (H) 45 - 117 U/L    Protein, total 5.8 (L) 6.4 - 8.2 g/dL    Albumin 2.5 (L) 3.4 - 5.0 g/dL    Globulin 3.3 2.0 - 4.0 g/dL    A-G Ratio 0.8 0.8 - 1.7     CBC WITH AUTOMATED DIFF    Collection Time: 03/10/18  7:45 PM   Result Value Ref Range    WBC 19.5 (H) 4.6 - 13.2 K/uL    RBC 4.81 4.20 - 5.30 M/uL    HGB 13.6 12.0 - 16.0 g/dL    HCT 40.8 35.0 - 45.0 %    MCV 84.8 74.0 - 97.0 FL    MCH 28.3 24.0 - 34.0 PG    MCHC 33.3 31.0 - 37.0 g/dL    RDW 16.6 (H) 11.6 - 14.5 %    PLATELET 238 153 - 979 K/uL    MPV 9.7 9.2 - 11.8 FL    NEUTROPHILS 94 (H) 40 - 73 %    LYMPHOCYTES 3 (L) 21 - 52 %    MONOCYTES 3 3 - 10 %    EOSINOPHILS 0 0 - 5 %    BASOPHILS 0 0 - 2 %    ABS. NEUTROPHILS 18.4 (H) 1.8 - 8.0 K/UL    ABS. LYMPHOCYTES 0.5 (L) 0.9 - 3.6 K/UL    ABS. MONOCYTES 0.6 0.05 - 1.2 K/UL    ABS. EOSINOPHILS 0.0 0.0 - 0.4 K/UL    ABS.  BASOPHILS 0.0 0.0 - 0.06 K/UL    DF AUTOMATED     URINE MICROSCOPIC ONLY    Collection Time: 03/10/18  7:45 PM   Result Value Ref Range    WBC 11 to 20 0 - 4 /hpf    RBC 2 to 4 0 - 5 /hpf    Epithelial cells 3+ 0 - 5 /lpf    Bacteria 3+ (A) NEG /hpf   POC LACTIC ACID    Collection Time: 03/10/18  7:46 PM   Result Value Ref Range    Lactic Acid (POC) 3.2 (HH) 0.4 - 2.0 mmol/L   CULTURE, BLOOD    Collection Time: 03/10/18  8:03 PM   Result Value Ref Range    Special Requests: PERIPHERAL      Culture result: PENDING    EKG, 12 LEAD, INITIAL    Collection Time: 03/10/18  8:04 PM   Result Value Ref Range    Ventricular Rate 87 BPM    Atrial Rate 375 BPM    QRS Duration 132 ms    Q-T Interval 400 ms    QTC Calculation (Bezet) 481 ms    Calculated R Axis -119 degrees    Calculated T Axis 26 degrees    Diagnosis Atrial fibrillation  Right bundle branch block  Lateral infarct (cited on or before 10-MAR-2018)  Abnormal ECG  When compared with ECG of 25-NOV-2017 21:16,  Questionable change in QRS axis  Serial changes of Lateral infarct present     POC LACTIC ACID    Collection Time: 03/10/18  9:55 PM   Result Value Ref Range    Lactic Acid (POC) 2.6 (HH) 0.4 - 2.0 mmol/L       Radiologic Studies -   XR CHEST PORT    (Results Pending)   XR ABD (KUB)    (Results Pending)         Medical Decision Making   I am the first provider for this patient. I reviewed the vital signs, available nursing notes, past medical history, past surgical history, family history and social history. Vital Signs-Reviewed the patient's vital signs. Pulse Oximetry Analysis -  97% on room air (Interpretation)Non-hypoxic         Records Reviewed: Nursing Notes and Triage notes  (Time of Review: 7:35 PM)    ED Course: Progress Notes, Reevaluation, and Consults:  8:15PM: Patient's daughter and grandson are at the bedside. I discussed with the patient's daughter the patient's condition and the need for hospitalization. States that the patient lives with the daughter, and is unable to ambulate without ambulating tools. Patient is on  Hospice through [facility]. Daughter reports that the patient is a DNR patient. If the patient stops breathing, there will be no chest compressions or intubation. However, the daughter and family want the patient to be given a trial of antibiotics for the UTI. Consult:  Discussed care with Dr. Carmina Ibrahim, Hospitalist Standard discussion; including history of patients chief complaint, available diagnostic results, and treatment course. Accepts the patient for admission  8:38 PM    Provider Notes (Medical Decision Making):     ED course:  Patient with history of breast cancer on hospice per family report and a DO NOT RESUSCITATE in place presents via EMS with decreased activity and energy for the past 2 days, fever here. Family confirms that they she does not want chest compressions or intubation, they would request a trial of antibiotics knowing that this will rebound or hospice. This has been discussed with her hospice company per the report    Sepsis order set place, lactic acid elevated at 3.2, fluid bolus ordered    Urinalysis concerning for infection started ceftriaxone    EKG done at 2004 hrs.: Atrial fibrillation heart rate 87    Chest x-ray per my interpretation right-sided pleural effusion    Labs elevated white blood cell count elevated lactic acid      Patient's presentation, history, physical exam and laboratory evaluations were reviewed. I felt the patient would benefit from inpatient management and treatment. Consult:  Discussed care with Dr. Tianna Jimenez. Standard discussion; including history of patients chief complaint, available diagnostic results, and treatment course. Patient was accepted to their service. Patient had been admitted prior to 3 hour repeat examination    Disposition:    Admitted to medicine service      Portions of this chart were created with Dragon medical speech to text program.   Unrecognized errors may be present. Procedures: None    Core Measures: None    Critical Care Time: None    For Hospitalized Patients:    1. Hospitalization Decision Time:  The decision to hospitalize the patient was made by Dr. Damian Angel at 8:20PM on 3/10/2018    2. Aspirin: Aspirin was not given because the patient did not present with a stroke at the time of their Emergency Department evaluation    Diagnosis     Clinical Impression: No diagnosis found. Disposition: Admission    Follow-up Information     None           Patient's Medications   Start Taking    No medications on file   Continue Taking    ACETAMINOPHEN (TYLENOL) 500 MG TABLET    Take 1 Tab by mouth every four (4) hours as needed. Indications: Pain    ALENDRONATE (FOSAMAX) 40 MG TABLET    Take 40 mg by mouth every seven (7) days.  79 mg    CETIRIZINE (ZYRTEC) 10 MG TABLET    Take 10 mg by mouth daily as needed. for allergy    CHOLECALCIFEROL (VITAMIN D3) 1,000 UNIT TABLET    Take 2 Tabs by mouth daily. CLOPIDOGREL (PLAVIX) 75 MG TAB    Take 1 Tab by mouth daily. DONEPEZIL (ARICEPT) 5 MG TABLET    Take 1 Tab by mouth daily. FAMOTIDINE (PEPCID) 20 MG TABLET    Take 1 Tab by mouth daily. FLUTICASONE (FLONASE) 50 MCG/ACTUATION NASAL SPRAY    2 sprays both nostriles daily    FUROSEMIDE (LASIX) 20 MG TABLET    Take 40 mg by mouth daily. LISINOPRIL (PRINIVIL, ZESTRIL) 10 MG TABLET    Take 1 Tab by mouth daily. METHYLPHENIDATE HCL (RITALIN) 10 MG TABLET    One pill twice a day as needed  Indications: Narcolepsy Syndrome    METOPROLOL SUCCINATE (TOPROL-XL) 25 MG XL TABLET    Take 1 Tab by mouth daily. MIRTAZAPINE (REMERON) 7.5 MG TABLET    Take 1 Tab by mouth nightly. NITROGLYCERIN (NITROSTAT) 0.4 MG SL TABLET    1 Tab by SubLINGual route every five (5) minutes as needed for Chest Pain. POLYETHYLENE GLYCOL (MIRALAX) 17 GRAM PACKET    Take 1 Packet by mouth daily. POTASSIUM CHLORIDE SR (KLOR-CON 10) 10 MEQ TABLET    Take  by mouth every other day. SIMVASTATIN (ZOCOR) 40 MG TABLET    Take 1 Tab by mouth nightly. VIT C-VIT E-COPPER-ZNOX-LUTEIN (PRESERVISION LUTEIN) 226 MG-200 UNIT -5 MG-0.8 MG CAP    Take 1 Cap by mouth daily. These Medications have changed    No medications on file   Stop Taking    No medications on file     _______________________________    Attestations:  Scribe [id number] acting as a scribe for and in the presence of Quyen White MD      March 10, 2018 at 7:35 PM       Provider Attestation:      I personally performed the services described in the documentation, reviewed the documentation, as recorded by the scribe in my presence, and it accurately and completely records my words and actions.  March 10, 2018 at 7:35 PM - Quyen White MD _______________________________

## 2018-03-11 NOTE — MANAGEMENT PLAN
[facility]   Discharge Planning/ Assessment    Reasons for Intervention:   Pt known to present . Went to room. Daughter not presently there. Pt has Dementia. Left message. Pt was in hospital and then [address] in December. Lives with daughter, India Daniel at [phone]. She is POA and caregiver of pt. There is a grandson that lives at home, but does not help. Pt with [facility] Medicare for insurance. DME: wheelchair, rolling walker. PCP is Dr Suri Giron. Left FYI for Palliative Consult again for this admission given advanced dementia, declining physical state. Hospice/Comfort Care may be beneficial to quality of life? 1030: Spoke with Dr Ignacia Conroy. Per ED note, pt IS under Hospice Care with [facility] and they told family to take her to ED?    Patient will likely D/C back home with Hospice    Alberto Gosselin RN BSN  Outcomes Manager  Pager # [phone]    High Risk Criteria  [x] Yes  []No   Physician Referral  [] Yes  []No        Date    Nursing Referral  [] Yes  []No        Date    Patient/Family Request  [] Yes  []No        Date       Resources:    Medicare  [x] Yes  []No   Medicaid  [] Yes  []No   No Resources  [] Yes  []No   Private Insurance  [] Yes  []No    Name/Phone Number    Other  [] Yes  []No        (i.e. Workman's Comp)         Prior Services:    Prior Services  [x] Yes  []No   Home Health  [x] Yes  []No   Agency [address] [place] [address]  [] Yes  []No        Number of Hours    Home Care Program  [] Yes  []No       Meals on Wheels  [] Yes  []No   Office on Aging  [] Yes  []No   Transportation Services  [] Yes  []No   Nursing Home  [] Yes  []No        Nursing Home Name    [address]  [x] Yes  []No        [address] Name [facility]   Other       Information Source:      Information obtained from  [] Patient  [] Parent   [] Guardian  [] Child  [] Spouse   [] Significant Other/Partner   [] Friend      [] EMS    [] Nursing Home Chart          [] Other:   Chart Review  [x] Yes  []No     Family/Support System:    Patient lives with  [] Alone    [] Spouse   [] Significant Other  [x] Children  [] Caretaker   [] Parent  [] Sibling     [] Other       Other Support System:    Is the patient responsible for care of others  [] Yes  [x]No   Information of person caring for patient on  discharge    Managers financial affairs independently  [x] Yes  []No   If no, explain:      Status Prior to Admission:    Mental Status  [x] Awake  [x] Alert  [] Oriented  [] Quiet/Calm [] Lethargic/Sedated   [] Disoriented  [] Restless/Anxious  [] Combative   Personal Care  [] Dependent  [] [address]  [x] Requires Assistance   Meal Preparation Ability  [] Independent   [] Standby Assistance   [] Minimal Assistance   [] Moderate Assistance  [] Maximum Assistance     [x] Total Assistance   Chores  [] Independent with Chores   [] N/A Nursing Home Resident   [x] Requires Assistance   Bowel/Bladder  [] Continent  [] Catheter  [] Incontinent  [] Ostomy Self-Care    [] Urine Diversion Self-Care  [] Maximum Assistance     [x] Total Assistance   Number of Persons needed for assistance    DME at home  [] Bhavesh Da Silva  [] Dexter Da Silva   [] Commode    [] Bathroom/Grab Bars  [] Hospital Bed  [] Nebulizer  [] Oxygen           [] Raised Toilet Seat  [] Shower Chair  [] Side Rails for Bed   [] Tub Transfer Bench   [x] [facility]  [] [address]      [x] Other:wheelchair   Vendor      Treatment Presently Receiving:    Current Treatments  [] Chemotherapy  [] Dialysis  [] Insulin  [] IVAB [x] IVF   [] O2  [] PCA   [] PT   [] RT   [] Tube Feedings   [] Wound Care     Psychosocial Evaluation:    Verbalized Knowledge of Disease Process  [] Patient  []Family   Coping with Disease Process  [] Patient  []Family   Requires Further Counseling Coping with Disease Process  [] Patient  []Family     Identified Projected Needs:    Home Health Aid  [] Yes  []No   Transportation  [] Yes  []No   Education  [] Yes  []No        Specific Education     Financial Counseling  [] Yes  []No   Inability to Care for Self/Will Require 24 hour care  [] Yes  []No   Pain Management  [] Yes  []No   Home Infusion Therapy  [] Yes  []No   Oxygen Therapy  [] Yes  []No   DME  [] Yes  []No   Long Term Care Placement  [] Yes  []No   Rehab  [] Yes  []No   Physical Therapy  [] Yes  []No   Needs Anticipated At This Time  [] Yes  []No     Intra-Hospital Referral:    [address] [place] [address]  [] Yes  []No     [] Yes  []No   Patient Representative  [] Yes  []No   Staff for Teaching Needs  [] Yes  []No   Specialty Teaching Needs     Diabetic Educator  [] Yes  []No   Referral for Diabetic Educator Needed  [] Yes  []No  If Yes, place order for Nutritionist or Diabetic Consult     Tentative Discharge Plan:    Home with No Services  [] Yes  [x]No   Home with Home Health Follow-up  [] Yes  []No        If Yes, specify type    Home Care Program  [x] Yes  []No        If Yes, specify type Vs SNF vs TBD   Meals on Wheels  [] Yes  []No   Office of Aging  [] Yes  []No   NHP  [] Yes  []No   Return to the Nursing Home  [] Yes  []No   Rehab Therapy  [] Yes  []No   Acute Rehab  [] Yes  []No   Subacute Rehab  [] Yes  []No   Private Care  [] Yes  []No   Substance Abuse Referral  [] Yes  []No   Transportation  [] Yes  []No   Chore Service  [] Yes  []No   Inpatient Hospice  [] Yes  []No   OP RT  [] Yes  [] No   OP Hemo  [] Yes  [] No   OP PT  [] Yes  []No   Support Group  [] Yes  []No   Reach to Recovery  [] Yes  []No   OP Oncology Clinic  [] Yes  []No   Clinic Appointment  [] Yes  []No   DME  [] Yes  []No   Comments    Name of D/C Planner or  Given to Patient or Family Alberto Gosselin RN BSN  Outcomes Manager  Pager # [phone]   Phone Number         Extension    Date 3/11/2018   Time 10:28 AM   If you are discharged home, whom do you designate to participate in your discharge plan and receive any information needed?      Enter name of designee Phone # of designee         Address of designee         Updated         Patient refused to designate any           individual

## 2018-03-11 NOTE — ED NOTES
SEPSIS SUMMARY    · TIME ZERO (triage time):     · 2 Sets Blood Cultures Drawn? YES    · Initial POC Lactic time drawn? Repeat drawn? YES     · POC lactic acid results      Lab Results   Component Value Date    LACPOC 2.6 () 03/10/2018    LACPOC 3.2 () 03/10/2018       REPEAT POC LACTIC ACID REQUIRED IN 4 HOURS OR PRIOR TO ADMISSION                      FOR ALL INITIAL POC LACTIC ACID GREATER THAN 2     · First Antibiotic started within 30 min of TIME ZERO starting with MONOTHERAPY first?     · All ordered antibiotics initiated within first 3 hours of TIME ZERO? YES    · Target fluid bolus 30ml/kg infused for Lactic Acid > 4 OR SYS < 90 and/or MAP < 65 x 1 reading? YES    · Bolus amt:                   Bolus start time:                     Bolus end time:     · Vital signs monitored and validated every 15 min during bolus? · Vital signs monitored and validated every 15 min x 1 hour after bolus complete?       2 CONSECUTIVE SYSTOLIC READINGS < 90 AND/OR MAP < THAN 65, NOTIFY PROVIDER IMMEDIATELY FOR PRESSOR ORDER    · If patient admitted/care transferred prior to bolus monitor timeframe complete (during bolus and 1 hr after completion), time remaining reported during handover? · ED Provider has completed . [id number] within 3-6 hours of time zero or prior to admission?      Vital signs:  Patient Vitals for the past 4 hrs:   Temp Pulse Resp BP SpO2   03/10/18 2100 100.3 °F (37.9 °C) 81 15 113/58 -   03/10/18 2045 (!) 100.5 °F (38.1 °C) 84 15 102/54 96 %   03/10/18 2040 (!) 100.5 °F (38.1 °C) 79 16 - 96 %   03/10/18 2030 (!) 100.6 °F (38.1 °C) 84 15 113/55 -   03/10/18 2015 (!) 100.9 °F (38.3 °C) 83 20 131/59 -   03/10/18 2000 (!) 101 °F (38.3 °C) 90 19 105/62 96 %   03/10/18 1951 (!) 100.7 °F (38.2 °C) 87 17 - 95 %   03/10/18 1946 100.4 °F (38 °C) 94 17 - 96 %   03/10/18 1945 100.2 °F (37.9 °C) 93 18 (!) 88/52 95 %   03/10/18 1930 - 94 19 109/55 96 %   03/10/18 1929 - 98 20 - 97 %       MAP (Monitor): 68    =monitored (data validate)       =calculated (manual entry)    Additional Labs:    WBC:    Lab Results   Component Value Date/Time    WBC 19.5 (H) 03/10/2018 07:45 PM     Bilirubin:    Lab Results   Component Value Date/Time    Bilirubin, total 5.1 (H) 03/10/2018 07:45 PM    Bilirubin LARGE (A) 03/10/2018 07:45 PM     INR:  Lab Results   Component Value Date/Time    INR 1.1 11/26/2017 03:35 AM    INR 1.0 11/25/2017 09:11 PM    Prothrombin time 13.6 11/26/2017 03:35 AM    Prothrombin time 12.7 11/25/2017 09:11 PM     Creatinine:  Lab Results   Component Value Date/Time    Creatinine 1.24 03/10/2018 07:45 PM     Platelet Count:    Lab Results   Component Value Date/Time    PLATELET 735 46/66/3501 07:45 PM

## 2018-03-11 NOTE — PROGRESS NOTES
Arrived from ED awake & alert,following commands. Pt incontinent of soft stool on arrival to floor. Diana care done,assisted with repositioning. No pressure injury noted but has ecchymotic areas in arms. Dual skin assessement with [place] History of double mastectomy. .Family at bedside presently. Pt has limited mobility of right arm,pt has history of fracture of this arm per family member. 2300 Unable to complete required documentation,pt poor historian,family members left. 3/11/18 0630 Pt remains alert to person,place. Uneventful shift.

## 2018-03-11 NOTE — H&P
[facility]  Hospitalist Division      History & Physical    Patient: Shawna Ng MRN: [id number]  [facility]: [id number]    YOB: 1920  Age: 80 y.o. Sex: female    DOA: 3/10/2018 LOS:  LOS: 1 day        DOA: 3/10/2018        Assessment/Plan     Active Problems:    UTI (urinary tract infection) (11/25/2017)      Chronic pain (3/10/2018)        Plan:  1. UTI - IVF , IV Levaquin   2. Chronic Pain - IV Morphine prn , continue fentanyl patch   3. Comfort measures per family -- do not wish to give her any meds -- just make her comfortable but Rx UTI   DVT Px - None   DNR/ DNI                 HPI:     Shawna Ng is a 80 y.o. female who is being admitted to the hosp 2y to UTI -- pt is unable to provide any history , family at bedside -- per family pt is a hospice pt with [facility] & has been c/o chronic low back pain for the past 2 days -- they mention that she is on a fentanyl patch & apparently it had been removed. She has also been very weak & is unable to perform her ADL's especially going to the bathroom with assistance from the daughter. [facility] hospice nurse saw her in the morning & pt was c/o pain - was given liquid morphine -- they dont know dose but it was a small amount per family & fentanyl patch placed. Pt woke up , was still in pain -- so they decided to bring her to the ER   Pt has known multiple medical problems -- HTN , total gastrectomy , h/o breast cancer s/p bilateral mastectomy , CHF, recurrent UTI   ER eval - pt found to have a UTI -- per daughter they would like her to be Rx with Abx & admitted since she is getting weak -- do not want any other Rx except UTI.    Will admit for further eval.     Past Medical History:   Diagnosis Date    Breast cancer ([address]     Chronic obstructive pulmonary disease ([facility] [address]     Dementia     Hypertension        Past Surgical History:   Procedure Laterality Date    HX MASTECTOMY         No family history on file.    Social History     Social History    Marital status:      Spouse name: N/A    Number of children: N/A    Years of education: N/A     Social History Main Topics    Smoking status: Never Smoker    Smokeless tobacco: Never Used    Alcohol use No    Drug use: No    Sexual activity: Not on file     Other Topics Concern    Not on file     Social History Narrative       Prior to Admission medications    Medication Sig Start Date End Date Taking? Authorizing Provider   acetaminophen (TYLENOL) 500 mg tablet Take 1 Tab by mouth every four (4) hours as needed. Indications: Pain 12/22/17   Daisy Chandra MD   clopidogrel (PLAVIX) 75 mg tab Take 1 Tab by mouth daily. 12/23/17   Daisy Chandra MD   donepezil (ARICEPT) 5 mg tablet Take 1 Tab by mouth daily. 12/23/17   Daisy Chandra MD   famotidine (PEPCID) 20 mg tablet Take 1 Tab by mouth daily. 12/23/17   Daisy Chandra MD   fluticasone [facility]) 50 mcg/actuation nasal spray 2 sprays both nostriles daily 12/23/17   Daisy Chandra MD   nitroglycerin (NITROSTAT) 0.4 mg SL tablet 1 Tab by SubLINGual route every five (5) minutes as needed for Chest Pain. 12/22/17   Daisy Chandra MD   polyethylene glycol (MIRALAX) 17 gram packet Take 1 Packet by mouth daily. 12/23/17   Daisy Chandra MD   simvastatin (ZOCOR) 40 mg tablet Take 1 Tab by mouth nightly. 12/22/17   Daisy Chandra MD   metoprolol succinate (TOPROL-XL) 25 mg XL tablet Take 1 Tab by mouth daily. 12/23/17   Daisy Chandra MD   lisinopril (PRINIVIL, ZESTRIL) 10 mg tablet Take 1 Tab by mouth daily. 12/23/17   Daisy Chandra MD   cholecalciferol (VITAMIN D3) 1,000 unit tablet Take 2 Tabs by mouth daily. 12/23/17   Daisy Chandra MD   methylphenidate HCl (RITALIN) 10 mg tablet One pill twice a day as needed  Indications: Narcolepsy Syndrome 12/22/17   Daisy Chandra MD   mirtazapine (REMERON) 7.5 mg tablet Take 1 Tab by mouth nightly.  12/22/17   Katie Mcfarlane MD Claire   Vit C-Vit E-Copper-ZnOx-Lutein (PRESERVISION LUTEIN) 226 mg-200 unit -5 mg-0.8 mg cap Take 1 Cap by mouth daily. Historical Provider   cetirizine (ZYRTEC) 10 mg tablet Take 10 mg by mouth daily as needed. for allergy    Historical Provider   potassium chloride SR (KLOR-CON 10) 10 mEq tablet Take  by mouth every other day. Kamilla Doyle MD   furosemide (LASIX) 20 mg tablet Take 40 mg by mouth daily. Kamilla Doyle MD   alendronate (FOSAMAX) 40 mg tablet Take 40 mg by mouth every seven (7) days. 70 mg    Historical Provider       Allergies   Allergen Reactions    Azithromycin Anaphylaxis    Penicillin Anaphylaxis       Review of Systems  Review of systems not obtained due to patient factors. Physical Exam:      Visit Vitals    BP 99/60 (BP 1 Location: Left arm, BP Patient Position: At rest)    Pulse 91    Temp 98.7 °F (37.1 °C)    Resp 16    Wt 46.9 kg (103 lb 6.4 oz)    SpO2 93%    BMI 24.93 kg/m2       Physical Exam:    Gen: In general, this is a cachexic female, in no acute distress  HEENT: Sclerae nonicteric. Oral mucous membranes dry. Dentition poor  Neck: Supple with midline trachea. CV: RRR without murmur or rub appreciated. Resp:Respirations are unlabored without use of accessory muscles. Decreased BS , poor effort   Abd: Soft, nontender, nondistended. Extrem: Extremities are warm, without cyanosis or clubbing. No pitting pretibial edema  Skin: Warm, no visible rashes.   Neuro: Patient is awake , responds some to verbal commands     Labs Reviewed:    Recent Results (from the past 24 hour(s))   CULTURE, BLOOD    Collection Time: 03/10/18  7:45 PM   Result Value Ref Range    Special Requests: PERIPHERAL      Culture result: PENDING    URINALYSIS W/ RFLX MICROSCOPIC    Collection Time: 03/10/18  7:45 PM   Result Value Ref Range    Color DARK YELLOW      Appearance TURBID      Specific gravity 1.017 1.005 - 1.030      pH (UA) 5.0 5.0 - 8.0      Protein 30 (A) NEG mg/dL Glucose NEGATIVE  NEG mg/dL    Ketone NEGATIVE  NEG mg/dL    Bilirubin LARGE (A) NEG      Blood SMALL (A) NEG      Urobilinogen 1.0 0.2 - 1.0 EU/dL    Nitrites POSITIVE (A) NEG      Leukocyte Esterase SMALL (A) NEG     METABOLIC PANEL, COMPREHENSIVE    Collection Time: 03/10/18  7:45 PM   Result Value Ref Range    Sodium 143 136 - 145 mmol/L    Potassium 3.7 3.5 - 5.5 mmol/L    Chloride 106 100 - 108 mmol/L    CO2 25 21 - 32 mmol/L    Anion gap 12 3.0 - 18 mmol/L    Glucose 90 74 - 99 mg/dL    BUN 32 (H) 7.0 - 18 MG/DL    Creatinine 1.24 0.6 - 1.3 MG/DL    BUN/Creatinine ratio 26 (H) 12 - 20      GFR est AA 49 (L) >60 ml/min/1.73m2    GFR est non-AA 40 (L) >60 ml/min/1.73m2    Calcium 8.4 (L) 8.5 - 10.1 MG/DL    Bilirubin, total 5.1 (H) 0.2 - 1.0 MG/DL    ALT (SGPT) 162 (H) 13 - 56 U/L    AST (SGOT) 274 (H) 15 - 37 U/L    Alk. phosphatase 521 (H) 45 - 117 U/L    Protein, total 5.8 (L) 6.4 - 8.2 g/dL    Albumin 2.5 (L) 3.4 - 5.0 g/dL    Globulin 3.3 2.0 - 4.0 g/dL    A-G Ratio 0.8 0.8 - 1.7     CBC WITH AUTOMATED DIFF    Collection Time: 03/10/18  7:45 PM   Result Value Ref Range    WBC 19.5 (H) 4.6 - 13.2 K/uL    RBC 4.81 4.20 - 5.30 M/uL    HGB 13.6 12.0 - 16.0 g/dL    HCT 40.8 35.0 - 45.0 %    MCV 84.8 74.0 - 97.0 FL    MCH 28.3 24.0 - 34.0 PG    MCHC 33.3 31.0 - 37.0 g/dL    RDW 16.6 (H) 11.6 - 14.5 %    PLATELET 979 720 - 422 K/uL    MPV 9.7 9.2 - 11.8 FL    NEUTROPHILS 94 (H) 40 - 73 %    LYMPHOCYTES 3 (L) 21 - 52 %    MONOCYTES 3 3 - 10 %    EOSINOPHILS 0 0 - 5 %    BASOPHILS 0 0 - 2 %    ABS. NEUTROPHILS 18.4 (H) 1.8 - 8.0 K/UL    ABS. LYMPHOCYTES 0.5 (L) 0.9 - 3.6 K/UL    ABS. MONOCYTES 0.6 0.05 - 1.2 K/UL    ABS. EOSINOPHILS 0.0 0.0 - 0.4 K/UL    ABS.  BASOPHILS 0.0 0.0 - 0.06 K/UL    DF AUTOMATED     URINE MICROSCOPIC ONLY    Collection Time: 03/10/18  7:45 PM   Result Value Ref Range    WBC 11 to 20 0 - 4 /hpf    RBC 2 to 4 0 - 5 /hpf    Epithelial cells 3+ 0 - 5 /lpf    Bacteria 3+ (A) NEG /hpf   POC LACTIC ACID    Collection Time: 03/10/18  7:46 PM   Result Value Ref Range    Lactic Acid (POC) 3.2 (HH) 0.4 - 2.0 mmol/L   CULTURE, BLOOD    Collection Time: 03/10/18  8:03 PM   Result Value Ref Range    Special Requests: PERIPHERAL      Culture result: PENDING    EKG, 12 LEAD, INITIAL    Collection Time: 03/10/18  8:04 PM   Result Value Ref Range    Ventricular Rate 87 BPM    Atrial Rate 375 BPM    QRS Duration 132 ms    Q-T Interval 400 ms    QTC Calculation (Bezet) 481 ms    Calculated R Axis -119 degrees    Calculated T Axis 26 degrees    Diagnosis       Atrial fibrillation  Right bundle branch block  Lateral infarct (cited on or before 10-MAR-2018)  Abnormal ECG  When compared with ECG of 25-NOV-2017 21:16,  Questionable change in QRS axis  Serial changes of Lateral infarct present     POC LACTIC ACID    Collection Time: 03/10/18  9:55 PM   Result Value Ref Range    Lactic Acid (POC) 2.6 (HH) 0.4 - 2.0 mmol/L       Imaging Reviewed:    HYYP & XR Abd  - final report pending         Darian Sanon MD  3/11/2018, 9:30 PM

## 2018-03-11 NOTE — ED TRIAGE NOTES
89yof arrives to ED by EMS, EMS reports pt febrile t-101.4 at home with dark colored urine. Pt is on hospice. Pt alert to self.

## 2018-03-11 NOTE — PROGRESS NOTES
Assumed patient care. Received patient awake, alert, oriented X1. Patient denies any pain/ discomfort at this time. Bed is locked and in lowest position and call bell is within reach. Not in acute distress.

## 2018-03-11 NOTE — ROUTINE PROCESS
Bedside and Verbal shift change report given to [employer] (oncoming nurse) by ayesha mccarthy rn (offgoing nurse). Report given with SBAR, Kardex, Intake/Output and Recent Results.

## 2018-03-11 NOTE — PROGRESS NOTES
Problem: Falls - Risk of  Goal: *Absence of Falls  Document Malu Fall Risk and appropriate interventions in the flowsheet.    Outcome: Progressing Towards Goal  Fall Risk Interventions:       Mentation Interventions: Bed/chair exit alarm, Door open when patient unattended, More frequent rounding, Reorient patient, Room close to nurse's station    Medication Interventions: Bed/chair exit alarm    Elimination Interventions: Bed/chair exit alarm, Toileting schedule/hourly rounds

## 2018-03-12 PROCEDURE — 97530 THERAPEUTIC ACTIVITIES: CPT

## 2018-03-12 PROCEDURE — 97162 PT EVAL MOD COMPLEX 30 MIN: CPT

## 2018-03-12 PROCEDURE — 65270000029 HC RM PRIVATE

## 2018-03-12 PROCEDURE — 97161 PT EVAL LOW COMPLEX 20 MIN: CPT

## 2018-03-12 PROCEDURE — 97165 OT EVAL LOW COMPLEX 30 MIN: CPT

## 2018-03-12 PROCEDURE — 76450000000

## 2018-03-12 PROCEDURE — 74011250636 HC RX REV CODE- 250/636: Performed by: HOSPITALIST

## 2018-03-12 PROCEDURE — 92610 EVALUATE SWALLOWING FUNCTION: CPT

## 2018-03-12 RX ADMIN — LEVOFLOXACIN 500 MG: 5 INJECTION, SOLUTION INTRAVENOUS at 21:47

## 2018-03-12 NOTE — INTERDISCIPLINARY ROUNDS
Patient at nurse's station- alert to self. Discharge plan is pending- care management to follow up with patient's NOK.  Nedra Alpers, FNP-BC

## 2018-03-12 NOTE — ROUTINE PROCESS
Bedside / verbal shift change report given to Brenden Ribrea    (oncoming nurse) by Esteban Joya RN (offgoing nurse). Report included the following information SBAR, Kardex, Intake/Output, MAR and Recent Results.

## 2018-03-12 NOTE — PROGRESS NOTES
Spoke with pts grand dtr, she states sherlyn just dx with ca, she will help her  With [facility]. Pt has been home with hospice, sherlyn having diff taking care of her. They were looking into long term care. She agrees to snf, wants me talk to sherlyn about facilities. spoke with sherlyn and her son, pts grandson. They agree to snf posted in epic and edc [facility] first choice. Facility list given to them. Plan snf.

## 2018-03-12 NOTE — PROGRESS NOTES
Spoke with palliative care, if pt will cont iv antibxs, may need snf, they will order PT/OT eval.would need auth from ins. Will speak to dtr when she comes in.

## 2018-03-12 NOTE — PROGRESS NOTES
Assumed care of patient, patient in bed. Patient place on bedpan. Sbar report received from [facility]. Call bell in place, bed alarm is in place and on at this time.

## 2018-03-12 NOTE — PROGRESS NOTES
[facility]: [phone] ([phone])  [facility]: [phone]   [facility]: [phone]    Patient Name: Maria C Morales  YOB: 1920    Date of Initial Consult: 3/12/2018   Reason for Consult: care decisions   Requesting Provider: Ms. Saige Shoemaker NP   Primary Care Physician: Jesika Chavez MD      SUMMARY:   Maria C Morales is a 80y.o. year old with a past history of breast cancer, COPD, dementia, hypertension, who was admitted on 3/10/2018 from home with a diagnosis of Urinary tract infection. Current medical issues leading to Palliative Medicine involvement include: 80year old female who was home with hospice, developed increased weakness and fatigue, has dementia. Family wishes to bring patient in for treatment of UTI. Palliative medicine is consulted for care decisions. PALLIATIVE DIAGNOSES:   1. Advanced care plan discussions  2. Dementia   3. UTI   4. Debility        PLAN:   1. Patient seen at bedside along with Ms Marilee Miner NP. She is alert but pleasantly confused. \" I need to call my daughter because I am in [place] and she needs to pick me up\". 2. Advanced care plan discussions Patient cannot participate, however when she was able she completed an AMD and living will naming her daughter Armani Bass as MPOA. Discussed with Ms. Travis at some length today. Affirms goals of care DNR/DNI. Was home with hospice services, [facility], short stay in [facility] [place] has been dx with cancer and undergoing RT). Dion Easley shares with us she became alarmed when Neha Blakeslee would not eat, become weaker and urine was very dark. Her care became more difficult. Currently she wishes to complete treatment for her UTI. Although followed by hospice, I am somewhat concerned she does not meet hospice criteria at this point  3.  Dementia long discussion with daughter concerning progression of disease, expected infections and the interplay of all. She has an understanding that her mother is of advanced age and dementia is not a curable disease. Accepting is difficult for daughter as Enma Griffin has been with Brittny Luna for 30 + years, she did seem to understand the progression of the disease. Discussed decreased appetite which will likely wax and wane. Daughter describes some dysphasia symptoms, will ask ST to eval. We discussed at some length how dementia will impair at some point her ability to swallow . Brittny Luna would like for her daughter to eat and drink as she is able. No feeding tubes. I am concerned that patient's care is now more difficult for daughter due to her illness. Discussed with care manager and attending team, will place therapy consults ? Short SNF stay. Enma Griffin although confused does follow some commands. ( did not discuss possible SNF stay with daughter) appreciate CM assistance as she will discuss with family. 4. UTI , noted + BC gram -. Attending team following. Daughter wishes to complete treatment of UTI  5. Debility  Per daughter most recently requires wheel chair for mobilization. Stands with assist. At times continent of bowel and bladder, however difficult to reach restroom with impaired ambulation, uses briefs. Appreciate therapy assistance. 6. Initial consult note routed to primary continuity provider  7.  Communicated plan of care with: Palliative IDT, daughter, CM , attending team     GOALS OF CARE:         TREATMENT PREFERENCES:   Code Status: DNR    Advance Care Planning:  Advance Care Planning 3/11/2018   Patient's Healthcare Decision Maker is: Legal Next of Veronica 69   Primary Decision Maker Name (No Data)   Primary Decision Maker Phone Number    Primary Decision Maker Relationship to Patient Adult child   Confirm Advance Directive Yes, on file   Patient Would Like to Complete Advance Directive -   Does the patient have other document types Other (comment)           Other Instructions:   Artificially Administered Nutrition: No feeding tube     Other:  As far as possible, the palliative care team has discussed with patient / health care proxy about goals of care / treatment preferences for patient. HISTORY:     History obtained from: chart and family     CHIEF COMPLAINT: weakness, UTI     HPI/SUBJECTIVE:    The patient is:   [] Verbal and participatory  [x] Non-participatory due to:  Confusion however very delightful   80year old female who was on hospice services for dementia. Admitted for increased weakness found to have UTI and + blood cultures. Discussed with daughter, wishes to have completion of antibiotics . Patient is pleasantly confused today, comfortable no pain. Clinical Pain Assessment (nonverbal scale for nonverbal patients): Clinical Pain Assessment  Severity: 0          Duration: for how long has pt been experiencing pain (e.g., 2 days, 1 month, years)  Frequency: how often pain is an issue (e.g., several times per day, once every few days, constant)     FUNCTIONAL ASSESSMENT:     Palliative Performance Scale (PPS):  PPS: 30    ECOG  ECOG Status : Completely disabled     PSYCHOSOCIAL/SPIRITUAL SCREENING:      Any spiritual / Hinduism concerns:  [] Yes /  [x] No    Caregiver Burnout:  [x] Yes /  [] No /  [] No Caregiver Present      Anticipatory grief assessment:   [x] Normal  / [] Maladaptive        REVIEW OF SYSTEMS:     Positive and pertinent negative findings in ROS are noted above in HPI. The following systems were [] reviewed / [x] unable to be reviewed as noted in HPI  Other findings are noted below. Systems: constitutional, ears/nose/mouth/throat, respiratory, gastrointestinal, genitourinary, musculoskeletal, integumentary, neurologic, psychiatric, endocrine. Positive findings noted below.   Modified ESAS Completed by: provider   Fatigue: 4 Drowsiness: 0     Pain: 0   Anxiety: 0 Nausea: 0   Anorexia: 3 Dyspnea: 0           Stool Occurrence(s): 1        PHYSICAL EXAM:     Wt Readings from Last 3 Encounters:   03/12/18 46.7 kg (103 lb)   12/19/17 53 kg (116 lb 12.8 oz)   11/30/17 53.3 kg (117 lb 9.6 oz)     Blood pressure (!) 154/97, pulse (!) 101, temperature 98.4 °F (36.9 °C), resp. rate 16, height 4' 6\" (1.372 m), weight 46.7 kg (103 lb), SpO2 97 %, not currently breastfeeding. Pain:  Pain Scale 1: Numeric (0 - 10)  Pain Intensity 1: 0                 Last bowel movement: x 1    Constitutional: elderly female sitting in recliner, who is alert to herself but confused, very pleasantly so  Eyes: pupils equal, anicteric  ENMT: no nasal discharge, moist mucous membranes  Respiratory: breathing not labored  Skin: warm, dry  Neurologic: alert oriented x 1, verbal, follows some commands   Psychiatric: full affect       HISTORY:     Principal Problem:    UTI (urinary tract infection) (11/25/2017)    Active Problems:    [facility] (11/25/2017)      Late onset Alzheimer's disease without behavioral disturbance (11/26/2017)      Essential hypertension (11/26/2017)      Debility (11/28/2017)      Chronic pain (3/10/2018)      Sepsis ([facility] (3/11/2018)      Chronic obstructive pulmonary disease ([facility] (3/11/2018)      Past Medical History:   Diagnosis Date    Breast cancer ([facility]     Chronic obstructive pulmonary disease ([facility]     Dementia     Hypertension       Past Surgical History:   Procedure Laterality Date    HX MASTECTOMY        No family history on file. History reviewed, no pertinent family history.   Social History   Substance Use Topics    Smoking status: Never Smoker    Smokeless tobacco: Never Used    Alcohol use No     Allergies   Allergen Reactions    Azithromycin Anaphylaxis    Penicillin Anaphylaxis      Current Facility-Administered Medications   Medication Dose Route Frequency    levoFLOXacin (LEVAQUIN) 500 mg in D5W IVPB  500 mg IntraVENous Q48H    sodium chloride (NS) flush 5-10 mL  5-10 mL IntraVENous PRN    acetaminophen (TYLENOL) tablet 650 mg  650 mg Oral Q6H PRN    0.9% sodium chloride infusion  50 mL/hr IntraVENous CONTINUOUS    morphine injection 1 mg  1 mg IntraVENous Q4H PRN    ondansetron (ZOFRAN) injection 4 mg  4 mg IntraVENous Q6H PRN        LAB AND IMAGING FINDINGS:     Lab Results   Component Value Date/Time    WBC 17.5 (H) 03/11/2018 05:45 AM    HGB 12.0 03/11/2018 05:45 AM    PLATELET 412 72/58/4638 05:45 AM     Lab Results   Component Value Date/Time    Sodium 144 03/11/2018 05:45 AM    Potassium 3.5 03/11/2018 05:45 AM    Chloride 109 (H) 03/11/2018 05:45 AM    CO2 23 03/11/2018 05:45 AM    BUN 34 (H) 03/11/2018 05:45 AM    Creatinine 1.03 03/11/2018 05:45 AM    Calcium 7.8 (L) 03/11/2018 05:45 AM      Lab Results   Component Value Date/Time    AST (SGOT) 158 (H) 03/11/2018 05:45 AM    Alk. phosphatase 403 (H) 03/11/2018 05:45 AM    Protein, total 5.1 (L) 03/11/2018 05:45 AM    Albumin 2.1 (L) 03/11/2018 05:45 AM    Globulin 3.0 03/11/2018 05:45 AM     Lab Results   Component Value Date/Time    INR 1.1 11/26/2017 03:35 AM    Prothrombin time 13.6 11/26/2017 03:35 AM    aPTT 32.2 03/11/2018 05:45 AM      No results found for: IRON, FE, TIBC, IBCT, PSAT, FERR   No results found for: PH, PCO2, PO2  No components found for: [place]   Lab Results   Component Value Date/Time     (H) 11/28/2017 03:50 AM    CK - MB 19.5 (H) 11/26/2017 03:35 AM              Total time: 70 minutes   Counseling / coordination time, spent as noted above: 50 minutes  > 50% counseling / coordination: yes with daughter     Prolonged service was provided for  []30 min   []75 min in face to face time in the presence of the patient, spent as noted above. Time Start:   Time End:   Note: this can only be billed with 13396 (initial) or 26298 (follow up). If multiple start / stop times, list each separately.

## 2018-03-12 NOTE — CDMP QUERY
Please clarify if this patient has diagnosis of:    =>right  pleural effusion    =>Other Explanation of clinical findings  =>Unable to Determine (no explanation of clinical findings)    The medical record reflects the following:    Risk: 79 yo female with PMH COPD, breast cancer, HTN    Clinical Indicators: CXR on admission  Probable new small right effusion. Cannot rule out underlying atelectasis or infiltrate. Please clarify and document your clinical opinion in the progress notes and discharge summary including the definitive and/or presumptive diagnosis, (suspected or probable), related to the above clinical findings. Please include clinical findings supporting your diagnosis. If you DECLINE this query or would like to communicate with [facility], please utilize the \"[facility] message box\" at the TOP of the Progress Note on the right.       Thank you,  Ab Matthew RN [facility] [phone]

## 2018-03-12 NOTE — PROGRESS NOTES
Problem: Falls - Risk of  Goal: *Absence of Falls  Document Malu Fall Risk and appropriate interventions in the flowsheet.    Outcome: Progressing Towards Goal  Fall Risk Interventions:       Mentation Interventions: Bed/chair exit alarm, Door open when patient unattended    Medication Interventions: Patient to call before getting OOB    Elimination Interventions: Call light in reach

## 2018-03-12 NOTE — PROGRESS NOTES
Problem: Dysphagia (Adult)  Goal: *Acute Goals and Plan of Care (Insert Text)  Dysphagia Present:   No    Recommendations:  Diet: soft solid/thin liquids  Meds: per pt preference  Aspiration Precautions    Patient will:  1. Participate in training and education related to continued aspiration risk, diet recs and compensatory strategies (goal met). Outcome: Resolved/Met Date Met: 03/12/18  Speech LAnguage Pathology bedside swallow evaluation AND DISCHARGE    Patient: Taco White (89 y.o. female)  Date: 3/12/2018  Primary Diagnosis: UTI (urinary tract infection)  Chronic pain        Precautions:      PLOF: lives with family  ASSESSMENT :  Clinical beside swallow eval completed per MD orders. Pt alert, pleasantly confused, oriented to self and place; disoriented to time and situation. Functional communication. Intelligibility >90%. OM examination revealed oral motor structures functional for mastication and deglutition. Presented with thin liquid, puree, and solid trials. Exhibited mildly delayed bolus cohesion, manipulation and A-P transit. Further exhibited functional swallow timing/reflex and hyolaryngeal excursion. Pt able to manipulate and clear with 0 clinical s/s aspiration and/or oropharyngeal dysphagia. Pt safe for soft solids, thin liquid diet. 0 formal ST needs for dysphagia indicated at this time. SLP educated pt on role of speech therapist in current setting with re: speech/swallow; verbalized comprehension. SLP available for re-evaluation if indicated by MD. Results d/w RN, Marilynn Carson. Thank you for this referral.   Vashti Khanna, SLP Intern     PLAN :  Recommendations and Planned Interventions:  No formal ST needs ID'd for dysphagia. Eval only. Discharge Recommendations: None     SUBJECTIVE:   Patient stated here honey, have some of this pudding.     OBJECTIVE:     Past Medical History:   Diagnosis Date    [facility] ([facility]     Chronic obstructive pulmonary disease ([facility]     Dementia     Hypertension      Past Surgical History:   Procedure Laterality Date    HX MASTECTOMY       Prior Level of Function/Home Situation: lives with family  Home Situation  Home Environment: Private residence  # Steps to Enter: 2  One/Two Story Residence: Two story  # of Interior Steps: 13  Height of Each Step (in):  (6)  Interior Rails: None  Living Alone: No  Support Systems: Family member(s)  Patient Expects to be Discharged to[de-identified] Private residence  Current DME Used/Available at Home: None  Diet prior to admission: regular, per pt report  Current Diet:  Regular solids/thin liquids   Cognitive and Communication Status:  Neurologic State: Alert, Confused  Orientation Level: Oriented to person, Oriented to place, Disoriented to time, Disoriented to situation  Cognition: Follows commands, Poor safety awareness  Perception: Appears intact  Perseveration: No perseveration noted  Safety/Judgement: Fall prevention  Oral Assessment:  Oral Assessment  Labial: No impairment  Dentition: Upper & lower dentures  Oral Hygiene: good  Lingual: No impairment  Velum: No impairment  Mandible: No impairment  Gag Reflex: No impairment  P.O. Trials:  Patient Position: Chair 60*  Vocal quality prior to P.O.: No impairment  Consistency Presented: Solid;Puree; Thin liquid  How Presented: Successive swallows;Straw;Self-fed/presented     Bolus Acceptance: No impairment  Bolus Formation/Control: Impaired  Type of Impairment: Mastication;Delayed  Propulsion: Delayed (# of seconds)  Oral Residue: None  Initiation of Swallow: No impairment  Laryngeal Elevation: Functional  Aspiration Signs/Symptoms: None  Pharyngeal Phase Characteristics: No impairment, issues, or problems   Effective Modifications: None  Cues for Modifications: Minimal       Oral Phase Severity: No impairment  Pharyngeal Phase Severity : No impairment    GCODESwallowing:  Swallow Current Status CH= 0%   Swallow Goal Status CH= 0%   Swallow D/C Status CH= 0%    The severity rating is based on the following outcomes:  MEAGAN Noms Swallow Level 6    Clinical Judgement      PAIN:  Start of Eval: 0  End of Eval: 0     After evaluation:   [x]            Patient left in no apparent distress sitting up in chair  []            Patient left in no apparent distress in bed  []            Call bell left within reach  [x]            Nursing present  []            Family present  []            Caregiver present  []            Bed alarm activated      COMMUNICATION/EDUCATION:   [x]            Aspiration precautions; swallow safety; compensatory techniques. [x]            Patient/family have participated as able in goal setting and plan of care. [x]            Patient/family agree to work toward stated goals and plan of care. []            Patient understands intent and goals of therapy; neutral about participation. []            Patient unable to participate in goal setting/plan of care; educ ongoing with interdisciplinary staff  []         Posted safety precautions in patient's room.     Thank you for this referral.  Homer Khanna, SLP Intern  Time Calculation: 14 mins

## 2018-03-12 NOTE — PROGRESS NOTES
Report given by Anisa Godinez. Pt on the recliner at the nurses station  Resting. Pt alert and oriented to self denies pain at the moment. Assessement completed plan of care for the shift explained pt needs continuous orientation. IVF infusing well, recliner locked,frequently used items within reach. Will continue to monitor. 2300- Pt taken back to bed made comfortable but pt attempted to get out of bed. Pt on recliner brought back to the nurses station for safety. IVF infusing well.    0400- Pt on recliner at the nurses station awake. 0600- Pt given a bed bath made comfortable. Pt refused to get back to bed, pt on recliner to nurses station. Pt remained awake the whole night. 0740- Bedside and Verbal shift change report given to   Nicolle Can RN (oncoming nurse) by David Kincaid RN (offgoing nurse). Report included the following information SBAR, Kardex, Intake/Output, MAR and Recent Results.

## 2018-03-12 NOTE — PROGRESS NOTES
Problem: Mobility Impaired (Adult and Pediatric)  Goal: *Acute Goals and Plan of Care (Insert Text)  Physical Therapy Goals  Initiated 3/12/2018 and to be accomplished within 7 day(s) 03/19/2018  1. Patient will move from supine to sit and sit to supine , scoot up and down and roll side to side in bed with moderate assistance . 2.  Patient will transfer from bed to chair and chair to bed with moderate assistance  using the least restrictive device. 3.  Patient will perform sit to stand with moderate assistance . physical Therapy EVALUATION    Patient: Lashell Aguirre (46 y.o. female)  Date: 3/12/2018  Primary Diagnosis: UTI (urinary tract infection)  Chronic pain  Precautions:   Fall   PLOF: Family reports pt requires balance and slight boost assistance for transfers; mobilizes in w/c.    ASSESSMENT :   Patient requires between moderate assistance  and maximal assistance for bed mobility, transfers and ambulation. Patient motivated and pleasant though intermittently inappropriate with responses and occasionally confused. Patient would benefit from anterior lean and functional movement training to promote improved STS pattern and reduce FOF. Patient would benefit from follow-up services for safety awareness training as well as family training to allow for proper support if available for home environment. Patient's daughter and grandson present in session; attentive and appropriate throughout session. Patient presents with deficits in:   Bed Mobility, Transfers, Gait, Strength, Range of Motion and Balance    Patient will benefit from skilled intervention to address the above impairments.   Patients rehabilitation potential is considered to be Fair  Factors which may influence rehabilitation potential include:   []         None noted  [x]         Mental ability/status: Attention to task  []         Medical condition  []         Home/family situation and support systems  [x]         Safety awareness  [x] Pain tolerance/management  []         Other:     Recommendations for nursing: Encourage anterior lean and downward force production with B LE when transitioning to standing  Verbally communicated to: Nabil     PLAN :  Recommendations and Planned Interventions:  [x]           Bed Mobility Training             []    Neuromuscular Re-Education  [x]           Transfer Training                   []    Orthotic/Prosthetic Training  []           Gait Training                          []    Modalities  [x]           Therapeutic Exercises          []    Edema Management/Control  [x]           Therapeutic Activities            [x]    Patient and Family Training/Education  []           Other (comment):    Frequency/Duration: Patient will be followed by physical therapy 1-2 times per day/4-7 days per week to address goals. Discharge Recommendations: Home Health and [facility] pending family training and support  Further Equipment Recommendations for Discharge: TBD at a later date     SUBJECTIVE:   Patient stated I think I can get up with you, I have been sitting too long.     OBJECTIVE DATA SUMMARY:     Past Medical History:   Diagnosis Date    [facility]     [facility]     Dementia     Hypertension      Past Surgical History:   Procedure Laterality Date    HX MASTECTOMY       Barriers to Learning/Limitations: yes;  cognitive and physical  Compensate with: visual, verbal, tactile, kinesthetic cues/model    G CODE:Mobility  Current  CL= 60-79%   Goal  CJ= 20-39%.   The severity rating is based on the [facility] Complexity: History: LOW Complexity : Zero comorbidities / personal factors that will impact the outcome / POCExam:LOW Complexity : 1-2 Standardized tests and measures addressing body structure, function, activity limitation and / or participation in recreation  Presentation: LOW Complexity : Stable, uncomplicated  Clinical Decision Making:Low Complexity LOW Overall Complexity:LOW     [address] Sitting Balance Scale  2: Pt supports self independently with both upper extremities. CL, 60% to <80% impaired. Prior Level of Function/Home Situation: See above; Assistance provided by family  Home Situation  Home Environment: Private residence  # Steps to Enter:  (pt confused, unable to report)  One/Two Story Residence: Two story  # of Interior Steps: 13  Height of Each Step (in):  (6)  Interior Rails: None  Living Alone: No  Support Systems: Family member(s)  Patient Expects to be Discharged to[de-identified] Private residence  Current DME Used/Available at Home: None  Critical Behavior:  Neurologic State: Alert  Orientation Level: Oriented to place;Oriented to person;Disoriented to situation;Disoriented to time  Cognition: Follows commands  Safety/Judgement: Fall prevention  Psychosocial  Patient Behaviors: Calm;Confused  Strength:    Strength: Generally decreased, functional  RLE Assessment (WDL): Exceptions to WDL  RLE Strength  R Hip Flexion: 3-  R Knee Flexion: 3+  R Knee Extension: 3+  R Ankle Dorsiflexion: 3-  LLE Assessment (WDL): Exception to WDL  LLE Strength  L Hip Flexion: 3-  L Knee Flexion: 3+ (limited by confusion; lack of understanding of motor task)  L Knee Extension: 3+  L Ankle Dorsiflexion: 3-  Tone & Sensation:   Tone: Normal  Sensation: Intact  RLE Assessment (WDL): Exceptions to WDL  LLE Assessment (WDL): Exception to WDL  Range Of Motion:  AROM: Generally decreased, functional  RLE Assessment (WDL): Exceptions to WDL  PROM: Generally decreased, functional  LLE Assessment (WDL): Exception to WDL  Functional Mobility:  Bed Mobility:  Rolling:  Moderate assistance  Supine to Sit: Maximum assistance  Sit to Supine: Maximum assistance  Scooting: Maximum assistance (sacral clearance assist with scoot)  Transfers:  Sit to Stand: Maximum assistance  Stand to Sit: Maximum assistance        Bed to Chair: Maximum assistance Other: Patient particiaptes in stand step transfer to left and right with max a x 1 for boost B LE foot placement assist with knee block. Patient participates in lateral stepping with max a x 1 for boost and weight shift assistance as well as controlled lowering to sitting surface. performed x 2 in session. Patient demonstrates posterior retropulsive push with quad heavy utilization causing LAQ moment bilaterally requriing block and anterior lean facilitation. Balance:   Sitting: Impaired  Sitting - Static: Fair (occasional)  Sitting - Dynamic: Fair (occasional)  Standing: Impaired  Standing - Static: Poor  Standing - Dynamic : Poor  Ambulation/Gait Training:  Distance (ft): 2 Feet (ft) (x 2 trials left and right)  Assistive Device: Gait belt (lateral stepping bed to w/c)  Ambulation - Level of Assistance: Maximum assistance  Gait Description (WDL): Exceptions to WDL  Gait Abnormalities: Decreased step clearance;Shuffling gait;Trunk sway increased; Other (significant forward trunk flexion)  Base of Support: Narrowed  Speed/My: Shuffled;Pace decreased (<100 feet/min); Slow  Step Length: Right shortened;Left shortened    Therapeutic Exercises:   Seated marching 25 reps with verbal cueing for attention to task  Pain:None  Pre treatment pain level: 0/10  Post treatment pain level:0/10  Pain Scale 1: Numeric (0 - 10)  Pain Intensity 1: 0  Activity Tolerance:   Fair  Please refer to the flowsheet for vital signs taken during this treatment. After treatment:   [x]         Patient left in no apparent distress sitting up in chair  []         Patient left in no apparent distress in bed  []         Call bell left within reach  [x]         Nursing notified  [x]         Family present  [x]         Bed alarm activated    COMMUNICATION/EDUCATION:   [x]         Fall prevention education was provided and the patient/caregiver indicated understanding.   [x]         Patient/family have participated as able in goal setting and plan of care. [x]         Patient/family agree to work toward stated goals and plan of care. []         Patient understands intent and goals of therapy, but is neutral about his/her participation. []         Patient is unable to participate in goal setting and plan of care. Thank you for this referral.  Laren Schilder.  Zapf, PT DPT   Time Calculation: 42 mins

## 2018-03-12 NOTE — PROGRESS NOTES
2 [facility]  Hospitalist Division        Inpatient Daily Progress Note    Daily progress Note    Patient: Daisy Gaines MRN: [id number]  CSN: [id number]    YOB: 1920  Age: 80 y.o. Sex: female    DOA: 3/10/2018 LOS:  LOS: 2 days                    Chief Complaint:  UTI, Bacteremia    Interval History: 81 y/o female with hx of chronic low back pain, HTN, CHF, dementia, COPD, recurrent UTI, total gastrectomy and breast cancer s/p bilateral mastectomy, presented to the ED on 3/10/2018 by family due to weakness and c/o pain. Pt is active with [facility] hospice. She has a hx of chronic low back pain managed with Fentanyl patch- per family, patch had been removed. [facility] hospice nurse saw pt and c/o pain, given oral Morphine and the fentanyl patch was placed. She woke up and was still in pain so family decided to bring her to the ER. Work up in the ED revealed UTI- UA 3/10/2018 with positive nitrites, small leukocyte esterase, 3+ bacteria. Blood cultures collected 3/10/2018 aerobic bottle gram negative rods, possible 2nd gram negative rods. Per daughter, would like her to be treated with antibiotics and admitted since she is weak. She was started on IV abx and gentle IVF's and admitted for further evaluation. Palliative care consulted. Assessment/Plan:     Patient Active Problem List   Diagnosis Code    CHF, acute on chronic ([address] I50.9    Traumatic rhabdomyolysis ([address] T79. 6XXA    Elevated troponin R74.8    UTI (urinary tract infection) N39.0    A-fib (HCC) I48.91    Late onset Alzheimer's disease without behavioral disturbance G30.1, F02.80    Essential hypertension I10    Debility R53.81    Fall W19. XXXA    Chronic pain G89.29    Sepsis ([address] A41.9    Chronic obstructive pulmonary disease ([address] J44.9       A/P:  Sepsis  - UA 3/10/2018 positive nitrites, small leukocyte esterase, 3+ bacteria  - Blood cultures collected 3/10/2018 aerobic bottle gram negative rods, possible 2nd gram negative rods  - blood cultures collected 3/10/2018 aerobic bottle gram negative rods  - WBC 19.5 - -> 17.5  - lactic acid 2.6  - continue IV Levaquin Q48H    Hx of Alzheimer's  - pt was [facility] w/ hosp[facility] - [facility]  - Palliative Care consulted    Chronic Pain  - Morphine 1 mg Q4H PRN    PT/OT, speech following  - recommend home health   - recommend soft solid/thin liquids, aspiration precautions    Disposition  - Palliative care consulted to assist with goals of care- if pt requires IV abx, will need SNF- daughter to discuss with case management  - pt was active with [facility]      Arianna Shah 15  Pager:  [phone]  Office:  [phone]        Subjective:      No events overnight. No complaints of pain. Denies chest pain, SOB or abdominal pain    Objective:      Visit Vitals    /80 (BP 1 Location: Right arm, BP Patient Position: At rest)    Pulse 61    Temp 98.1 °F (36.7 °C)    Resp 16    Ht 4' 6\" (1.372 m)    Wt 46.7 kg (103 lb)    SpO2 96%    Breastfeeding No    BMI 24.83 kg/m2           Physical Exam:  General appearance: alert, cooperative, no distress, appears stated age  Head: Normocephalic, without obvious abnormality, atraumatic  Lungs: clear to auscultation bilaterally  Heart: regular rate and rhythm, S1, S2 normal, no murmur, click, rub or gallop  Abdomen: soft, non tender, non distended. Normoactive bowel sounds.  No masses, no organomegaly  Extremities: extremities normal, atraumatic, no cyanosis or edema  Skin: Skin color, texture, turgor normal. No rashes or lesions  Neurologic: hx of dementia, pleasantly confused, A/O to self and daughter, follows commands, FENG  PSY: appropriately behaved        Intake and Output:  Current Shift:     Last three shifts:  03/10 1901 - 03/12 0700  In: 393.3 [P.O.:60; I.V.:333.3]  Out: 750 [Urine:750]    No results found for this or any previous visit (from the past 24 hour(s)). Lab Results   Component Value Date/Time    Glucose 60 (L) 03/11/2018 05:45 AM    Glucose 90 03/10/2018 07:45 PM    Glucose 88 12/18/2017 05:50 AM    Glucose 87 12/11/2017 04:10 AM    Glucose 115 (H) 12/04/2017 07:00 AM        Imaging:  No results found.     Medication List Reviewed:  Current Facility-Administered Medications   Medication Dose Route Frequency    levoFLOXacin (LEVAQUIN) 500 mg in D5W IVPB  500 mg IntraVENous Q48H    sodium chloride (NS) flush 5-10 mL  5-10 mL IntraVENous PRN    acetaminophen (TYLENOL) tablet 650 mg  650 mg Oral Q6H PRN    0.9% sodium chloride infusion  50 mL/hr IntraVENous CONTINUOUS    morphine injection 1 mg  1 mg IntraVENous Q4H PRN    ondansetron (ZOFRAN) injection 4 mg  4 mg IntraVENous Q6H PRN

## 2018-03-12 NOTE — PROGRESS NOTES
Problem: Self Care Deficits Care Plan (Adult)  Goal: *Acute Goals and Plan of Care (Insert Text)  Occupational Therapy Goals  Initiated 3/12/2018 within 7 day(s). 1.  Patient will perform upper body dressing with minimal assistance  2. Patient will perform lower body dressing with minimal assistance   3. Patient will perform toileting with minimal assistance  4. Patient will perform toilet transfers with minimal assistance      Outcome: Progressing Towards Goal  Occupational Therapy EVALUATION    Patient: Laquita Garcia (18 y.o. female)  Date: 3/12/2018  Primary Diagnosis: UTI (urinary tract infection)  Chronic pain        Precautions:   Fall    ASSESSMENT :  Based on the objective data described below, the patient presents with decreased ADL and related transfer independence. PLF unclear, pt presents with confusion and is not a reliable . Pleasantly confused, follows commands well with verbal request. MOD A sit>stand with posterior lean limiting safety to advance steps towards bedside commode. 3 attempts made. Able to complete seated ADL in chair with set up and verbal cue. OT to follow, and update goals/POC pending updated information regarding PLF. Patient will benefit from skilled intervention to address the above impairments.   Patients rehabilitation potential is considered to be Good  Factors which may influence rehabilitation potential include:   []             None noted  [x]             Mental ability/status  []             Medical condition  []             Home/family situation and support systems  []             Safety awareness  []             Pain tolerance/management  []             Other:      PLAN :  Recommendations and Planned Interventions:  [x]               Self Care Training                  [x]        Therapeutic Activities  [x]               Functional Mobility Training    []        Cognitive Retraining  [x]               Therapeutic Exercises           [x]        Endurance Activities  [x]               Balance Training                   []        Neuromuscular Re-Education  []               Visual/Perceptual Training     [x]   Home Safety Training  [x]               Patient Education                 [x]        Family Training/Education  []               Other (comment):    Frequency/Duration: Patient will be followed by occupational therapy 1-2 times per day/4-7 days per week to address goals. Discharge Recommendations: Home Health  Further Equipment Recommendations for Discharge: N/A     Barriers to Learning/Limitations: None  Compensate with: visual, verbal, tactile, kinesthetic cues/model     PATIENT COMPLEXITY      Eval Complexity: History: LOW Complexity : Brief history review ; Examination: LOW Complexity : 1-3 performance deficits relating to physical, cognitive , or psychosocial skils that result in activity limitations and / or participation restrictions ; Decision Making:MEDIUM Complexity : Patient may present with comorbidities that affect occupational performnce. Miniml to moderate modification of tasks or assistance (eg, physical or verbal ) with assesment(s) is necessary to enable patient to complete evaluation  Assessment: low Complexity     G-CODES:     Self Care  Current  CK= 40-59%   Goal  CI= 1-19%. The severity rating is based on the Level of Assistance required for Functional Mobility and ADLs. SUBJECTIVE:   Patient stated im at the facility.     OBJECTIVE DATA SUMMARY:     Past Medical History:   Diagnosis Date    [facility] [address]     Chronic obstructive pulmonary disease ([facility] [facility]     Dementia     Hypertension      Past Surgical History:   Procedure Laterality Date    HX MASTECTOMY       Prior Level of Function/Home Situation: Unclear, pt cant comment 2' confusion  Home Situation  Home Environment: Private residence  # Steps to Enter:  (pt confused, unable to report)  One/Two Story Residence: Two story  # of Interior Steps: 13  Height of Each Step (in):  (6)  Interior Rails: None  Living Alone: No  Support Systems: Family member(s)  Patient Expects to be Discharged to[de-identified] Private residence  Current DME Used/Available at Home: None  []  Right hand dominant   []  Left hand dominant  Cognitive/Behavioral Status:  Neurologic State: Alert  Orientation Level: Disoriented to place; Disoriented to situation;Disoriented to time  Cognition: Follows commands  Safety/Judgement: Fall prevention    Skin: no noted concern    Edema: no noted concern    Vision/Perceptual:    Tracking:  (no noted concern)                                Coordination:  Coordination: Generally decreased, functional (BUE)            Balance:   posterior lean in standing attempt x3     Strength:    Strength: Generally decreased, functional (BUE)                Tone & Sensation:    Tone: Normal (BUE)  Sensation: Intact (BUE)                      Range of Motion:    AROM: Generally decreased, functional (BUE)  PROM: Generally decreased, functional (BUE)                      Functional Mobility and Transfers for ADLs:  Bed Mobility:        Sit to Supine:  (not observed)     Transfers:  Sit to Stand: Moderate assistance                Toilet Transfer :  (not attempted, strong post lean)                ADL Assessment:  Feeding: Setup    Oral Facial Hygiene/Grooming: Setup (seated)    Bathing: Moderate assistance    Upper Body Dressing: Minimum assistance    Lower Body Dressing: Maximum assistance    Toileting: Moderate assistance                ADL Intervention:        set up seated grooming      Cognitive Retraining  Safety/Judgement: Fall prevention        Pain:  Pt reports 0/10 pain or discomfort prior to treatment.    Pt reports 0/10 pain or discomfort post treatment. Non verbal pain scale    Activity Tolerance:   Fair, fatigues quickly    Please refer to the flowsheet for vital signs taken during this treatment. Pt left with CNA present at RN station, locked recliner, and chair alarm.    After treatment:   [x] Patient left in no apparent distress sitting up in chair  [] Patient left in no apparent distress in bed  [] Call bell left within reach  [x] Nursing notified  [x] Caregiver present  [x] chair alarm activated    COMMUNICATION/EDUCATION:   [] Home safety education was provided and the patient/caregiver indicated understanding. [] Patient/family have participated as able in goal setting and plan of care. [] Patient/family agree to work toward stated goals and plan of care. [] Patient understands intent and goals of therapy, but is neutral about his/her participation. [x] Patient is unable to participate in goal setting and plan of care.     Thank you for this referral.  Kenya Maynard OT  Time Calculation: 20 mins

## 2018-03-12 NOTE — PROGRESS NOTES
Assumed care of pt from [place] , RN pt awake in chair alert to self , no distress noted and denies pain. Frequently used items and call bell within reach. Patient verbalized understanding to use call bell for any needs or assistance. Bed locked in lowest position. 1910 Bedside and Verbal shift change report given to TATO Mancuso (oncoming nurse) by Raven Corbin RN (offgoing nurse). Report included the following information SBAR, Kardex, Intake/Output, MAR and Recent Results.

## 2018-03-12 NOTE — PALLIATIVE CARE
Full note to follow       Patient pleasantly confused. Discussed with daughter who states patient has been home with hospice, has seen progression of dementia, increased weakness, does use walker however now more in w/c. Needs help with ADL's. Frequent UTI's. Affirms goals of care DNR/DNI. Long discussion concerning effects of dementia. Daughter who is primary care giver, has cancer, undergoing RT. Finding it difficult to care for her mother. Wishes to complete current therapy for UTI. Discussed with care manager will ask OT/PT to see ? Short SNF stay ( did not discuss with daughter).

## 2018-03-13 PROBLEM — F02.80 ALZHEIMER'S DISEASE (HCC): Status: ACTIVE | Noted: 2018-03-13

## 2018-03-13 PROBLEM — G30.9 ALZHEIMER'S DISEASE (HCC): Status: ACTIVE | Noted: 2018-03-13

## 2018-03-13 PROBLEM — Z71.89 ADVANCED CARE PLANNING/COUNSELING DISCUSSION: Status: ACTIVE | Noted: 2018-03-13

## 2018-03-13 LAB
BACTERIA SPEC CULT: ABNORMAL
BACTERIA SPEC CULT: ABNORMAL
GRAM STN SPEC: ABNORMAL
SERVICE CMNT-IMP: ABNORMAL

## 2018-03-13 PROCEDURE — 77030011256 HC DRSG MEPILEX <16IN NO BORD MOLN -A

## 2018-03-13 PROCEDURE — 97530 THERAPEUTIC ACTIVITIES: CPT

## 2018-03-13 PROCEDURE — 77030020263 HC SOL INJ SOD CL0.9% LFCR 1000ML

## 2018-03-13 PROCEDURE — 74011250636 HC RX REV CODE- 250/636: Performed by: HOSPITALIST

## 2018-03-13 PROCEDURE — 65270000029 HC RM PRIVATE

## 2018-03-13 RX ADMIN — SODIUM CHLORIDE 50 ML/HR: 900 INJECTION, SOLUTION INTRAVENOUS at 04:15

## 2018-03-13 NOTE — PROGRESS NOTES
[facility] accepted pt, they have started auth, confirmed with sherlyn pt's dtr, that [facility] is her choice.

## 2018-03-13 NOTE — PROGRESS NOTES
Problem: Mobility Impaired (Adult and Pediatric)  Goal: *Acute Goals and Plan of Care (Insert Text)  Physical Therapy Goals  Initiated 3/12/2018 and to be accomplished within 7 day(s) 03/19/2018  1. Patient will move from supine to sit and sit to supine , scoot up and down and roll side to side in bed with moderate assistance . 2.  Patient will transfer from bed to chair and chair to bed with moderate assistance  using the least restrictive device. 3.  Patient will perform sit to stand with moderate assistance . Outcome: Progressing Towards Goal  physical Therapy TREATMENT    Patient: Daisy Gaines (17 y.o. female)  Date: 3/13/2018  Diagnosis: UTI (urinary tract infection)  Chronic pain UTI (urinary tract infection)  Precautions: Fall   Chart, physical therapy assessment, plan of care and goals were reviewed. PLOF:unable to obtain from pt, family not present  ASSESSMENT:  Pt back in room in bed after sitting in recliner at nursing station all morning. Increased time to sit up at EOB. Pt complaining of staff but unable to understand what pt was stating (pt with Alzheimer disease and dementia). SIt to stand performed 2x, unable to fully stand. Attempted side steps but pt only stepping in place. Pt able to scoot up to [facility] with increased time and VC. Assisted back to supine, rolled R and L with modA for bed pad placement. Education: UE placement to assist with bed mobility  Progression toward goals:  []      Improving appropriately and progressing toward goals  [x]      Improving slowly and progressing toward goals  []      Not making progress toward goals and plan of care will be adjusted     PLAN:  Patient continues to benefit from skilled intervention to address the above impairments. Continue treatment per established plan of care.   Discharge Recommendations:  Skilled Nursing Facility/LTC/memory unit  Further Equipment Recommendations for Discharge:  rolling walker     SUBJECTIVE:   Patient stated I am not a baby, I can do stuff but those girls wont let me.     OBJECTIVE DATA SUMMARY:   Critical Behavior:  Neurologic State: Alert  Orientation Level: Oriented to person  Cognition: Follows commands  Safety/Judgement: Fall prevention  Functional Mobility Training:  Bed Mobility:  Rolling: Moderate assistance  Supine to Sit: Moderate assistance  Sit to Supine: Moderate assistance  Scooting: Moderate assistance;Maximum assistance  Transfers:  Sit to Stand: Moderate assistance;Maximum assistance; Additional time  Stand to Sit: Minimum assistance  Balance:  Sitting: Intact  Sitting - Static: Fair (occasional)  Sitting - Dynamic: Fair (occasional)  Standing: Impaired; With support  Standing - Static: Poor  Standing - Dynamic : Poor  Ambulation/Gait Training:  Distance (ft): 0 Feet (ft)  Assistive Device: Gait belt;Walker, rolling  Ambulation - Level of Assistance: Maximum assistance  Gait Abnormalities: Decreased step clearance  Base of Support: Center of gravity altered  Speed/My: Shuffled;Delayed  Pain:  Pre:0  Post:0  Pain Scale 1: Numeric (0 - 10)  Pain Intensity 1: 0  Activity Tolerance:   Poor  Please refer to the flowsheet for vital signs taken during this treatment.   After treatment:   [] Patient left in no apparent distress sitting up in chair  [x] Patient left in no apparent distress in bed  [x] Call bell left within reach  [] Nursing notified  [] Caregiver present  [x] Bed alarm activated x2      Kenyon Olsen PTA   Time Calculation: 26 mins

## 2018-03-13 NOTE — PROGRESS NOTES
Problem: Self Care Deficits Care Plan (Adult)  Goal: *Acute Goals and Plan of Care (Insert Text)  Occupational Therapy Goals  Initiated 3/12/2018 within 7 day(s). 1.  Patient will perform upper body dressing with minimal assistance  2. Patient will perform lower body dressing with minimal assistance   3. Patient will perform toileting with minimal assistance  4. Patient will perform toilet transfers with minimal assistance       Occupational Therapy TREATMENT    Patient: Jer Howe (47 y.o. female)  Date: 3/13/2018  Diagnosis: UTI (urinary tract infection)  Chronic pain UTI (urinary tract infection)       Precautions: Fall  Chart, occupational therapy assessment, plan of care, and goals were reviewed. ASSESSMENT:  Pt presented supine in bed with HOB raised agreeable to participate with skilled OT services. Pt pleasantly confused t/o treatment session. Pt participated in [address] for FM activity for carryover to UB dressing. Pt attempted to snap snaps on a hospital gown provided. Pt has deficits in hand strength and was unable to perform task. Pt then donned hospital gown after proper orientation. Mod A for donning gown requiring physical assistance. Pt was left comfortable in bed and call bell within reach. EDUCATION Pt educated on purpose of OT. Progression toward goals:  []          Improving appropriately and progressing toward goals  [x]          Improving slowly and progressing toward goals  []          Not making progress toward goals and plan of care will be adjusted     PLAN:  Patient continues to benefit from skilled intervention to address the above impairments. Continue treatment per established plan of care. Discharge Recommendations:  Home Health  Further Equipment Recommendations for Discharge:  N/A     SUBJECTIVE:   Patient stated I think I'm Mandaen.     OBJECTIVE DATA SUMMARY:     G CODES:  Self Care  Current  CK= 40-59%.   The severity rating is based on the Other functional assessment    Cognitive/Behavioral Status:  Neurologic State: Alert  Orientation Level: Oriented to person  Cognition: Follows commands  Safety/Judgement: Fall prevention  Functional Mobility and Transfers for ADLs:   Bed Mobility:  Rolling: Moderate assistance  Supine to Sit: Moderate assistance  Sit to Supine: Moderate assistance  Scooting: Moderate assistance;Maximum assistance   Transfers:  Sit to Stand: Moderate assistance;Maximum assistance; Additional time      Balance:  Sitting: Intact  Sitting - Static: Fair (occasional)  Sitting - Dynamic: Fair (occasional)  Standing: Impaired; With support  Standing - Static: Poor  Standing - Dynamic : Poor  ADL Intervention:    Upper Body [address]: Moderate assistance  Cues: Physical assistance;Verbal cues provided      Pain:0/10  Pre Treatment:  Post Treatment:  Pain Scale 1: Numeric (0 - 10)  Pain Intensity 1: 0    Activity Tolerance:    Fair  Please refer to the flowsheet for vital signs taken during this treatment.   After treatment:   []  Patient left in no apparent distress sitting up in chair  [x]  Patient left in no apparent distress in bed  [x]  Call bell left within reach  []  Nursing notified  []  Caregiver present  []  Bed alarm activated    PAUL Orozco  Time Calculation: 19 mins

## 2018-03-13 NOTE — PROGRESS NOTES
2 [facility]  Hospitalist Division        Inpatient Daily Progress Note    Daily progress Note    Patient: Sharron Sharp MRN: [id number]  CSN: [id number]    YOB: 1920  Age: 80 y.o. Sex: female    DOA: 3/10/2018 LOS:  LOS: 3 days                    Chief Complaint:      Interval History:     79 y/o female with hx of chronic low back pain, HTN, CHF, dementia, COPD, recurrent UTI, total gastrectomy and breast cancer s/p bilateral mastectomy, presented to the ED on 3/10/2018 by family due to weakness and c/o pain. Pt was active with [facility] hospice. She has a hx of chronic low back pain managed with Fentanyl patch- per family, patch had been removed. [facility] hospice nurse saw pt and c/o pain, given oral Morphine and the fentanyl patch was placed. She woke up and was still in pain so family decided to bring her to the ER. Work up in the ED revealed UTI- UA 3/10/2018 with positive nitrites, small leukocyte esterase, 3+ bacteria. Blood cultures collected 3/10/2018 aerobic bottle gram negative rods, possible 2nd gram negative rods. Per daughter, would like her to be treated with antibiotics and admitted since she was weak. She was started on IV abx and gentle IVF's and admitted for further evaluation. Palliative care consulted and met with patient's daughter, who is her primary care giver, and is currently undergoing RT for cancer herself. Daughter would like to continue treatment for UTI and transition patient to SNF. Subjective:      Resting in bed- alert to person and place.      Objective:      Visit Vitals    BP (!) 154/97 (BP 1 Location: Right arm, BP Patient Position: At rest)    Pulse (!) 101    Temp 98.4 °F (36.9 °C)    Resp 16    Ht 4' 6\" (1.372 m)    Wt 46.7 kg (103 lb)    SpO2 97%    Breastfeeding No    BMI 24.83 kg/m2       Physical Exam:  General appearance: alert to person and place, cooperative, no distress   Lungs: clear to auscultation bilaterally, no wheezes   Heart: regular rate and rhythm, S1, S2 normal, no murmur, click, rub or gallop  Abdomen: soft, non tender, non distended. Normoactive bowel sounds  Extremities: extremities normal, atraumatic, no cyanosis or edema  Skin: Skin color, texture, turgor normal. No rashes or lesions  Neurologic: moves all 4 extremities equally   PSY: confused      Intake and Output:  Current Shift:  03/13 0701 - 03/13 1900  In: 120 [P.O.:120]  Out: -   Last three shifts:  03/11 1901 - 03/13 0700  In: 2240.8 [I.V.:2240.8]  Out: 1100 [Urine:1100]    No results found for this or any previous visit (from the past 24 hour(s)). Lab Results   Component Value Date/Time    Glucose 60 (L) 03/11/2018 05:45 AM    Glucose 90 03/10/2018 07:45 PM    Glucose 88 12/18/2017 05:50 AM    Glucose 87 12/11/2017 04:10 AM    Glucose 115 (H) 12/04/2017 07:00 AM        Assessment/Plan:     Patient Active Problem List   Diagnosis Code    CHF, acute on chronic (HCC) I50.9    Traumatic rhabdomyolysis ([facility] T79. 6XXA    Elevated troponin R74.8    UTI (urinary tract infection) N39.0    A-fib (HCC) I48.91    Late onset Alzheimer's disease without behavioral disturbance G30.1, F02.80    Essential hypertension I10    Debility R53.81    Fall W19. [place]    Chronic pain G89.29    Sepsis ([address] A41.9    Chronic obstructive pulmonary disease ([address] J44.9    Advanced care planning/counseling discussion Z70.80    Alzheimer's disease G30.9       A/P:  UTI  - Levaquin    DVT prophylaxis  - SCDs     Alzheimer's Disease    PT/OT    Dispo: SNF pending insurance auth       NATACHA Lucia-BC  [address]  Hospitalist Division  Pager:  [phone]  Office:  [phone]

## 2018-03-13 NOTE — PROGRESS NOTES
Problem: Falls - Risk of  Goal: *Absence of Falls  Document Malu Fall Risk and appropriate interventions in the flowsheet.    Outcome: Progressing Towards Goal  Fall Risk Interventions:       Mentation Interventions: Reorient patient    Medication Interventions: Patient to call before getting OOB    Elimination Interventions: Call light in reach, Patient to call for help with toileting needs

## 2018-03-13 NOTE — PROGRESS NOTES
Assumed patient care. Received patient sitting in a recliner in the Nurse's station. Patient is awake, alert, oriented X1. Patient denies any pain at this time. Recliner is locked. Not in acute distress. 1500  - Perez catheter discontinued. Pt. Tolerated.

## 2018-03-13 NOTE — ROUTINE PROCESS
Bedside and Verbal shift change report given to [place] (oshncoming nurse) by [facility], RN (offgoing nurse). Report included the folowing information SBAR, Kardex, Intake/Output, MAR and Recent Results.

## 2018-03-13 NOTE — PROGRESS NOTES
[facility]: [phone]  [place] [facility]: [phone]   [facility]: [phone]    Patient Name: Cristina Chaves  YOB: 1920    Date of Initial Consult: 3/12/2018   Reason for Consult: care decisions   Requesting Provider: Ms. Florencia Jimenez NP   Primary Care Physician: Hazel Whitley MD      SUMMARY:   Cristina Chaves is a 80y.o. year old with a past history of breast cancer, COPD, dementia, hypertension, who was admitted on 3/10/2018 from home with a diagnosis of Urinary tract infection. Current medical issues leading to Palliative Medicine involvement include: 80year old female who was home with hospice, developed increased weakness and fatigue, has dementia. Family wishes to bring patient in for treatment of UTI. Palliative medicine is consulted for care decisions. PALLIATIVE DIAGNOSES:   1. Advanced care plan discussions  2. Dementia   3. UTI   4. Debility        PLAN:   1. Ms. Marilee Ordoñez is seen as a follow up today along with Ms. Justyna Gonzalez NP. Sitting out in the nurse's station. Alert, concerned, tells me her daughter and the baby are missing. 2. Advanced care plan discussions Patient has executed an AMD naming her daughter Rashad Duke as MPOA. Had hospice services at home, however somewhat concerned she may not meet criteria any longer. Although her dementia has progressed, she is very verbal, but confused. Appetite not great, however able to tolerate thins. No change in goals of care DNR/DNI. Spoke with CM, she has spoken with daughter, currently would like a trial of SNF to see if she will return to her baseline level of functioning which is ambulation with walker, able to get her herself to the bathroom. Have shared with daughter, it is not known even with therapy if she will return to her baseline level of functioning.    3. Dementia long discussion with daughter concerning progression of disease, expected infections and the interplay of all. She has an understanding that her mother is of advanced age and dementia is not a curable disease. Accepting is difficult for daughter as Areli Priest has been with Nellie Frias for 30 + years, she did seem to understand the progression of the disease. 4. UTI , noted + BC gram -. Attending team following. Daughter wishes to complete treatment of UTI. She has de guzman, may benefit from bladder scan after de guzman is d/c'd to see if she is retaining urine. 5. Debility  Per daughter several weeks ago, could ambulate with walker at least for short distances, more recently she has been using w/c. Areli Priest is confused however she will follow directions and is able to participate in therapy. Family's hope is with therapy she may re gain some of the independence with ambulation she had prior to several weeks ago. 6. Initial consult note routed to primary continuity provider  7. Communicated plan of care with: Palliative IDT, CM     GOALS OF CARE:         TREATMENT PREFERENCES:   Code Status: DNR    Advance Care Planning:  Advance Care Planning 3/11/2018   Patient's Healthcare Decision Maker is: Legal Next of Veronica 69   Primary Decision Maker Name (No Data)   Primary Decision Maker Phone Number    Primary Decision Maker Relationship to Patient Adult child   Confirm Advance Directive Yes, on file   Patient Would Like to Complete Advance Directive -   Does the patient have other document types Other (comment)           Other Instructions:   Artificially Administered Nutrition: No feeding tube     Other:  As far as possible, the palliative care team has discussed with patient / health care proxy about goals of care / treatment preferences for patient.      HISTORY:     History obtained from: chart and family     CHIEF COMPLAINT: weakness, UTI     HPI/SUBJECTIVE:    The patient is:   [] Verbal and participatory  [x] Non-participatory due to:  Confusion however very delightful   80year old female who was on hospice services for dementia. Admitted for increased weakness found to have UTI and + blood cultures. Discussed with daughter, wishes to have completion of antibiotics ultimate plan return home with hospice services. Patient is pleasantly confused today, comfortable no pain. Clinical Pain Assessment (nonverbal scale for nonverbal patients): Clinical Pain Assessment  Severity: 0          Duration: for how long has pt been experiencing pain (e.g., 2 days, 1 month, years)  Frequency: how often pain is an issue (e.g., several times per day, once every few days, constant)     FUNCTIONAL ASSESSMENT:     Palliative Performance Scale (PPS):  PPS: 30    ECOG  ECOG Status : Completely disabled     PSYCHOSOCIAL/SPIRITUAL SCREENING:      Any spiritual / [employer] concerns:  [] Yes /  [x] No    Caregiver Burnout:  [x] Yes /  [] No /  [] No Caregiver Present      Anticipatory grief assessment:   [x] Normal  / [] Maladaptive        REVIEW OF SYSTEMS:     Positive and pertinent negative findings in ROS are noted above in HPI. The following systems were [] reviewed / [x] unable to be reviewed as noted in HPI  Other findings are noted below. Systems: constitutional, ears/nose/mouth/throat, respiratory, gastrointestinal, genitourinary, musculoskeletal, integumentary, neurologic, psychiatric, endocrine. Positive findings noted below. Modified ESAS Completed by: provider   Fatigue: 4 Drowsiness: 0     Pain: 0   Anxiety: 0 Nausea: 0   Anorexia: 4 Dyspnea: 0           Stool Occurrence(s): 1        PHYSICAL EXAM:     Wt Readings from Last 3 Encounters:   03/12/18 46.7 kg (103 lb)   12/19/17 53 kg (116 lb 12.8 oz)   11/30/17 53.3 kg (117 lb 9.6 oz)     Blood pressure (!) 154/97, pulse (!) 101, temperature 98.4 °F (36.9 °C), resp. rate 16, height 4' 6\" (1.372 m), weight 46.7 kg (103 lb), SpO2 97 %, not currently breastfeeding.   Pain:  Pain Scale 1: Numeric (0 - 10)  Pain Intensity 1: 0                 Last bowel movement: x 1 yesterday Constitutional: elderly female sitting out at nurse's station, alert, confused    Eyes: pupils equal, anicteric  ENMT: no nasal discharge, moist mucous membranes  Respiratory: breathing not labored  Skin: warm, dry  Neurologic: alert oriented x 1, verbal, follows some commands,    Psychiatric: full affect       HISTORY:     Principal Problem:    UTI (urinary tract infection) (11/25/2017)    Active Problems:    A-fib ([facility] (11/25/2017)      Late onset Alzheimer's disease without behavioral disturbance (11/26/2017)      Essential hypertension (11/26/2017)      Debility (11/28/2017)      Chronic pain (3/10/2018)      Sepsis ([facility] (3/11/2018)      Chronic obstructive pulmonary disease ([facility] (3/11/2018)      Advanced care planning/counseling discussion (3/13/2018)      Alzheimer's disease (3/13/2018)      Past Medical History:   Diagnosis Date    [facility] cancer [facility]     Chronic obstructive pulmonary disease ([facility]     Dementia     Hypertension       Past Surgical History:   Procedure Laterality Date    HX MASTECTOMY        No family history on file. History reviewed, no pertinent family history.   Social History   Substance Use Topics    Smoking status: Never Smoker    Smokeless tobacco: Never Used    Alcohol use No     Allergies   Allergen Reactions    Azithromycin Anaphylaxis    Penicillin Anaphylaxis      Current Facility-Administered Medications   Medication Dose Route Frequency    levoFLOXacin (LEVAQUIN) 500 mg in D5W IVPB  500 mg IntraVENous Q48H    sodium chloride (NS) flush 5-10 mL  5-10 mL IntraVENous PRN    acetaminophen (TYLENOL) tablet 650 mg  650 mg Oral Q6H PRN    0.9% sodium chloride infusion  50 mL/hr IntraVENous CONTINUOUS    morphine injection 1 mg  1 mg IntraVENous Q4H PRN    ondansetron (ZOFRAN) injection 4 mg  4 mg IntraVENous Q6H PRN        LAB AND IMAGING FINDINGS:     Lab Results   Component Value Date/Time    WBC 17.5 (H) 03/11/2018 05:45 AM    HGB 12.0 03/11/2018 05:45 AM PLATELET 291 87/06/4091 05:45 AM     Lab Results   Component Value Date/Time    Sodium 144 03/11/2018 05:45 AM    Potassium 3.5 03/11/2018 05:45 AM    Chloride 109 (H) 03/11/2018 05:45 AM    CO2 23 03/11/2018 05:45 AM    BUN 34 (H) 03/11/2018 05:45 AM    Creatinine 1.03 03/11/2018 05:45 AM    Calcium 7.8 (L) 03/11/2018 05:45 AM      Lab Results   Component Value Date/Time    AST (SGOT) 158 (H) 03/11/2018 05:45 AM    Alk. phosphatase 403 (H) 03/11/2018 05:45 AM    Protein, total 5.1 (L) 03/11/2018 05:45 AM    Albumin 2.1 (L) 03/11/2018 05:45 AM    Globulin 3.0 03/11/2018 05:45 AM     Lab Results   Component Value Date/Time    INR 1.1 11/26/2017 03:35 AM    Prothrombin time 13.6 11/26/2017 03:35 AM    aPTT 32.2 03/11/2018 05:45 AM      No results found for: IRON, FE, TIBC, IBCT, PSAT, FERR   No results found for: PH, PCO2, PO2  No components found for: [place]   Lab Results   Component Value Date/Time     (H) 11/28/2017 03:50 AM    CK - MB 19.5 (H) 11/26/2017 03:35 AM              Total time: 25 minutes   Counseling / coordination time, spent as noted above: 15 minutes  > 50% counseling / coordination: yes    Prolonged service was provided for  []30 min   []75 min in face to face time in the presence of the patient, spent as noted above. Time Start:   Time End:   Note: this can only be billed with 18484 (initial) or 42438 (follow up). If multiple start / stop times, list each separately.

## 2018-03-14 LAB
BACTERIA SPEC CULT: ABNORMAL
BACTERIA SPEC CULT: ABNORMAL
GRAM STN SPEC: ABNORMAL
GRAM STN SPEC: ABNORMAL
SERVICE CMNT-IMP: ABNORMAL

## 2018-03-14 PROCEDURE — 74011250636 HC RX REV CODE- 250/636: Performed by: HOSPITALIST

## 2018-03-14 PROCEDURE — 65270000029 HC RM PRIVATE

## 2018-03-14 PROCEDURE — 77030020263 HC SOL INJ SOD CL0.9% LFCR 1000ML

## 2018-03-14 RX ORDER — HYDRALAZINE HYDROCHLORIDE 20 MG/ML
10 INJECTION INTRAMUSCULAR; INTRAVENOUS
Status: DISCONTINUED | OUTPATIENT
Start: 2018-03-14 | End: 2018-03-15 | Stop reason: HOSPADM

## 2018-03-14 RX ORDER — LEVOFLOXACIN 500 MG/1
500 TABLET, FILM COATED ORAL DAILY
Qty: 2 TAB | Refills: 0 | Status: SHIPPED
Start: 2018-03-14 | End: 2018-03-14

## 2018-03-14 RX ORDER — LEVOFLOXACIN 500 MG/1
500 TABLET, FILM COATED ORAL
Qty: 10 TAB | Refills: 0 | Status: SHIPPED
Start: 2018-03-14 | End: 2018-03-15

## 2018-03-14 RX ORDER — METOPROLOL SUCCINATE 25 MG/1
25 TABLET, EXTENDED RELEASE ORAL DAILY
Status: DISCONTINUED | OUTPATIENT
Start: 2018-03-14 | End: 2018-03-15 | Stop reason: HOSPADM

## 2018-03-14 RX ORDER — METOPROLOL SUCCINATE 25 MG/1
25 TABLET, EXTENDED RELEASE ORAL DAILY
Qty: 30 TAB | Refills: 0 | Status: SHIPPED
Start: 2018-03-14

## 2018-03-14 RX ADMIN — SODIUM CHLORIDE 50 ML/HR: 900 INJECTION, SOLUTION INTRAVENOUS at 01:43

## 2018-03-14 RX ADMIN — LEVOFLOXACIN 500 MG: 5 INJECTION, SOLUTION INTRAVENOUS at 22:22

## 2018-03-14 NOTE — PROGRESS NOTES
[facility]: [phone]  Formerly [facility]: [phone]   [facility] [place]: [phone]    Patient Name: Toya Rhodes  YOB: 1920    Date of Initial Consult: 3/12/2018   Reason for Consult: care decisions   Requesting Provider: Ms. Ignacio Garner NP   Primary Care Physician: Cameron Aguiar MD      SUMMARY:   Toya Rhodes is a 80y.o. year old with a past history of breast cancer, COPD, dementia, hypertension, who was admitted on 3/10/2018 from home with a diagnosis of Urinary tract infection. Current medical issues leading to Palliative Medicine involvement include: 80year old female who was home with hospice, developed increased weakness and fatigue, has dementia. Family wishes to bring patient in for treatment of UTI. Palliative medicine is consulted for care decisions. PALLIATIVE DIAGNOSES:   1. Advanced care plan discussions  2. Dementia   3. UTI   4. Debility        PLAN:   1. Patient seen as follow up along with Ms. Ted Murphy NP. Seen this am in bed, sleeping comfortably but alerts easily to her name. Could not tell me where she is, when I said [place] she responded with I know. Noted d/c plan later today to SNF. 2. Advanced care plan discussions Patient has executed an AMD naming her daughter Jeanine Foss as [id number]. Had hospice services at home, however somewhat concerned she may not meet criteria any longer. Although her dementia has progressed, she is very verbal, but confused. Appetite not great, however able to tolerate thins. No change in goals of care DNR/DNI. DDNR has been signed by daughter. Plan is SNF today. 3. Dementia long discussion with daughter concerning progression of disease, expected infections and the interplay of all. She has an understanding that her mother is of advanced age and dementia is not a curable disease. 4. UTI , noted + BC gram -. Attending team following. Daughter wishes to complete treatment of UTI. She has de guzman, may benefit from bladder scan after de guzman is d/c'd to see if she is retaining urine. 5. Debility  Per daughter several weeks ago, could ambulate with walker at least for short distances, more recently she has been using w/c. Although she is confused she does participate with therapy. To SNF for continued therapy. Daughter has shared she would like to have patient ultimately return home. Post SNF hospice could be asked to re eval for home services, however concerned that daily care even after SNF maybe to difficult for daughter who is primary care giver, as she is undergoing treatment for breast cancer. 6. Initial consult note routed to primary continuity provider  7. Communicated plan of care with: Palliative IDT    GOALS OF CARE:         TREATMENT PREFERENCES:   Code Status: DNR    Advance Care Planning:  Advance Care Planning 3/11/2018   Patient's Healthcare Decision Maker is: Legal Next of Veronica 69   Primary Decision Maker Name (No Data)   Primary Decision Maker Phone Number [phone] Relationship to Patient Adult child   Confirm Advance Directive Yes, on file   Patient Would Like to Complete Advance Directive -   Does the patient have other document types Other (comment)           Other Instructions:   Artificially Administered Nutrition: No feeding tube     Other:  As far as possible, the palliative care team has discussed with patient / health care proxy about goals of care / treatment preferences for patient. HISTORY:     History obtained from: chart and family     CHIEF COMPLAINT: weakness, UTI     HPI/SUBJECTIVE:    The patient is:   [] Verbal and participatory  [x] Non-participatory due to:  Confusion however very delightful    3/14/2018 plan d/c to SNF today  80year old female who was on hospice services for dementia. Admitted for increased weakness found to have UTI and + blood cultures.  Discussed with daughter, wishes to have completion of antibiotics ultimate plan return home with hospice services. Patient is pleasantly confused today, comfortable no pain. Clinical Pain Assessment (nonverbal scale for nonverbal patients): Clinical Pain Assessment  Severity: 0          Duration: for how long has pt been experiencing pain (e.g., 2 days, 1 month, years)  Frequency: how often pain is an issue (e.g., several times per day, once every few days, constant)     FUNCTIONAL ASSESSMENT:     Palliative Performance Scale (PPS):  PPS: 30    ECOG  ECOG Status : Completely disabled     PSYCHOSOCIAL/SPIRITUAL SCREENING:      Any spiritual / Taoist concerns:  [] Yes /  [x] No    Caregiver Burnout:  [x] Yes /  [] No /  [] No Caregiver Present      Anticipatory grief assessment:   [x] Normal  / [] Maladaptive        REVIEW OF SYSTEMS:     Positive and pertinent negative findings in ROS are noted above in HPI. The following systems were [] reviewed / [x] unable to be reviewed as noted in HPI  Other findings are noted below. Systems: constitutional, ears/nose/mouth/throat, respiratory, gastrointestinal, genitourinary, musculoskeletal, integumentary, neurologic, psychiatric, endocrine. Positive findings noted below. Modified ESAS Completed by: provider   Fatigue: 5 Drowsiness: 0     Pain: 0   Anxiety: 0 Nausea: 0   Anorexia: 4 Dyspnea: 0           Stool Occurrence(s): 1        PHYSICAL EXAM:     Wt Readings from Last 3 Encounters:   03/14/18 47.5 kg (104 lb 11.5 oz)   12/19/17 53 kg (116 lb 12.8 oz)   11/30/17 53.3 kg (117 lb 9.6 oz)     Blood pressure (!) 171/92, pulse 70, temperature 97.2 °F (36.2 °C), resp. rate 16, height 4' 6\" (1.372 m), weight 47.5 kg (104 lb 11.5 oz), SpO2 94 %, not currently breastfeeding. Pain:  Pain Scale 1: Visual  Pain Intensity 1: 0                 Last bowel movement: x 1 yesterday     Constitutional: reclining in bed, comfort alerts easily to her name.  Pleasantly confused in NAD  Eyes: pupils equal, anicteric  ENMT: no nasal discharge, moist mucous membranes  Respiratory: breathing not labored  Skin: warm, dry  Neurologic: alert oriented x 1, verbal, follows some commands,         HISTORY:     Principal Problem:    UTI (urinary tract infection) (11/25/2017)    Active Problems:    A-fib ([facility] (11/25/2017)      Late onset Alzheimer's disease without behavioral disturbance (11/26/2017)      Essential hypertension (11/26/2017)      Debility (11/28/2017)      Chronic pain (3/10/2018)      Sepsis ([facility] (3/11/2018)      Chronic obstructive pulmonary disease ([facility] (3/11/2018)      Advanced care planning/counseling discussion (3/13/2018)      Alzheimer's disease (3/13/2018)      Past Medical History:   Diagnosis Date    Breast cancer ([facility]     Chronic obstructive pulmonary disease ([facility]     Dementia     Hypertension       Past Surgical History:   Procedure Laterality Date    HX MASTECTOMY        No family history on file. History reviewed, no pertinent family history.   Social History   Substance Use Topics    Smoking status: Never Smoker    Smokeless tobacco: Never Used    Alcohol use No     Allergies   Allergen Reactions    Azithromycin Anaphylaxis    Penicillin Anaphylaxis      Current Facility-Administered Medications   Medication Dose Route Frequency    levoFLOXacin (LEVAQUIN) 500 mg in D5W IVPB  500 mg IntraVENous Q48H    sodium chloride (NS) flush 5-10 mL  5-10 mL IntraVENous PRN    acetaminophen (TYLENOL) tablet 650 mg  650 mg Oral Q6H PRN    0.9% sodium chloride infusion  50 mL/hr IntraVENous CONTINUOUS    morphine injection 1 mg  1 mg IntraVENous Q4H PRN    ondansetron (ZOFRAN) injection 4 mg  4 mg IntraVENous Q6H PRN        LAB AND IMAGING FINDINGS:     Lab Results   Component Value Date/Time    WBC 17.5 (H) 03/11/2018 05:45 AM    HGB 12.0 03/11/2018 05:45 AM    PLATELET 779 47/19/9902 05:45 AM     Lab Results   Component Value Date/Time    Sodium 144 03/11/2018 05:45 AM    Potassium 3.5 03/11/2018 05:45 AM    Chloride 109 (H) 03/11/2018 05:45 AM    CO2 23 03/11/2018 05:45 AM    BUN 34 (H) 03/11/2018 05:45 AM    Creatinine 1.03 03/11/2018 05:45 AM    Calcium 7.8 (L) 03/11/2018 05:45 AM      Lab Results   Component Value Date/Time    AST (SGOT) 158 (H) 03/11/2018 05:45 AM    Alk. phosphatase 403 (H) 03/11/2018 05:45 AM    Protein, total 5.1 (L) 03/11/2018 05:45 AM    Albumin 2.1 (L) 03/11/2018 05:45 AM    Globulin 3.0 03/11/2018 05:45 AM     Lab Results   Component Value Date/Time    INR 1.1 11/26/2017 03:35 AM    Prothrombin time 13.6 11/26/2017 03:35 AM    aPTT 32.2 03/11/2018 05:45 AM      No results found for: IRON, FE, TIBC, IBCT, PSAT, FERR   No results found for: PH, PCO2, PO2  No components found for: [place]   Lab Results   Component Value Date/Time     (H) 11/28/2017 03:50 AM    CK - MB 19.5 (H) 11/26/2017 03:35 AM              Total time: 25 minutes   Counseling / coordination time, spent as noted above: 15 minutes  > 50% counseling / coordination: yes    Prolonged service was provided for  []30 min   []75 min in face to face time in the presence of the patient, spent as noted above. Time Start:   Time End:   Note: this can only be billed with 01956 (initial) or 34973 (follow up). If multiple start / stop times, list each separately.

## 2018-03-14 NOTE — DISCHARGE SUMMARY
[facility]    Discharge Summary    Patient: Jer Howe MRN: [id number]  CSN: [id number]    YOB: 1920  Age: 80 y.o. Sex: female    DOA: 3/10/2018 LOS:  LOS: 4 days   Discharge Date: 3/14/2018     Admission Diagnoses: UTI (urinary tract infection)  Chronic pain    Discharge Diagnoses:    Problem List as of 3/14/2018  Never Reviewed          Codes Class Noted - Resolved    Advanced care planning/counseling discussion ICD-10-CM: Z71.89  ICD-9-CM: V65.49  3/13/2018 - Present        Alzheimer's disease ICD-10-CM: G30.9  ICD-9-CM: 331.0  3/13/2018 - Present        Sepsis ([facility] ICD-10-CM: A41.9  ICD-9-CM: 038.9, 995.91  3/11/2018 - Present        Chronic obstructive pulmonary disease ([facility] ICD-10-CM: J44.9  ICD-9-CM: 256  3/11/2018 - Present        Chronic pain ICD-10-CM: G89.29  ICD-9-CM: 338.29  3/10/2018 - Present        Debility ICD-10-CM: R53.81  ICD-9-CM: 799.3  11/28/2017 - Present        Fall ICD-10-CM: Rima Backer. Sherin Ifeanyi  ICD-9-CM: R048.9  11/28/2017 - Present        Late onset Alzheimer's disease without behavioral disturbance ICD-10-CM: G30.1, F02.80  ICD-9-CM: 331.0, 294.10  11/26/2017 - Present        Essential hypertension ICD-10-CM: I10  ICD-9-CM: 401.9  11/26/2017 - Present        Traumatic rhabdomyolysis ([facility] ICD-10-CM: T79. 6XXA  ICD-9-CM: 958.6  11/25/2017 - Present        Elevated troponin ICD-10-CM: R74.8  ICD-9-CM: 790.6  11/25/2017 - Present        * (Principal)UTI (urinary tract infection) ICD-10-CM: N39.0  ICD-9-CM: 599.0  11/25/2017 - Present        A-fib (HCC) ICD-10-CM: I48.91  ICD-9-CM: 427.31  11/25/2017 - Present        CHF, acute on chronic (HCC) ICD-10-CM: I50.9  ICD-9-CM: 428.0  4/5/2017 - Present              Discharge Condition: Stable    Discharge To: SNF    Consults: Hospitalist and Amparo Course: 81 y/o female with hx of chronic low back pain, HTN, CHF, dementia, COPD, recurrent UTI, total gastrectomy and breast cancer s/p bilateral mastectomy, presented to the ED on 3/10/2018 by family due to weakness and c/o pain.  Pt was active with [facility] hospice. Dennis Casas has a hx of chronic low back pain managed with Fentanyl patch- per family, patch had been removed. [facility] Flores home hospice nurse saw pt and c/o pain, given oral Morphine and the fentanyl patch was placed.  She woke up and was still in pain so family decided to bring her to the ER.  Work up in the ED revealed UTI- UA 3/10/2018 with positive nitrites, small leukocyte esterase, 3+ bacteria.  Blood cultures collected 3/10/2018 aerobic bottle gram negative rods, possible 2nd gram negative rods.  Per daughter, would like her to be treated with antibiotics and admitted since she was weak. She was started on IV abx and gentle IVF's and admitted for further evaluation.  Palliative care consulted and met with patient's daughter, who is her primary care giver, and is currently undergoing RT for cancer herself. Daughter would like to continue treatment for UTI and transition patient to SNF. PT/OT eval and treats completed and recommended SNF. Patient is appropriate for discharge to SNF. Physical Exam:  General appearance: alert to person and place, cooperative, no distress   Lungs: clear to auscultation throughout, no wheezes   Heart: regular rate and rhythm, S1, S2 normal, no murmur, click, rub or gallop  Abdomen: soft, non tender, non distended. Normoactive bowel sounds  Extremities: extremities normal, atraumatic, no cyanosis or edema  Skin: Skin color, texture, turgor normal. No rashes or lesions  Neurologic: moves all 4 extremities equally   PSY: confused      Significant Diagnostic Studies:     CXR 3/10/2018  IMPRESSION:  1. Probable new small right effusion. Cannot rule out underlying atelectasis or infiltrate. 2. Cannot exclude new left perihilar infiltrate.   3. Comminuted displaced proximal right humerus fracture which is a new finding compared to the most recent exam showing the right shoulder.  Acute fracture cannot be excluded    Discharge Medications:     Current Discharge Medication List      START taking these medications    Details   levoFLOXacin (LEVAQUIN) 500 mg tablet Take 1 Tab by mouth daily. Qty: 2 Tab, Refills: 0      !! metoprolol succinate (TOPROL-XL) 25 mg XL tablet Take 1 Tab by mouth daily. Qty: 30 Tab, Refills: 0       !! - Potential duplicate medications found. Please discuss with provider. CONTINUE these medications which have NOT CHANGED    Details   cholecalciferol (VITAMIN D3) 1,000 unit tablet Take 2 Tabs by mouth daily. Qty: 60 Tab, Refills: 0      cetirizine (ZYRTEC) 10 mg tablet Take 10 mg by mouth daily as needed. for allergy      acetaminophen (TYLENOL) 500 mg tablet Take 1 Tab by mouth every four (4) hours as needed. Indications: Pain  Qty: 60 Tab, Refills: 0      clopidogrel (PLAVIX) 75 mg tab Take 1 Tab by mouth daily. Qty: 60 Tab, Refills: 0      donepezil (ARICEPT) 5 mg tablet Take 1 Tab by mouth daily. Qty: 60 Tab, Refills: 0      famotidine (PEPCID) 20 mg tablet Take 1 Tab by mouth daily. Qty: 60 Tab, Refills: 0      fluticasone (FLONASE) 50 mcg/actuation nasal spray 2 sprays both nostriles daily  Qty: 1 Bottle, Refills: 0      nitroglycerin (NITROSTAT) 0.4 mg SL tablet 1 Tab by SubLINGual route every five (5) minutes as needed for Chest Pain. Qty: 1 Bottle, Refills: 0      polyethylene glycol (MIRALAX) 17 gram packet Take 1 Packet by mouth daily. Qty: 30 Packet, Refills: 0      simvastatin (ZOCOR) 40 mg tablet Take 1 Tab by mouth nightly. Qty: 60 Tab, Refills: 0      !! metoprolol succinate (TOPROL-XL) 25 mg XL tablet Take 1 Tab by mouth daily. Qty: 60 Tab, Refills: 0      lisinopril (PRINIVIL, ZESTRIL) 10 mg tablet Take 1 Tab by mouth daily.   Qty: 30 Tab, Refills: 0      methylphenidate HCl (RITALIN) 10 mg tablet One pill twice a day as needed  Indications: Narcolepsy Syndrome  Qty: 30 Tab, Refills: 0    Associated Diagnoses: Late onset Alzheimer's disease without behavioral disturbance      mirtazapine (REMERON) 7.5 mg tablet Take 1 Tab by mouth nightly. Qty: 60 Tab, Refills: 0      Vit C-Vit E-Copper-ZnOx-Lutein (PRESERVISION LUTEIN) 226 mg-200 unit -5 mg-0.8 mg cap Take 1 Cap by mouth daily. potassium chloride SR (KLOR-CON 10) 10 mEq tablet Take  by mouth every other day. furosemide (LASIX) 20 mg tablet Take 40 mg by mouth daily. alendronate (FOSAMAX) 40 mg tablet Take 40 mg by mouth every seven (7) days. 70 mg       !! - Potential duplicate medications found. Please discuss with provider.           Activity: Activity as tolerated and PT/OT Eval and Treat    Diet: Mechanical Soft     Wound Care: None needed    Follow-up: Facility provider on admission  PCP one week after discharge from SNF     Discharge time: > 35 minutes   Maria Isabel Villa NP  3/14/2018, 3:19 PM

## 2018-03-14 NOTE — PROGRESS NOTES
Bedside and Verbal shift change report given to TATO Mckeon (oncoming nurse) by Marianne Eastman RN BSN (offgoing nurse). Report included the following information SBAR, Kardex, Intake/Output, MAR and Recent Results.

## 2018-03-14 NOTE — PROGRESS NOTES
Problem: Falls - Risk of  Goal: *Absence of Falls  Document Malu Fall Risk and appropriate interventions in the flowsheet.    Outcome: Progressing Towards Goal  Fall Risk Interventions:       Mentation Interventions: Reorient patient    Medication Interventions: Bed/chair exit alarm    Elimination Interventions: Bed/chair exit alarm, Patient to call for help with toileting needs

## 2018-03-14 NOTE — PROGRESS NOTES
Problem: Falls - Risk of  Goal: *Absence of Falls  Document Malu Fall Risk and appropriate interventions in the flowsheet.    Outcome: Progressing Towards Goal  Fall Risk Interventions:       Mentation Interventions: Door open when patient unattended, Bed/chair exit alarm, Reorient patient    Medication Interventions: Bed/chair exit alarm, Patient to call before getting OOB    Elimination Interventions: Call light in reach, Patient to call for help with toileting needs

## 2018-03-14 NOTE — PROGRESS NOTES
Transportation at Discharge: 3/14/2018 (Expected DC)    [address] [address]  [facility] number:    Estimated pick-up time: Placed on will call     Method of Transport:  [id number] [facility]: [facility]  Authorization:  No auth req/$[id number] CP per day    Made D/C transportation arrangements at the request of Outcomes Manager [employer].       Laurell Klinefelter, Care- ext [phone]

## 2018-03-14 NOTE — ANCILLARY DISCHARGE INSTRUCTIONS
Patient and/or next of kin has been given the [facility] Important Message From Medicare About Your Rights\" letter and all questions were answered.

## 2018-03-14 NOTE — INTERDISCIPLINARY ROUNDS
IDR Summary      Patient: Cristina Chaves MRN: [id number]    Age: 80 y.o.  : 10/21/1920     Admit Diagnosis: UTI (urinary tract infection)  Chronic pain        DIET status: Mechanical soft     Lines/Tubes:   IV: YES  Needed: YES  Perez: YES  Needed:YES  Central Line: NO Needed: NO      VTE Prophylaxis: Mechanical    Mobility needs: Yes     PT ordered:  YES PT eval on chart: YES    OT ordered:  NO OT eval on chart: NO      ST ordered:  NO ST eval on chart:  NO     Disposition/Care Management:  Discharge plan: SNF      Home Health ordered? NO     Recommended DME from PT/OT:      DME ordered? NO     SNF- has patient been matched? YES    Accepting bed? YES   Does patient require insurance auth?   YES        Barriers to discharge:   Financial concerns:No   PCP: Hazel Whitley MD    : NO  Interventions:       LOS: 4 days     Expected days until discharge: pending insurance auth            Signed:     PINA CamposP-BC  [address]  Hospitalist Division  Pager:  [phone]  Office:  [phone]

## 2018-03-14 NOTE — PROGRESS NOTES
Patient is unable to communicate at this time as she is resting at present. No family seen at this visit, offered prayer. Gael Huber will continue to follow and will provide pastoral care on an as needed/requested basis.     Cintia Clinton   Spiritual Care   [phone]

## 2018-03-14 NOTE — PROGRESS NOTES
1420:  Pt dry heaving upon entering room, notified nurse. Will follow up again for PT.     1615:  2nd attempt, pt sleeping sound, did not arouse when name called. Will follow up tomorrow if pt does not TF to SNF today.

## 2018-03-14 NOTE — PROGRESS NOTES
Patient received in bed asleep. Patient alert and oriented to self, behavior indicators negative for pain and discomfort. Patient resting quietly. Frequent use items within reach. Bed locked in low position. Call bell within reach. 3178:  Received D/C order. Pt awaiting authorization for SNF placement. Will inform Case Management. 1425:  PT reports Pt dry heaving. This nurse to bedside, Pt sleeping with no dry heaving. 1505:  Paged NP Lavern Hall CNA reports Pt having a BP of 165/103 after taking Pressure two times. Nurse reviewed MAR and orders, no BP goal nor are there any PRN BP medications.

## 2018-03-14 NOTE — INTERDISCIPLINARY ROUNDS
IDR Summary      Patient: Sundar Gallo MRN: [id number]    Age: 80 y.o.  : 10/21/1920     Admit Diagnosis: UTI (urinary tract infection)  Chronic pain        DIET status: Mechanical soft     Lines/Tubes:   IV: YES   Needed: YES  Perez: YES  Needed:YES  Central Line: NO Needed: NO      VTE Prophylaxis: Mechanical    Mobility needs: Yes     PT ordered:  YES PT eval on chart: YES    OT ordered:  NO OT eval on chart: NO      ST ordered:  NO ST eval on chart:  NO     Disposition/Care Management:  Discharge plan: SNF      Home Health ordered? NO     Recommended DME from PT/OT:      DME ordered? NO     SNF- has patient been matched? YES    Accepting bed? YES   Does patient require insurance auth?   YES        Barriers to discharge:   Financial concerns:No   PCP: Camille Og MD    : NO  Interventions:       LOS: 3 days     Expected days until discharge: pending insurance auth            Signed:     Morrie Galeazzi, FNP-BC  [address]  Hospitalist Division  Pager:  [phone]  Office:  [phone]

## 2018-03-14 NOTE — PROGRESS NOTES
Bedside and Verbal shift change report given to NUVIA HALL RN (oncoming nurse) by Elijah Blakely (offgoing nurse). Report included the following information SBAR, Kardex and MAR. Assessment completed. Alert to self, calm and confused. Family at bedside. Patient sitting in chair comfortably. Chair locked and remote alarm applied. 0725-Bedside and Verbal shift change report given to TATO George (oncoming nurse) by NUVIA HALL RN (offgoing nurse). Report included the following information SBAR, Kardex and MAR. Patient resting comfortably.

## 2018-03-15 VITALS
WEIGHT: 104.72 LBS | HEIGHT: 55 IN | RESPIRATION RATE: 16 BRPM | BODY MASS INDEX: 24.23 KG/M2 | OXYGEN SATURATION: 94 % | TEMPERATURE: 97.5 F | HEART RATE: 75 BPM | DIASTOLIC BLOOD PRESSURE: 84 MMHG | SYSTOLIC BLOOD PRESSURE: 162 MMHG

## 2018-03-15 PROCEDURE — 74011250637 HC RX REV CODE- 250/637: Performed by: NURSE PRACTITIONER

## 2018-03-15 PROCEDURE — 97110 THERAPEUTIC EXERCISES: CPT

## 2018-03-15 PROCEDURE — 97530 THERAPEUTIC ACTIVITIES: CPT

## 2018-03-15 RX ORDER — SULFAMETHOXAZOLE AND TRIMETHOPRIM 200; 40 MG/5ML; MG/5ML
10 SUSPENSION ORAL 2 TIMES DAILY
Qty: 200 ML | Refills: 0 | Status: SHIPPED
Start: 2018-03-15 | End: 2018-03-25

## 2018-03-15 RX ADMIN — METOPROLOL SUCCINATE 25 MG: 25 TABLET, EXTENDED RELEASE ORAL at 09:08

## 2018-03-15 NOTE — PROGRESS NOTES
Problem: Self Care Deficits Care Plan (Adult)  Goal: *Acute Goals and Plan of Care (Insert Text)  Occupational Therapy Goals  Initiated 3/12/2018 within 7 day(s). 1.  Patient will perform upper body dressing with minimal assistance  2. Patient will perform lower body dressing with minimal assistance   3. Patient will perform toileting with minimal assistance  4. Patient will perform toilet transfers with minimal assistance       Occupational Therapy TREATMENT    Patient: Eyad Blankenship (23 y.o. female)  Date: 3/15/2018  Diagnosis: UTI (urinary tract infection)  Chronic pain UTI (urinary tract infection)       Precautions: Fall  Chart, occupational therapy assessment, plan of care, and goals were reviewed. PLOF: Unable to obtain from pt and family not present    ASSESSMENT:  Pt presented seated in recliner chair agreeable to skilled OT services this date. Pt participated in [address] for simulated LB dressing. Mod A required for pt to simulate activity and constant cuing to follow directions. Pt was perseverating on going home t/o treatment session. Pt demonstrates decreased BUE strength. Pt was left comfortably seated in recliner chair and call bell within reach. EDUCATION Pt educated on importance of improving UE strength to facilitate increased independence with ADLs. Progression toward goals:  []          Improving appropriately and progressing toward goals  [x]          Improving slowly and progressing toward goals   []          Not making progress toward goals and plan of care will be adjusted     PLAN:  Patient continues to benefit from skilled intervention to address the above impairments. Continue treatment per established plan of care. Discharge Recommendations:  Didier Narvaez  Further Equipment Recommendations for Discharge:  N/A     SUBJECTIVE:   Patient stated Dorothy Ledy you take me home?     OBJECTIVE DATA SUMMARY:     G CODES:  Self Care  Current  CK= 40-59%.   The severity rating is based on the Other functional assessment    Cognitive/Behavioral Status:  Neurologic State: Alert  Orientation Level: Oriented to person  Cognition: Follows commands  Safety/Judgement: Fall prevention  ADL Intervention:    Lower Body Dressing Assistance  Socks: Moderate assistance (simulated activity)    Pain:0/10  Pre Treatment:  Post Treatment:  Pain Scale 1: Numeric (0 - 10)  Pain Intensity 1: 0    Activity Tolerance:    Fair  Please refer to the flowsheet for vital signs taken during this treatment.   After treatment:   [x]  Patient left in no apparent distress sitting up in chair  []  Patient left in no apparent distress in bed  [x]  Call bell left within reach  []  Nursing notified  []  Caregiver present  []  Bed alarm activated    PAUL Salgado  Time Calculation: 15 mins

## 2018-03-15 NOTE — PROGRESS NOTES
Received message auth obtained. [facility] , set for 6pm . Pcs completed envelope in nurses station. Notified alona platar.

## 2018-03-15 NOTE — PROGRESS NOTES
Problem: Falls - Risk of  Goal: *Absence of Falls  Document Malu Fall Risk and appropriate interventions in the flowsheet. Outcome: Progressing Towards Goal  Fall Risk Interventions:       Mentation Interventions: Bed/chair exit alarm, Adequate sleep, hydration, pain control, Evaluate medications/consider consulting pharmacy, Room close to nurse's station, Reorient patient, More frequent rounding    Medication Interventions: Evaluate medications/consider consulting pharmacy, Bed/chair exit alarm    Elimination Interventions:  Toileting schedule/hourly rounds

## 2018-03-15 NOTE — PROGRESS NOTES
Sent 5 messages in edc about auth, uploaded all notes needed. Offered to call cm with jack, they were to get me her name and number. Left  message for phillip burgerit ksenia for [facility]. still have not gotten auth or cm name.

## 2018-03-15 NOTE — DISCHARGE SUMMARY
[facility]    [facility] Summary    Patient: Frederick Yeung MRN: [id number]  CSN: [id number]    YOB: 1920  Age: 80 y.o. Sex: female    DOA: 3/10/2018 LOS:  LOS: 5 days   Discharge Date: 3/15/2018     Admission Diagnoses: UTI (urinary tract infection)  Chronic pain    Discharge Diagnoses:    Problem List as of 3/15/2018  Never Reviewed          Codes Class Noted - Resolved    Advanced care planning/counseling discussion ICD-10-CM: Z71.89  ICD-9-CM: V65.49  3/13/2018 - Present        Alzheimer's disease ICD-10-CM: G30.9  ICD-9-CM: 331.0  3/13/2018 - Present        Sepsis ([facility] ICD-10-CM: A41.9  ICD-9-CM: 038.9, 995.91  3/11/2018 - Present        Chronic obstructive pulmonary disease ([facility] ICD-10-CM: J44.9  ICD-9-CM: 944  3/11/2018 - Present        Chronic pain ICD-10-CM: G89.29  ICD-9-CM: 338.29  3/10/2018 - Present        Debility ICD-10-CM: R53.81  ICD-9-CM: 799.3  11/28/2017 - Present        Fall ICD-10-CM: Mehnaz Conway. Yuvalpieterjennifer Esauma  ICD-9-CM: K405.3  11/28/2017 - Present        Late onset Alzheimer's disease without behavioral disturbance ICD-10-CM: G30.1, F02.80  ICD-9-CM: 331.0, 294.10  11/26/2017 - Present        Essential hypertension ICD-10-CM: I10  ICD-9-CM: 401.9  11/26/2017 - Present        Traumatic rhabdomyolysis ([facility] ICD-10-CM: T79. 6XXA  ICD-9-CM: 958.6  11/25/2017 - Present        Elevated troponin ICD-10-CM: R74.8  ICD-9-CM: 790.6  11/25/2017 - Present        * (Principal)UTI (urinary tract infection) ICD-10-CM: N39.0  ICD-9-CM: 599.0  11/25/2017 - Present        A-fib (HCC) ICD-10-CM: I48.91  ICD-9-CM: 427.31  11/25/2017 - Present        CHF, acute on chronic (HCC) ICD-10-CM: I50.9  ICD-9-CM: 428.0  4/5/2017 - Present              Discharge Condition: Stable    Discharge To: SNF    Consults: Hospitalist and Amparo Course: 81 y/o female with hx of chronic low back pain, HTN, CHF, dementia, COPD, recurrent UTI, total gastrectomy and breast cancer s/p bilateral mastectomy, presented to the ED on 3/10/2018 by family due to weakness and c/o pain.  Pt was active with [facility]. Kaye Silva has a hx of chronic low back pain managed with Fentanyl patch- per family, patch had been removed.  [facility] hospice nurse saw pt and c/o pain, given oral Morphine and the fentanyl patch was placed.  She woke up and was still in pain so family decided to bring her to the ER.  Work up in the ED revealed UTI- UA 3/10/2018 with positive nitrites, small leukocyte esterase, 3+ bacteria.  Blood cultures collected 3/10/2018 aerobic bottle gram negative rods, possible 2nd gram negative rods. Levaquin renally dosed as ordered below for treatment. Per daughter, would like her to be treated with antibiotics and admitted since she was weak. She was started on IV abx and gentle IVF's and admitted for further evaluation.  Palliative care consulted and met with patient's daughter, who is her primary care giver, and is currently undergoing RT for cancer herself. Daughter would like to continue treatment for UTI and transition patient to SNF. PT/OT eval and treats completed and recommended SNF. Patient is appropriate for discharge to SNF. Physical Exam:  General appearance: alert to person and place, cooperative, no distress   Lungs: clear to auscultation throughout, no wheezes   Heart: regular rate and rhythm, S1, S2 normal, no murmur, click, rub or gallop  Abdomen: soft, non tender, non distended. Normoactive bowel sounds  Extremities: extremities normal, atraumatic, no cyanosis or edema  Skin: Skin color, texture, turgor normal. No rashes or lesions  Neurologic: moves all 4 extremities equally   PSY: confused      Significant Diagnostic Studies:     CXR 3/10/2018  IMPRESSION:  1. Probable new small right effusion. Cannot rule out underlying atelectasis or infiltrate.   2. Cannot exclude new left perihilar infiltrate.   3. Comminuted displaced proximal right humerus fracture which is a new finding compared to the most recent exam showing the right shoulder. Acute fracture cannot be excluded    Discharge Medications:     Current Discharge Medication List      START taking these medications    Details   levoFLOXacin (LEVAQUIN) 500 mg tablet Take 1 Tab by mouth every fourty-eight (48) hours. Qty: 10 Tab, Refills: 0         CONTINUE these medications which have CHANGED    Details   metoprolol succinate (TOPROL-XL) 25 mg XL tablet Take 1 Tab by mouth daily. Qty: 30 Tab, Refills: 0         CONTINUE these medications which have NOT CHANGED    Details   cholecalciferol (VITAMIN D3) 1,000 unit tablet Take 2 Tabs by mouth daily. Qty: 60 Tab, Refills: 0      cetirizine (ZYRTEC) 10 mg tablet Take 10 mg by mouth daily as needed. for allergy      acetaminophen (TYLENOL) 500 mg tablet Take 1 Tab by mouth every four (4) hours as needed. Indications: Pain  Qty: 60 Tab, Refills: 0      clopidogrel (PLAVIX) 75 mg tab Take 1 Tab by mouth daily. Qty: 60 Tab, Refills: 0      donepezil (ARICEPT) 5 mg tablet Take 1 Tab by mouth daily. Qty: 60 Tab, Refills: 0      famotidine (PEPCID) 20 mg tablet Take 1 Tab by mouth daily. Qty: 60 Tab, Refills: 0      fluticasone (FLONASE) 50 mcg/actuation nasal spray 2 sprays both nostriles daily  Qty: 1 Bottle, Refills: 0      nitroglycerin (NITROSTAT) 0.4 mg SL tablet 1 Tab by SubLINGual route every five (5) minutes as needed for Chest Pain. Qty: 1 Bottle, Refills: 0      polyethylene glycol (MIRALAX) 17 gram packet Take 1 Packet by mouth daily. Qty: 30 Packet, Refills: 0      simvastatin (ZOCOR) 40 mg tablet Take 1 Tab by mouth nightly. Qty: 60 Tab, Refills: 0      lisinopril (PRINIVIL, ZESTRIL) 10 mg tablet Take 1 Tab by mouth daily.   Qty: 30 Tab, Refills: 0      methylphenidate HCl (RITALIN) 10 mg tablet One pill twice a day as needed  Indications: Narcolepsy Syndrome  Qty: 30 Tab, Refills: 0    Associated Diagnoses: Late onset Alzheimer's disease without behavioral disturbance      mirtazapine (Madelin Feast) 7.5 mg tablet Take 1 Tab by mouth nightly. Qty: 60 Tab, Refills: 0      Vit C-Vit E-Copper-ZnOx-Lutein (PRESERVISION LUTEIN) 226 mg-200 unit -5 mg-0.8 mg cap Take 1 Cap by mouth daily. potassium chloride SR (KLOR-CON 10) 10 mEq tablet Take  by mouth every other day. furosemide (LASIX) 20 mg tablet Take 40 mg by mouth daily. alendronate (FOSAMAX) 40 mg tablet Take 40 mg by mouth every seven (7) days.  70 mg                 Activity: Activity as tolerated and PT/OT Eval and Treat    Diet: Mechanical Soft     Wound Care: None needed    Follow-up: Facility provider on admission  PCP one week after discharge from SNF     Discharge time: > 35 minutes   Stacy Lozano NP  3/15/2018, 11:42 AM

## 2018-03-15 NOTE — PROGRESS NOTES
[facility] to [address] [place] [address] Elena                                                                        80 y.o.   female    [address]   Room: 2101/01    [facility] 2W NEURO MED  Unit Phone# :  [phone] [address] [facility] 2W NEURO MED  [address] [place] [address]  Dept: [phone]  Loc: [phone]                    SITUATION     Admitted:  3/10/2018         Attending Provider:  No att. providers found       Consultations:  IP CONSULT TO [address]    PCP:  Sreedhar Bruce MD   [phone]    Treatment Team: Care Manager: Jose Au, RN; Consulting Provider: Mary Jaime NP; Utilization Review: Jorge L Jiang RN; Nurse Practitioner: Claudeen Combes, NP; Charge Nurse: Sheila Bhandari RN; Physical Therapist: Shaye Hong PT; Occupational Therapy Assistant: Arlan Scheuermann, OT; Care Manager: Joe José    Admitting Dx:  UTI (urinary tract infection)  Chronic pain       Principal Problem: UTI (urinary tract infection)    * No surgery found * of      BY: * Surgery not found *             ON: * No surgery found *                  Code Status: DNR                Advance Directives:   Advance Care Planning 3/11/2018   Patient's Healthcare Decision Maker is: Legal Next of Veronica 69   Primary Decision Maker Name (No Data)   Primary Decision Maker Phone Number [phone] Relationship to Patient Adult child   Confirm Advance Directive Yes, on file   Patient Would Like to Complete Advance Directive -   Does the patient have other document types Other (comment)    (Send w/patient)   Not Received       Isolation:  There are currently no Active Isolations       MDRO: No current active infections    Pain Medications given:  None   Last dose: None Given     Special Equipment needed: no  Type of equipment: None         BACKGROUND     Allergies:   Allergies   Allergen Reactions    Azithromycin Anaphylaxis    Penicillin Anaphylaxis       Past Medical History:   Diagnosis Date    [facility] cancer ([facility]     Chronic obstructive pulmonary disease ([facility]     Dementia     Hypertension        Past Surgical History:   Procedure Laterality Date    HX MASTECTOMY         No prescriptions prior to admission. Hard scripts included in transfer packet no    Vaccinations:    Immunization History   Administered Date(s) Administered    Influenza Vaccine 10/27/2017       Readmission Risks:    Known Risks: Unknown      The Charl[employer] Index tool is an evidenced based tool that has more automatic generated information. The tool looks at many different items such as the age of the patient, how many times they were admitted in the last calendar year, current length of stay in the hospital and their diagnosis. All of these items are pulled automatically from information documented in the chart from various places and will generate a score that predicts whether a patient is at low (less than 13), medium (13-20) or high (21 or greater) risk of being readmitted.         ASSESSMENT                Temp: 97.5 °F (36.4 °C) (03/15/18 1748) Pulse (Heart Rate): 75 (03/15/18 1748)     Resp Rate: 16 (03/15/18 1748)           BP: 162/84 (03/15/18 1748)     O2 Sat (%): 94 % (03/15/18 1748)     Weight: 47.5 kg (104 lb 11.5 oz)    Height: 4' 6\" (137.2 cm) (03/10/18 1931)       If above not within 1 hour of discharge:    BP:_____  P:____  R:____ T:_____ O2 Sat: ___%  O2: ______    Active Orders   Diet    DIET MECHANICAL SOFT         Orientation: only aware of  person     Active Behaviors: None                                   Active Lines/Drains:  (Peg Tube / Perez / CL or S/L?): no    Urinary Status: Voiding, Incontinent     Last BM: Last Bowel Movement Date: 03/15/18     Skin Integrity: Other (comment) (bruising)             Mobility: Very limited   Weight Bearing Status: NWB (Non Weight Bearing)      Gait Training  Assistive Device: Gait belt, Walker, rolling  Ambulation - Level of Assistance: Maximum assistance  Distance (ft): 0 Feet (ft)         Lab Results   Component Value Date/Time    Glucose 60 (L) 03/11/2018 05:45 AM    INR 1.1 11/26/2017 03:35 AM    INR 1.0 11/25/2017 09:11 PM    HGB 12.0 03/11/2018 05:45 AM    HGB 13.6 03/10/2018 07:45 PM        RECOMMENDATION     See After Visit Summary (AVS) for:  · Discharge instructions  · After [address]   · Special equipment needed (entered pre-discharge by Care Management)  · Medication Reconciliation    · Follow up Appointment(s)         Report given/sent by:  Tahir Hedrick RN                    Verbal report given to: Lobito Almaguer.   FAXED to:  [phone]       Estimated discharge time:  3/15/2018 at [address] [place] [address]

## 2018-03-15 NOTE — PROGRESS NOTES
Problem: Mobility Impaired (Adult and Pediatric)  Goal: *Acute Goals and Plan of Care (Insert Text)  Physical Therapy Goals  Initiated 3/12/2018 and to be accomplished within 7 day(s) 03/19/2018  1. Patient will move from supine to sit and sit to supine , scoot up and down and roll side to side in bed with moderate assistance . 2.  Patient will transfer from bed to chair and chair to bed with moderate assistance  using the least restrictive device. 3.  Patient will perform sit to stand with moderate assistance . Goal added: 3/15/2019  4. Patient will ambulate with minimal assistance/contact guard assist for 50 feet with the least restrictive device. Outcome: Progressing Towards Goal  physical Therapy TREATMENT    Patient: Oh Gauthier (70 y.o. female)  Date: 3/15/2018  Diagnosis: UTI (urinary tract infection)  Chronic pain UTI (urinary tract infection)  Precautions: Fall  Chart, physical therapy assessment, plan of care and goals were reviewed. PLOF:  Unknown    ASSESSMENT:  Seated in recliner upon entry. Mod A for scooting to edge of chair d/t short stature. Attempted sit to stand x2 with max A each time. Unable to achieve full standing d/t patient anxiety over falling. Educated on safety in setup with chair behind, walker in front, and PT assist. Continues to be fearful each attempt. Completed seated exercise BLE as documented below. Remained seated in chair at end of session with BLE elevated. Call bell in lap. Verbalized appropriate use of call bell. Oriented to place and person. EDUCATION: bed mobility, transfers, balance, exercise, cognition  Progression toward goals:        Improving appropriately and progressing toward goals     PLAN:  Patient continues to benefit from skilled intervention to address the above impairments. Continue treatment per established plan of care.   Discharge Recommendations:  Rehab  Further Equipment Recommendations for Discharge:  rolling walker     SUBJECTIVE: Patient stated I'm going home today.     OBJECTIVE DATA SUMMARY:   Critical Behavior:  Neurologic State: Alert  Orientation Level: Oriented to person, Oriented to place, Disoriented to time  Cognition: Follows commands, Decreased attention/concentration  Safety/Judgement: Fall prevention    [facility] Balance Scale 2/5  0: Pt performs 25% or less of sitting activity (Max assist) CN, 100% impaired. 1: Pt supports self with upper extremities but requires therapist assistance. Pt performs 25-50% of effort (Mod assist) CM, 80% to <100% impaired. 1+: Pt supports self with upper extremities but requires therapist assistance. Pt performs >50% effort. (Min assist). CL, 60% to <80% impaired. 2: Pt supports self independently with both upper extremities. CL, 60% to <80% impaired. 2+: Pt support self independently with 1 upper extremity. CK, 40% to <60% impaired. 3: Pt sits without upper extremity support for up to 30 seconds. CK, 40% to <60% impaired. 3+: Pt sits without upper extremity support for 30 seconds or greater. CJ, 20% to <40% impaired. 4: Pt moves and returns trunkal midpoint 1-2 inches in one plane. CJ, 20% to <40% impaired. 4+: Pt moves and returns trunkal midpoint 1-2 inches in multiple planes. CI, 1% to <20% impaired. 5: Pt moves and returns trunkal midpoint in all planes greater than 2 inches. CH, 0% impaired. G CODE:Mobility [id number] Current  CL= 60-79%   Goal  CJ= 20-39%.   The severity rating is based on the [employer] Sitting Balance Scale 2/5  Functional Mobility Training:  Bed Mobility:  Supine to Sit:  (seated in recliner)  Sit to Supine:  (seated in recliner)  Transfers:  Sit to Stand: Maximum assistance  Stand to Sit: Maximum assistance  Balance:  Sitting: Impaired  Sitting - Static: Fair (occasional)  Sitting - Dynamic: Fair (occasional)  Standing:  (unable)  Ambulation/Gait Training:  Gait Description (WDL):  (not tested)  Neuro Re-Education:  Seated unsupported 5 minutes  Therapeutic Exercises:   Sit to stand x2  Seated knee extension, heel slides, hip abduction, ankle pumps, hip flexion  Vital Signs  Temp: 98 °F (36.7 °C)     Pulse (Heart Rate): 68     BP: 155/81     Resp Rate: 16     O2 Sat (%): 95 %  Pain:   Pre treatment pain level: 0  Post treatment pain level: 0  Pain Scale 1: Numeric (0 - 10)  Pain Intensity 1: 0  Activity Tolerance:   Fair     After treatment:   Patient left in no apparent distress sitting up in chair  Call bell left within reach  Nursing notified      Jerad Phi, PT   Time Calculation: 10 mins

## 2018-03-15 NOTE — DISCHARGE INSTRUCTIONS
DISCHARGE SUMMARY from Nurse    PATIENT INSTRUCTIONS:    After general anesthesia or intravenous sedation, for 24 hours or while taking prescription Narcotics:  · Limit your activities  · Do not drive and operate hazardous machinery  · Do not make important personal or business decisions  · Do  not drink alcoholic beverages  · If you have not urinated within 8 hours after discharge, please contact your surgeon on call. Report the following to your surgeon:  · Excessive pain, swelling, redness or odor of or around the surgical area  · Temperature over 100.5  · Nausea and vomiting lasting longer than 4 hours or if unable to take medications  · Any signs of decreased circulation or nerve impairment to extremity: change in color, persistent  numbness, tingling, coldness or increase pain  · Any questions    What to do at Home:  Recommended activity: Activity as tolerated    If you experience any of the following symptoms as listed in the discharge instructions as \"When to Call for Help\", please follow up with your primary care physician and/or call 911. *  Please give a list of your current medications to your Primary Care Provider. *  Please update this list whenever your medications are discontinued, doses are      changed, or new medications (including over-the-counter products) are added. *  Please carry medication information at all times in case of emergency situations. These are general instructions for a healthy lifestyle:    No smoking/ No tobacco products/ Avoid exposure to second hand smoke  Surgeon General's Warning:  Quitting smoking now greatly reduces serious risk to your health.     Obesity, smoking, and sedentary lifestyle greatly increases your risk for illness    A healthy diet, regular physical exercise & weight monitoring are important for maintaining a healthy lifestyle    You may be retaining fluid if you have a history of heart failure or if you experience any of the following symptoms:  Weight gain of 3 pounds or more overnight or 5 pounds in a week, increased swelling in our hands or feet or shortness of breath while lying flat in bed. Please call your doctor as soon as you notice any of these symptoms; do not wait until your next office visit. Recognize signs and symptoms of STROKE:    F-face looks uneven    A-arms unable to move or move unevenly    S-speech slurred or non-existent    T-time-call 911 as soon as signs and symptoms begin-DO NOT go       Back to bed or wait to see if you get better-TIME IS BRAIN. Warning Signs of HEART ATTACK     Call 911 if you have these symptoms:   Chest discomfort. Most heart attacks involve discomfort in the center of the chest that lasts more than a few minutes, or that goes away and comes back. It can feel like uncomfortable pressure, squeezing, fullness, or pain.  Discomfort in other areas of the upper body. Symptoms can include pain or discomfort in one or both arms, the back, neck, jaw, or stomach.  Shortness of breath with or without chest discomfort.  Other signs may include breaking out in a cold sweat, nausea, or lightheadedness. Don't wait more than five minutes to call 911 - MINUTES MATTER! Fast action can save your life. Calling 911 is almost always the fastest way to get lifesaving treatment. Emergency Medical Services staff can begin treatment when they arrive -- up to an hour sooner than if someone gets to the hospital by car. The discharge information has been reviewed with the patient, caregiver and SNF. The caregiver and SNF verbalized understanding. Discharge medications reviewed with the patient, caregiver and SNF and appropriate educational materials and side effects teaching were provided.   ___________________________________________________________________________________________________________________________________           Urinary Tract Infection in Women: Care Instructions  Your Care Instructions    A urinary tract infection, or UTI, is a general term for an infection anywhere between the kidneys and the urethra (where urine comes out). Most UTIs are bladder infections. They often cause pain or burning when you urinate. UTIs are caused by bacteria and can be cured with antibiotics. Be sure to complete your treatment so that the infection goes away. Follow-up care is a key part of your treatment and safety. Be sure to make and go to all appointments, and call your doctor if you are having problems. It's also a good idea to know your test results and keep a list of the medicines you take. How can you care for yourself at home? · Take your antibiotics as directed. Do not stop taking them just because you feel better. You need to take the full course of antibiotics. · Drink extra water and other fluids for the next day or two. This may help wash out the bacteria that are causing the infection. (If you have kidney, heart, or liver disease and have to limit fluids, talk with your doctor before you increase your fluid intake.)  · Avoid drinks that are carbonated or have caffeine. They can irritate the bladder. · Urinate often. Try to empty your bladder each time. · To relieve pain, take a hot bath or lay a heating pad set on low over your lower belly or genital area. Never go to sleep with a heating pad in place. To prevent UTIs  · Drink plenty of water each day. This helps you urinate often, which clears bacteria from your system. (If you have kidney, heart, or liver disease and have to limit fluids, talk with your doctor before you increase your fluid intake.)  · Urinate when you need to. · Urinate right after you have sex. · Change sanitary pads often. · Avoid douches, bubble baths, feminine hygiene sprays, and other feminine hygiene products that have deodorants. · After going to the bathroom, wipe from front to back. When should you call for help?   Call your doctor now or seek immediate medical care if:  ? · Symptoms such as fever, chills, nausea, or vomiting get worse or appear for the first time. ? · You have new pain in your back just below your rib cage. This is called flank pain. ? · There is new blood or pus in your urine. ? · You have any problems with your antibiotic medicine. ? Watch closely for changes in your health, and be sure to contact your doctor if:  ? · You are not getting better after taking an antibiotic for 2 days. ? · Your symptoms go away but then come back. Where can you learn more? Go to http://denys-florencio.info/. Enter D101 in the search box to learn more about \"Urinary Tract Infection in Women: Care Instructions. \"  Current as of: May 12, 2017  Content Version: 11.4  © 8353-6221 [employer]. Care instructions adapted under license by [employer] (which disclaims liability or warranty for this information). If you have questions about a medical condition or this instruction, always ask your healthcare professional. [place] [address] any warranty or liability for your use of this information. Helping A Person With Dementia: Care Instructions  Your Care Instructions    Dementia is a loss of mental skills that affects daily life. It is different from mild memory loss that occurs with aging. Dementia can cause problems with memory, thinking clearly, and planning. It is different for everyone. But it usually gets worse slowly. Some people who have dementia can function well for a long time. But at some point it may become hard for the person to care for himself or herself. It can be upsetting to learn that a loved one has this condition. You may be afraid and worried about what will happen. You may wonder how you will care for the person. There is no cure for dementia. But medicine may be able to slow memory loss and improve thinking for a while.  Other medicines may help with sleep, depression, and behavior changes. Dementia is different for everyone. In some cases, people can function well for a long time. You can help your loved one by making his or her home life easier and safer. You also need to take care of yourself. Caregiving can be stressful. But support is available to help you and give you a break when you need it. The Alzheimer's Association offers good information and support. If you are caring for someone with dementia, you can help make life safer and more comfortable. You can also help your loved one make decisions about future care. You may also want to bring up legal and financial issues. These are hard but important conversations to have. Follow-up care is a key part of your loved one's treatment and safety. Be sure to make and go to all appointments, and call your doctor if your loved one is having problems. It's also a good idea to know your loved one's test results and keep a list of the medicines he or she takes. How can you care for your loved one at home? Taking care of the person  · If the person takes medicine for dementia, help him or her take it exactly as prescribed. Call the doctor if you notice any problems with the medicine. · Make a list of the person's medicines. Review it with all of his or her doctors. · Help the person eat a balanced diet. Serve plenty of whole grains, fruits, and vegetables every day. If the person is not hungry at mealtimes, give snacks at midmorning and in the afternoon. Offer drinks such as Boost, Ensure, or Sustacal if the person is losing weight. · Encourage exercise. Walking and other activities may slow the decline of mental ability. Help the person stay active mentally with reading, crossword puzzles, or other hobbies. · Take steps to help if the person is sundowning. This is the restless behavior and trouble with sleeping that may occur in late afternoon and at night. Try not to let the person nap during the day.  Offer a glass of warm milk or caffeine-free tea before bedtime. · Develop a routine. Your loved one will feel less frustrated or confused with a clear, simple plan of what to do every day. · Be patient. A task may take the person longer than it used to. · For as long as he or she is able, allow your loved one to make decisions about activities, food, clothing, and other choices. Let him or her be independent, even if tasks take more time or are not done perfectly. Tailor tasks to the person's abilities. For example, if cooking is no longer safe, ask for other help. Your loved one can help set the table, or make simple dishes such as a salad. When the person needs help, offer it gently. Staying safe  · Make your home (or your loved one's home) safe. Tack down rugs, and put no-slip tape in the tub. Install handrails, and put safety switches on stoves and appliances. Keep rooms free of clutter. Make sure walkways around furniture are clear. Do not move furniture around, because the person may become confused. · Use locks on doors and cupboards. Lock up knives, scissors, medicines, cleaning supplies, and other dangerous things. · Do not let the person drive or cook if he or she can't do it safely. A person with dementia should not drive unless he or she is able to pass an on-road driving test. Your state 's license bureau can do a driving test if there is any question. · Get [facility] for the person so that you can be contacted if he or she wanders away. If possible, provide a safe place for wandering, such as an enclosed yard or garden. Taking care of yourself  · Ask your doctor about support groups and other resources in your area. · Take care of your health. Be sure to eat healthy foods and get enough rest and exercise. · Take time for yourself. Respite services provide someone to stay with the person for a short time while you get out of the house for a few hours.   · Make time for an activity that you enjoy. Read, listen to music, paint, do crafts, or play an instrument, even if it's only for a few minutes a day. · Spend time with family, friends, and others in your support system. When should you call for help? Call 911 anytime you think the person may need emergency care. For example, call if:  ? · The person who has dementia wanders away and you can't find him or her. ? · The person who has dementia is seriously injured. ?Call the doctor now or seek immediate medical care if:  ? · The person suddenly sees things that are not there (hallucinates). ? · The person has a sudden change in his or her behavior. ? Watch closely for changes in the person's health, and be sure to contact the doctor if:  ? · The person has symptoms that could cause injury. ? · The person has problems with his or her medicine. ? · You need more information to care for a person with dementia. ? · You need respite care so you can take a break. Where can you learn more? Go to http://denys-florencio.info/. Enter U713 in the search box to learn more about \"Helping A Person With Dementia: Care Instructions. \"  Current as of: May 12, 2017  Content Version: 11.4  © 9314-3823 [employer]. Care instructions adapted under license by [employer] (which disclaims liability or warranty for this information). If you have questions about a medical condition or this instruction, always ask your healthcare professional. [place] [address] any warranty or liability for your use of this information. Patient discharged without removing armband and transfered to another healthcare acute, sub acute , or extended care facility. Informed of privacy risks if armband lost or stolen.

## 2018-03-15 NOTE — PROGRESS NOTES
Problem: Falls - Risk of  Goal: *Absence of Falls  Document Malu Fall Risk and appropriate interventions in the flowsheet. Outcome: Progressing Towards Goal  Fall Risk Interventions:       Mentation Interventions: Bed/chair exit alarm    Medication Interventions: Bed/chair exit alarm    Elimination Interventions:  Toileting schedule/hourly rounds

## 2018-03-15 NOTE — PROGRESS NOTES
Spoke with sherlyn, pts dtr, confirmed with her pt is going to [facility] when [place] obtained for therapy. She is in agreement.

## 2018-03-15 NOTE — PROGRESS NOTES
Pt placed on will call , pend auth today to jade [facility]. pcs completed, envelope in nurses station.

## 2018-03-15 NOTE — PROGRESS NOTES
Pt discharge home, accompanied by [facility] via stretcher. Pt has all discharge instructions and belongings. Voiced understanding of all discharge instructions.

## 2018-03-15 NOTE — PROGRESS NOTES
1940: Bedside verbal shift report received from Alf villasenor, [address] [place] [address]. Pt is awake in bed, no signs of distress, instructed to press call light if assistance is needed, call light within reach. 2230: Incontinence care provided, samirck changed. 0530: Transferred from bed to recliner. Incontinence care provided. Bedside and Verbal shift change report given to Alf villasenor RN (oncoming nurse) by Maddie Soler RN (offgoing nurse). Report included the following information SBAR, Kardex, Intake/Output, MAR and Recent Results.

## 2018-03-15 NOTE — PROGRESS NOTES
Patient received in chair awake. Patient alert and oriented to self, denies pain and discomfort. Patient resting quietly. Frequent use items within reach. Bed locked in low position. Call bell within reach.

## 2019-03-20 NOTE — ROUTINE PROCESS
2105 Bedside and Verbal shift change  Received from  Janice Macias [facility] (outgoing nurse), to KORY Tyler (oncoming)  Pt. Is AOX 3. IV SL, Pt. denies  pain at this time. Report included the following information SBAR, Kardex, Procedure Summary, Intake/Output, MAR, Recent Lab Results, and  Cardiac Rhythm @ A flutter. Will resume care and monitor Pt. Condition. Pt. Educated on call bell when in need of help and assistance. Pt unable to comprehend education. Pt. Head to toe Assessment Done and documented. 2200  Pt.transfer to bed.    2315  Pt. Resting in bed with eyes closed. 0100  Pt. Made no complaints. 0300  Pt. Resting in bed comfortably, no sign of distress. 0500 Pt able to rest well throughout the shift. 0630  Pt. Made no complaints nor showing any distress. I have  discussed with  resident and agree with the findings and plan as documented.

## 2020-01-23 NOTE — PROGRESS NOTES
Internal Medicine Progress Note    Patient's Name: yEad Blankenship  Admit Date: 3/10/2018  Length of Stay: 1      Assessment/Plan     C/Herminia Epps [address] Problems    Diagnosis Date Noted    Sepsis ([facility] [address] 03/11/2018    Chronic obstructive pulmonary disease ([facility] [address] [facility] 03/11/2018    Chronic pain 03/10/2018    Debility 11/28/2017    Essential hypertension 11/26/2017    Late onset Alzheimer's disease without behavioral disturbance 11/26/2017    UTI (urinary tract infection) 11/25/2017    A-fib ([facility] [address] 11/25/2017     - Remains on comfort care otherwise  - Cont IVAB  - F/u urine/blood cx  - Discuss with case mgmt  - Cont acceptable home medications for chronic conditions   - DVT protocol    I have personally reviewed all pertinent labs and films that have officially resulted over the last 24 hours. I have personally checked for all pending labs that are awaiting final results.     Subjective     Pt s/e @ bedside  No major events overnight  Pt with dementia  Denies any issues otherwise    Objective     Visit Vitals    BP 99/60 (BP 1 Location: Left arm, BP Patient Position: At rest)    Pulse 91    Temp 98.7 °F (37.1 °C)    Resp 16    Wt 46.9 kg (103 lb 6.4 oz)    SpO2 93%    BMI 24.93 kg/m2       Physical Exam:  General Appearance: NAD, dementia  Lungs: CTA with normal respiratory effort  CV: RRR, no m/r/g  Abdomen: soft, non-tender, normal bowel sounds  Extremities: no cyanosis, no peripheral edema  Neuro: No focal deficits, motor/sensory intact    Lab/Data Reviewed:  BMP:   Lab Results   Component Value Date/Time     03/11/2018 05:45 AM    K 3.5 03/11/2018 05:45 AM     (H) 03/11/2018 05:45 AM    CO2 23 03/11/2018 05:45 AM    AGAP 12 03/11/2018 05:45 AM    GLU 60 (L) 03/11/2018 05:45 AM    BUN 34 (H) 03/11/2018 05:45 AM    CREA 1.03 03/11/2018 05:45 AM    GFRAA >60 03/11/2018 05:45 AM    GFRNA 50 (L) 03/11/2018 05:45 AM     CBC:   Lab Results   Component Value Date/Time    WBC 17.5 (H) 03/11/2018 05:45 AM HGB 12.0 03/11/2018 05:45 AM    HCT 37.2 03/11/2018 05:45 AM     03/11/2018 05:45 AM       Imaging Reviewed:  No results found.     Medications Reviewed:  Current Facility-Administered Medications   Medication Dose Route Frequency    [START ON 3/12/2018] levoFLOXacin (LEVAQUIN) 500 mg in D5W IVPB  500 mg IntraVENous Q48H    sodium chloride (NS) flush 5-10 mL  5-10 mL IntraVENous PRN    acetaminophen (TYLENOL) tablet 650 mg  650 mg Oral Q6H PRN    0.9% sodium chloride infusion  50 mL/hr IntraVENous CONTINUOUS    morphine injection 1 mg  1 mg IntraVENous Q4H PRN    ondansetron (ZOFRAN) injection 4 mg  4 mg IntraVENous Q6H PRN           Milan Angel DO  Internal Medicine, Hospitalist  Pager: [phone]  [address] [place] [address] Drive Group 24-Jan-2020 00:17:42

## 2023-11-02 NOTE — ROUTINE PROCESS
Bedside shift change report given to [address] (oncoming nurse) by Nereida Hernandez RN (offgoing nurse). Report included the following information SBAR, Kardex, MAR and Recent Results. VISUALLY CHECKED PT Q 1 HR BY NURSING STAFF. 24 hour order chart check done 132

## 2024-02-21 NOTE — DISCHARGE INSTRUCTIONS
Patient armband removed and shredded. DISCHARGE SUMMARY from Nurse    The following personal items are in your possession at time of discharge:    Dental Appliances: At home  Visual Aid: Glasses, At bedside, With patient     Home Medications: None  Jewelry: Ring (sent home)  Clothing: None (sent home)  Other Valuables: None  Personal Items Sent to Safe: none          PATIENT INSTRUCTIONS:    After general anesthesia or intravenous sedation, for 24 hours or while taking prescription Narcotics:  · Limit your activities  · Do not drive and operate hazardous machinery  · Do not make important personal or business decisions  · Do  not drink alcoholic beverages  · If you have not urinated within 8 hours after discharge, please contact your surgeon on call. Report the following to your surgeon:  · Excessive pain, swelling, redness or odor of or around the surgical area  · Temperature over 100.5  · Nausea and vomiting lasting longer than 4 hours or if unable to take medications  · Any signs of decreased circulation or nerve impairment to extremity: change in color, persistent  numbness, tingling, coldness or increase pain  · Any questions        What to do at Home:  Recommended activity: Activity as tolerated. If you experience any of the following symptoms shortness of breath, difficulty breathing, fever greater than 100.5, nausea, vomiting, diarrhea, bleeding, weight gain of 5 pounds or more in one week, change in mental status or increasing leg pain please follow up with your primary care provider or call 911. *  Please give a list of your current medications to your Primary Care Provider. *  Please update this list whenever your medications are discontinued, doses are      changed, or new medications (including over-the-counter products) are added. *  Please carry medication information at all times in case of emergency situations.           These are general instructions for a healthy lifestyle:    No smoking/ No tobacco products/ Avoid exposure to second hand smoke    Surgeon General's Warning:  Quitting smoking now greatly reduces serious risk to your health. Obesity, smoking, and sedentary lifestyle greatly increases your risk for illness    A healthy diet, regular physical exercise & weight monitoring are important for maintaining a healthy lifestyle    You may be retaining fluid if you have a history of heart failure or if you experience any of the following symptoms:  Weight gain of 3 pounds or more overnight or 5 pounds in a week, increased swelling in our hands or feet or shortness of breath while lying flat in bed. Please call your doctor as soon as you notice any of these symptoms; do not wait until your next office visit. Recognize signs and symptoms of STROKE:    F-face looks uneven    A-arms unable to move or move unevenly    S-speech slurred or non-existent    T-time-call 911 as soon as signs and symptoms begin-DO NOT go       Back to bed or wait to see if you get better-TIME IS BRAIN. Warning Signs of HEART ATTACK     Call 911 if you have these symptoms:   Chest discomfort. Most heart attacks involve discomfort in the center of the chest that lasts more than a few minutes, or that goes away and comes back. It can feel like uncomfortable pressure, squeezing, fullness, or pain.  Discomfort in other areas of the upper body. Symptoms can include pain or discomfort in one or both arms, the back, neck, jaw, or stomach.  Shortness of breath with or without chest discomfort.  Other signs may include breaking out in a cold sweat, nausea, or lightheadedness. Don't wait more than five minutes to call 911 - MINUTES MATTER! Fast action can save your life. Calling 911 is almost always the fastest way to get lifesaving treatment. Emergency Medical Services staff can begin treatment when they arrive -- up to an hour sooner than if someone gets to the hospital by car.        The discharge information has been reviewed with the patient and caregiver. The patient and caregiver verbalized understanding. Discharge medications reviewed with the patient and guardian and appropriate educational materials and side effects teaching were provided. patient

## 2024-10-12 NOTE — IP AVS SNAPSHOT
[address] [address] 
[phone] Patient: Sandee Sharma MRN: [id number] [facility]:[id number] A check tash indicates which time of day the medication should be taken. My Medications START taking these medications Instructions Each Dose to Equal  
 Morning Noon Evening Bedtime  
 sulfamethoxazole-trimethoprim 200-40 mg/5 mL suspension Commonly known as:  BACTRIM;SEPTRA Your last dose was: Your next dose is: Take 10 mL by mouth two (2) times a day for 10 days. 10 mL CONTINUE taking these medications Instructions Each Dose to Equal  
 Morning Noon Evening Bedtime  
 acetaminophen 500 mg tablet Commonly known as:  TYLENOL Your last dose was: Your next dose is: Take 1 Tab by mouth every four (4) hours as needed. Indications: Pain 500 mg  
    
   
   
   
  
 alendronate 40 mg tablet Commonly known as:  FOSAMAX Your last dose was: Your next dose is: Take 40 mg by mouth every seven (7) days. 70 mg  
 40 mg  
    
   
   
   
  
 cetirizine 10 mg tablet Commonly known as:  ZYRTEC Your last dose was: Your next dose is: Take 10 mg by mouth daily as needed. for allergy 10 mg  
    
   
   
   
  
 cholecalciferol 1,000 unit tablet Commonly known as:  VITAMIN D3 Your last dose was: Your next dose is: Take 2 Tabs by mouth daily. 2000 Units  
    
   
   
   
  
 clopidogrel 75 mg Tab Commonly known as:  PLAVIX Your last dose was: Your next dose is: Take 1 Tab by mouth daily. 75 mg  
    
   
   
   
  
 donepezil 5 mg tablet Commonly known as:  ARICEPT Your last dose was: Your next dose is: Take 1 Tab by mouth daily. 5 mg  
    
   
   
   
  
 famotidine 20 mg tablet Commonly known as:  PEPCID Your last dose was: Your next dose is: Take 1 Tab by mouth daily. 20 mg  
    
   
   
   
  
 fluticasone 50 mcg/actuation nasal spray Commonly known as:  Garcia Fuentes Your last dose was: Your next dose is:    
   
   
 2 sprays both nostriles daily LASIX 20 mg tablet Generic drug:  furosemide Your last dose was: Your next dose is: Take 40 mg by mouth daily. 40 mg  
    
   
   
   
  
 lisinopril 10 mg tablet Commonly known as:  Luetta Mandarrylnijennifera Your last dose was: Your next dose is: Take 1 Tab by mouth daily. 10 mg  
    
   
   
   
  
 methylphenidate HCl 10 mg tablet Commonly known as:  RITALIN Your last dose was: Your next dose is: One pill twice a day as needed  Indications: Narcolepsy Syndrome  
     
   
   
   
  
 metoprolol succinate 25 mg XL tablet Commonly known as:  TOPROL-XL Your last dose was: Your next dose is: Take 1 Tab by mouth daily. 25 mg  
    
   
   
   
  
 mirtazapine 7.5 mg tablet Commonly known as:  Von Torrez Your last dose was: Your next dose is: Take 1 Tab by mouth nightly. 7.5 mg  
    
   
   
   
  
 nitroglycerin 0.4 mg SL tablet Commonly known as:  NITROSTAT Your last dose was: Your next dose is:    
   
   
 1 Tab by SubLINGual route every five (5) minutes as needed for Chest Pain. 0.4 mg  
    
   
   
   
  
 polyethylene glycol 17 gram packet Commonly known as:  Eddie [place] Your last dose was: Your next dose is: Take 1 Packet by mouth daily. 17 g  
    
   
   
   
  
 potassium chloride SR 10 mEq tablet Commonly known as:  KLOR-CON 10 Your last dose was: Your next dose is: Take  by mouth every other day. PRESERVISION LUTEIN 226 mg-200 unit -5 mg-0.8 mg Cap Generic drug:  Vit C-Vit E-Copper-ZnOx-Lutein Your last dose was: Your next dose is: Take 1 Cap by mouth daily. 1 Cap  
    
   
   
   
  
 simvastatin 40 mg tablet Commonly known as:  ZOCOR Your last dose was: Your next dose is: Take 1 Tab by mouth nightly. 40 mg Where to Get Your Medications Information on where to get these meds will be given to you by the nurse or doctor. ! Ask your nurse or doctor about these medications  
  metoprolol succinate 25 mg XL tablet  
 sulfamethoxazole-trimethoprim 200-40 mg/5 mL suspension No

## 2024-12-09 NOTE — PROGRESS NOTES
Problem: Self Care Deficits Care Plan (Adult)  Goal: *Acute Goals and Plan of Care (Insert Text)  OCCUPATIONAL THERAPY SHORT TERM GOALS      Updated 12/8/17     1. Patient will perform Upper body bathing with contact guard assist and dressing with minimal assist without adaptive equipment. 2.  Patient will perform Lower body bathing with minimal assist and dressing with maximal assist with adaptive equipment as needed. 3.  Patient will perform toileting task with contact guard assist with clothing management and Supervision with hygeine with Good safety to reduce falls risk. 4.  Patient will perform toilet transfers with Rachel Reges and standby assistance . 5. Patient will perform standing static/dynamic balance activities for improved ADL/IADL function with moderate assistance and Good balance and safety awareness. 6.  Patient will improve Barthel index scores to atleast 65/100 to improve functional mobility. 7. Patient will perform self feeding and set up with adaptive equipment with Mod I.    Updated 12/8/17    1. Patient will perform Upper body ADL's without adaptive equipment with moderate assistance. - - progressing w/ bathing, met goal with dressing/upgrade  2. Patient will perform Lower body ADL's with adaptive equipment as needed with maximal assistance. - progressing w/ dressing, met goal with bathing/upgrade  3. Patient will perform toileting task with maximal assistance with Good safety to reduce falls risk. - met goal upgrade  4. Patient will perform toilet transfers with Rolling Walker and moderate assistance . - met goal/upgrade  5. Patient will perform standing static/dynamic balance activities for improved ADL/IADL function with moderate assistance and Good balance and safety awareness. -porgressing  6. Patient will improve Barthel index scores to atleast 35/100 to improve functional mobility. -pmet goal/upgrade  7.  Patient will perform self feeding and set up with adaptive equipment with Mod I. - progressing  Initiated 12/1/2017 and to be accomplished within 2 Week(s)    1. Patient will perform Upper body ADL's without adaptive equipment with moderate assistance . 2.  Patient will perform Lower body ADL's with adaptive equipment as needed with maximal assistance . 3. Patient will perform toileting task with maximal assistance with Good safety to reduce falls risk. 4.  Patient will perform toilet transfers with Rolling Walker and moderate assistance . 5. Patient will perform standing static/dynamic balance activities for improved ADL/IADL function with moderate assistance and Good balance and safety awareness. 6.  Patient will improve Barthel index scores to atleast 35/100 to improve functional mobility. 7. Patient will perform self feeding and set up with adaptive equipment with Mod I.      OCCUPATIONAL THERAPY LONG TERM GOALS   Initiated 12/1/2017 and to be accomplished within 4 Week(s)    1. Patient will perform Upper body ADL's without adaptive equipment with modified independence. 2.  Patient will perform Lower body ADL's with adaptive equipment as needed with modified independence . 3. Patient will perform toileting task with modified independence with Good safety to reduce falls risk. 4.  Patient will perform all functional transfers utilizing least restrictive device and durable medical equipment as needed with modified independence and Good balance and safety awareness. 5.  Patient will perform standing static/dynamic activity for improved ADL/IADL function with modified independence an and Good balance and safety awareness. 6.  Patient will improve Barthel index score to 95/100 to improve independence with mobility.       Therapist: Ang Banuelos    12/1/2017            [facility]   Occupational Therapy Daily Treatment note      Patient: Sharon Singh (6 y.o. female)       Date: 12/12/2017  Attending Physician: Mary Murillo MD  Primary Diagnosis: Traumatic rhabdomyolysis     Treatment Diagnosis  Treatment Diagnosis: muscle weakness   Treatment Diagnosis 2: difficulty in walking   Precautions : Precautions at Admission: Fall  Vital Signs:        Cognitive Status:     Pain:        Gross Assessment:     Coordination:     Bed Mobility:     Transfers:  Functional Transfers  Sit to Stand: Contact guard assistance; Additional time  Stand to Sit: Contact guard assistance     Balance:  Balance  Sitting: Intact  Sitting - Static: Good (unsupported)  Sitting - Dynamic: Fair (occasional) (+)  Standing: With support  Standing - Static: Fair  Standing - Dynamic : Fair  ADL Self Care:     ADL Intervention:                                Therapeutic Activities:  w/c management: patient presents with hips anteriorly sliding forward in high back reclining w/c with standard leg rests and seat cushion, therapist issued Dysem on top of seat cushion in order to prevent further anterior sliding forward of hips, noted Dysem already intact between seat cushion and w/c seat to prevent sliding of cushion, patient repositioned x 2 in order to scoot hips back in w/c, nursing/Carmen notified of modifications w/ w/c e.g. Dysem placement. Patient tolerated BUE reaching crossing midline and overhead, AAROM and as tolerated w/ RUE with noted improvement for increased shoulder flexion AROM ~ 120 degrees, for placement of pegs into vertical peg board while seated w/c level in order to improve functional reach for improved ADL performance skills and functional transfers. Patient is c/o fatigue this date and previous day, nursing/Carmen notified of patient's increased level of fatigue in inhibiting [progress with rehab. Patient/Caregiver Education:     Pt.  Instructed on safety awareness with importance to utilize call bell to request assist with functional transfers to prevent falls, patient verbalized understanding and provided accurate demonstration, w/c tab alarm intact.     ASSESSMENT:  Patient continues to demonstrate the need for skilled Occupational Therapy services to improve   grooming, bathing, upper body dressing, lower body dressing, toileting and toilet transfer  Progression toward goals:  []      Improving appropriately and progressing toward goals  [x]      Improving slowly and progressing toward goals  []      Not making progress toward goals and plan of care will be adjusted     Treatment session:   49 minutes    Therapist:    Yeimi Chapman,  12/12/2017 Opt out